# Patient Record
Sex: FEMALE | Race: WHITE | HISPANIC OR LATINO
[De-identification: names, ages, dates, MRNs, and addresses within clinical notes are randomized per-mention and may not be internally consistent; named-entity substitution may affect disease eponyms.]

---

## 2017-02-15 ENCOUNTER — LABORATORY RESULT (OUTPATIENT)
Age: 32
End: 2017-02-15

## 2017-02-15 ENCOUNTER — APPOINTMENT (OUTPATIENT)
Dept: INTERNAL MEDICINE | Facility: CLINIC | Age: 32
End: 2017-02-15

## 2017-02-15 VITALS
BODY MASS INDEX: 36.69 KG/M2 | TEMPERATURE: 98.5 F | SYSTOLIC BLOOD PRESSURE: 104 MMHG | HEART RATE: 101 BPM | HEIGHT: 59 IN | DIASTOLIC BLOOD PRESSURE: 70 MMHG | WEIGHT: 182 LBS

## 2017-02-15 DIAGNOSIS — Z86.39 PERSONAL HISTORY OF OTHER ENDOCRINE, NUTRITIONAL AND METABOLIC DISEASE: ICD-10-CM

## 2017-02-16 LAB
25(OH)D3 SERPL-MCNC: 20.1 NG/ML
ALBUMIN SERPL ELPH-MCNC: 4.1 G/DL
ALP BLD-CCNC: 190 U/L
ALT SERPL-CCNC: 43 U/L
ANION GAP SERPL CALC-SCNC: 16 MMOL/L
APPEARANCE: ABNORMAL
AST SERPL-CCNC: 48 U/L
BASOPHILS # BLD AUTO: 0.02 K/UL
BASOPHILS NFR BLD AUTO: 0.3 %
BILIRUB SERPL-MCNC: 0.3 MG/DL
BILIRUBIN URINE: ABNORMAL
BLOOD URINE: ABNORMAL
BUN SERPL-MCNC: 7 MG/DL
CALCIUM SERPL-MCNC: 9.7 MG/DL
CARDIOLIPIN AB SER IA-ACNC: NEGATIVE
CHLORIDE SERPL-SCNC: 103 MMOL/L
CHOLEST SERPL-MCNC: 135 MG/DL
CHOLEST/HDLC SERPL: 3.6 RATIO
CO2 SERPL-SCNC: 24 MMOL/L
COLOR: ABNORMAL
CREAT SERPL-MCNC: 0.77 MG/DL
DSDNA AB SER-ACNC: <12 IU/ML
EOSINOPHIL # BLD AUTO: 0.03 K/UL
EOSINOPHIL NFR BLD AUTO: 0.5 %
FERRITIN SERPL-MCNC: 16.1 NG/ML
GLUCOSE QUALITATIVE U: NORMAL MG/DL
GLUCOSE SERPL-MCNC: 91 MG/DL
HBA1C MFR BLD HPLC: 5.6 %
HCT VFR BLD CALC: 35.8 %
HDLC SERPL-MCNC: 37 MG/DL
HGB BLD-MCNC: 10.8 G/DL
IMM GRANULOCYTES NFR BLD AUTO: 0.3 %
IRON SATN MFR SERPL: 6 %
IRON SERPL-MCNC: 25 UG/DL
KETONES URINE: NEGATIVE
LDLC SERPL CALC-MCNC: 84 MG/DL
LEUKOCYTE ESTERASE URINE: ABNORMAL
LYMPHOCYTES # BLD AUTO: 1.79 K/UL
LYMPHOCYTES NFR BLD AUTO: 28.7 %
MAN DIFF?: NORMAL
MCHC RBC-ENTMCNC: 23.5 PG
MCHC RBC-ENTMCNC: 30.2 GM/DL
MCV RBC AUTO: 78 FL
MEV IGG FLD QL IA: 95.5 AU/ML
MEV IGG+IGM SER-IMP: POSITIVE
MITOCHONDRIA AB SER IF-ACNC: NORMAL
MONOCYTES # BLD AUTO: 0.4 K/UL
MONOCYTES NFR BLD AUTO: 6.4 %
MUV AB SER-ACNC: POSITIVE
MUV IGG SER QL IA: 90.2 AU/ML
NEUTROPHILS # BLD AUTO: 3.98 K/UL
NEUTROPHILS NFR BLD AUTO: 63.8 %
NITRITE URINE: NEGATIVE
PH URINE: 5
PLATELET # BLD AUTO: 333 K/UL
POTASSIUM SERPL-SCNC: 3.8 MMOL/L
PROT SERPL-MCNC: 7.7 G/DL
PROTEIN URINE: 100 MG/DL
RBC # BLD: 4.59 M/UL
RBC # FLD: 17.8 %
RUBV IGG FLD-ACNC: 3.2 INDEX
RUBV IGG SER-IMP: POSITIVE
SMOOTH MUSCLE AB SER QL IF: ABNORMAL
SODIUM SERPL-SCNC: 143 MMOL/L
SPECIFIC GRAVITY URINE: 1.02
T PALLIDUM AB SER QL IA: NEGATIVE
THYROGLOB AB SERPL-ACNC: 25.9 IU/ML
THYROPEROXIDASE AB SERPL IA-ACNC: 386 IU/ML
TIBC SERPL-MCNC: 390 UG/DL
TRIGL SERPL-MCNC: 72 MG/DL
TSH SERPL-ACNC: 2.05 UIU/ML
UIBC SERPL-MCNC: 365 UG/DL
UROBILINOGEN URINE: NORMAL MG/DL
VZV AB TITR SER: POSITIVE
VZV IGG SER IF-ACNC: 2576 INDEX
WBC # FLD AUTO: 6.24 K/UL

## 2017-02-17 LAB
ANA PAT FLD IF-IMP: ABNORMAL
ANA SER IF-ACNC: ABNORMAL
CENTROMERE IGG SER-ACNC: <0.2 AL
ENA RNP AB SER IA-ACNC: 0.6 AL
ENA SCL70 IGG SER IA-ACNC: <0.2 AL
ENA SM AB SER IA-ACNC: <0.2 AL
ENA SS-A AB SER IA-ACNC: <0.2 AL
ENA SS-B AB SER IA-ACNC: 0.3 AL
FRUCTOSAMINE SERPL-MCNC: 238 UMOL/L

## 2017-02-21 LAB — LKM AB SER QL IF: 0.2 UNITS

## 2017-02-22 ENCOUNTER — OTHER (OUTPATIENT)
Age: 32
End: 2017-02-22

## 2017-03-01 ENCOUNTER — APPOINTMENT (OUTPATIENT)
Dept: CHRONIC DISEASE MANAGEMENT | Facility: CLINIC | Age: 32
End: 2017-03-01

## 2017-03-20 ENCOUNTER — APPOINTMENT (OUTPATIENT)
Dept: RHEUMATOLOGY | Facility: CLINIC | Age: 32
End: 2017-03-20

## 2017-03-20 VITALS
SYSTOLIC BLOOD PRESSURE: 100 MMHG | DIASTOLIC BLOOD PRESSURE: 64 MMHG | HEART RATE: 92 BPM | BODY MASS INDEX: 35.88 KG/M2 | WEIGHT: 178 LBS | HEIGHT: 59 IN | TEMPERATURE: 98.4 F

## 2017-03-20 DIAGNOSIS — Z87.39 PERSONAL HISTORY OF OTHER DISEASES OF THE MUSCULOSKELETAL SYSTEM AND CONNECTIVE TISSUE: ICD-10-CM

## 2017-03-21 LAB
FOLATE SERPL-MCNC: 14.4 NG/ML
VIT B12 SERPL-MCNC: 482 PG/ML

## 2017-03-22 LAB
B2 GLYCOPROT1 IGA SERPL IA-ACNC: <5 SAU
B2 GLYCOPROT1 IGG SER-ACNC: <5 SGU
B2 GLYCOPROT1 IGM SER-ACNC: <5 SMU
CONFIRM: 28.3 SEC
DRVVT IMM 1:2 NP PPP: NORMAL
DRVVT SCREEN TO CONFIRM RATIO: 1.01 RATIO
SCREEN DRVVT: 32.2 SEC
SILICA CLOTTING TIME INTERPRETATION: NORMAL
SILICA CLOTTING TIME S/C: 1.1 RATIO

## 2017-03-30 ENCOUNTER — APPOINTMENT (OUTPATIENT)
Dept: INTERNAL MEDICINE | Facility: CLINIC | Age: 32
End: 2017-03-30

## 2017-03-30 VITALS
DIASTOLIC BLOOD PRESSURE: 74 MMHG | WEIGHT: 180 LBS | BODY MASS INDEX: 36.36 KG/M2 | SYSTOLIC BLOOD PRESSURE: 104 MMHG | TEMPERATURE: 98.7 F | HEART RATE: 102 BPM

## 2017-03-31 LAB
HAV IGG+IGM SER QL: NONREACTIVE
HBV CORE IGG+IGM SER QL: NONREACTIVE
HBV SURFACE AB SER QL: REACTIVE
HBV SURFACE AB SERPL IA-ACNC: >1000 MIU/ML
HCG SERPL-MCNC: <1 MIU/ML

## 2017-04-03 ENCOUNTER — APPOINTMENT (OUTPATIENT)
Dept: RHEUMATOLOGY | Facility: CLINIC | Age: 32
End: 2017-04-03

## 2017-05-15 ENCOUNTER — APPOINTMENT (OUTPATIENT)
Dept: RHEUMATOLOGY | Facility: CLINIC | Age: 32
End: 2017-05-15

## 2017-05-15 VITALS
SYSTOLIC BLOOD PRESSURE: 110 MMHG | OXYGEN SATURATION: 99 % | HEART RATE: 77 BPM | HEIGHT: 59 IN | RESPIRATION RATE: 16 BRPM | BODY MASS INDEX: 36.69 KG/M2 | TEMPERATURE: 98.9 F | DIASTOLIC BLOOD PRESSURE: 60 MMHG | WEIGHT: 182 LBS

## 2017-05-16 ENCOUNTER — APPOINTMENT (OUTPATIENT)
Dept: INTERNAL MEDICINE | Facility: CLINIC | Age: 32
End: 2017-05-16

## 2017-05-31 ENCOUNTER — TRANSCRIPTION ENCOUNTER (OUTPATIENT)
Age: 32
End: 2017-05-31

## 2017-06-16 ENCOUNTER — APPOINTMENT (OUTPATIENT)
Dept: RHEUMATOLOGY | Facility: CLINIC | Age: 32
End: 2017-06-16

## 2017-07-31 ENCOUNTER — APPOINTMENT (OUTPATIENT)
Dept: RHEUMATOLOGY | Facility: CLINIC | Age: 32
End: 2017-07-31
Payer: MEDICAID

## 2017-07-31 VITALS
WEIGHT: 187 LBS | HEART RATE: 104 BPM | TEMPERATURE: 98.6 F | RESPIRATION RATE: 18 BRPM | DIASTOLIC BLOOD PRESSURE: 62 MMHG | OXYGEN SATURATION: 99 % | SYSTOLIC BLOOD PRESSURE: 94 MMHG | BODY MASS INDEX: 37.77 KG/M2

## 2017-07-31 PROCEDURE — 99214 OFFICE O/P EST MOD 30 MIN: CPT

## 2017-08-01 ENCOUNTER — TRANSCRIPTION ENCOUNTER (OUTPATIENT)
Age: 32
End: 2017-08-01

## 2017-08-01 LAB
ALBUMIN SERPL ELPH-MCNC: 4 G/DL
ALP BLD-CCNC: 159 U/L
ALT SERPL-CCNC: 11 U/L
ANION GAP SERPL CALC-SCNC: 15 MMOL/L
AST SERPL-CCNC: 15 U/L
BASOPHILS # BLD AUTO: 0.01 K/UL
BASOPHILS NFR BLD AUTO: 0.1 %
BILIRUB SERPL-MCNC: 0.3 MG/DL
BUN SERPL-MCNC: 10 MG/DL
CALCIUM SERPL-MCNC: 8.8 MG/DL
CHLORIDE SERPL-SCNC: 104 MMOL/L
CO2 SERPL-SCNC: 23 MMOL/L
CREAT SERPL-MCNC: 0.79 MG/DL
CRP SERPL-MCNC: 1.1 MG/DL
EOSINOPHIL # BLD AUTO: 0.06 K/UL
EOSINOPHIL NFR BLD AUTO: 0.8 %
ERYTHROCYTE [SEDIMENTATION RATE] IN BLOOD BY WESTERGREN METHOD: 18 MM/HR
GLUCOSE SERPL-MCNC: 89 MG/DL
HCT VFR BLD CALC: 35.7 %
HGB BLD-MCNC: 11.2 G/DL
IMM GRANULOCYTES NFR BLD AUTO: 0.1 %
LYMPHOCYTES # BLD AUTO: 1.64 K/UL
LYMPHOCYTES NFR BLD AUTO: 23 %
MAN DIFF?: NORMAL
MCHC RBC-ENTMCNC: 24.5 PG
MCHC RBC-ENTMCNC: 31.4 GM/DL
MCV RBC AUTO: 77.9 FL
MONOCYTES # BLD AUTO: 0.42 K/UL
MONOCYTES NFR BLD AUTO: 5.9 %
NEUTROPHILS # BLD AUTO: 4.99 K/UL
NEUTROPHILS NFR BLD AUTO: 70.1 %
PLATELET # BLD AUTO: 257 K/UL
POTASSIUM SERPL-SCNC: 4.6 MMOL/L
PROT SERPL-MCNC: 7.4 G/DL
RBC # BLD: 4.58 M/UL
RBC # FLD: 16.5 %
SODIUM SERPL-SCNC: 142 MMOL/L
WBC # FLD AUTO: 7.13 K/UL

## 2017-09-06 ENCOUNTER — APPOINTMENT (OUTPATIENT)
Dept: INTERNAL MEDICINE | Facility: CLINIC | Age: 32
End: 2017-09-06

## 2017-09-18 ENCOUNTER — APPOINTMENT (OUTPATIENT)
Dept: RHEUMATOLOGY | Facility: CLINIC | Age: 32
End: 2017-09-18
Payer: MEDICAID

## 2017-09-18 VITALS
DIASTOLIC BLOOD PRESSURE: 60 MMHG | HEART RATE: 80 BPM | TEMPERATURE: 98.3 F | OXYGEN SATURATION: 99 % | RESPIRATION RATE: 18 BRPM | BODY MASS INDEX: 38.3 KG/M2 | SYSTOLIC BLOOD PRESSURE: 94 MMHG | HEIGHT: 59 IN | WEIGHT: 190 LBS

## 2017-09-18 PROCEDURE — 99213 OFFICE O/P EST LOW 20 MIN: CPT

## 2017-10-05 LAB
ALP BLD-CCNC: 156 U/L
ALP BONE CFR SERPL: 31 %
ALP INTEST CFR SERPL: 8 %
ALP LIVER CFR SERPL: 61 %
ALP MACRO CFR SERPL: 0 %
ALP PLAC CFR SERPL: 0 %

## 2017-10-23 ENCOUNTER — LABORATORY RESULT (OUTPATIENT)
Age: 32
End: 2017-10-23

## 2017-10-23 ENCOUNTER — OTHER (OUTPATIENT)
Age: 32
End: 2017-10-23

## 2017-10-23 ENCOUNTER — RX RENEWAL (OUTPATIENT)
Age: 32
End: 2017-10-23

## 2017-10-23 ENCOUNTER — APPOINTMENT (OUTPATIENT)
Dept: INTERNAL MEDICINE | Facility: CLINIC | Age: 32
End: 2017-10-23
Payer: MEDICAID

## 2017-10-23 VITALS
BODY MASS INDEX: 37.5 KG/M2 | DIASTOLIC BLOOD PRESSURE: 72 MMHG | SYSTOLIC BLOOD PRESSURE: 114 MMHG | TEMPERATURE: 98.5 F | HEART RATE: 92 BPM | WEIGHT: 186 LBS | HEIGHT: 59 IN | OXYGEN SATURATION: 99 %

## 2017-10-23 PROCEDURE — 99215 OFFICE O/P EST HI 40 MIN: CPT

## 2017-10-23 RX ORDER — FERROUS SULFATE TAB EC 324 MG (65 MG FE EQUIVALENT) 324 (65 FE) MG
324 (65 FE) TABLET DELAYED RESPONSE ORAL
Qty: 60 | Refills: 3 | Status: COMPLETED | COMMUNITY
Start: 2017-02-16 | End: 2017-10-23

## 2017-10-23 RX ORDER — HYDROXYCHLOROQUINE SULFATE 200 MG/1
200 TABLET, FILM COATED ORAL TWICE DAILY
Qty: 60 | Refills: 1 | Status: COMPLETED | COMMUNITY
Start: 2017-03-20 | End: 2017-10-23

## 2017-10-23 RX ORDER — CHOLECALCIFEROL (VITAMIN D3) 1250 MCG
1.25 MG CAPSULE ORAL
Qty: 4 | Refills: 0 | Status: COMPLETED | COMMUNITY
Start: 2017-03-20 | End: 2017-10-23

## 2017-10-23 RX ORDER — CYCLOBENZAPRINE HYDROCHLORIDE 5 MG/1
5 TABLET, FILM COATED ORAL
Qty: 30 | Refills: 1 | Status: COMPLETED | COMMUNITY
Start: 2017-03-20 | End: 2017-10-23

## 2017-10-23 RX ORDER — CHOLECALCIFEROL (VITAMIN D3) 1250 MCG
1.25 MG CAPSULE ORAL
Qty: 8 | Refills: 0 | Status: COMPLETED | COMMUNITY
Start: 2017-02-16 | End: 2017-10-23

## 2017-10-24 LAB
25(OH)D3 SERPL-MCNC: 36.7 NG/ML
ALBUMIN SERPL ELPH-MCNC: 4.2 G/DL
ALP BLD-CCNC: 175 U/L
ALT SERPL-CCNC: 14 U/L
ANION GAP SERPL CALC-SCNC: 14 MMOL/L
AST SERPL-CCNC: 8 U/L
BASOPHILS # BLD AUTO: 0.02 K/UL
BASOPHILS NFR BLD AUTO: 0.2 %
BILIRUB SERPL-MCNC: 0.2 MG/DL
BUN SERPL-MCNC: 8 MG/DL
CALCIUM SERPL-MCNC: 9.3 MG/DL
CHLORIDE SERPL-SCNC: 103 MMOL/L
CO2 SERPL-SCNC: 24 MMOL/L
CREAT SERPL-MCNC: 0.78 MG/DL
EOSINOPHIL # BLD AUTO: 0.04 K/UL
EOSINOPHIL NFR BLD AUTO: 0.5 %
FOLATE SERPL-MCNC: 10.5 NG/ML
GGT SERPL-CCNC: 27 U/L
GLUCOSE SERPL-MCNC: 82 MG/DL
HCT VFR BLD CALC: 33.7 %
HGB BLD-MCNC: 10.7 G/DL
IMM GRANULOCYTES NFR BLD AUTO: 0.1 %
LYMPHOCYTES # BLD AUTO: 1.87 K/UL
LYMPHOCYTES NFR BLD AUTO: 23.2 %
MAN DIFF?: NORMAL
MCHC RBC-ENTMCNC: 24.5 PG
MCHC RBC-ENTMCNC: 31.8 GM/DL
MCV RBC AUTO: 77.1 FL
MONOCYTES # BLD AUTO: 0.55 K/UL
MONOCYTES NFR BLD AUTO: 6.8 %
NEUTROPHILS # BLD AUTO: 5.57 K/UL
NEUTROPHILS NFR BLD AUTO: 69.2 %
PLATELET # BLD AUTO: 270 K/UL
POTASSIUM SERPL-SCNC: 4.1 MMOL/L
PROT SERPL-MCNC: 7.5 G/DL
RBC # BLD: 4.37 M/UL
RBC # FLD: 16.1 %
SODIUM SERPL-SCNC: 141 MMOL/L
VIT B12 SERPL-MCNC: 356 PG/ML
WBC # FLD AUTO: 8.06 K/UL

## 2017-11-20 ENCOUNTER — APPOINTMENT (OUTPATIENT)
Dept: RHEUMATOLOGY | Facility: CLINIC | Age: 32
End: 2017-11-20

## 2017-11-23 ENCOUNTER — TRANSCRIPTION ENCOUNTER (OUTPATIENT)
Age: 32
End: 2017-11-23

## 2017-12-04 ENCOUNTER — APPOINTMENT (OUTPATIENT)
Dept: RHEUMATOLOGY | Facility: CLINIC | Age: 32
End: 2017-12-04
Payer: MEDICAID

## 2017-12-04 VITALS
HEART RATE: 103 BPM | DIASTOLIC BLOOD PRESSURE: 64 MMHG | HEIGHT: 59 IN | TEMPERATURE: 98.4 F | BODY MASS INDEX: 36.69 KG/M2 | WEIGHT: 182 LBS | SYSTOLIC BLOOD PRESSURE: 100 MMHG | RESPIRATION RATE: 18 BRPM | OXYGEN SATURATION: 99 %

## 2017-12-04 PROCEDURE — 99213 OFFICE O/P EST LOW 20 MIN: CPT

## 2017-12-18 ENCOUNTER — APPOINTMENT (OUTPATIENT)
Dept: INTERNAL MEDICINE | Facility: CLINIC | Age: 32
End: 2017-12-18
Payer: MEDICAID

## 2017-12-18 ENCOUNTER — APPOINTMENT (OUTPATIENT)
Dept: RHEUMATOLOGY | Facility: CLINIC | Age: 32
End: 2017-12-18

## 2017-12-18 VITALS
OXYGEN SATURATION: 99 % | SYSTOLIC BLOOD PRESSURE: 100 MMHG | BODY MASS INDEX: 38.3 KG/M2 | HEIGHT: 59 IN | DIASTOLIC BLOOD PRESSURE: 70 MMHG | TEMPERATURE: 98.1 F | WEIGHT: 190 LBS | HEART RATE: 70 BPM

## 2017-12-18 DIAGNOSIS — N64.4 MASTODYNIA: ICD-10-CM

## 2017-12-18 PROCEDURE — G0008: CPT

## 2017-12-18 PROCEDURE — 99214 OFFICE O/P EST MOD 30 MIN: CPT | Mod: 25

## 2017-12-18 PROCEDURE — 90674 CCIIV4 VAC NO PRSV 0.5 ML IM: CPT

## 2017-12-18 RX ORDER — FERROUS SULFATE TAB EC 325 MG (65 MG FE EQUIVALENT) 325 (65 FE) MG
325 (65 FE) TABLET DELAYED RESPONSE ORAL
Qty: 60 | Refills: 6 | Status: DISCONTINUED | COMMUNITY
Start: 2017-10-24 | End: 2017-12-18

## 2018-02-05 ENCOUNTER — APPOINTMENT (OUTPATIENT)
Dept: RHEUMATOLOGY | Facility: CLINIC | Age: 33
End: 2018-02-05
Payer: MEDICAID

## 2018-02-05 VITALS
RESPIRATION RATE: 18 BRPM | SYSTOLIC BLOOD PRESSURE: 106 MMHG | OXYGEN SATURATION: 96 % | TEMPERATURE: 97.9 F | HEART RATE: 102 BPM | HEIGHT: 59 IN | WEIGHT: 189 LBS | DIASTOLIC BLOOD PRESSURE: 70 MMHG | BODY MASS INDEX: 38.1 KG/M2

## 2018-02-05 PROCEDURE — 99213 OFFICE O/P EST LOW 20 MIN: CPT

## 2018-02-12 ENCOUNTER — LABORATORY RESULT (OUTPATIENT)
Age: 33
End: 2018-02-12

## 2018-02-12 ENCOUNTER — APPOINTMENT (OUTPATIENT)
Dept: INTERNAL MEDICINE | Facility: CLINIC | Age: 33
End: 2018-02-12
Payer: MEDICAID

## 2018-02-12 VITALS
HEART RATE: 100 BPM | WEIGHT: 185 LBS | SYSTOLIC BLOOD PRESSURE: 118 MMHG | HEIGHT: 59 IN | TEMPERATURE: 97.8 F | OXYGEN SATURATION: 98 % | DIASTOLIC BLOOD PRESSURE: 74 MMHG | BODY MASS INDEX: 37.29 KG/M2

## 2018-02-12 LAB
ALBUMIN SERPL ELPH-MCNC: 4.4 G/DL
ALP BLD-CCNC: 169 U/L
ALT SERPL-CCNC: 23 U/L
ANION GAP SERPL CALC-SCNC: 16 MMOL/L
APPEARANCE: ABNORMAL
AST SERPL-CCNC: 16 U/L
BASOPHILS # BLD AUTO: 0.02 K/UL
BASOPHILS NFR BLD AUTO: 0.2 %
BILIRUB SERPL-MCNC: 0.2 MG/DL
BILIRUBIN URINE: NEGATIVE
BLOOD URINE: NEGATIVE
BUN SERPL-MCNC: 9 MG/DL
CALCIUM SERPL-MCNC: 9.4 MG/DL
CHLORIDE SERPL-SCNC: 101 MMOL/L
CHOLEST SERPL-MCNC: 154 MG/DL
CHOLEST/HDLC SERPL: 2.8 RATIO
CO2 SERPL-SCNC: 23 MMOL/L
COLOR: YELLOW
CREAT SERPL-MCNC: 0.79 MG/DL
EOSINOPHIL # BLD AUTO: 0.04 K/UL
EOSINOPHIL NFR BLD AUTO: 0.4 %
FERRITIN SERPL-MCNC: 13 NG/ML
FOLATE SERPL-MCNC: 9.2 NG/ML
FRUCTOSAMINE SERPL-MCNC: 218 UMOL/L
GLUCOSE QUALITATIVE U: NEGATIVE MG/DL
GLUCOSE SERPL-MCNC: 103 MG/DL
HBA1C MFR BLD HPLC: 5.5 %
HCT VFR BLD CALC: 36.1 %
HDLC SERPL-MCNC: 55 MG/DL
HGB BLD-MCNC: 11 G/DL
IMM GRANULOCYTES NFR BLD AUTO: 0.2 %
IRON SATN MFR SERPL: 6 %
IRON SERPL-MCNC: 28 UG/DL
KETONES URINE: NEGATIVE
LDLC SERPL CALC-MCNC: 83 MG/DL
LEUKOCYTE ESTERASE URINE: ABNORMAL
LYMPHOCYTES # BLD AUTO: 1.91 K/UL
LYMPHOCYTES NFR BLD AUTO: 20.7 %
MAN DIFF?: NORMAL
MCHC RBC-ENTMCNC: 23.8 PG
MCHC RBC-ENTMCNC: 30.5 GM/DL
MCV RBC AUTO: 78 FL
MONOCYTES # BLD AUTO: 0.42 K/UL
MONOCYTES NFR BLD AUTO: 4.5 %
NEUTROPHILS # BLD AUTO: 6.83 K/UL
NEUTROPHILS NFR BLD AUTO: 74 %
NITRITE URINE: NEGATIVE
PH URINE: 8
PLATELET # BLD AUTO: 321 K/UL
POTASSIUM SERPL-SCNC: 4.2 MMOL/L
PROT SERPL-MCNC: 7.8 G/DL
PROTEIN URINE: NEGATIVE MG/DL
RBC # BLD: 4.63 M/UL
RBC # FLD: 15.5 %
SODIUM SERPL-SCNC: 140 MMOL/L
SPECIFIC GRAVITY URINE: 1.02
TIBC SERPL-MCNC: 456 UG/DL
TRIGL SERPL-MCNC: 79 MG/DL
TSH SERPL-ACNC: 1.38 UIU/ML
UIBC SERPL-MCNC: 428 UG/DL
UROBILINOGEN URINE: 1 MG/DL
VIT B12 SERPL-MCNC: 438 PG/ML
WBC # FLD AUTO: 9.24 K/UL

## 2018-02-12 PROCEDURE — 99395 PREV VISIT EST AGE 18-39: CPT | Mod: 25

## 2018-02-12 PROCEDURE — 99401 PREV MED CNSL INDIV APPRX 15: CPT

## 2018-02-13 LAB — 25(OH)D3 SERPL-MCNC: 26.4 NG/ML

## 2018-05-07 ENCOUNTER — APPOINTMENT (OUTPATIENT)
Dept: RHEUMATOLOGY | Facility: CLINIC | Age: 33
End: 2018-05-07
Payer: MEDICAID

## 2018-05-07 VITALS
TEMPERATURE: 98.7 F | HEIGHT: 59 IN | OXYGEN SATURATION: 99 % | HEART RATE: 92 BPM | RESPIRATION RATE: 18 BRPM | DIASTOLIC BLOOD PRESSURE: 64 MMHG | SYSTOLIC BLOOD PRESSURE: 88 MMHG | BODY MASS INDEX: 37.9 KG/M2 | WEIGHT: 188 LBS

## 2018-05-07 PROCEDURE — 99214 OFFICE O/P EST MOD 30 MIN: CPT

## 2018-05-08 LAB
ALBUMIN SERPL ELPH-MCNC: 3.9 G/DL
ALP BLD-CCNC: 175 U/L
ALT SERPL-CCNC: 11 U/L
ANION GAP SERPL CALC-SCNC: 16 MMOL/L
AST SERPL-CCNC: 15 U/L
BASOPHILS # BLD AUTO: 0.03 K/UL
BASOPHILS NFR BLD AUTO: 0.3 %
BILIRUB SERPL-MCNC: 0.2 MG/DL
BUN SERPL-MCNC: 12 MG/DL
CALCIUM SERPL-MCNC: 9.6 MG/DL
CHLORIDE SERPL-SCNC: 103 MMOL/L
CO2 SERPL-SCNC: 22 MMOL/L
CREAT SERPL-MCNC: 0.73 MG/DL
EOSINOPHIL # BLD AUTO: 0.06 K/UL
EOSINOPHIL NFR BLD AUTO: 0.7 %
GLUCOSE SERPL-MCNC: 88 MG/DL
HCT VFR BLD CALC: 35.7 %
HGB BLD-MCNC: 10.7 G/DL
IMM GRANULOCYTES NFR BLD AUTO: 0.2 %
LYMPHOCYTES # BLD AUTO: 2.24 K/UL
LYMPHOCYTES NFR BLD AUTO: 25.1 %
MAN DIFF?: NORMAL
MCHC RBC-ENTMCNC: 24 PG
MCHC RBC-ENTMCNC: 30 GM/DL
MCV RBC AUTO: 80.2 FL
MONOCYTES # BLD AUTO: 0.45 K/UL
MONOCYTES NFR BLD AUTO: 5.1 %
NEUTROPHILS # BLD AUTO: 6.11 K/UL
NEUTROPHILS NFR BLD AUTO: 68.6 %
PLATELET # BLD AUTO: 303 K/UL
POTASSIUM SERPL-SCNC: 4 MMOL/L
PROT SERPL-MCNC: 7.5 G/DL
RBC # BLD: 4.45 M/UL
RBC # FLD: 16.6 %
SODIUM SERPL-SCNC: 141 MMOL/L
WBC # FLD AUTO: 8.91 K/UL

## 2018-06-11 ENCOUNTER — APPOINTMENT (OUTPATIENT)
Dept: INTERNAL MEDICINE | Facility: CLINIC | Age: 33
End: 2018-06-11
Payer: MEDICAID

## 2018-06-11 VITALS
OXYGEN SATURATION: 98 % | DIASTOLIC BLOOD PRESSURE: 68 MMHG | WEIGHT: 183 LBS | RESPIRATION RATE: 16 BRPM | SYSTOLIC BLOOD PRESSURE: 110 MMHG | HEIGHT: 59 IN | TEMPERATURE: 98.5 F | BODY MASS INDEX: 36.89 KG/M2 | HEART RATE: 80 BPM

## 2018-06-11 PROCEDURE — 99214 OFFICE O/P EST MOD 30 MIN: CPT

## 2018-06-11 NOTE — COUNSELING
[Healthy eating counseling provided] : healthy eating [Weight Self Once Weekly] : Weight self once weekly [Keep Food Diary] : Keep food diary [Low Fat Diet] : Low fat diet [Decrease Portions] : Decrease food portions [Low Salt Diet] : Low salt diet [___ min/wk activity recommended] : [unfilled] min/wk activity recommended [Walking] : Walking [Sleep ___ hours/day] : Sleep [unfilled] hours/day [Engage in a relaxing activity] : Engage in a relaxing activity [Plan in advance] : Plan in advance [None] : None [Good understanding] : Patient has a good understanding of lifestyle changes and the steps needed to achieve self management goals

## 2018-06-11 NOTE — HEALTH RISK ASSESSMENT
[] : No [No falls in past year] : Patient reported no falls in the past year [0] : 2) Feeling down, depressed, or hopeless: Not at all (0) [[id number]] : 0

## 2018-06-11 NOTE — ASSESSMENT
[FreeTextEntry1] : anemia -  pt has iron def and is taking her iron .  Fibromyalgia-  she is feeling much better.  She will continue to exercise and lose weight  and healthy eating avoid processed foods.  she should avoid  canned foods and eat raw and healthy foods.  She can decrease her gluten in her diet  since some studies have shown improvement of her fibromyalgia.  She has lost 5 lbs .

## 2018-07-02 ENCOUNTER — APPOINTMENT (OUTPATIENT)
Dept: RHEUMATOLOGY | Facility: CLINIC | Age: 33
End: 2018-07-02
Payer: MEDICAID

## 2018-07-02 VITALS
OXYGEN SATURATION: 99 % | HEART RATE: 72 BPM | RESPIRATION RATE: 16 BRPM | DIASTOLIC BLOOD PRESSURE: 70 MMHG | TEMPERATURE: 98.6 F | BODY MASS INDEX: 36.69 KG/M2 | SYSTOLIC BLOOD PRESSURE: 120 MMHG | WEIGHT: 182 LBS | HEIGHT: 59 IN

## 2018-07-02 PROCEDURE — 99213 OFFICE O/P EST LOW 20 MIN: CPT

## 2018-07-02 RX ORDER — MILNACIPRAN HYDROCHLORIDE 25 MG/1
25 TABLET, FILM COATED ORAL
Qty: 60 | Refills: 2 | Status: COMPLETED | COMMUNITY
Start: 2017-07-31 | End: 2018-07-02

## 2018-08-17 ENCOUNTER — TRANSCRIPTION ENCOUNTER (OUTPATIENT)
Age: 33
End: 2018-08-17

## 2018-10-01 ENCOUNTER — APPOINTMENT (OUTPATIENT)
Dept: RHEUMATOLOGY | Facility: CLINIC | Age: 33
End: 2018-10-01

## 2018-10-04 ENCOUNTER — EMERGENCY (EMERGENCY)
Facility: HOSPITAL | Age: 33
LOS: 1 days | Discharge: HOME | End: 2018-10-04
Attending: EMERGENCY MEDICINE
Payer: SELF-PAY

## 2018-10-04 VITALS
TEMPERATURE: 98 F | SYSTOLIC BLOOD PRESSURE: 133 MMHG | DIASTOLIC BLOOD PRESSURE: 90 MMHG | OXYGEN SATURATION: 100 % | RESPIRATION RATE: 18 BRPM | HEART RATE: 106 BPM

## 2018-10-04 VITALS — HEIGHT: 59 IN | WEIGHT: 184.97 LBS

## 2018-10-04 PROCEDURE — 99284 EMERGENCY DEPT VISIT MOD MDM: CPT

## 2018-10-04 PROCEDURE — 99283 EMERGENCY DEPT VISIT LOW MDM: CPT | Mod: 25

## 2018-10-04 PROCEDURE — 72040 X-RAY EXAM NECK SPINE 2-3 VW: CPT

## 2018-10-04 PROCEDURE — 72040 X-RAY EXAM NECK SPINE 2-3 VW: CPT | Mod: 26

## 2018-10-04 RX ORDER — IBUPROFEN 200 MG
600 TABLET ORAL ONCE
Qty: 0 | Refills: 0 | Status: COMPLETED | OUTPATIENT
Start: 2018-10-04 | End: 2018-10-04

## 2018-10-04 RX ORDER — IBUPROFEN 200 MG
1 TABLET ORAL
Qty: 28 | Refills: 0
Start: 2018-10-04 | End: 2018-10-10

## 2018-10-04 RX ORDER — METHOCARBAMOL 500 MG/1
1 TABLET, FILM COATED ORAL
Qty: 21 | Refills: 0
Start: 2018-10-04 | End: 2018-10-10
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
## 2018-10-08 ENCOUNTER — APPOINTMENT (OUTPATIENT)
Dept: RHEUMATOLOGY | Facility: CLINIC | Age: 33
End: 2018-10-08
Payer: MEDICAID

## 2018-10-08 ENCOUNTER — LABORATORY RESULT (OUTPATIENT)
Age: 33
End: 2018-10-08

## 2018-10-08 VITALS
BODY MASS INDEX: 36.49 KG/M2 | HEART RATE: 76 BPM | OXYGEN SATURATION: 100 % | HEIGHT: 59 IN | RESPIRATION RATE: 16 BRPM | TEMPERATURE: 99.1 F | WEIGHT: 181 LBS | DIASTOLIC BLOOD PRESSURE: 70 MMHG | SYSTOLIC BLOOD PRESSURE: 115 MMHG

## 2018-10-08 DIAGNOSIS — R53.81 OTHER MALAISE: ICD-10-CM

## 2018-10-08 PROCEDURE — 99214 OFFICE O/P EST MOD 30 MIN: CPT

## 2018-10-10 ENCOUNTER — RX RENEWAL (OUTPATIENT)
Age: 33
End: 2018-10-10

## 2018-10-10 LAB
25(OH)D3 SERPL-MCNC: 31.8 NG/ML
ALBUMIN SERPL ELPH-MCNC: 4.4 G/DL
ALP BLD-CCNC: 176 U/L
ALT SERPL-CCNC: 10 U/L
ANA PAT FLD IF-IMP: ABNORMAL
ANA PATTERN: ABNORMAL
ANA SER IF-ACNC: ABNORMAL
ANA TITER: ABNORMAL
ANION GAP SERPL CALC-SCNC: 14 MMOL/L
APPEARANCE: ABNORMAL
AST SERPL-CCNC: 17 U/L
BASOPHILS # BLD AUTO: 0.01 K/UL
BASOPHILS NFR BLD AUTO: 0.1 %
BILIRUB SERPL-MCNC: 0.4 MG/DL
BILIRUBIN URINE: NEGATIVE
BLOOD URINE: NEGATIVE
BUN SERPL-MCNC: 8 MG/DL
C3 SERPL-MCNC: 164 MG/DL
C4 SERPL-MCNC: 52 MG/DL
CALCIUM SERPL-MCNC: 9.6 MG/DL
CCP AB SER IA-ACNC: <8 UNITS
CHLORIDE SERPL-SCNC: 102 MMOL/L
CO2 SERPL-SCNC: 24 MMOL/L
COLOR: YELLOW
CREAT SERPL-MCNC: 0.76 MG/DL
CREAT SPEC-SCNC: 69 MG/DL
CREAT/PROT UR: 0.1 RATIO
CRP SERPL-MCNC: 1.01 MG/DL
DEPRECATED KAPPA LC FREE/LAMBDA SER: 1.01 RATIO
DSDNA AB SER-ACNC: <12 IU/ML
ENA RNP AB SER IA-ACNC: 0.2 AL
ENA SM AB SER IA-ACNC: <0.2 AL
ENA SS-A AB SER IA-ACNC: <0.2 AL
ENA SS-B AB SER IA-ACNC: 0.2 AL
EOSINOPHIL # BLD AUTO: 0.04 K/UL
EOSINOPHIL NFR BLD AUTO: 0.5 %
ERYTHROCYTE [SEDIMENTATION RATE] IN BLOOD BY WESTERGREN METHOD: 28 MM/HR
FERRITIN SERPL-MCNC: 11 NG/ML
FOLATE SERPL-MCNC: 10.9 NG/ML
GLUCOSE QUALITATIVE U: NEGATIVE MG/DL
GLUCOSE SERPL-MCNC: 68 MG/DL
HBV CORE IGG+IGM SER QL: NONREACTIVE
HBV SURFACE AB SER QL: REACTIVE
HBV SURFACE AG SER QL: NONREACTIVE
HCT VFR BLD CALC: 37.2 %
HCV AB SER QL: NONREACTIVE
HCV S/CO RATIO: 0.22 S/CO
HGB BLD-MCNC: 11 G/DL
IGA SER QL IEP: 292 MG/DL
IGG SER QL IEP: 1408 MG/DL
IGM SER QL IEP: 98 MG/DL
IMM GRANULOCYTES NFR BLD AUTO: 0.2 %
IRON SATN MFR SERPL: 5 %
IRON SERPL-MCNC: 23 UG/DL
KAPPA LC CSF-MCNC: 1.82 MG/DL
KAPPA LC SERPL-MCNC: 1.83 MG/DL
KETONES URINE: NEGATIVE
LEUKOCYTE ESTERASE URINE: ABNORMAL
LYMPHOCYTES # BLD AUTO: 1.95 K/UL
LYMPHOCYTES NFR BLD AUTO: 22.6 %
M TB IFN-G BLD-IMP: NEGATIVE
MAN DIFF?: NORMAL
MCHC RBC-ENTMCNC: 23.1 PG
MCHC RBC-ENTMCNC: 29.6 GM/DL
MCV RBC AUTO: 78.2 FL
MONOCYTES # BLD AUTO: 0.28 K/UL
MONOCYTES NFR BLD AUTO: 3.2 %
NEUTROPHILS # BLD AUTO: 6.34 K/UL
NEUTROPHILS NFR BLD AUTO: 73.4 %
NITRITE URINE: NEGATIVE
PH URINE: 7
PLATELET # BLD AUTO: 347 K/UL
POTASSIUM SERPL-SCNC: 4.2 MMOL/L
PROT SERPL-MCNC: 7.9 G/DL
PROT UR-MCNC: 7 MG/DL
PROTEIN URINE: NEGATIVE MG/DL
QUANTIFERON TB PLUS MITOGEN MINUS NIL: 7.99 IU/ML
QUANTIFERON TB PLUS NIL: 0.04 IU/ML
QUANTIFERON TB PLUS TB1 MINUS NIL: 0 IU/ML
QUANTIFERON TB PLUS TB2 MINUS NIL: 0 IU/ML
RBC # BLD: 4.76 M/UL
RBC # FLD: 17.6 %
RF+CCP IGG SER-IMP: NEGATIVE
RHEUMATOID FACT SER QL: <10 IU/ML
SMOOTH MUSCLE AB SER QL IF: ABNORMAL
SODIUM SERPL-SCNC: 140 MMOL/L
SPECIFIC GRAVITY URINE: 1.01
TIBC SERPL-MCNC: 485 UG/DL
TSH SERPL-ACNC: 2.07 UIU/ML
UIBC SERPL-MCNC: 462 UG/DL
UROBILINOGEN URINE: NEGATIVE MG/DL
VIT B12 SERPL-MCNC: 416 PG/ML
WBC # FLD AUTO: 8.64 K/UL

## 2018-10-11 ENCOUNTER — TRANSCRIPTION ENCOUNTER (OUTPATIENT)
Age: 33
End: 2018-10-11

## 2018-10-12 ENCOUNTER — RX RENEWAL (OUTPATIENT)
Age: 33
End: 2018-10-12

## 2018-10-15 ENCOUNTER — APPOINTMENT (OUTPATIENT)
Dept: INTERNAL MEDICINE | Facility: CLINIC | Age: 33
End: 2018-10-15
Payer: MEDICAID

## 2018-10-15 VITALS
DIASTOLIC BLOOD PRESSURE: 70 MMHG | SYSTOLIC BLOOD PRESSURE: 110 MMHG | WEIGHT: 184 LBS | TEMPERATURE: 98.6 F | OXYGEN SATURATION: 99 % | HEIGHT: 59 IN | HEART RATE: 89 BPM | BODY MASS INDEX: 37.09 KG/M2

## 2018-10-15 PROCEDURE — G0008: CPT

## 2018-10-15 PROCEDURE — 99214 OFFICE O/P EST MOD 30 MIN: CPT | Mod: 25

## 2018-10-15 PROCEDURE — 90686 IIV4 VACC NO PRSV 0.5 ML IM: CPT

## 2018-10-15 RX ORDER — MILNACIPRAN HYDROCHLORIDE 50 MG/1
50 TABLET, FILM COATED ORAL
Qty: 30 | Refills: 2 | Status: DISCONTINUED | COMMUNITY
Start: 2018-05-07 | End: 2018-10-15

## 2018-10-15 NOTE — HEALTH RISK ASSESSMENT
[No falls in past year] : Patient reported no falls in the past year [0] : 2) Feeling down, depressed, or hopeless: Not at all (0) [] : No [[id number]] : 0

## 2018-10-15 NOTE — HISTORY OF PRESENT ILLNESS
[FreeTextEntry1] : iron def  RA [de-identified] : Pt has been seeing the rheumatologist and has been diagnosed with RA and is receiving medication. She has iron def anemia due to menorrhagia and cant tolerate oral iron and is to have iron infusions.

## 2018-10-15 NOTE — ASSESSMENT
[FreeTextEntry1] : Arthritis Pt will be starting Orencia and I gave her flu vaccine to day. I discussed other vaccines such as pneumonia and she will discuss with her  rheumatologist.  She also has menorrhagia and iron dsef and cant tolerate oral iron and will obtain infusion two doses.    She is taking prednisone and tolerating it .

## 2018-10-15 NOTE — COUNSELING
[Weight management counseling provided] : Weight management [Healthy eating counseling provided] : healthy eating [Weight Self Once Weekly] : Weight self once weekly [Keep Food Diary] : Keep food diary [Low Fat Diet] : Low fat diet [Decrease Portions] : Decrease food portions [Low Salt Diet] : Low salt diet [___ min/wk activity recommended] : [unfilled] min/wk activity recommended [Walking] : Walking [Sleep ___ hours/day] : Sleep [unfilled] hours/day [Engage in a relaxing activity] : Engage in a relaxing activity [Plan in advance] : Plan in advance [None] : None [Good understanding] : Patient has a good understanding of lifestyle changes and the steps needed to achieve self management goals

## 2018-10-16 ENCOUNTER — RX RENEWAL (OUTPATIENT)
Age: 33
End: 2018-10-16

## 2018-10-16 RX ORDER — ABATACEPT 125 MG/ML
125 INJECTION, SOLUTION SUBCUTANEOUS
Qty: 1 | Refills: 4 | Status: DISCONTINUED | COMMUNITY
Start: 2018-10-12 | End: 2018-10-16

## 2018-10-25 ENCOUNTER — TRANSCRIPTION ENCOUNTER (OUTPATIENT)
Age: 33
End: 2018-10-25

## 2018-10-26 ENCOUNTER — TRANSCRIPTION ENCOUNTER (OUTPATIENT)
Age: 33
End: 2018-10-26

## 2018-10-28 ENCOUNTER — TRANSCRIPTION ENCOUNTER (OUTPATIENT)
Age: 33
End: 2018-10-28

## 2018-10-29 ENCOUNTER — APPOINTMENT (OUTPATIENT)
Dept: RHEUMATOLOGY | Facility: CLINIC | Age: 33
End: 2018-10-29
Payer: MEDICAID

## 2018-10-29 VITALS
HEART RATE: 109 BPM | SYSTOLIC BLOOD PRESSURE: 120 MMHG | DIASTOLIC BLOOD PRESSURE: 83 MMHG | OXYGEN SATURATION: 97 % | BODY MASS INDEX: 36.69 KG/M2 | TEMPERATURE: 98.3 F | RESPIRATION RATE: 16 BRPM | WEIGHT: 182 LBS | HEIGHT: 59 IN

## 2018-10-29 PROCEDURE — 99214 OFFICE O/P EST MOD 30 MIN: CPT

## 2018-11-02 ENCOUNTER — TRANSCRIPTION ENCOUNTER (OUTPATIENT)
Age: 33
End: 2018-11-02

## 2018-11-05 ENCOUNTER — RX RENEWAL (OUTPATIENT)
Age: 33
End: 2018-11-05

## 2018-11-05 ENCOUNTER — TRANSCRIPTION ENCOUNTER (OUTPATIENT)
Age: 33
End: 2018-11-05

## 2018-11-30 ENCOUNTER — TRANSCRIPTION ENCOUNTER (OUTPATIENT)
Age: 33
End: 2018-11-30

## 2018-12-02 ENCOUNTER — TRANSCRIPTION ENCOUNTER (OUTPATIENT)
Age: 33
End: 2018-12-02

## 2018-12-03 ENCOUNTER — TRANSCRIPTION ENCOUNTER (OUTPATIENT)
Age: 33
End: 2018-12-03

## 2018-12-20 ENCOUNTER — APPOINTMENT (OUTPATIENT)
Dept: RHEUMATOLOGY | Facility: CLINIC | Age: 33
End: 2018-12-20
Payer: MEDICAID

## 2018-12-20 VITALS
HEIGHT: 59 IN | TEMPERATURE: 98.4 F | HEART RATE: 76 BPM | WEIGHT: 184 LBS | OXYGEN SATURATION: 100 % | DIASTOLIC BLOOD PRESSURE: 75 MMHG | SYSTOLIC BLOOD PRESSURE: 113 MMHG | RESPIRATION RATE: 16 BRPM | BODY MASS INDEX: 37.09 KG/M2

## 2018-12-20 PROCEDURE — 99214 OFFICE O/P EST MOD 30 MIN: CPT

## 2018-12-20 RX ORDER — PREDNISONE 20 MG/1
20 TABLET ORAL
Qty: 30 | Refills: 0 | Status: DISCONTINUED | COMMUNITY
Start: 2018-10-08 | End: 2018-12-20

## 2018-12-21 LAB
25(OH)D3 SERPL-MCNC: 19.1 NG/ML
ALBUMIN SERPL ELPH-MCNC: 4.5 G/DL
ALP BLD-CCNC: 102 U/L
ALT SERPL-CCNC: 18 U/L
ANION GAP SERPL CALC-SCNC: 12 MMOL/L
AST SERPL-CCNC: 18 U/L
BASOPHILS # BLD AUTO: 0.03 K/UL
BASOPHILS NFR BLD AUTO: 0.5 %
BILIRUB SERPL-MCNC: 0.2 MG/DL
BUN SERPL-MCNC: 10 MG/DL
CALCIUM SERPL-MCNC: 9.5 MG/DL
CHLORIDE SERPL-SCNC: 104 MMOL/L
CHOLEST SERPL-MCNC: 171 MG/DL
CHOLEST/HDLC SERPL: 3.4 RATIO
CO2 SERPL-SCNC: 26 MMOL/L
CREAT SERPL-MCNC: 0.7 MG/DL
CRP SERPL-MCNC: <0.1 MG/DL
EOSINOPHIL # BLD AUTO: 0.06 K/UL
EOSINOPHIL NFR BLD AUTO: 0.9 %
ERYTHROCYTE [SEDIMENTATION RATE] IN BLOOD BY WESTERGREN METHOD: 2 MM/HR
GLUCOSE SERPL-MCNC: 69 MG/DL
HCT VFR BLD CALC: 39.1 %
HDLC SERPL-MCNC: 50 MG/DL
HGB BLD-MCNC: 12.6 G/DL
IMM GRANULOCYTES NFR BLD AUTO: 0.2 %
IRON SATN MFR SERPL: 20 %
IRON SERPL-MCNC: 66 UG/DL
LDLC SERPL CALC-MCNC: 98 MG/DL
LYMPHOCYTES # BLD AUTO: 2.26 K/UL
LYMPHOCYTES NFR BLD AUTO: 34.3 %
MAN DIFF?: NORMAL
MCHC RBC-ENTMCNC: 27.7 PG
MCHC RBC-ENTMCNC: 32.2 GM/DL
MCV RBC AUTO: 85.9 FL
MONOCYTES # BLD AUTO: 0.35 K/UL
MONOCYTES NFR BLD AUTO: 5.3 %
NEUTROPHILS # BLD AUTO: 3.87 K/UL
NEUTROPHILS NFR BLD AUTO: 58.8 %
PLATELET # BLD AUTO: 216 K/UL
POTASSIUM SERPL-SCNC: 4.2 MMOL/L
PROT SERPL-MCNC: 7 G/DL
RBC # BLD: 4.55 M/UL
RBC # FLD: 18.4 %
SODIUM SERPL-SCNC: 142 MMOL/L
TIBC SERPL-MCNC: 337 UG/DL
TRIGL SERPL-MCNC: 114 MG/DL
UIBC SERPL-MCNC: 271 UG/DL
WBC # FLD AUTO: 6.58 K/UL

## 2019-01-02 ENCOUNTER — EMERGENCY (EMERGENCY)
Facility: HOSPITAL | Age: 34
LOS: 1 days | Discharge: HOME | End: 2019-01-02
Attending: EMERGENCY MEDICINE
Payer: MEDICAID

## 2019-01-02 VITALS
OXYGEN SATURATION: 99 % | TEMPERATURE: 99 F | HEART RATE: 72 BPM | RESPIRATION RATE: 16 BRPM | WEIGHT: 186.07 LBS | DIASTOLIC BLOOD PRESSURE: 85 MMHG | SYSTOLIC BLOOD PRESSURE: 124 MMHG | HEIGHT: 59 IN

## 2019-01-02 LAB
ANION GAP SERPL CALC-SCNC: 8 MMOL/L — SIGNIFICANT CHANGE UP (ref 5–17)
APPEARANCE UR: SIGNIFICANT CHANGE UP
BASOPHILS # BLD AUTO: 0 K/UL — SIGNIFICANT CHANGE UP (ref 0–0.2)
BASOPHILS NFR BLD AUTO: 1.4 % — SIGNIFICANT CHANGE UP (ref 0–2)
BILIRUB UR-MCNC: NEGATIVE — SIGNIFICANT CHANGE UP
BUN SERPL-MCNC: 5 MG/DL — LOW (ref 7–18)
CALCIUM SERPL-MCNC: 8.4 MG/DL — SIGNIFICANT CHANGE UP (ref 8.4–10.5)
CHLORIDE SERPL-SCNC: 106 MMOL/L — SIGNIFICANT CHANGE UP (ref 96–108)
CO2 SERPL-SCNC: 25 MMOL/L — SIGNIFICANT CHANGE UP (ref 22–31)
COLOR SPEC: YELLOW — SIGNIFICANT CHANGE UP
CREAT SERPL-MCNC: 0.78 MG/DL — SIGNIFICANT CHANGE UP (ref 0.5–1.3)
DIFF PNL FLD: ABNORMAL
EOSINOPHIL # BLD AUTO: 0 K/UL — SIGNIFICANT CHANGE UP (ref 0–0.5)
EOSINOPHIL NFR BLD AUTO: 1.2 % — SIGNIFICANT CHANGE UP (ref 0–6)
GLUCOSE SERPL-MCNC: 85 MG/DL — SIGNIFICANT CHANGE UP (ref 70–99)
GLUCOSE UR QL: NEGATIVE — SIGNIFICANT CHANGE UP
HCG SERPL-ACNC: <1 MIU/ML — SIGNIFICANT CHANGE UP
HCG UR QL: NEGATIVE — SIGNIFICANT CHANGE UP
HCT VFR BLD CALC: 45.9 % — HIGH (ref 34.5–45)
HGB BLD-MCNC: 14.7 G/DL — SIGNIFICANT CHANGE UP (ref 11.5–15.5)
KETONES UR-MCNC: NEGATIVE — SIGNIFICANT CHANGE UP
LEUKOCYTE ESTERASE UR-ACNC: ABNORMAL
LYMPHOCYTES # BLD AUTO: 1.5 K/UL — SIGNIFICANT CHANGE UP (ref 1–3.3)
LYMPHOCYTES # BLD AUTO: 42.6 % — SIGNIFICANT CHANGE UP (ref 13–44)
MCHC RBC-ENTMCNC: 28.1 PG — SIGNIFICANT CHANGE UP (ref 27–34)
MCHC RBC-ENTMCNC: 32 GM/DL — SIGNIFICANT CHANGE UP (ref 32–36)
MCV RBC AUTO: 87.8 FL — SIGNIFICANT CHANGE UP (ref 80–100)
MONOCYTES # BLD AUTO: 0.5 K/UL — SIGNIFICANT CHANGE UP (ref 0–0.9)
MONOCYTES NFR BLD AUTO: 14.3 % — HIGH (ref 2–14)
NEUTROPHILS # BLD AUTO: 1.4 K/UL — LOW (ref 1.8–7.4)
NEUTROPHILS NFR BLD AUTO: 40.5 % — LOW (ref 43–77)
NITRITE UR-MCNC: NEGATIVE — SIGNIFICANT CHANGE UP
PH UR: 5 — SIGNIFICANT CHANGE UP (ref 5–8)
PLATELET # BLD AUTO: 149 K/UL — LOW (ref 150–400)
POTASSIUM SERPL-MCNC: 3.9 MMOL/L — SIGNIFICANT CHANGE UP (ref 3.5–5.3)
POTASSIUM SERPL-SCNC: 3.9 MMOL/L — SIGNIFICANT CHANGE UP (ref 3.5–5.3)
PROT UR-MCNC: NEGATIVE — SIGNIFICANT CHANGE UP
RBC # BLD: 5.23 M/UL — HIGH (ref 3.8–5.2)
RBC # FLD: 15.7 % — HIGH (ref 10.3–14.5)
SODIUM SERPL-SCNC: 139 MMOL/L — SIGNIFICANT CHANGE UP (ref 135–145)
SP GR SPEC: 1.01 — SIGNIFICANT CHANGE UP (ref 1.01–1.02)
UROBILINOGEN FLD QL: NEGATIVE — SIGNIFICANT CHANGE UP
WBC # BLD: 3.6 K/UL — LOW (ref 3.8–10.5)
WBC # FLD AUTO: 3.6 K/UL — LOW (ref 3.8–10.5)

## 2019-01-02 PROCEDURE — 85027 COMPLETE CBC AUTOMATED: CPT

## 2019-01-02 PROCEDURE — 80048 BASIC METABOLIC PNL TOTAL CA: CPT

## 2019-01-02 PROCEDURE — 36415 COLL VENOUS BLD VENIPUNCTURE: CPT

## 2019-01-02 PROCEDURE — 81025 URINE PREGNANCY TEST: CPT

## 2019-01-02 PROCEDURE — 74176 CT ABD & PELVIS W/O CONTRAST: CPT

## 2019-01-02 PROCEDURE — 96361 HYDRATE IV INFUSION ADD-ON: CPT

## 2019-01-02 PROCEDURE — 99284 EMERGENCY DEPT VISIT MOD MDM: CPT | Mod: 25

## 2019-01-02 PROCEDURE — 87086 URINE CULTURE/COLONY COUNT: CPT

## 2019-01-02 PROCEDURE — 84702 CHORIONIC GONADOTROPIN TEST: CPT

## 2019-01-02 PROCEDURE — 81001 URINALYSIS AUTO W/SCOPE: CPT

## 2019-01-02 PROCEDURE — 99284 EMERGENCY DEPT VISIT MOD MDM: CPT

## 2019-01-02 RX ORDER — SODIUM CHLORIDE 9 MG/ML
1000 INJECTION INTRAMUSCULAR; INTRAVENOUS; SUBCUTANEOUS ONCE
Qty: 0 | Refills: 0 | Status: COMPLETED | OUTPATIENT
Start: 2019-01-02 | End: 2019-01-02

## 2019-01-02 RX ORDER — ACETAMINOPHEN WITH CODEINE 300MG-30MG
1 TABLET ORAL ONCE
Qty: 0 | Refills: 0 | Status: DISCONTINUED | OUTPATIENT
Start: 2019-01-02 | End: 2019-01-02

## 2019-01-02 RX ORDER — ONDANSETRON 8 MG/1
4 TABLET, FILM COATED ORAL ONCE
Qty: 0 | Refills: 0 | Status: COMPLETED | OUTPATIENT
Start: 2019-01-02 | End: 2019-01-02

## 2019-01-02 RX ADMIN — SODIUM CHLORIDE 2000 MILLILITER(S): 9 INJECTION INTRAMUSCULAR; INTRAVENOUS; SUBCUTANEOUS at 23:06

## 2019-01-02 RX ADMIN — ONDANSETRON 4 MILLIGRAM(S): 8 TABLET, FILM COATED ORAL at 23:04

## 2019-01-02 RX ADMIN — Medication 1 TABLET(S): at 23:03

## 2019-01-02 NOTE — ED PROVIDER NOTE - PROGRESS NOTE DETAILS
UA contaminated, no specific urinary symptoms so will defer abx unless signs of infection on CT, culture sent. pt signed out pending CT abd/pel (Taylor) - sign out to f/u CT and reassess. CT does not show hydronephrosis. Non contrast scan so unable to assess for pyelo. Will send rx to abx for possible pyelo given acute change in flank pain the past few days and with recent fever of unknown etiology. Will f/u with PCP. Discussed indications for patient return to ED. Patient understood.

## 2019-01-02 NOTE — ED ADULT NURSE NOTE - NSIMPLEMENTINTERV_GEN_ALL_ED
Implemented All Universal Safety Interventions:  [place] to call system. Call bell, personal items and telephone within reach. Instruct patient to call for assistance. Room bathroom lighting operational. Non-slip footwear when patient is off stretcher. Physically safe environment: no spills, clutter or unnecessary equipment. Stretcher in lowest position, wheels locked, appropriate side rails in place.

## 2019-01-02 NOTE — ED PROVIDER NOTE - OBJECTIVE STATEMENT
32 y/o F with PMHx of kidney stones, RA on immunosuppressive drug presents to the ED with complaints of waxing and waning R flank pain x 6 weeks. Pain has been associated with fever for last week. Patient states fever was measured at 101 a few days ago. Patient has not seen anyone for symptoms since onset. Patient states last kidney stone was a few years ago which resolved without intervention. Patient denies recent antibiotic use, vomiting, bowel symptoms or any other acute complaints. Allergies: PCN. 34 y/o F with PMHx of kidney stones, RA on immunosuppressive drug presents to the ED with complaints of waxing and waning R flank pain x 6 weeks. Pain has been associated with fever for last week. Patient states fever was measured at 101 a few days ago. Patient has not seen anyone for symptoms since onset. Patient states last kidney stone was a few years ago which resolved without intervention. Patient denies recent antibiotic use, dysuria, hematuria, frequency, urgency, vomiting, bowel symptoms, vaginal bleeding/discharge, or any other acute complaints. Allergies: PCN.

## 2019-01-02 NOTE — ED PROVIDER NOTE - MEDICAL DECISION MAKING DETAILS
34 y/o F presents with flank pain. Mild symptoms however patient immunocompromised. Concern for pyelonephritis versus infected stone. Will obtain non-contrast CT abdomen, urinalysis, urine culture and BMP. If stone is present, will consult urology.

## 2019-01-03 VITALS
DIASTOLIC BLOOD PRESSURE: 74 MMHG | TEMPERATURE: 98 F | OXYGEN SATURATION: 100 % | SYSTOLIC BLOOD PRESSURE: 112 MMHG | RESPIRATION RATE: 16 BRPM | HEART RATE: 68 BPM

## 2019-01-03 PROCEDURE — 74176 CT ABD & PELVIS W/O CONTRAST: CPT | Mod: 26

## 2019-01-03 RX ADMIN — Medication 1 TABLET(S): at 00:04

## 2019-01-03 RX ADMIN — SODIUM CHLORIDE 1000 MILLILITER(S): 9 INJECTION INTRAMUSCULAR; INTRAVENOUS; SUBCUTANEOUS at 00:04

## 2019-01-04 LAB
CULTURE RESULTS: SIGNIFICANT CHANGE UP
SPECIMEN SOURCE: SIGNIFICANT CHANGE UP

## 2019-01-08 ENCOUNTER — RX RENEWAL (OUTPATIENT)
Age: 34
End: 2019-01-08

## 2019-02-01 ENCOUNTER — TRANSCRIPTION ENCOUNTER (OUTPATIENT)
Age: 34
End: 2019-02-01

## 2019-02-01 ENCOUNTER — EMERGENCY (EMERGENCY)
Facility: HOSPITAL | Age: 34
LOS: 1 days | Discharge: HOME | End: 2019-02-01
Attending: EMERGENCY MEDICINE
Payer: MEDICAID

## 2019-02-01 ENCOUNTER — OUTPATIENT (OUTPATIENT)
Dept: OUTPATIENT SERVICES | Facility: HOSPITAL | Age: 34
LOS: 1 days | End: 2019-02-01
Payer: MEDICAID

## 2019-02-01 VITALS
SYSTOLIC BLOOD PRESSURE: 117 MMHG | OXYGEN SATURATION: 100 % | WEIGHT: 184.97 LBS | HEIGHT: 59 IN | DIASTOLIC BLOOD PRESSURE: 82 MMHG | RESPIRATION RATE: 20 BRPM | HEART RATE: 87 BPM | TEMPERATURE: 99 F

## 2019-02-01 LAB — HCG UR QL: NEGATIVE — SIGNIFICANT CHANGE UP

## 2019-02-01 PROCEDURE — 99285 EMERGENCY DEPT VISIT HI MDM: CPT

## 2019-02-01 PROCEDURE — 81025 URINE PREGNANCY TEST: CPT

## 2019-02-01 PROCEDURE — 94640 AIRWAY INHALATION TREATMENT: CPT

## 2019-02-01 PROCEDURE — 99285 EMERGENCY DEPT VISIT HI MDM: CPT | Mod: 25

## 2019-02-01 PROCEDURE — 96374 THER/PROPH/DIAG INJ IV PUSH: CPT

## 2019-02-01 PROCEDURE — 93005 ELECTROCARDIOGRAM TRACING: CPT

## 2019-02-01 PROCEDURE — 93010 ELECTROCARDIOGRAM REPORT: CPT

## 2019-02-01 PROCEDURE — G9001: CPT

## 2019-02-01 RX ORDER — IPRATROPIUM/ALBUTEROL SULFATE 18-103MCG
3 AEROSOL WITH ADAPTER (GRAM) INHALATION
Qty: 0 | Refills: 0 | Status: COMPLETED | OUTPATIENT
Start: 2019-02-01 | End: 2019-02-01

## 2019-02-01 RX ORDER — IBUPROFEN 200 MG
1 TABLET ORAL
Qty: 20 | Refills: 0
Start: 2019-02-01 | End: 2019-03-02

## 2019-02-01 RX ORDER — METHOCARBAMOL 500 MG/1
2 TABLET, FILM COATED ORAL
Qty: 24 | Refills: 0
Start: 2019-02-01 | End: 2019-02-03

## 2019-02-01 RX ORDER — KETOROLAC TROMETHAMINE 30 MG/ML
30 SYRINGE (ML) INJECTION ONCE
Qty: 0 | Refills: 0 | Status: DISCONTINUED | OUTPATIENT
Start: 2019-02-01 | End: 2019-02-01

## 2019-02-01 RX ORDER — ALBUTEROL 90 UG/1
3 AEROSOL, METERED ORAL
Qty: 30 | Refills: 0
Start: 2019-02-01 | End: 2019-03-02

## 2019-02-01 RX ADMIN — Medication 3 MILLILITER(S): at 22:05

## 2019-02-01 RX ADMIN — Medication 30 MILLIGRAM(S): at 23:12

## 2019-02-01 RX ADMIN — Medication 30 MILLIGRAM(S): at 23:53

## 2019-02-01 RX ADMIN — Medication 3 MILLILITER(S): at 21:55

## 2019-02-01 RX ADMIN — Medication 3 MILLILITER(S): at 23:00
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
## 2019-02-01 NOTE — ED ADULT TRIAGE NOTE - HEIGHT IN CM
Chief Complaint   Patient presents with     RECHECK     UMP- MOVEMENT DISORDER F/U     Eldon Pearl, KHADIJAH     [phone]
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
## 2019-02-01 NOTE — ED ADULT NURSE NOTE - NSIMPLEMENTINTERV_GEN_ALL_ED
Implemented All Universal Safety Interventions:  [place] to call system. Call bell, personal items and telephone within reach. Instruct patient to call for assistance. Room bathroom lighting operational. Non-slip footwear when patient is off stretcher. Physically safe environment: no spills, clutter or unnecessary equipment. Stretcher in lowest position, wheels locked, appropriate side rails in place.
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
## 2019-02-02 PROBLEM — M06.9 RHEUMATOID ARTHRITIS, UNSPECIFIED: Chronic | Status: ACTIVE | Noted: 2019-01-03

## 2019-02-05 ENCOUNTER — APPOINTMENT (OUTPATIENT)
Dept: INTERNAL MEDICINE | Facility: CLINIC | Age: 34
End: 2019-02-05
Payer: MEDICAID

## 2019-02-05 ENCOUNTER — APPOINTMENT (OUTPATIENT)
Dept: OBGYN | Facility: CLINIC | Age: 34
End: 2019-02-05

## 2019-02-05 VITALS
TEMPERATURE: 98 F | BODY MASS INDEX: 36.49 KG/M2 | HEART RATE: 76 BPM | WEIGHT: 181 LBS | DIASTOLIC BLOOD PRESSURE: 59 MMHG | OXYGEN SATURATION: 98 % | SYSTOLIC BLOOD PRESSURE: 116 MMHG | HEIGHT: 59 IN

## 2019-02-05 PROCEDURE — 99215 OFFICE O/P EST HI 40 MIN: CPT

## 2019-02-05 RX ORDER — FERUMOXYTOL 510 MG/17ML
510 INJECTION INTRAVENOUS
Qty: 1 | Refills: 0 | Status: COMPLETED | OUTPATIENT
Start: 2018-10-10 | End: 2019-02-05

## 2019-02-05 RX ORDER — PREDNISONE 5 MG/1
5 TABLET ORAL
Qty: 60 | Refills: 1 | Status: COMPLETED | COMMUNITY
Start: 2018-10-29 | End: 2019-02-05

## 2019-02-05 NOTE — PHYSICAL EXAM
[Well Developed] : well developed [Well Nourished] : well nourished [Normal Sclera/Conjunctiva] : normal sclera/conjunctiva [PERRL] : pupils equal round and reactive to light [EOMI] : extraocular movements intact [Normal Outer Ear/Nose] : the outer ears and nose were normal in appearance [Normal Oropharynx] : the oropharynx was normal [Normal TMs] : both tympanic membranes were normal [Normal Nasal Mucosa] : the nasal mucosa was normal [No JVD] : no jugular venous distention [Supple] : supple [No Lymphadenopathy] : no lymphadenopathy [Rate ___] : at [unfilled] breaths per minute [Normal Rhythm/Effort] : normal respiratory rhythm and effort [Clear Bilaterally] : the lungs were clear to auscultation bilaterally [Normal to Percussion] : the lungs were normal to percussion [Normal] : palpation of the chest was normal [5th Left ICS - MCL] : palpated at the 5th LICS in the midclavicular line [Normal Rate] : normal [Heart Rate ___] : [unfilled] bpm [Normal S1] : normal S1 [Normal S2] : normal S2 [No Murmur] : no murmurs heard [No Pitting Edema] : no pitting edema present [2+] : left 2+ [No Abnormalities] : the abdominal aorta was not enlarged and no bruit was heard [No Edema] : there was no peripheral edema [No Extremity Clubbing/Cyanosis] : no extremity clubbing/cyanosis [Soft] : abdomen soft [Non Tender] : non-tender [Non-distended] : non-distended [No Masses] : no abdominal mass palpated [No HSM] : no HSM [Normal Bowel Sounds] : normal bowel sounds [Normal Posterior Cervical Nodes] : no posterior cervical lymphadenopathy [Normal Anterior Cervical Nodes] : no anterior cervical lymphadenopathy [No CVA Tenderness] : no CVA  tenderness [No Spinal Tenderness] : no spinal tenderness [No Joint Swelling] : no joint swelling [Grossly Normal Strength/Tone] : grossly normal strength/tone [No Rash] : no rash [No Skin Lesions] : no skin lesions [Normal Gait] : normal gait [Coordination Grossly Intact] : coordination grossly intact [No Focal Deficits] : no focal deficits [Normal Affect] : the affect was normal [Normal Insight/Judgement] : insight and judgment were intact [Normal Verbal Skills] : a deficiency in verbal communication skills was noted [Normal Nonverbal Skills] : deficient nonverbal communication skills were noted [S3] : no S3 [S4] : no S4 [Right Carotid Bruit] : no bruit heard over the right carotid [Left Carotid Bruit] : no bruit heard over the left carotid [Right Femoral Bruit] : no bruit heard over the right femoral artery [Left Femoral Bruit] : no bruit heard over the left femoral artery

## 2019-02-05 NOTE — COUNSELING
[Healthy eating counseling provided] : healthy eating [Engage in a relaxing activity] : Engage in a relaxing activity [Plan in advance] : Plan in advance [None] : None [Good understanding] : Patient has a good understanding of lifestyle changes and the steps needed to achieve self management goals

## 2019-02-05 NOTE — ASSESSMENT
[FreeTextEntry1] : smoke inhalation Inhalation injuries are acute injuries to your respiratory system and lungs. They can happen if you breathe in toxic substances, such as smoke (from fires), chemicals, particle pollution, and gases. Inhalation injuries can also be caused by extreme heat; these are a type of thermal injuries. Over half of deaths from fires are due to inhalation injuries.\par \par Symptoms of inhalation injuries can depend on what you breathed in. But they often include\par •Coughing and phlegm\par •A scratchy throat\par •Irritated sinuses\par •Shortness of breath\par •Chest pain or tightness\par •Headaches\par •Stinging eyes\par •A runny nose\par \par If you have a chronic heart or lung problem, an inhalation injury can make it worse.\par \par To make a diagnosis, your health care provider may use a scope to look at your airways and check for damage. Other possible tests include imaging tests of the lungs, blood tests, and lung function tests.\par \par If you have an inhalation injury, your health care provider will make sure that your airway is not blocked. Treatment is with oxygen therapy, and in some cases, medicines. Some patients need to use a ventilator to breathe. Most people get better, but some people have permanent lung or breathing problems. Smokers and people who had a severe injury are at a greater risk of having permanent problems.\par \par You can take steps to try to prevent inhalation injuries:\par •At home, practice fire safety, which includes preventing fires and having a plan in case there is a fire\par •If there is smoke from a wildfire nearby or lots of particulate pollution in the air, try to limit your time outdoors. Keep your indoor air as clean as possible, by keeping windows closed and using an air filter. If you have asthma, another lung disease, or heart disease, follow your health care provider's advice about your medicines and respiratory management plan.\par •If you are working with chemicals or gases, handle them safely and use protective equipment\par sleep disturbance \par Sleep patterns are often learned as children. When we repeat these patterns over many years, they become habits.\par \par Insomnia is difficulty falling asleep or staying asleep. In many cases, you can relieve insomnia by making a few simple lifestyle changes. But, it may take some time if you have had the same sleep habits for years.\par \par \par \par \par \par How Much Sleep is Enough?\par \par \par \par \par \par \par People who have insomnia are often worried about getting enough sleep. The more they try to sleep, the more frustrated and upset they get, and the harder it becomes to sleep.\par •While 7 to 8 hours a night is recommended for most people, children and teenagers need more.\par •Older people tend to do fine with less sleep at night. But they may still need about 8 hours of sleep over a 24-hour period. \par \par Remember, the quality of sleep and how rested you feel afterward is as important as how much sleep you get.\par \par \par \par \par \par Change Your Lifestyle\par \par \par \par \par \par \par Before you go to bed:\par •Write down all the things that worry you in a journal. This way, you can transfer your worries from your mind to paper, leaving your thoughts quieter and better suited for falling asleep. \par \par During the day:\par •Be more active. Walk or exercise for at least 30 minutes on most days.\par •DO NOT take naps during the day or in the evening. \par \par Stop or cut back on smoking and drinking alcohol. And reduce your caffeine intake. \par \par If you are taking any medicines, diet pills, herbs, or supplements, ask your health care provider about the effects they may have on your sleep.\par \par Find ways to manage stress.\par •Learn about relaxation techniques, such as guided imagery, listening to music, or practicing yoga or meditation.\par •Listen to your body when it tells you to slow down or take a break. \par \par \par \par \par \par Change Your Bedtime Habits\par \par \par \par \par \par \par Your bed is for sleeping. DO NOT do things like eat or work while in bed.\par \par Develop a sleep routine.\par •If possible, wake up at the same time each day.\par •Go to bed around the same time every day, but not more than 8 hours before you expect to start your day.\par •Avoid beverages with caffeine or alcohol in the evening.\par •Avoid eating heavy meals at least 2 hours before going to sleep. \par \par Find calming, relaxing activities to do before bedtime.\par •Read or take a bath so that you do not dwell on worrisome issues.\par •DO NOT watch TV or use a computer near the time you want to fall asleep.\par •Avoid activity that increases your heart rate for the 2 hours before going to bed.\par •Make sure your sleep area is quiet, dark, and is at a temperature you like. \par \par If you cannot fall asleep within 30 minutes, get up and move to another room. Do a quiet activity until you feel sleepy.\par \par \par \par \par \par When to Call the Doctor\par \par \par \par \par \par \par Talk to your provider if:\par •You are feeling sad or depressed\par •Pain or discomfort is keeping you awake\par •You are taking any medicine that may be keeping you awake\par •You have been taking medicines for sleep without talking to your provider first \par  Pt instructed to go to er if she develops any worsening of her symptoms.   40 mins spent with pt discussing recommendation and evaluating her

## 2019-02-06 LAB
BASOPHILS # BLD AUTO: 0.02 K/UL
BASOPHILS NFR BLD AUTO: 0.4 %
EOSINOPHIL # BLD AUTO: 0.03 K/UL
EOSINOPHIL NFR BLD AUTO: 0.6 %
HCT VFR BLD CALC: 42.4 %
HGB BLD-MCNC: 13.6 G/DL
IMM GRANULOCYTES NFR BLD AUTO: 0.2 %
LYMPHOCYTES # BLD AUTO: 1.62 K/UL
LYMPHOCYTES NFR BLD AUTO: 29.9 %
MAN DIFF?: NORMAL
MCHC RBC-ENTMCNC: 28.4 PG
MCHC RBC-ENTMCNC: 32.1 GM/DL
MCV RBC AUTO: 88.5 FL
MONOCYTES # BLD AUTO: 0.39 K/UL
MONOCYTES NFR BLD AUTO: 7.2 %
NEUTROPHILS # BLD AUTO: 3.35 K/UL
NEUTROPHILS NFR BLD AUTO: 61.7 %
PLATELET # BLD AUTO: 157 K/UL
RBC # BLD: 4.79 M/UL
RBC # FLD: 13.7 %
WBC # FLD AUTO: 5.42 K/UL

## 2019-02-07 LAB
ALBUMIN SERPL ELPH-MCNC: 3.4 G/DL
ALP BLD-CCNC: 75 U/L
ALT SERPL-CCNC: NORMAL
ANION GAP SERPL CALC-SCNC: NORMAL MMOL/L
AST SERPL-CCNC: NORMAL
BILIRUB SERPL-MCNC: 0.3 MG/DL
BUN SERPL-MCNC: 6 MG/DL
CALCIUM SERPL-MCNC: NORMAL
CHLORIDE SERPL-SCNC: 110 MMOL/L
CO2 SERPL-SCNC: NORMAL
CREAT SERPL-MCNC: NORMAL MG/DL
GLUCOSE SERPL-MCNC: NORMAL
POTASSIUM SERPL-SCNC: 5 MMOL/L
PROT SERPL-MCNC: 7.2 G/DL
SODIUM SERPL-SCNC: 139 MMOL/L

## 2019-02-12 ENCOUNTER — APPOINTMENT (OUTPATIENT)
Dept: PULMONOLOGY | Facility: CLINIC | Age: 34
End: 2019-02-12
Payer: MEDICAID

## 2019-02-12 VITALS
HEART RATE: 66 BPM | SYSTOLIC BLOOD PRESSURE: 115 MMHG | BODY MASS INDEX: 36.49 KG/M2 | OXYGEN SATURATION: 100 % | HEIGHT: 59 IN | TEMPERATURE: 99 F | WEIGHT: 181 LBS | DIASTOLIC BLOOD PRESSURE: 74 MMHG

## 2019-02-12 PROCEDURE — ZZZZZ: CPT

## 2019-02-12 PROCEDURE — 94060 EVALUATION OF WHEEZING: CPT

## 2019-02-12 PROCEDURE — 94726 PLETHYSMOGRAPHY LUNG VOLUMES: CPT

## 2019-02-12 PROCEDURE — 94729 DIFFUSING CAPACITY: CPT

## 2019-02-12 PROCEDURE — 99203 OFFICE O/P NEW LOW 30 MIN: CPT | Mod: 25
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
## 2019-02-13 NOTE — PHYSICAL EXAM
[General Appearance - Well Developed] : well developed [Normal Appearance] : normal appearance [Well Groomed] : well groomed [General Appearance - Well Nourished] : well nourished [No Deformities] : no deformities [General Appearance - In No Acute Distress] : no acute distress [Normal Conjunctiva] : the conjunctiva exhibited no abnormalities [Normal Oropharynx] : normal oropharynx [I] : I [Neck Appearance] : the appearance of the neck was normal [Neck Cervical Mass (___cm)] : no neck mass was observed [Jugular Venous Distention Increased] : there was no jugular-venous distention [Heart Rate And Rhythm] : heart rate and rhythm were normal [Heart Sounds] : normal S1 and S2 [Murmurs] : no murmurs present [Arterial Pulses Normal] : the arterial pulses were normal [Respiration, Rhythm And Depth] : normal respiratory rhythm and effort [Exaggerated Use Of Accessory Muscles For Inspiration] : no accessory muscle use [Bowel Sounds] : normal bowel sounds [Abdomen Soft] : soft [Abdomen Tenderness] : non-tender [] : no hepato-splenomegaly [Abnormal Walk] : normal gait [Nail Clubbing] : no clubbing of the fingernails [Cyanosis, Localized] : no localized cyanosis [Skin Color & Pigmentation] : normal skin color and pigmentation [Skin Turgor] : normal skin turgor [Cranial Nerves] : cranial nerves 2-12 were intact [Deep Tendon Reflexes (DTR)] : deep tendon reflexes were 2+ and symmetric [No Focal Deficits] : no focal deficits [Oriented To Time, Place, And Person] : oriented to person, place, and time [Impaired Insight] : insight and judgment were intact [FreeTextEntry1] : Bilateral inspiratory squeaks

## 2019-02-13 NOTE — PROCEDURE
[FreeTextEntry1] : Spirometry shows no obstruction however there is a significant bronchodilator response. Possible pattern of small airway disease (reduced [id number]%). DLCO is normal.

## 2019-02-13 NOTE — REVIEW OF SYSTEMS
[Cough] : cough [Sputum] : sputum  [Dyspnea] : dyspnea [Chest Tightness] : chest tightness [Negative] : Endocrine

## 2019-02-19 ENCOUNTER — APPOINTMENT (OUTPATIENT)
Dept: INTERNAL MEDICINE | Facility: CLINIC | Age: 34
End: 2019-02-19

## 2019-02-21 ENCOUNTER — APPOINTMENT (OUTPATIENT)
Dept: RHEUMATOLOGY | Facility: CLINIC | Age: 34
End: 2019-02-21

## 2019-02-22 ENCOUNTER — APPOINTMENT (OUTPATIENT)
Dept: RHEUMATOLOGY | Facility: CLINIC | Age: 34
End: 2019-02-22
Payer: MEDICAID

## 2019-02-22 VITALS
HEART RATE: 68 BPM | DIASTOLIC BLOOD PRESSURE: 81 MMHG | WEIGHT: 181 LBS | OXYGEN SATURATION: 98 % | SYSTOLIC BLOOD PRESSURE: 117 MMHG | HEIGHT: 59 IN | TEMPERATURE: 98.4 F | BODY MASS INDEX: 36.49 KG/M2

## 2019-02-22 DIAGNOSIS — Z71.89 OTHER SPECIFIED COUNSELING: ICD-10-CM

## 2019-02-22 PROCEDURE — 99214 OFFICE O/P EST MOD 30 MIN: CPT

## 2019-03-22 ENCOUNTER — TRANSCRIPTION ENCOUNTER (OUTPATIENT)
Age: 34
End: 2019-03-22

## 2019-03-26 ENCOUNTER — APPOINTMENT (OUTPATIENT)
Dept: PULMONOLOGY | Facility: CLINIC | Age: 34
End: 2019-03-26

## 2019-04-01 ENCOUNTER — TRANSCRIPTION ENCOUNTER (OUTPATIENT)
Age: 34
End: 2019-04-01

## 2019-04-03 ENCOUNTER — TRANSCRIPTION ENCOUNTER (OUTPATIENT)
Age: 34
End: 2019-04-03

## 2019-04-04 ENCOUNTER — RX RENEWAL (OUTPATIENT)
Age: 34
End: 2019-04-04

## 2019-04-05 ENCOUNTER — APPOINTMENT (OUTPATIENT)
Dept: RHEUMATOLOGY | Facility: CLINIC | Age: 34
End: 2019-04-05
Payer: MEDICAID

## 2019-04-05 PROCEDURE — 99214 OFFICE O/P EST MOD 30 MIN: CPT

## 2019-04-05 NOTE — PHYSICAL EXAM
[General Appearance - Alert] : alert [General Appearance - In No Acute Distress] : in no acute distress [Outer Ear] : the ears and nose were normal in appearance [Oropharynx] : the oropharynx was normal [Neck Appearance] : the appearance of the neck was normal [Neck Cervical Mass (___cm)] : no neck mass was observed [Jugular Venous Distention Increased] : there was no jugular-venous distention [Thyroid Diffuse Enlargement] : the thyroid was not enlarged [Thyroid Nodule] : there were no palpable thyroid nodules [Auscultation Breath Sounds / Voice Sounds] : lungs were clear to auscultation bilaterally [Heart Rate And Rhythm] : heart rate was normal and rhythm regular [Heart Sounds] : normal S1 and S2 [Heart Sounds Gallop] : no gallops [Murmurs] : no murmurs [Heart Sounds Pericardial Friction Rub] : no pericardial rub [Full Pulse] : the pedal pulses are present [Edema] : there was no peripheral edema [Bowel Sounds] : normal bowel sounds [Abdomen Soft] : soft [Abdomen Tenderness] : non-tender [Abdomen Mass (___ Cm)] : no abdominal mass palpated [Abnormal Walk] : normal gait [Nail Clubbing] : no clubbing  or cyanosis of the fingernails [Musculoskeletal - Swelling] : no joint swelling seen [Motor Tone] : muscle strength and tone were normal [Skin Color & Pigmentation] : normal skin color and pigmentation [Skin Turgor] : normal skin turgor [] : no rash [Oriented To Time, Place, And Person] : oriented to person, place, and time [Impaired Insight] : insight and judgment were intact [Affect] : the affect was normal [FreeTextEntry1] : minimal bilateral knee pain

## 2019-04-05 NOTE — REVIEW OF SYSTEMS
[Feeling Tired] : feeling tired [As Noted in HPI] : as noted in HPI [Joint Stiffness] : joint stiffness [Negative] : Heme/Lymph

## 2019-04-05 NOTE — ASSESSMENT
[FreeTextEntry1] : 32 year-old female with pmh of ALMA ROSA, now with return of her symptoms with polyarthritis, highly positive DEBORAH\par \par \par 1. RA -  flaring - will start methotrexate with folic acid - patient has IUD in place\par 2. Iron deficiency - stable levels - continue with oral replacement\par 3. right side lumbar pain - x-rays with DDD - worsening now with bilateral sciatica pain -  taking ibuprofen as needed - consider PT - next visit\par 4. chronic pain - - unable to afford CBD oil - uses hemp\par \par \par I reviewed previous labs results with patients.\par Diagnosis and Prognosis discussed\par Continue with current medications\par education provided on methotrexate\par F/u  4 weeks\par

## 2019-04-05 NOTE — HISTORY OF PRESENT ILLNESS
[___ Month(s) Ago] : [unfilled] month(s) ago [FreeTextEntry1] : flaring - for the last 4 days with mouth sores (this has resolved after folic acid)\par will start methotrexate and folic acid \par start prednisone 40 mg daily for flare resolution\par

## 2019-04-10 ENCOUNTER — APPOINTMENT (OUTPATIENT)
Dept: OBGYN | Facility: CLINIC | Age: 34
End: 2019-04-10
Payer: MEDICAID

## 2019-04-10 VITALS — WEIGHT: 184 LBS | SYSTOLIC BLOOD PRESSURE: 122 MMHG | BODY MASS INDEX: 37.16 KG/M2 | DIASTOLIC BLOOD PRESSURE: 80 MMHG

## 2019-04-10 PROCEDURE — 99395 PREV VISIT EST AGE 18-39: CPT

## 2019-04-10 RX ORDER — IBUPROFEN 600 MG/1
600 TABLET, FILM COATED ORAL
Qty: 28 | Refills: 0 | Status: COMPLETED | COMMUNITY
Start: 2018-12-12

## 2019-04-10 RX ORDER — PREDNISONE 20 MG/1
20 TABLET ORAL DAILY
Qty: 40 | Refills: 0 | Status: COMPLETED | COMMUNITY
Start: 2019-02-12 | End: 2019-04-10

## 2019-04-10 RX ORDER — ACETAMINOPHEN AND CODEINE 300; 30 MG/1; MG/1
300-30 TABLET ORAL
Qty: 15 | Refills: 0 | Status: COMPLETED | COMMUNITY
Start: 2019-01-24

## 2019-04-10 RX ORDER — FLUTICASONE PROPIONATE 44 UG/1
44 AEROSOL, METERED RESPIRATORY (INHALATION)
Qty: 1 | Refills: 3 | Status: COMPLETED | COMMUNITY
Start: 2019-02-05 | End: 2019-04-10

## 2019-04-10 RX ORDER — ALBUTEROL SULFATE 90 UG/1
108 (90 BASE) AEROSOL, METERED RESPIRATORY (INHALATION)
Qty: 1 | Refills: 2 | Status: COMPLETED | COMMUNITY
Start: 2019-02-05 | End: 2019-04-10

## 2019-04-10 RX ORDER — CLINDAMYCIN HYDROCHLORIDE 300 MG/1
300 CAPSULE ORAL
Qty: 21 | Refills: 0 | Status: COMPLETED | COMMUNITY
Start: 2018-12-12

## 2019-04-10 NOTE — COUNSELING
[Nutrition] : nutrition [Breast Self Exam] : breast self exam [Exercise] : exercise [Vitamins/Supplements] : vitamins/supplements [STD (testing, results, tx)] : STD (testing, results, tx) [Contraception] : contraception [Vaccines] : vaccines

## 2019-04-16 ENCOUNTER — APPOINTMENT (OUTPATIENT)
Dept: INTERNAL MEDICINE | Facility: CLINIC | Age: 34
End: 2019-04-16
Payer: MEDICAID

## 2019-04-16 ENCOUNTER — TRANSCRIPTION ENCOUNTER (OUTPATIENT)
Age: 34
End: 2019-04-16

## 2019-04-16 VITALS
SYSTOLIC BLOOD PRESSURE: 110 MMHG | HEART RATE: 72 BPM | BODY MASS INDEX: 37.5 KG/M2 | TEMPERATURE: 97.8 F | HEIGHT: 59 IN | WEIGHT: 186 LBS | DIASTOLIC BLOOD PRESSURE: 73 MMHG

## 2019-04-16 PROCEDURE — 93000 ELECTROCARDIOGRAM COMPLETE: CPT

## 2019-04-16 PROCEDURE — 90670 PCV13 VACCINE IM: CPT

## 2019-04-16 PROCEDURE — 99214 OFFICE O/P EST MOD 30 MIN: CPT | Mod: 25

## 2019-04-16 PROCEDURE — G0009: CPT

## 2019-04-16 PROCEDURE — 99395 PREV VISIT EST AGE 18-39: CPT | Mod: 25

## 2019-04-16 NOTE — HISTORY OF PRESENT ILLNESS
[FreeTextEntry1] : cpe [de-identified] : Pt is a 33 yr old woman who came for her cpe.  She has hx of asthma, fibromyalgia, migraines and rheumatoid arthritis.  Pt saw pulmonary and had pft  which didn’t show obstruction but possible small airway disease.

## 2019-04-16 NOTE — PAST MEDICAL HISTORY
[Menstruating] : menstruating [Menarche Age ____] : age at menarche was [unfilled] [Definite ___ (Date)] : the last menstrual period was [unfilled] [Normal Duration] : the duration was normal [Regular Cycle Intervals] : have been regular [Total Preg ___] : G[unfilled] [Living ___] : Living: [unfilled] [Live Births ___] : P[unfilled]

## 2019-04-16 NOTE — HEALTH RISK ASSESSMENT
[Fair] : ~his/her~ current health as fair  [Good] : ~his/her~  mood as  good [No falls in past year] : Patient reported no falls in the past year [0] : 2) Feeling down, depressed, or hopeless: Not at all (0) [HIV Test offered] : HIV Test offered [Hepatitis C test offered] : Hepatitis C test offered [None] : None [With Family] : lives with family [High School] : high school [# of Members in Household ___] :  household currently consist of [unfilled] member(s) [# Of Children ___] : has [unfilled] children [] :  [Sexually Active] : sexually active [Fully functional (bathing, dressing, toileting, transferring, walking, feeding)] : Fully functional (bathing, dressing, toileting, transferring, walking, feeding) [Feels Safe at Home] : Feels safe at home [Fully functional (using the telephone, shopping, preparing meals, housekeeping, doing laundry, using] : Fully functional and needs no help or supervision to perform IADLs (using the telephone, shopping, preparing meals, housekeeping, doing laundry, using transportation, managing medications and managing finances) [Smoke Detector] : smoke detector [Carbon Monoxide Detector] : carbon monoxide detector [Safety elements used in home] : safety elements used in home [Seat Belt] :  uses seat belt [Sunscreen] : uses sunscreen [FreeTextEntry1] : none [] : No [de-identified] : twice a week goes to gym [de-identified] : reduced portions.   [RSF6Hoiew] : 0 [Change in mental status noted] : No change in mental status noted [Language] : denies difficulty with language [Learning/Retaining New Information] : denies difficulty learning/retaining new information [Behavior] : denies difficulty with behavior [Handling Complex Tasks] : denies difficulty handling complex tasks [Reasoning] : denies difficulty with reasoning [Spatial Ability and Orientation] : denies difficulty with spatial ability and orientation [Reports changes in hearing] : Reports no changes in hearing [Guns at Home] : no guns at home [Reports changes in dental health] : Reports no changes in dental health [Travel to Developing Areas] : does not  travel to developing areas [TB Exposure] : is not being exposed to tuberculosis [PapSmearDate] : 4/19 [Caregiver Concerns] : does not have caregiver concerns [de-identified] : no hearing loss [HepatitisCDate] : 10/18 [de-identified] : over a year [de-identified] : last eye exam less than year [AdvancecareDate] : 04/16/19

## 2019-04-16 NOTE — COUNSELING
[Weight management counseling provided] : Weight management [Activity counseling provided] : activity [Healthy eating counseling provided] : healthy eating [Fall prevention counseling provided] : fall prevention  [Weight Self Once Weekly] : Weight self once weekly [Low Salt Diet] : Low salt diet [Low Fat Diet] : Low fat diet [Decrease Portions] : Decrease food portions [Keep Food Diary] : Keep food diary [Sleep ___ hours/day] : Sleep [unfilled] hours/day [Adequate lighting] : Adequate lighting [Engage in a relaxing activity] : Engage in a relaxing activity [Plan in advance] : Plan in advance [Use proper foot wear] : Use proper foot wear [No throw rugs] : No throw rugs [Good understanding] : Patient has a good understanding of lifestyle changes and the steps needed to achieve self management goals [Use recommended devices] : Use recommended devices [None] : None [Target Wt Loss Goal ___] : Target weight loss goal [unfilled] lbs [Walking] : Walking [___ min/wk activity recommended] : [unfilled] min/wk activity recommended [de-identified] : ideal weight [phone]

## 2019-04-16 NOTE — REVIEW OF SYSTEMS
[Joint Pain] : joint pain [Joint Stiffness] : joint stiffness [Muscle Weakness] : muscle weakness [Muscle Pain] : muscle pain [Back Pain] : back pain [Negative] : Heme/Lymph

## 2019-04-16 NOTE — ASSESSMENT
[FreeTextEntry1] : health maintenance  bmi 37  risks of obesity and need for diet and eating healthy Obesity is a complex disorder involving an excessive amount of body fat. Obesity isn't just a cosmetic concern. It increases your risk of diseases and health problems, such as heart disease, diabetes and high blood pressure.\par \par Being extremely obese means you are especially likely to have health problems related to your weight.\par \par \par The good news is that even modest weight loss can improve or prevent the health problems associated with obesity. Dietary changes, increased physical activity and behavior changes can help you lose weight. Prescription medications and weight-loss surgery are additional options for treating obesity.\par \par \par \par \par Symptoms\par \par Obesity is diagnosed when your body mass index (BMI) is 30 or higher. Your body mass index is calculated by dividing your weight in kilograms (kg) by your height in meters (m) squared. \par \par \par BMI\par \par Weight status\par \par \par Below 18.5 Underweight \par 18.5-24.9 Normal \par 25.0-29.9 Overweight \par 30.0-34.9 Obese (Class I) \par 35.0-39.9 Obese (Class II) \par 40.0 and higher Extreme obesity (Class III) \par \par For most people, BMI provides a reasonable estimate of body fat. However, BMI doesn't directly measure body fat, so some people, such as muscular athletes, may have a BMI in the obese category even though they don't have excess body fat. Ask your doctor if your BMI is a problem. \par \par When to see a doctor\par \par If you think you may be obese, and especially if you're concerned about weight-related health problems, see your doctor or health care provider. You and your provider can evaluate your health risks and discuss your weight-loss options. \par \par Request an Appointment at [facility]\par \par Causes\par \par Although there are genetic, behavioral and hormonal influences on body weight, obesity occurs when you take in more calories than you burn through exercise and normal daily activities. Your body stores these excess calories as fat.\par \par Obesity can sometimes be traced to a medical cause, such as Prader-Willi syndrome, Cushing's syndrome, and other diseases and conditions. However, these disorders are rare and, in general, the principal causes of obesity are:\par •Inactivity. If you're not very active, you don't burn as many calories. With a sedentary lifestyle, you can easily take in more calories every day than you use through exercise and normal daily activities.\par •Unhealthy diet and eating habits. Weight gain is inevitable if you regularly eat more calories than you burn. And most Americans' diets are too high in calories and are full of fast food and high-calorie beverages.\par \par Risk factors\par \par Obesity usually results from a combination of causes and contributing factors, including:\par •Genetics. Your genes may affect the amount of body fat you store, and where that fat is distributed. Genetics may also play a role in how efficiently your body converts food into energy and how your body burns calories during exercise.\par •Family lifestyle. Obesity tends to run in families. If one or both of your parents are obese, your risk of being obese is increased. That's not just because of genetics. Family members tend to share similar eating and activity habits.\par •Inactivity. If you're not very active, you don't burn as many calories. With a sedentary lifestyle, you can easily take in more calories every day than you burn through exercise and routine daily activities. Having medical problems, such as arthritis, can lead to decreased activity, which contributes to weight gain.\par •Unhealthy diet. A diet that's high in calories, lacking in fruits and vegetables, full of fast food, and laden with high-calorie beverages and oversized portions contributes to weight gain.\par •Medical problems. In some people, obesity can be traced to a medical cause, such as Prader-Willi syndrome, Cushing's syndrome and other conditions. Medical problems, such as arthritis, also can lead to decreased activity, which may result in weight gain.\par •Certain medications. Some medications can lead to weight gain if you don't compensate through diet or activity. These medications include some antidepressants, anti-seizure medications, diabetes medications, antipsychotic medications, steroids and beta blockers.\par •Social and economic issues. Research has linked social and economic factors to obesity. Avoiding obesity is difficult if you don't have safe areas to exercise. Similarly, you may not have been taught healthy ways of cooking, or you may not have money to buy healthier foods. In addition, the people you spend time with may influence your weight — you're more likely to become obese if you have obese friends or relatives.\par •Age. Obesity can occur at any age, even in young children. But as you age, hormonal changes and a less active lifestyle increase your risk of obesity. In addition, the amount of muscle in your body tends to decrease with age. This lower muscle mass leads to a decrease in metabolism. These changes also reduce calorie needs, and can make it harder to keep off excess weight. If you don't consciously control what you eat and become more physically active as you age, you'll likely gain weight.\par •Pregnancy. During pregnancy, a woman's weight necessarily increases. Some women find this weight difficult to lose after the baby is born. This weight gain may contribute to the development of obesity in women.\par •Quitting smoking. Quitting smoking is often associated with weight gain. And for some, it can lead to enough weight gain that the person becomes obese. In the long run, however, quitting smoking is still a greater benefit to your health than continuing to smoke.\par •Lack of sleep. Not getting enough sleep or getting too much sleep can cause changes in hormones that increase your appetite. You may also crave foods high in calories and carbohydrates, which can contribute to weight gain.\par \par Even if you have one or more of these risk factors, it doesn't mean that you're destined to become obese. You can counteract most risk factors through diet, physical activity and exercise, and behavior changes.\par \par Complications\par \par If you're obese, you're more likely to develop a number of potentially serious health problems, including:\par •High triglycerides and low high-density lipoprotein (HDL) cholesterol\par •Type 2 diabetes\par •High blood pressure\par •Metabolic syndrome — a combination of high blood sugar, high blood pressure, high triglycerides and low HDL cholesterol\par •Heart disease\par •Stroke\par •Cancer, including cancer of the uterus, cervix, endometrium, ovaries, breast, colon, rectum, esophagus, liver, gallbladder, pancreas, kidney and prostate\par •Breathing disorders, including sleep apnea, a potentially serious sleep disorder in which breathing repeatedly stops and starts\par •Gallbladder disease\par •Gynecological problems, such as infertility and irregular periods\par •Erectile dysfunction and sexual health issues\par •Nonalcoholic fatty liver disease, a condition in which fat builds up in the liver and can cause inflammation or scarring\par •Osteoarthritis\par \par Quality of life\par \par When you're obese, your overall quality of life may be diminished. You may not be able to do things you used to do, such as participating in enjoyable activities. You may avoid public places. Obese people may even encounter discrimination.\par \par Other weight-related issues that may affect your quality of life include:\par •Depression\par •Disability\par •Sexual problems\par •Shame and guilt\par •Social isolation\par •Lower work achievement\par \par Prevention\par \par Whether you're at risk of becoming obese, currently overweight or at a healthy weight, you can take steps to prevent unhealthy weight gain and related health problems. Not surprisingly, the steps to prevent weight gain are the same as the steps to lose weight: daily exercise, a healthy diet, and a long-term commitment to watch what you eat and drink.\par •Exercise regularly. You need to get 150 to 300 minutes of moderate-intensity activity a week to prevent weight gain. Moderately intense physical activities include fast walking and swimming.\par •Follow a healthy eating plan. Focus on low-calorie, nutrient-dense foods, such as fruits, veg\par She understands her joint pain might improve with weight loss and increased activity.  she is up to date with gyn and is having tubal ligation May 28 .  She will return for blood tests.  she needs prevnar and Pneumovax due to her ra and on immunosuppressors.  She will have prevnar now and one year later Pneumovax.    Rheumatoid arthritis -  She has been started on methotrexate and I would like her to have abd sonogram to look for underlying liver disease such as fatty liver.  She also should have shingles vaccines as well.  Asthma stable and should fu with Dr Greenfield.   She is also on kevzara injections every two weeks.    ekg sinus arrhythmia normal ecg rate 68 /min qrs 86 ms qt 394/418 ms pr 128ms p 108ms I fito also refer to cardiologist

## 2019-04-16 NOTE — PHYSICAL EXAM
[Well Nourished] : well nourished [Normal Voice Quality] : was normal [Well Developed] : well developed [Normal Verbal Skills] : the patient had normal verbal communication skills [Normal Nonverbal Skills] : normal nonverbal communication skills were demonstrated [Conjunctiva] : the conjunctiva were normal in both eyes [PERRL] : pupils were equal in size, round, and reactive to light [EOM Intact] : extraocular movements were intact [Normal Oropharynx] : the oropharynx was normal [Normal Outer Ear/Nose] : the outer ears and nose were normal in appearance [Normal Appearance] : was normal in appearance [Normal Nasal Mucosa] : the nasal mucosa was normal [Normal TMs] : both tympanic membranes were normal [Neck Supple] : was supple [Rate ___] : at [unfilled] breaths per minute [Clear Bilaterally] : the lungs were clear to auscultation bilaterally [Normal Rhythm/Effort] : normal respiratory rhythm and effort [Normal to Percussion] : the lungs were normal to percussion [Heart Rate ___] : [unfilled] bpm [Normal Rate] : normal [Normal S1] : normal S1 [Normal S2] : normal S2 [No Murmur] : no murmurs heard [No Pitting Edema] : no pitting edema present [2+] : right 2+ [No Abnormalities] : the abdominal aorta was not enlarged and no bruit was heard [Examination Of The Breasts] : a normal appearance [No Discharge] : no discharge [Soft, Nontender] : the abdomen was soft and nontender [No HSM] : no hepatosplenomegaly noted [No Mass] : no masses were palpated [None] : no hernias were palpable [Normal Kyphosis] : normal kyphosis [No Scoliosis] : no scoliosis [Normal Lordosis] : normal lordosis [No Visual Abnormalities] : no visible abnormalities [Full ROM] : full ROM [No Masses] : no masses [No Tenderness to Palpation] : no spine tenderness on palpation [No Pain with ROM] : no pain with motion in any direction [Intact] : all reflexes within normal limits bilaterally [Normal Station and Gait] : the gait and station were normal [Normal Motor Tone] : the muscle tone was normal [Involuntary Movements] : no involuntary movements were seen [Normal Scalp] : inspection of the scalp showed no abnormalities [Examination Of The Hair] : texture and distribution of hair was normal [Complexion Medium] : medium complexion [Normal] : the deep tendon reflexes were normal [Appropriate] : appropriate [Normal Mental Status] : the patient's orientation, memory, attention, language and fund of knowledge were normal [JVP Elevated ___cm] : the JVP was not elevated [Enlarged Diffusely] : was not enlarged [Regularly Irregular] : regularly irregular [S4] : no S4 [S3] : no S3 [Lt] : no varicose veins of the left leg [Rt] : no varicose veins of the right leg [Right Carotid Bruit] : no bruit heard over the right carotid [Left Carotid Bruit] : no bruit heard over the left carotid [Right Femoral Bruit] : no bruit heard over the right femoral artery [Bruit] : no bruit heard [Left Femoral Bruit] : no bruit heard over the left femoral artery [Postauricular Lymph Nodes Enlarged Bilaterally] : nodes not enlarged [Preauricular Lymph Nodes Enlarged Bilaterally] : nodes not enlarged [Suboccipital Lymph Nodes Enlarged Bilaterally] : nodes not enlarged [Submandibular Lymph Nodes Enlarged Bilaterally] : nodes not enlarged [Cervical Lymph Nodes Enlarged Anterior Bilaterally] : nodes not enlarged [Submental Lymph Nodes Enlarged] : nodes not enlarged [Cervical Lymph Nodes Enlarged Posterior Bilaterally] : nodes not enlarged [Epitrochlear Lymph Nodes Enlarged Bilaterally] : nodes not enlarged [Supraclavicular Lymph Nodes Enlarged Bilaterally] : nodes not enlarged [Axillary Lymph Nodes Enlarged Bilaterally] : nodes not enlarged [Femoral Lymph Nodes Enlarged Bilaterally] : nodes not enlarged [Abnormal Color] : normal color and pigmentation [Inguinal Lymph Nodes Enlarged Bilaterally] : nodes not enlarged [Impaired judgment] : intact judgment [Skin Lesions 1] : no skin lesions were observed [Skin Turgor Decreased] : normal skin turgor [Impaired Insight] : intact insight [de-identified] : tongue normal teeth in good repair.

## 2019-04-19 ENCOUNTER — APPOINTMENT (OUTPATIENT)
Dept: ORTHOPEDIC SURGERY | Facility: CLINIC | Age: 34
End: 2019-04-19

## 2019-05-09 ENCOUNTER — TRANSCRIPTION ENCOUNTER (OUTPATIENT)
Age: 34
End: 2019-05-09

## 2019-05-15 ENCOUNTER — APPOINTMENT (OUTPATIENT)
Dept: ORTHOPEDIC SURGERY | Facility: CLINIC | Age: 34
End: 2019-05-15

## 2019-05-16 ENCOUNTER — APPOINTMENT (OUTPATIENT)
Dept: RHEUMATOLOGY | Facility: CLINIC | Age: 34
End: 2019-05-16
Payer: MEDICAID

## 2019-05-16 VITALS
WEIGHT: 188 LBS | DIASTOLIC BLOOD PRESSURE: 75 MMHG | HEIGHT: 59 IN | SYSTOLIC BLOOD PRESSURE: 107 MMHG | TEMPERATURE: 98.2 F | BODY MASS INDEX: 37.9 KG/M2 | OXYGEN SATURATION: 100 % | RESPIRATION RATE: 16 BRPM | HEART RATE: 78 BPM

## 2019-05-16 PROCEDURE — 99214 OFFICE O/P EST MOD 30 MIN: CPT

## 2019-05-16 NOTE — HISTORY OF PRESENT ILLNESS
[___ Month(s) Ago] : [unfilled] month(s) ago [FreeTextEntry1] : No flares - but with baseline achiness\par 1. RA - on kevzara and methotrexate - has a headache the day after taking methotrexate resolves quickly - thinning hair\par 2. Iron deficiency - stable levels - continue with oral replacement\par 3. right side lumbar pain - x-rays with DDD - worsening now with bilateral sciatica pain -  taking ibuprofen as needed - still ongoing \par 4. chronic pain - - unable to afford CBD oil - uses hemp

## 2019-05-16 NOTE — ASSESSMENT
[FreeTextEntry1] : 32 year-old female with pmh of ALMA ROSA, now with return of her symptoms with polyarthritis, highly positive DEBORAH\par \par \par 1. RA -  stable now on methotrexate with minimal stiffness - increase methotrexate to 5 tablets with folic acid\par no prednisone - no flares - continue with kevzara\par 2. Iron deficiency - stable levels - continue with oral replacement\par 3. right side lumbar pain - x-rays with DDD - worsening now with bilateral sciatica pain -  taking ibuprofen as needed - referral to PT\par 4. chronic pain -using CBD oil\par 5. muscle cramps over the right leg - start magnesium at bed time\par \par scheduled for tubal ligation on 05/28 - will stop kevzara now - can continue with methotrexate\par can re-start kevzara 1 week after surgery - if properly healed\par has 3 children\par \par \par I reviewed previous labs results with patients.\par Laboratory tests ordered today\par Diagnosis and Prognosis discussed\par Continue with current medications\par education provided on pre-operative directives\par F/u 2 months\par

## 2019-05-16 NOTE — PHYSICAL EXAM
[General Appearance - Alert] : alert [General Appearance - In No Acute Distress] : in no acute distress [Outer Ear] : the ears and nose were normal in appearance [Oropharynx] : the oropharynx was normal [Neck Cervical Mass (___cm)] : no neck mass was observed [Neck Appearance] : the appearance of the neck was normal [Jugular Venous Distention Increased] : there was no jugular-venous distention [Thyroid Diffuse Enlargement] : the thyroid was not enlarged [Thyroid Nodule] : there were no palpable thyroid nodules [Auscultation Breath Sounds / Voice Sounds] : lungs were clear to auscultation bilaterally [Heart Rate And Rhythm] : heart rate was normal and rhythm regular [Heart Sounds] : normal S1 and S2 [Murmurs] : no murmurs [Heart Sounds Gallop] : no gallops [Heart Sounds Pericardial Friction Rub] : no pericardial rub [Edema] : there was no peripheral edema [Full Pulse] : the pedal pulses are present [Bowel Sounds] : normal bowel sounds [Abdomen Soft] : soft [Abdomen Tenderness] : non-tender [Abdomen Mass (___ Cm)] : no abdominal mass palpated [Abnormal Walk] : normal gait [Nail Clubbing] : no clubbing  or cyanosis of the fingernails [Motor Tone] : muscle strength and tone were normal [Musculoskeletal - Swelling] : no joint swelling seen [Skin Color & Pigmentation] : normal skin color and pigmentation [Skin Turgor] : normal skin turgor [] : no rash [Impaired Insight] : insight and judgment were intact [Oriented To Time, Place, And Person] : oriented to person, place, and time [Affect] : the affect was normal [FreeTextEntry1] : no tender joints

## 2019-05-17 LAB
25(OH)D3 SERPL-MCNC: 19.1 NG/ML
ALBUMIN SERPL ELPH-MCNC: 4.8 G/DL
ALP BLD-CCNC: 98 U/L
ALT SERPL-CCNC: 19 U/L
ANION GAP SERPL CALC-SCNC: 12 MMOL/L
AST SERPL-CCNC: 15 U/L
BASOPHILS # BLD AUTO: 0.04 K/UL
BASOPHILS NFR BLD AUTO: 0.6 %
BILIRUB SERPL-MCNC: 0.6 MG/DL
BUN SERPL-MCNC: 13 MG/DL
CALCIUM SERPL-MCNC: 9.8 MG/DL
CHLORIDE SERPL-SCNC: 104 MMOL/L
CO2 SERPL-SCNC: 26 MMOL/L
CREAT SERPL-MCNC: 0.78 MG/DL
CRP SERPL-MCNC: <0.1 MG/DL
EOSINOPHIL # BLD AUTO: 0.1 K/UL
EOSINOPHIL NFR BLD AUTO: 1.6 %
ERYTHROCYTE [SEDIMENTATION RATE] IN BLOOD BY WESTERGREN METHOD: 2 MM/HR
GLUCOSE SERPL-MCNC: 84 MG/DL
HCT VFR BLD CALC: 40.4 %
HGB BLD-MCNC: 13.3 G/DL
IMM GRANULOCYTES NFR BLD AUTO: 0.2 %
LYMPHOCYTES # BLD AUTO: 2.13 K/UL
LYMPHOCYTES NFR BLD AUTO: 33.7 %
MAN DIFF?: NORMAL
MCHC RBC-ENTMCNC: 30.5 PG
MCHC RBC-ENTMCNC: 32.9 GM/DL
MCV RBC AUTO: 92.7 FL
MONOCYTES # BLD AUTO: 0.59 K/UL
MONOCYTES NFR BLD AUTO: 9.3 %
NEUTROPHILS # BLD AUTO: 3.45 K/UL
NEUTROPHILS NFR BLD AUTO: 54.6 %
PLATELET # BLD AUTO: 217 K/UL
POTASSIUM SERPL-SCNC: 4.3 MMOL/L
PROT SERPL-MCNC: 7.3 G/DL
RBC # BLD: 4.36 M/UL
RBC # FLD: 13.5 %
SODIUM SERPL-SCNC: 142 MMOL/L
WBC # FLD AUTO: 6.32 K/UL

## 2019-05-20 ENCOUNTER — APPOINTMENT (OUTPATIENT)
Dept: INTERNAL MEDICINE | Facility: CLINIC | Age: 34
End: 2019-05-20
Payer: MEDICAID

## 2019-05-20 VITALS
DIASTOLIC BLOOD PRESSURE: 58 MMHG | HEART RATE: 95 BPM | WEIGHT: 190.6 LBS | TEMPERATURE: 98 F | OXYGEN SATURATION: 98 % | SYSTOLIC BLOOD PRESSURE: 118 MMHG | BODY MASS INDEX: 38.42 KG/M2 | HEIGHT: 59 IN

## 2019-05-20 PROCEDURE — 99215 OFFICE O/P EST HI 40 MIN: CPT

## 2019-05-20 RX ORDER — TIZANIDINE 2 MG/1
2 TABLET ORAL
Qty: 20 | Refills: 0 | Status: COMPLETED | COMMUNITY
Start: 2019-02-22 | End: 2019-05-20

## 2019-05-20 RX ORDER — IBUPROFEN 800 MG/1
800 TABLET, FILM COATED ORAL 3 TIMES DAILY
Qty: 60 | Refills: 2 | Status: COMPLETED | COMMUNITY
Start: 2019-02-22 | End: 2019-05-20

## 2019-05-20 NOTE — COUNSELING
[Healthy eating counseling provided] : healthy eating [Low Fat Diet] : Low fat diet [Walking] : Walking [Sleep ___ hours/day] : Sleep [unfilled] hours/day [Engage in a relaxing activity] : Engage in a relaxing activity [Plan in advance] : Plan in advance [None] : None [Good understanding] : Patient has a good understanding of lifestyle changes and the steps needed to achieve self management goals

## 2019-05-24 ENCOUNTER — LABORATORY RESULT (OUTPATIENT)
Age: 34
End: 2019-05-24

## 2019-05-24 ENCOUNTER — OUTPATIENT (OUTPATIENT)
Dept: OUTPATIENT SERVICES | Facility: HOSPITAL | Age: 34
LOS: 1 days | End: 2019-05-24
Payer: MEDICAID

## 2019-05-24 VITALS
RESPIRATION RATE: 18 BRPM | HEIGHT: 59 IN | SYSTOLIC BLOOD PRESSURE: 109 MMHG | WEIGHT: 184.97 LBS | HEART RATE: 73 BPM | DIASTOLIC BLOOD PRESSURE: 78 MMHG | TEMPERATURE: 98 F

## 2019-05-24 DIAGNOSIS — Z01.818 ENCOUNTER FOR OTHER PREPROCEDURAL EXAMINATION: ICD-10-CM

## 2019-05-24 DIAGNOSIS — Z30.2 ENCOUNTER FOR STERILIZATION: ICD-10-CM

## 2019-05-24 LAB
BLD GP AB SCN SERPL QL: SIGNIFICANT CHANGE UP
HCG SERPL-ACNC: <1 MIU/ML — SIGNIFICANT CHANGE UP

## 2019-05-24 PROCEDURE — 86900 BLOOD TYPING SEROLOGIC ABO: CPT

## 2019-05-24 PROCEDURE — 86901 BLOOD TYPING SEROLOGIC RH(D): CPT

## 2019-05-24 PROCEDURE — G0463: CPT

## 2019-05-24 PROCEDURE — 86850 RBC ANTIBODY SCREEN: CPT

## 2019-05-24 PROCEDURE — 84702 CHORIONIC GONADOTROPIN TEST: CPT

## 2019-05-24 PROCEDURE — 36415 COLL VENOUS BLD VENIPUNCTURE: CPT

## 2019-05-24 NOTE — H&P PST ADULT - ASSESSMENT
33 years old female with PMhx rheumatoid arthritis, fibromyalgia, asthma (last attack in January and PSHx ,  left plantar fasciotomy presented for presurgical evaluation for laparoscopic bilateral tubal ligation.

## 2019-05-24 NOTE — H&P PST ADULT - GASTROINTESTINAL DETAILS
nontender/no rebound tenderness/no distention/normal/soft/bowel sounds normal/no bruit/no masses palpable

## 2019-05-24 NOTE — H&P PST ADULT - HISTORY OF PRESENT ILLNESS
33 years old female with PMhx rheumatoid arthritis, fibromyalgia, asthma (last attack in January and PSHx   left plantar fasciotomy presented for presurgical evaluation for laparoscopic bilateral tubal ligation. 33 years old female with PMhx rheumatoid arthritis, fibromyalgia, asthma (last attack in January and PSHx ,  left plantar fasciotomy presented for presurgical evaluation for laparoscopic bilateral tubal ligation.

## 2019-05-24 NOTE — H&P PST ADULT - RS GEN PE MLT RESP DETAILS PC
normal/clear to auscultation bilaterally/airway patent/respirations non-labored/no chest wall tenderness/breath sounds equal/good air movement

## 2019-05-24 NOTE — H&P PST ADULT - NEGATIVE GASTROINTESTINAL SYMPTOMS
no change in bowel habits/no flatulence/no diarrhea/no constipation/no vomiting/no nausea/no abdominal pain/no melena

## 2019-05-24 NOTE — H&P PST ADULT - NEGATIVE MUSCULOSKELETAL SYMPTOMS
no muscle weakness/no arthralgia/no myalgia/no muscle cramps/no stiffness/no arthritis/no joint swelling/no neck pain

## 2019-05-24 NOTE — H&P PST ADULT - NEGATIVE NEUROLOGICAL SYMPTOMS
no paresthesias/no generalized seizures/no focal seizures/no loss of sensation/no difficulty walking/no weakness/no tremors/no transient paralysis/no syncope/no vertigo

## 2019-05-24 NOTE — H&P PST ADULT - NEUROLOGICAL DETAILS
responds to pain/responds to verbal commands/alert and oriented x 3/sensation intact/deep reflexes intact/normal strength

## 2019-05-24 NOTE — H&P PST ADULT - NEGATIVE ENMT SYMPTOMS
no ear pain/no nasal discharge/no tinnitus/no hearing difficulty/no vertigo/no nasal congestion/no sinus symptoms/no nasal obstruction

## 2019-05-24 NOTE — H&P PST ADULT - NSANTHOSAYNRD_GEN_A_CORE
No. MELITA screening performed.  STOP BANG Legend: 0-2 = LOW Risk; 3-4 = INTERMEDIATE Risk; 5-8 = HIGH Risk

## 2019-05-24 NOTE — H&P PST ADULT - NSICDXPROBLEM_GEN_ALL_CORE_FT
PROBLEM DIAGNOSES  Problem: Encounter for sterilization  Assessment and Plan: -patient is scheduled for laparoscopic bilateral tubal ligation on 5/28/2019

## 2019-05-24 NOTE — H&P PST ADULT - MUSCULOSKELETAL
details… detailed exam normal/ROM intact/no joint swelling/no joint erythema/no calf tenderness/no joint warmth/normal strength

## 2019-05-24 NOTE — H&P PST ADULT - NEGATIVE PSYCHIATRIC SYMPTOMS
no insomnia/no mood swings/no anxiety/no paranoia/no visual hallucinations/no suicidal ideation/no depression/no memory loss/no hyperactivity/no agitation/no auditory hallucinations

## 2019-05-24 NOTE — H&P PST ADULT - NEGATIVE GENERAL GENITOURINARY SYMPTOMS
no urinary hesitancy/no nocturia/no hematuria/no flank pain L/no dysuria/no flank pain R/no renal colic/normal urinary frequency

## 2019-05-26 LAB
ALBUMIN SERPL ELPH-MCNC: 4.4 G/DL
ALP BLD-CCNC: 97 U/L
ALT SERPL-CCNC: 27 U/L
ANION GAP SERPL CALC-SCNC: 17 MMOL/L
APPEARANCE: ABNORMAL
AST SERPL-CCNC: 17 U/L
BASOPHILS # BLD AUTO: 0.02 K/UL
BASOPHILS NFR BLD AUTO: 0.3 %
BILIRUB SERPL-MCNC: 0.4 MG/DL
BILIRUBIN URINE: NEGATIVE
BLOOD URINE: ABNORMAL
BUN SERPL-MCNC: 10 MG/DL
CALCIUM SERPL-MCNC: 9.2 MG/DL
CHLORIDE SERPL-SCNC: 104 MMOL/L
CO2 SERPL-SCNC: 20 MMOL/L
COLOR: YELLOW
CREAT SERPL-MCNC: 0.73 MG/DL
EOSINOPHIL # BLD AUTO: 0.08 K/UL
EOSINOPHIL NFR BLD AUTO: 1.3 %
GLUCOSE QUALITATIVE U: NEGATIVE
GLUCOSE SERPL-MCNC: 76 MG/DL
HCG SERPL-MCNC: <1 MIU/ML
HCT VFR BLD CALC: 39.8 %
HGB BLD-MCNC: 12.8 G/DL
IMM GRANULOCYTES NFR BLD AUTO: 0.5 %
INR PPP: 1.05 RATIO
KETONES URINE: NEGATIVE
LEUKOCYTE ESTERASE URINE: ABNORMAL
LYMPHOCYTES # BLD AUTO: 1.99 K/UL
LYMPHOCYTES NFR BLD AUTO: 31.4 %
MAN DIFF?: NORMAL
MCHC RBC-ENTMCNC: 30.6 PG
MCHC RBC-ENTMCNC: 32.2 GM/DL
MCV RBC AUTO: 95.2 FL
MONOCYTES # BLD AUTO: 0.45 K/UL
MONOCYTES NFR BLD AUTO: 7.1 %
NEUTROPHILS # BLD AUTO: 3.76 K/UL
NEUTROPHILS NFR BLD AUTO: 59.4 %
NITRITE URINE: NEGATIVE
PH URINE: 5
PLATELET # BLD AUTO: 220 K/UL
POTASSIUM SERPL-SCNC: 4 MMOL/L
PROT SERPL-MCNC: 6.9 G/DL
PROTEIN URINE: NEGATIVE
PT BLD: 12.1 SEC
RBC # BLD: 4.18 M/UL
RBC # FLD: 13.8 %
SODIUM SERPL-SCNC: 141 MMOL/L
SPECIFIC GRAVITY URINE: 1.01
UROBILINOGEN URINE: NORMAL
WBC # FLD AUTO: 6.33 K/UL

## 2019-05-27 ENCOUNTER — TRANSCRIPTION ENCOUNTER (OUTPATIENT)
Age: 34
End: 2019-05-27

## 2019-05-28 ENCOUNTER — RESULT REVIEW (OUTPATIENT)
Age: 34
End: 2019-05-28

## 2019-05-28 ENCOUNTER — OUTPATIENT (OUTPATIENT)
Dept: OUTPATIENT SERVICES | Facility: HOSPITAL | Age: 34
LOS: 1 days | End: 2019-05-28
Payer: MEDICAID

## 2019-05-28 ENCOUNTER — APPOINTMENT (OUTPATIENT)
Dept: OBGYN | Facility: HOSPITAL | Age: 34
End: 2019-05-28

## 2019-05-28 VITALS
SYSTOLIC BLOOD PRESSURE: 126 MMHG | HEIGHT: 59 IN | WEIGHT: 184.97 LBS | TEMPERATURE: 98 F | DIASTOLIC BLOOD PRESSURE: 77 MMHG | HEART RATE: 73 BPM | OXYGEN SATURATION: 100 % | RESPIRATION RATE: 18 BRPM

## 2019-05-28 VITALS
OXYGEN SATURATION: 99 % | RESPIRATION RATE: 18 BRPM | DIASTOLIC BLOOD PRESSURE: 56 MMHG | HEART RATE: 81 BPM | SYSTOLIC BLOOD PRESSURE: 96 MMHG | TEMPERATURE: 98 F

## 2019-05-28 DIAGNOSIS — Z01.818 ENCOUNTER FOR OTHER PREPROCEDURAL EXAMINATION: ICD-10-CM

## 2019-05-28 DIAGNOSIS — M72.2 PLANTAR FASCIAL FIBROMATOSIS: Chronic | ICD-10-CM

## 2019-05-28 DIAGNOSIS — Z98.891 HISTORY OF UTERINE SCAR FROM PREVIOUS SURGERY: Chronic | ICD-10-CM

## 2019-05-28 DIAGNOSIS — Z30.2 ENCOUNTER FOR STERILIZATION: ICD-10-CM

## 2019-05-28 LAB — BLD GP AB SCN SERPL QL: SIGNIFICANT CHANGE UP

## 2019-05-28 PROCEDURE — 58670 LAPAROSCOPY TUBAL CAUTERY: CPT

## 2019-05-28 PROCEDURE — 88300 SURGICAL PATH GROSS: CPT | Mod: 26

## 2019-05-28 RX ORDER — IBUPROFEN 200 MG
600 TABLET ORAL EVERY 6 HOURS
Refills: 0 | Status: DISCONTINUED | OUTPATIENT
Start: 2019-05-28 | End: 2019-06-05

## 2019-05-28 RX ORDER — FENTANYL CITRATE 50 UG/ML
25 INJECTION INTRAVENOUS
Refills: 0 | Status: DISCONTINUED | OUTPATIENT
Start: 2019-05-28 | End: 2019-05-28

## 2019-05-28 RX ORDER — ACETAMINOPHEN 500 MG
2 TABLET ORAL
Qty: 30 | Refills: 0
Start: 2019-05-28 | End: 2019-06-01

## 2019-05-28 RX ORDER — IBUPROFEN 200 MG
1 TABLET ORAL
Qty: 20 | Refills: 0
Start: 2019-05-28 | End: 2019-06-01

## 2019-05-28 RX ORDER — SODIUM CHLORIDE 9 MG/ML
3 INJECTION INTRAMUSCULAR; INTRAVENOUS; SUBCUTANEOUS EVERY 8 HOURS
Refills: 0 | Status: DISCONTINUED | OUTPATIENT
Start: 2019-05-28 | End: 2019-06-05

## 2019-05-28 RX ORDER — METOCLOPRAMIDE HCL 10 MG
10 TABLET ORAL ONCE
Refills: 0 | Status: COMPLETED | OUTPATIENT
Start: 2019-05-28 | End: 2019-05-28

## 2019-05-28 RX ORDER — ACETAMINOPHEN 500 MG
1000 TABLET ORAL ONCE
Refills: 0 | Status: DISCONTINUED | OUTPATIENT
Start: 2019-05-28 | End: 2019-06-05

## 2019-05-28 RX ORDER — SODIUM CHLORIDE 9 MG/ML
1000 INJECTION, SOLUTION INTRAVENOUS
Refills: 0 | Status: DISCONTINUED | OUTPATIENT
Start: 2019-05-28 | End: 2019-05-28

## 2019-05-28 RX ADMIN — SODIUM CHLORIDE 100 MILLILITER(S): 9 INJECTION, SOLUTION INTRAVENOUS at 17:34

## 2019-05-28 RX ADMIN — Medication 10 MILLIGRAM(S): at 17:34

## 2019-05-28 NOTE — BRIEF OPERATIVE NOTE - NSICDXBRIEFPROCEDURE_GEN_ALL_CORE_FT
PROCEDURES:  Removal IUD 28-May-2019 15:09:48  Sandee Grissom  Laparoscopic tubal ligation 28-May-2019 15:07:35  Sandee Grissom

## 2019-05-28 NOTE — HISTORY OF PRESENT ILLNESS
[No Pertinent Cardiac History] : no history of aortic stenosis, atrial fibrillation, coronary artery disease, recent myocardial infarction, or implantable device/pacemaker [Asthma] : asthma [No Adverse Anesthesia Reaction] : no adverse anesthesia reaction in self or family member [Chronic Anticoagulation] : no chronic anticoagulation [Diabetes] : no diabetes [FreeTextEntry1] : tubal ligation [Chronic Kidney Disease] : no chronic kidney disease [FreeTextEntry2] : 5/28/19 [FreeTextEntry3] : Dr Oleary [FreeTextEntry4] : Pt is a 33 yr old woman with rheumatoid arthritis and has hx of asthma  she has been on methotrexate and received injections of Kevzara .   [FreeTextEntry7] : pt saw pulmonary in 2/19 and had pft small airway disease .

## 2019-05-28 NOTE — ASSESSMENT
[High Risk Surgery - Intraperitoneal, Intrathoracic or Supringuinal Vascular Procedures] : High Risk Surgery - Intraperitoneal, Intrathoracic or Supringuinal Vascular Procedures - No (0) [Ischemic Heart Disease] : Ischemic Heart Disease - No (0) [Congestive Heart Failure] : Congestive Heart Failure - No (0) [Prior Cerebrovascular Accident or TIA] : Prior Cerebrovascular Accident or TIA - No (0) [Creatinine >= 2mg/dL (1 Point)] : Creatinine >= 2mg/dL - No (0) [Insulin-dependent Diabetic (1 Point)] : Insulin-dependent Diabetic - No (0) [0] : 0 , RCRI Class: I, Risk of Post-Op Cardiac Complications: 0.4%, Procedure Risk: Low-Risk [Modify medications prior to procedure] : Modify medications prior to procedure [As per surgery] : as per surgery [FreeTextEntry7] : bring proair with her to surgery take 2 puffs prior to procedure  [FreeTextEntry4] : pt has been explained the risks , complications alternatives to the surgery anesthesia  and all her questions were answered by the surgeon.   she will have blood testing today and once reviewed will be cleared . She needs pregnancy test on Wednesday.  she will bring her inhaler and 30 mins before surgery she will take two puffs  Her rcrI is .4% score 0 for  cardiac  event intra operatively  for a low risk procedure.

## 2019-05-28 NOTE — ASU DISCHARGE PLAN (ADULT/PEDIATRIC) - ACTIVITY LEVEL
Nothing per rectum/No heavy lifting No intercourse/No douching/No heavy lifting/No weight bearing/No sports/gym/Nothing per rectum/Nothing per vagina/No tampons/No tub baths

## 2019-05-28 NOTE — ASU DISCHARGE PLAN (ADULT/PEDIATRIC) - CARE PROVIDER_API CALL
Avery Oleary)  Obstetrics and Gynecology  [address] [facility], [place] [address]  Phone: [phone]  Fax: [phone]  Follow Up Time:

## 2019-05-28 NOTE — PHYSICAL EXAM
[Well Developed] : well developed [Well Nourished] : well nourished [Normal Voice Quality] : was normal [Normal Verbal Skills] : the patient had normal verbal communication skills [Normal Nonverbal Skills] : normal nonverbal communication skills were demonstrated [Conjunctiva] : the conjunctiva were normal in both eyes [PERRL] : pupils were equal in size, round, and reactive to light [EOM Intact] : extraocular movements were intact [Normal Outer Ear/Nose] : the outer ears and nose were normal in appearance [Normal Oropharynx] : the oropharynx was normal [Normal TMs] : both tympanic membranes were normal [No JVD] : no jugular venous distention [Supple] : supple [Rate ___] : at [unfilled] breaths per minute [Normal Rhythm/Effort] : normal respiratory rhythm and effort [Clear Bilaterally] : the lungs were clear to auscultation bilaterally [Normal to Percussion] : the lungs were normal to percussion [Heart Rate ___] : [unfilled] bpm [Normal Rate] : normal [Normal S1] : normal S1 [Normal S2] : normal S2 [No Murmur] : no murmurs heard [No Pitting Edema] : no pitting edema present [2+] : left 2+ [No Abnormalities] : the abdominal aorta was not enlarged and no bruit was heard [Soft, Nontender] : the abdomen was soft and nontender [No Mass] : no masses were palpated [No HSM] : no hepatosplenomegaly noted [No CVA Tenderness] : no CVA  tenderness [No Spinal Tenderness] : no spinal tenderness [Normal Station and Gait] : the gait and station were normal [Normal Motor Tone] : the muscle tone was normal [Involuntary Movements] : no involuntary movements were seen [Normal Scalp] : inspection of the scalp showed no abnormalities [Examination Of The Hair] : texture and distribution of hair was normal [Complexion Medium] : medium complexion [Normal] : the deep tendon reflexes were normal [Normal Mental Status] : the patient's orientation, memory, attention, language and fund of knowledge were normal [Appropriate] : appropriate [S3] : no S3 [Lt] : no varicose veins of the left leg [S4] : no S4 [Rt] : no varicose veins of the right leg [Right Carotid Bruit] : no bruit heard over the right carotid [Left Carotid Bruit] : no bruit heard over the left carotid [Right Femoral Bruit] : no bruit heard over the right femoral artery [Cervical Lymph Nodes Enlarged Posterior Bilaterally] : nodes not enlarged [Left Femoral Bruit] : no bruit heard over the left femoral artery [Bruit] : no bruit heard [Inguinal Lymph Nodes Enlarged Bilaterally] : nodes not enlarged [Axillary Lymph Nodes Enlarged Bilaterally] : nodes not enlarged [Abnormal Color] : normal color and pigmentation [Supraclavicular Lymph Nodes Enlarged Bilaterally] : nodes not enlarged [Skin Turgor Decreased] : normal skin turgor [Impaired judgment] : intact judgment [Skin Lesions 1] : no skin lesions were observed [Impaired Insight] : intact insight

## 2019-05-28 NOTE — RESULTS/DATA
[ECG Reviewed] : reviewed [Ventricular Rate___] : ventricular rate is [unfilled] beats per minute [P Waves Normal] : the P wave is normal [ECG Intervals TN.] : TN interval is normal [MT Interval___] : [unfilled] seconds [Normal QRS] : the QRS is normal [QRS Interval___] : QRS interval: [unfilled] seconds [ECG Axis] : the QRS axis is normal [QTc Interval___] : QTc interval: [unfilled] [Normal ST Segments] : the ST segments are normal [ECG T. Waves] : normal [FreeTextEntry1] : sinus arrhythmia [de-identified] : lmp 5/15/`19

## 2019-05-28 NOTE — BRIEF OPERATIVE NOTE - OPERATION/FINDINGS
grossly normal appearing bilateral fallopian tubes and ovaries. Uterus with adhesion between lower uterine segment and upper abdominal wall and fundus and upper abdominal wall. grossly normal appearing liver edge  IUD removal

## 2019-05-29 PROBLEM — N20.0 CALCULUS OF KIDNEY: Chronic | Status: INACTIVE | Noted: 2019-01-03 | Resolved: 2019-05-28

## 2019-05-31 ENCOUNTER — APPOINTMENT (OUTPATIENT)
Dept: ORTHOPEDIC SURGERY | Facility: CLINIC | Age: 34
End: 2019-05-31

## 2019-06-03 ENCOUNTER — INPATIENT (INPATIENT)
Facility: HOSPITAL | Age: 34
LOS: 1 days | Discharge: HOME | DRG: 176 | End: 2019-06-05
Attending: INTERNAL MEDICINE | Admitting: INTERNAL MEDICINE
Payer: MEDICAID

## 2019-06-03 VITALS
TEMPERATURE: 98 F | DIASTOLIC BLOOD PRESSURE: 78 MMHG | WEIGHT: 199.96 LBS | RESPIRATION RATE: 16 BRPM | HEART RATE: 11 BPM | HEIGHT: 62 IN | OXYGEN SATURATION: 98 % | SYSTOLIC BLOOD PRESSURE: 115 MMHG

## 2019-06-03 DIAGNOSIS — Z98.51 TUBAL LIGATION STATUS: Chronic | ICD-10-CM

## 2019-06-03 DIAGNOSIS — M72.2 PLANTAR FASCIAL FIBROMATOSIS: Chronic | ICD-10-CM

## 2019-06-03 DIAGNOSIS — Z98.891 HISTORY OF UTERINE SCAR FROM PREVIOUS SURGERY: Chronic | ICD-10-CM

## 2019-06-03 PROBLEM — J45.909 UNSPECIFIED ASTHMA, UNCOMPLICATED: Chronic | Status: ACTIVE | Noted: 2019-05-28

## 2019-06-03 PROBLEM — M79.7 FIBROMYALGIA: Chronic | Status: ACTIVE | Noted: 2019-05-28

## 2019-06-03 LAB
ANION GAP SERPL CALC-SCNC: 6 MMOL/L — SIGNIFICANT CHANGE UP (ref 5–17)
BASOPHILS # BLD AUTO: 0.01 K/UL — SIGNIFICANT CHANGE UP (ref 0–0.2)
BASOPHILS NFR BLD AUTO: 0.1 % — SIGNIFICANT CHANGE UP (ref 0–2)
BUN SERPL-MCNC: 5 MG/DL — LOW (ref 7–18)
CALCIUM SERPL-MCNC: 8 MG/DL — LOW (ref 8.4–10.5)
CHLORIDE SERPL-SCNC: 111 MMOL/L — HIGH (ref 96–108)
CK MB BLD-MCNC: <1 % — SIGNIFICANT CHANGE UP (ref 0–3.5)
CK MB CFR SERPL CALC: <1 NG/ML — SIGNIFICANT CHANGE UP (ref 0–3.6)
CK SERPL-CCNC: 101 U/L — SIGNIFICANT CHANGE UP (ref 21–215)
CO2 SERPL-SCNC: 25 MMOL/L — SIGNIFICANT CHANGE UP (ref 22–31)
CREAT SERPL-MCNC: 0.64 MG/DL — SIGNIFICANT CHANGE UP (ref 0.5–1.3)
EOSINOPHIL # BLD AUTO: 0.01 K/UL — SIGNIFICANT CHANGE UP (ref 0–0.5)
EOSINOPHIL NFR BLD AUTO: 0.1 % — SIGNIFICANT CHANGE UP (ref 0–6)
GLUCOSE SERPL-MCNC: 109 MG/DL — HIGH (ref 70–99)
HCG SERPL-ACNC: <1 MIU/ML — SIGNIFICANT CHANGE UP
HCT VFR BLD CALC: 37.4 % — SIGNIFICANT CHANGE UP (ref 34.5–45)
HGB BLD-MCNC: 12.6 G/DL — SIGNIFICANT CHANGE UP (ref 11.5–15.5)
IMM GRANULOCYTES NFR BLD AUTO: 0.4 % — SIGNIFICANT CHANGE UP (ref 0–1.5)
LYMPHOCYTES # BLD AUTO: 0.86 K/UL — LOW (ref 1–3.3)
LYMPHOCYTES # BLD AUTO: 7.6 % — LOW (ref 13–44)
MCHC RBC-ENTMCNC: 31.1 PG — SIGNIFICANT CHANGE UP (ref 27–34)
MCHC RBC-ENTMCNC: 33.7 GM/DL — SIGNIFICANT CHANGE UP (ref 32–36)
MCV RBC AUTO: 92.3 FL — SIGNIFICANT CHANGE UP (ref 80–100)
MONOCYTES # BLD AUTO: 0.58 K/UL — SIGNIFICANT CHANGE UP (ref 0–0.9)
MONOCYTES NFR BLD AUTO: 5.2 % — SIGNIFICANT CHANGE UP (ref 2–14)
NEUTROPHILS # BLD AUTO: 9.76 K/UL — HIGH (ref 1.8–7.4)
NEUTROPHILS NFR BLD AUTO: 86.6 % — HIGH (ref 43–77)
NRBC # BLD: 0 /100 WBCS — SIGNIFICANT CHANGE UP (ref 0–0)
NT-PROBNP SERPL-SCNC: 64 PG/ML — SIGNIFICANT CHANGE UP (ref 0–125)
PLATELET # BLD AUTO: 186 K/UL — SIGNIFICANT CHANGE UP (ref 150–400)
POTASSIUM SERPL-MCNC: 3.7 MMOL/L — SIGNIFICANT CHANGE UP (ref 3.5–5.3)
POTASSIUM SERPL-SCNC: 3.7 MMOL/L — SIGNIFICANT CHANGE UP (ref 3.5–5.3)
RBC # BLD: 4.05 M/UL — SIGNIFICANT CHANGE UP (ref 3.8–5.2)
RBC # FLD: 13.8 % — SIGNIFICANT CHANGE UP (ref 10.3–14.5)
SODIUM SERPL-SCNC: 142 MMOL/L — SIGNIFICANT CHANGE UP (ref 135–145)
TROPONIN I SERPL-MCNC: <0.015 NG/ML — SIGNIFICANT CHANGE UP (ref 0–0.04)
WBC # BLD: 11.26 K/UL — HIGH (ref 3.8–10.5)
WBC # FLD AUTO: 11.26 K/UL — HIGH (ref 3.8–10.5)

## 2019-06-03 PROCEDURE — 93005 ELECTROCARDIOGRAM TRACING: CPT

## 2019-06-03 PROCEDURE — 36415 COLL VENOUS BLD VENIPUNCTURE: CPT

## 2019-06-03 PROCEDURE — 86901 BLOOD TYPING SEROLOGIC RH(D): CPT

## 2019-06-03 PROCEDURE — 88300 SURGICAL PATH GROSS: CPT

## 2019-06-03 PROCEDURE — 86850 RBC ANTIBODY SCREEN: CPT

## 2019-06-03 PROCEDURE — 58301 REMOVE INTRAUTERINE DEVICE: CPT

## 2019-06-03 PROCEDURE — 86900 BLOOD TYPING SEROLOGIC ABO: CPT

## 2019-06-03 PROCEDURE — 71275 CT ANGIOGRAPHY CHEST: CPT | Mod: 26

## 2019-06-03 PROCEDURE — 58670 LAPAROSCOPY TUBAL CAUTERY: CPT

## 2019-06-03 RX ORDER — DIPHENHYDRAMINE HCL 50 MG
50 CAPSULE ORAL ONCE
Refills: 0 | Status: COMPLETED | OUTPATIENT
Start: 2019-06-03 | End: 2019-06-03

## 2019-06-03 RX ORDER — MORPHINE SULFATE 50 MG/1
4 CAPSULE, EXTENDED RELEASE ORAL ONCE
Refills: 0 | Status: DISCONTINUED | OUTPATIENT
Start: 2019-06-03 | End: 2019-06-03

## 2019-06-03 RX ADMIN — MORPHINE SULFATE 4 MILLIGRAM(S): 50 CAPSULE, EXTENDED RELEASE ORAL at 17:00

## 2019-06-03 RX ADMIN — Medication 50 MILLIGRAM(S): at 21:41

## 2019-06-03 RX ADMIN — Medication 125 MILLIGRAM(S): at 18:31

## 2019-06-03 RX ADMIN — MORPHINE SULFATE 4 MILLIGRAM(S): 50 CAPSULE, EXTENDED RELEASE ORAL at 21:18

## 2019-06-03 RX ADMIN — MORPHINE SULFATE 4 MILLIGRAM(S): 50 CAPSULE, EXTENDED RELEASE ORAL at 16:34

## 2019-06-03 NOTE — ED ADULT NURSE REASSESSMENT NOTE - NS ED NURSE REASSESS COMMENT FT1
Pt. received medication for CT scan and pain, no complaints voiced at this time. No signs of acute distress noted. Pt. awaiting CT scan. Will continue to monitor.

## 2019-06-03 NOTE — ED PROVIDER NOTE - OBJECTIVE STATEMENT
34 y/o F with a significant PMHx of asthma, fibromyalgia, rheumatoid arthritis and a significant PSHx of , plantar fasciitis and s/p tubal ligation done here on 19, presents to the ED with complaints of chest pain and shortness of breath x 2 days. Patient states pain is right sided. Denies nausea, vomiting, diaphoresis, fever or any other acute complaints.

## 2019-06-03 NOTE — ED PROVIDER NOTE - PROGRESS NOTE DETAILS
Harish DO: Pt with persistnet right cCT discussed after intital reporte with radiologist, pt with right peripheral PE. Harish DO: Pt with persistent right thoracic area tenderness. CT reviewed after initial report with radiologist, pt with right peripheral PE (will make report addendum). Lovenox ordered will admit to telemetry. Moreira DO: Pt with persistent right thoracic area tenderness. CT reviewed with radiologist, pt with right peripheral PE (will make report addendum). Lovenox ordered will admit to telemetry.

## 2019-06-03 NOTE — ED ADULT TRIAGE NOTE - NSWEIGHTCALCTOOLDRUG_GEN_A_CORE
eMERGENCY dEPARTMENT eNCOUnter      CHIEF COMPLAINT    Chief Complaint   Patient presents with   • Cough       HPI  Mickleton Hill is a 1 year old male who presents to the emergency department with a chief complaint of \"cough and congestion.\" Historian was the father. Father reports onset of congestion on Friday. States over the last few days he started coughing. Today, he noticed that there was an increased work of breathing and patient was using his stomach to breathe harder with wheezing as well as a fever. Father denies any past medical history of reactive airway disease. Father reports 4 episodes of pudding-like diarrhea today. He reports patient is eating and drinking as normal. He has been wetting his diapers as normal. Father has been giving Dimetapp for symptoms without improvement. Father reports patient is not up-to-date on his immunizations. Father denies any vomiting, ear tugging, or lethargy.     ALLERGIES    ALLERGIES:  No Known Allergies    CURRENT MEDICATIONS    No current facility-administered medications for this encounter.      Current Outpatient Prescriptions   Medication Sig Dispense Refill   • prednisoLONE sod-phos (ORAPRED) 15 MG/5ML solution Take 8.2 mLs by mouth daily for 4 days. 100 mL 0   • albuterol 108 (90 Base) MCG/ACT inhaler Inhale 2 puffs into the lungs every 4 hours as needed for Wheezing. 8.5 g 0   • ibuprofen (MOTRIN,ADVIL) 100 MG/5ML suspension Take 5.3 mLs by mouth every 8 hours as needed for Fever. 237 mL 0   • amoxicillin (AMOXIL) 125 MG/5ML suspension Take 5 mLs by mouth 3 times daily. 150 mL 0   • ibuprofen (CHILDRENS MOTRIN) 100 MG/5ML suspension Take 5.9 mLs by mouth every 6 hours as needed for Fever. 120 mL 0   • amoxicillin (AMOXIL) 125 MG/5ML suspension Take 5 mLs by mouth 3 times daily. 150 mL 0   • ibuprofen (CHILDRENS MOTRIN) 100 MG/5ML suspension Take 5.5 mLs by mouth every 6 hours as needed for Fever. 120 mL 0       PAST MEDICAL HISTORY    History reviewed. No  pertinent past medical history.    SURGICAL HISTORY    History reviewed. No pertinent surgical history.    SOCIAL HISTORY    Child is meeting all age-appropriate mile stones.    FAMILY HISTORY    No family history on file.    REVIEW OF SYSTEMS:    Constitutional:  Fevers.   HENT: Reports nasal congestion. No earache or sore throat.   Respiratory: Reports cough and wheezing with breathing difficulty.   GI:  No abdominal pain, vomiting. Reports diarrhea.   Skin:  No rash.     PHYSICAL EXAM  Vitals:    02/18/18 2036 02/19/18 0013 02/19/18 0454   Pulse: 168  133   Resp: (!) 44  (!) 32   Temp: 100.4 °F (38 °C)     TempSrc: Rectal     SpO2: 96%  97%   Weight:  12.3 kg        Pulse Ox Interpretation:  96% on RA  Constitutional:  Well-developed, well-nourished, non-toxic appearing child who is playful, consolable, smiling and interactive.   Eyes:  PERRL, conjunctivae normal.  HENT:  Head is atraumatic/normocephalic. External ears without lesions. Tympanic membranes normal. Nose midline. Oropharynx appears dry. No tonsillar or oropharyngeal erythema. Rhinorrhea present.  Neck:  Normal range of motion, no tenderness, supple.  Respiratory:  Increased work of breathing with accessory muscle use.  Tachyepnic.  No acute respiratory distress. Wheezing present in bilateral lung fields. No rales or coarse breath sounds.   Cardiovascular:  Tachycardic.  No murmurs.   GI:  Soft, nondistended, normal bowel sounds, nontender, no organomegaly, no mass, no guarding.   Musculoskeletal:  No edema, no tenderness, no deformities.  Integument:  Capillary refill < 2sec. No rashes.    Lymphatic:  No cervical LAD.   Neurologic:  Alert, strength and tone normal.    RADIOLOGY    XR Chest AP or PA   Final Result   FINDINGS/IMPRESSION:        The cardio thymic silhouette is within normal limits. There is minimal   prominence of the perihilar bronchovascular markings, which can be seen   with viral infection or reactive airways disease. There is no  evidence of   dense focal consolidation or significant effusion.             LABS    Results for orders placed or performed during the hospital encounter of 02/18/18   Influenza Rapid A/B   Result Value    SOURCE NASOPHARYNGEAL SWAB    INFLUENZA A NEGATIVE    INFLUENZA B ANTIGEN NEGATIVE         ED COURSE & MEDICAL DECISION MAKING    Addy Klein is a 1 year old male who presents to the emergency department with a chief complaint of \"cough and congestion.\" Examination as noted above. Patient has increased work of breathing with bilateral wheezing but is in no acute respiratory distress or respiratory failure. Patient is febrile and tachycardic. Will obtain chest x-ray, test for influenza, give DuoNeb and tylenol. Will give oral fluids given history of diarrhea today.    12:44 AM: Reassessed patient. Patient is active, smiling, jumping up and down on bed. Patient is tolerating water. Patient's breath sounds with improved aeration after breathing treatment. Work of breathing has decreased after although patient is still slightly tachypneic. Awaiting influenza and chest x-ray read at this time.    1:07 AM: CXR negative for pneumonia with minimal prominence of perihilar bronchovascular markings likely due to viral infection or reactive airway disease.     1:51 AM: Influenza negative. Reassessed patient. Patient continues to have increased work of breathing and is mildly tachypneic. Lungs are markedly improved after first breathing treatment. Patient appears more comfortable and is in no acute respiratory distress. Will give orapred.     3:40 AM: Patient sleeping bed with father. Patient continues to have some wheezing in his right lung. Patient appears to have decreased work of breathing from initial assessment with improvement in respiratory and heart rate. Patient is interactive, active, and smiling. Will order second DuoNeb. Will prescribe inhaler with metered-dose inhaler as needed for wheezing. Will give orapred  to for 4 days. Patient instructed to follow up with his pediatrician in one day for reevaluation of symptoms. Patient instructed to give Tylenol and ibuprofen as needed for fever.    3:47 AM Patient care further endorsed to Dr. Anderson at this time for further management with anticipation of discharge after second breathing treatment. All questions have been answered.    Impression:  The primary encounter diagnosis was Reactive airway disease with wheezing without complication, unspecified asthma severity, unspecified whether persistent. Diagnoses of Fever, unspecified fever cause, Diarrhea, unspecified type, and Viral syndrome were also pertinent to this visit.    Follow Up:  NOE Abreu  [address] [facility] [address]  [phone]    Schedule an appointment as soon as possible for a visit in 1 day  For re-evaluation       Discharge Medication List as of 2/19/2018  4:02 AM      START taking these medications    Details   prednisoLONE sod-phos (ORAPRED) 15 MG/5ML solution Take 8.2 mLs by mouth daily for 4 days.Eprescribe, Disp-100 mL, R-0      albuterol 108 (90 Base) MCG/ACT inhaler Inhale 2 puffs into the lungs every 4 hours as needed for Wheezing.Eprescribe, Disp-8.5 g, R-0             Patient seen under the direct supervision of Dr. Anderson.  Eliane Levy PA-C   02/19/18 0402       Eliane Levy PA-C  02/19/18 8732      used

## 2019-06-04 ENCOUNTER — TRANSCRIPTION ENCOUNTER (OUTPATIENT)
Age: 34
End: 2019-06-04

## 2019-06-04 DIAGNOSIS — Z29.9 ENCOUNTER FOR PROPHYLACTIC MEASURES, UNSPECIFIED: ICD-10-CM

## 2019-06-04 DIAGNOSIS — I26.99 OTHER PULMONARY EMBOLISM WITHOUT ACUTE COR PULMONALE: ICD-10-CM

## 2019-06-04 DIAGNOSIS — J45.909 UNSPECIFIED ASTHMA, UNCOMPLICATED: ICD-10-CM

## 2019-06-04 DIAGNOSIS — M79.669 PAIN IN UNSPECIFIED LOWER LEG: ICD-10-CM

## 2019-06-04 DIAGNOSIS — M06.9 RHEUMATOID ARTHRITIS, UNSPECIFIED: ICD-10-CM

## 2019-06-04 LAB
ANION GAP SERPL CALC-SCNC: 8 MMOL/L — SIGNIFICANT CHANGE UP (ref 5–17)
APPEARANCE UR: CLEAR — SIGNIFICANT CHANGE UP
BASOPHILS # BLD AUTO: 0.01 K/UL — SIGNIFICANT CHANGE UP (ref 0–0.2)
BASOPHILS NFR BLD AUTO: 0.1 % — SIGNIFICANT CHANGE UP (ref 0–2)
BILIRUB UR-MCNC: NEGATIVE — SIGNIFICANT CHANGE UP
BUN SERPL-MCNC: 8 MG/DL — SIGNIFICANT CHANGE UP (ref 7–18)
CALCIUM SERPL-MCNC: 8.8 MG/DL — SIGNIFICANT CHANGE UP (ref 8.4–10.5)
CHLORIDE SERPL-SCNC: 108 MMOL/L — SIGNIFICANT CHANGE UP (ref 96–108)
CHOLEST SERPL-MCNC: 181 MG/DL — SIGNIFICANT CHANGE UP (ref 10–199)
CK MB BLD-MCNC: <0.9 % — SIGNIFICANT CHANGE UP (ref 0–3.5)
CK MB CFR SERPL CALC: <1 NG/ML — SIGNIFICANT CHANGE UP (ref 0–3.6)
CK SERPL-CCNC: 107 U/L — SIGNIFICANT CHANGE UP (ref 21–215)
CO2 SERPL-SCNC: 24 MMOL/L — SIGNIFICANT CHANGE UP (ref 22–31)
COLOR SPEC: YELLOW — SIGNIFICANT CHANGE UP
CREAT SERPL-MCNC: 0.71 MG/DL — SIGNIFICANT CHANGE UP (ref 0.5–1.3)
DIFF PNL FLD: ABNORMAL
EOSINOPHIL # BLD AUTO: 0 K/UL — SIGNIFICANT CHANGE UP (ref 0–0.5)
EOSINOPHIL NFR BLD AUTO: 0 % — SIGNIFICANT CHANGE UP (ref 0–6)
FOLATE SERPL-MCNC: 14.3 NG/ML — SIGNIFICANT CHANGE UP
GLUCOSE SERPL-MCNC: 132 MG/DL — HIGH (ref 70–99)
GLUCOSE UR QL: NEGATIVE — SIGNIFICANT CHANGE UP
HBA1C BLD-MCNC: 5 % — SIGNIFICANT CHANGE UP (ref 4–5.6)
HCT VFR BLD CALC: 37.2 % — SIGNIFICANT CHANGE UP (ref 34.5–45)
HDLC SERPL-MCNC: 79 MG/DL — SIGNIFICANT CHANGE UP
HGB BLD-MCNC: 12.3 G/DL — SIGNIFICANT CHANGE UP (ref 11.5–15.5)
IMM GRANULOCYTES NFR BLD AUTO: 0.4 % — SIGNIFICANT CHANGE UP (ref 0–1.5)
KETONES UR-MCNC: NEGATIVE — SIGNIFICANT CHANGE UP
LEUKOCYTE ESTERASE UR-ACNC: NEGATIVE — SIGNIFICANT CHANGE UP
LIPID PNL WITH DIRECT LDL SERPL: 90 MG/DL — SIGNIFICANT CHANGE UP
LYMPHOCYTES # BLD AUTO: 0.78 K/UL — LOW (ref 1–3.3)
LYMPHOCYTES # BLD AUTO: 6.8 % — LOW (ref 13–44)
MCHC RBC-ENTMCNC: 30.5 PG — SIGNIFICANT CHANGE UP (ref 27–34)
MCHC RBC-ENTMCNC: 33.1 GM/DL — SIGNIFICANT CHANGE UP (ref 32–36)
MCV RBC AUTO: 92.3 FL — SIGNIFICANT CHANGE UP (ref 80–100)
MONOCYTES # BLD AUTO: 0.11 K/UL — SIGNIFICANT CHANGE UP (ref 0–0.9)
MONOCYTES NFR BLD AUTO: 1 % — LOW (ref 2–14)
NEUTROPHILS # BLD AUTO: 10.56 K/UL — HIGH (ref 1.8–7.4)
NEUTROPHILS NFR BLD AUTO: 91.7 % — HIGH (ref 43–77)
NITRITE UR-MCNC: NEGATIVE — SIGNIFICANT CHANGE UP
NRBC # BLD: 0 /100 WBCS — SIGNIFICANT CHANGE UP (ref 0–0)
PH UR: 7 — SIGNIFICANT CHANGE UP (ref 5–8)
PLATELET # BLD AUTO: 195 K/UL — SIGNIFICANT CHANGE UP (ref 150–400)
POTASSIUM SERPL-MCNC: 3.8 MMOL/L — SIGNIFICANT CHANGE UP (ref 3.5–5.3)
POTASSIUM SERPL-SCNC: 3.8 MMOL/L — SIGNIFICANT CHANGE UP (ref 3.5–5.3)
PROT UR-MCNC: NEGATIVE — SIGNIFICANT CHANGE UP
RBC # BLD: 4.03 M/UL — SIGNIFICANT CHANGE UP (ref 3.8–5.2)
RBC # FLD: 13.9 % — SIGNIFICANT CHANGE UP (ref 10.3–14.5)
SODIUM SERPL-SCNC: 140 MMOL/L — SIGNIFICANT CHANGE UP (ref 135–145)
SP GR SPEC: 1 — LOW (ref 1.01–1.02)
TOTAL CHOLESTEROL/HDL RATIO MEASUREMENT: 2.3 RATIO — LOW (ref 3.3–7.1)
TRIGL SERPL-MCNC: 59 MG/DL — SIGNIFICANT CHANGE UP (ref 10–149)
TROPONIN I SERPL-MCNC: <0.015 NG/ML — SIGNIFICANT CHANGE UP (ref 0–0.04)
TSH SERPL-MCNC: 0.37 UU/ML — SIGNIFICANT CHANGE UP (ref 0.34–4.82)
UROBILINOGEN FLD QL: NEGATIVE — SIGNIFICANT CHANGE UP
VIT B12 SERPL-MCNC: 437 PG/ML — SIGNIFICANT CHANGE UP (ref 232–1245)
WBC # BLD: 11.51 K/UL — HIGH (ref 3.8–10.5)
WBC # FLD AUTO: 11.51 K/UL — HIGH (ref 3.8–10.5)

## 2019-06-04 PROCEDURE — 99284 EMERGENCY DEPT VISIT MOD MDM: CPT | Mod: 25

## 2019-06-04 PROCEDURE — 99223 1ST HOSP IP/OBS HIGH 75: CPT

## 2019-06-04 PROCEDURE — 93970 EXTREMITY STUDY: CPT | Mod: 26

## 2019-06-04 RX ORDER — OXYCODONE AND ACETAMINOPHEN 5; 325 MG/1; MG/1
1 TABLET ORAL EVERY 6 HOURS
Refills: 0 | Status: DISCONTINUED | OUTPATIENT
Start: 2019-06-04 | End: 2019-06-05

## 2019-06-04 RX ORDER — ENOXAPARIN SODIUM 100 MG/ML
90 INJECTION SUBCUTANEOUS ONCE
Refills: 0 | Status: COMPLETED | OUTPATIENT
Start: 2019-06-04 | End: 2019-06-04

## 2019-06-04 RX ORDER — ACETAMINOPHEN 500 MG
650 TABLET ORAL EVERY 6 HOURS
Refills: 0 | Status: DISCONTINUED | OUTPATIENT
Start: 2019-06-04 | End: 2019-06-05

## 2019-06-04 RX ORDER — FOLIC ACID 0.8 MG
1 TABLET ORAL DAILY
Refills: 0 | Status: DISCONTINUED | OUTPATIENT
Start: 2019-06-04 | End: 2019-06-05

## 2019-06-04 RX ORDER — ENOXAPARIN SODIUM 100 MG/ML
90 INJECTION SUBCUTANEOUS EVERY 12 HOURS
Refills: 0 | Status: DISCONTINUED | OUTPATIENT
Start: 2019-06-04 | End: 2019-06-05

## 2019-06-04 RX ORDER — MORPHINE SULFATE 50 MG/1
6 CAPSULE, EXTENDED RELEASE ORAL ONCE
Refills: 0 | Status: DISCONTINUED | OUTPATIENT
Start: 2019-06-04 | End: 2019-06-04

## 2019-06-04 RX ORDER — IPRATROPIUM/ALBUTEROL SULFATE 18-103MCG
3 AEROSOL WITH ADAPTER (GRAM) INHALATION EVERY 6 HOURS
Refills: 0 | Status: DISCONTINUED | OUTPATIENT
Start: 2019-06-04 | End: 2019-06-05

## 2019-06-04 RX ORDER — OXYCODONE AND ACETAMINOPHEN 5; 325 MG/1; MG/1
2 TABLET ORAL EVERY 6 HOURS
Refills: 0 | Status: DISCONTINUED | OUTPATIENT
Start: 2019-06-04 | End: 2019-06-05

## 2019-06-04 RX ADMIN — ENOXAPARIN SODIUM 90 MILLIGRAM(S): 100 INJECTION SUBCUTANEOUS at 01:18

## 2019-06-04 RX ADMIN — MORPHINE SULFATE 4 MILLIGRAM(S): 50 CAPSULE, EXTENDED RELEASE ORAL at 02:36

## 2019-06-04 RX ADMIN — Medication 3 MILLILITER(S): at 08:49

## 2019-06-04 RX ADMIN — Medication 650 MILLIGRAM(S): at 08:12

## 2019-06-04 RX ADMIN — Medication 650 MILLIGRAM(S): at 07:27

## 2019-06-04 RX ADMIN — ENOXAPARIN SODIUM 90 MILLIGRAM(S): 100 INJECTION SUBCUTANEOUS at 13:15

## 2019-06-04 RX ADMIN — OXYCODONE AND ACETAMINOPHEN 1 TABLET(S): 5; 325 TABLET ORAL at 12:01

## 2019-06-04 RX ADMIN — OXYCODONE AND ACETAMINOPHEN 1 TABLET(S): 5; 325 TABLET ORAL at 20:32

## 2019-06-04 RX ADMIN — Medication 1 MILLIGRAM(S): at 11:16

## 2019-06-04 RX ADMIN — MORPHINE SULFATE 6 MILLIGRAM(S): 50 CAPSULE, EXTENDED RELEASE ORAL at 04:16

## 2019-06-04 RX ADMIN — OXYCODONE AND ACETAMINOPHEN 1 TABLET(S): 5; 325 TABLET ORAL at 11:16

## 2019-06-04 RX ADMIN — Medication 3 MILLILITER(S): at 05:31

## 2019-06-04 RX ADMIN — Medication 3 MILLILITER(S): at 14:10

## 2019-06-04 RX ADMIN — MORPHINE SULFATE 6 MILLIGRAM(S): 50 CAPSULE, EXTENDED RELEASE ORAL at 01:16

## 2019-06-04 NOTE — H&P ADULT - PROBLEM SELECTOR PLAN 5
IMPROVE VTE Individual Risk Assessment    RISK                                                          Points  [] Previous VTE                                           3  [] Thrombophilia                                        2  [] Lower limb paralysis                              2   [] Current Cancer                                       2   [x] Immobilization > 24 hrs                        1  [] ICU/CCU stay > 24 hours                       1  [] Age > 60                                                   1    IMPROVE VTE Score:1  on lovenox for PE , no need for gi ppx

## 2019-06-04 NOTE — H&P ADULT - ASSESSMENT
32 yo F with PMHx of asthma, fibromyalgia, rheumatoid arthritis and a significant PSHx of , plantar fasciitis and s/p tubal ligation done here on 19, presents to the ED with complaints of chest pain and shortness of breath for 2 days. Patient states pain is right sided, dull pain, non radiating, also c/p rt calf pain, mild swelling of RLE. CT angio noted small segmental/subsegmental pulmonary embolus in the posterior basal segment of the right lower lobe. s/p lovenox 90mg x1 in ED.

## 2019-06-04 NOTE — CONSULT NOTE ADULT - SUBJECTIVE AND OBJECTIVE BOX
Source:    Reason for Consultation: Right sided segmental PE    Chief Complaint: SOB x 2 days, right sided chest pain    HPI:          MEDICATIONS  (STANDING):  ALBUTerol/ipratropium for Nebulization 3 milliLiter(s) Nebulizer every 6 hours  enoxaparin Injectable 90 milliGRAM(s) SubCutaneous every 12 hours  folic acid 1 milliGRAM(s) Oral daily    MEDICATIONS  (PRN):  acetaminophen   Tablet .. 650 milliGRAM(s) Oral every 6 hours PRN Temp greater or equal to 38C (100.4F), Mild Pain (1 - 3)  oxyCODONE    5 mG/acetaminophen 325 mG 1 Tablet(s) Oral every 6 hours PRN Moderate Pain (4 - 6)  oxyCODONE    5 mG/acetaminophen 325 mG 2 Tablet(s) Oral every 6 hours PRN Severe Pain (7 - 10)    Allergies    penicillin (Anaphylaxis)    Intolerances    PAST MEDICAL & SURGICAL HISTORY:  Asthma  Fibromyalgia  Rheumatoid arthritis  S/P tubal ligation  Plantar fasciitis, left: -s/p fasciotomy  H/O  section    FAMILY HISTORY:  FH: hypertension    SOCIAL HISTORY  Smoking History:   Alcohol:  Drugs:  Occupation:    T(C): 36.6 (19 @ 15:00), Max: 36.9 (19 @ 01:18)  HR: 100 (19 @ 15:00) (92 - 100)  BP: 122/63 (19 @ 15:00) (93/66 - 122/63)  RR: 18 (19 @ 15:00) (18 - 27)  SpO2: 100% (19 @ 15:00) (98% - 100%)    LABS:                        12.3   11.51 )-----------( 195      ( 2019 05:48 )             37.2     -    140  |  108  |  8   ----------------------------<  132<H>  3.8   |  24  |  0.71    Ca    8.8      2019 05:48    Urinalysis Basic - ( 2019 00:26 )    Color: Yellow / Appearance: Clear / S.005 / pH: x  Gluc: x / Ketone: Negative  / Bili: Negative / Urobili: Negative   Blood: x / Protein: Negative / Nitrite: Negative   Leuk Esterase: Negative / RBC: 2-5 /HPF / WBC 0-2 /HPF   Sq Epi: x / Non Sq Epi: Few /HPF / Bacteria: Many /HPF    CARDIAC MARKERS ( 2019 05:48 )  <0.015 ng/mL / x     / 107 U/L / x     / <1.0 ng/mL  CARDIAC MARKERS ( 2019 16:21 )  <0.015 ng/mL / x     / 101 U/L / x     / <1.0 ng/mL    Serum Pro-Brain Natriuretic Peptide: 64 pg/mL (19 @ 16:21)    Microbiology    RADIOLOGY & ADDITIONAL STUDIES: (My Reading)    CT angio- Right sided LL segmental PE with possible infarct  Dopplar lower ext- negative Source: Patient, chart, known to me from my clinic    Reason for Consultation: Right sided segmental PE    Chief Complaint: SOB x 2 days, right sided chest pain    HPI:  34 yo F with PMHx of asthma, fibromyalgia, rheumatoid arthritis and a significant PSHx of , plantar fasciitis and s/p tubal ligation done here on 19, presents to the ED with complaints of chest pain and shortness of breath for 2 days. Patient states pain is right sided, dull pain, non radiating, also c/p rt calf pain, mild swelling of RLE. Denies fever, chills, abd pain,  nausea, vomiting, diaphoresis, dysuria or any other acute complaints. Pt is non smoking, denies use OCP.     The patient had right calf pain for the last 3 months. She has no hx of blood clots. She had an IUD before her surgery. In the hospital she was found to have a RLL segmental PE.    MEDICATIONS  (STANDING):  ALBUTerol/ipratropium for Nebulization 3 milliLiter(s) Nebulizer every 6 hours  enoxaparin Injectable 90 milliGRAM(s) SubCutaneous every 12 hours  folic acid 1 milliGRAM(s) Oral daily    MEDICATIONS  (PRN):  acetaminophen   Tablet .. 650 milliGRAM(s) Oral every 6 hours PRN Temp greater or equal to 38C (100.4F), Mild Pain (1 - 3)  oxyCODONE    5 mG/acetaminophen 325 mG 1 Tablet(s) Oral every 6 hours PRN Moderate Pain (4 - 6)  oxyCODONE    5 mG/acetaminophen 325 mG 2 Tablet(s) Oral every 6 hours PRN Severe Pain (7 - 10)    Allergies    penicillin (Anaphylaxis)    Intolerances    PAST MEDICAL & SURGICAL HISTORY:  Asthma  Fibromyalgia  Rheumatoid arthritis  S/P tubal ligation  Plantar fasciitis, left: -s/p fasciotomy  H/O  section    FAMILY HISTORY:  FH: hypertension    SOCIAL HISTORY  Smoking History: Nonsmoker  Alcohol: No ETOH  Drugs: No Drugs    T(C): 36.6 (19 @ 15:00), Max: 36.9 (19 @ 01:18)  HR: 100 (19 @ 15:00) (92 - 100)  BP: 122/63 (19 @ 15:00) (93/66 - 122/63)  RR: 18 (19 @ 15:00) (18 - 27)  SpO2: 100% (19 @ 15:00) (98% - 100%)    LABS:                        12.3   11.51 )-----------( 195      ( 2019 05:48 )             37.2         140  |  108  |  8   ----------------------------<  132<H>  3.8   |  24  |  0.71    Ca    8.8      2019 05:48    Urinalysis Basic - ( 2019 00:26 )    Color: Yellow / Appearance: Clear / S.005 / pH: x  Gluc: x / Ketone: Negative  / Bili: Negative / Urobili: Negative   Blood: x / Protein: Negative / Nitrite: Negative   Leuk Esterase: Negative / RBC: 2-5 /HPF / WBC 0-2 /HPF   Sq Epi: x / Non Sq Epi: Few /HPF / Bacteria: Many /HPF    CARDIAC MARKERS ( 2019 05:48 )  <0.015 ng/mL / x     / 107 U/L / x     / <1.0 ng/mL  CARDIAC MARKERS ( 2019 16:21 )  <0.015 ng/mL / x     / 101 U/L / x     / <1.0 ng/mL    Serum Pro-Brain Natriuretic Peptide: 64 pg/mL (19 @ 16:21)    Microbiology    RADIOLOGY & ADDITIONAL STUDIES: (My Reading)    CT angio- Right sided LL segmental PE with possible infarct  Dopplar lower ext- negative

## 2019-06-04 NOTE — H&P ADULT - NSICDXPASTSURGICALHX_GEN_ALL_CORE_FT
PAST SURGICAL HISTORY:  H/O  section     Plantar fasciitis, left -s/p fasciotomy    S/P tubal ligation

## 2019-06-04 NOTE — H&P ADULT - HISTORY OF PRESENT ILLNESS
34 yo F with PMHx of asthma, fibromyalgia, rheumatoid arthritis and a significant PSHx of , plantar fasciitis and s/p tubal ligation done here on 19, presents to the ED with complaints of chest pain and shortness of breath x 2 days. Patient states pain is right sided. Denies nausea, vomiting, diaphoresis, fever or any other acute complaints.     ED course, tachycardia 105, afebrile, WBC 11k, CT angio noted small segmental/subsegmental pulmonary embolus in the posterior basal segment of the right lower lobe. s/p lovenox 90mg x1 in ED. 34 yo F with PMHx of asthma, fibromyalgia, rheumatoid arthritis and a significant PSHx of , plantar fasciitis and s/p tubal ligation done here on 19, presents to the ED with complaints of chest pain and shortness of breath for 2 days. Patient states pain is right sided, dull pain, non radiating, also c/p rt calf pain, mild swelling of RLE. Denies fever, chills, abd pain,  nausea, vomiting, diaphoresis, dysuria or any other acute complaints. Pt is non smoking, denies use OCP.     ED course, tachycardia 105, afebrile, WBC 11k, CT angio noted small segmental/subsegmental pulmonary embolus in the posterior basal segment of the right lower lobe. s/p lovenox 90mg x1 in ED.     goc: full code.

## 2019-06-04 NOTE — H&P ADULT - NSHPPHYSICALEXAM_GEN_ALL_CORE
Vital Signs Last 24 Hrs  T(C): 36.9 (04 Jun 2019 01:18), Max: 37.3 (03 Jun 2019 15:27)  T(F): 98.4 (04 Jun 2019 01:18), Max: 99.1 (03 Jun 2019 15:27)  HR: 97 (04 Jun 2019 01:18) (11 - 105)  BP: 105/65 (04 Jun 2019 01:18) (105/65 - 124/73)  BP(mean): --  RR: 21 (04 Jun 2019 01:18) (16 - 21)  SpO2: 100% (04 Jun 2019 01:18) (98% - 100%)

## 2019-06-04 NOTE — H&P ADULT - NSHPLABSRESULTS_GEN_ALL_CORE
Complete Blood Count + Automated Diff (06.03.19 @ 16:21)    WBC Count: 11.26 K/uL    RBC Count: 4.05 M/uL    Hemoglobin: 12.6 g/dL    Hematocrit: 37.4 %    Mean Cell Volume: 92.3 fl    Mean Cell Hemoglobin: 31.1 pg    Mean Cell Hemoglobin Conc: 33.7 gm/dL    Red Cell Distrib Width: 13.8 %    Platelet Count - Automated: 186 K/uL    Auto Neutrophil #: 9.76 K/uL    Auto Lymphocyte #: 0.86 K/uL    Auto Monocyte #: 0.58 K/uL    Auto Eosinophil #: 0.01 K/uL    Auto Basophil #: 0.01 K/uL    Auto Neutrophil %: 86.6: Differential percentages must be correlated with absolute numbers for  clinical significance. %    Auto Lymphocyte %: 7.6 %    Auto Monocyte %: 5.2 %    Auto Eosinophil %: 0.1 %    Auto Basophil %: 0.1 %    Auto Immature Granulocyte %: 0.4 %    Nucleated RBC: 0 /100 WBCs    Basic Metabolic Panel (06.03.19 @ 16:21)    Sodium, Serum: 142 mmol/L    Potassium, Serum: 3.7 mmol/L    Chloride, Serum: 111 mmol/L    Carbon Dioxide, Serum: 25 mmol/L    Anion Gap, Serum: 6 mmol/L    Blood Urea Nitrogen, Serum: 5 mg/dL    Creatinine, Serum: 0.64 mg/dL    Glucose, Serum: 109 mg/dL    Calcium, Total Serum: 8.0 mg/dL    eGFR if Non : 116:    < from: CT Angio Chest w/ IV Cont (06.03.19 @ 23:08) >    FINDINGS/  IMPRESSION:  There is small segmental/subsegmental pulmonary embolus in the posterior   basal segment of the right lower lobe (5:207-243).    A triangular opacity in the posterior basal segment of the right lower   lobe appears poorly enhancing, relative to adjacent atelectasis, and is   suspicious for a pulmonary infarct (3:66-70).    < end of copied text >

## 2019-06-04 NOTE — H&P ADULT - PROBLEM SELECTOR PLAN 1
p/w rt cp, sob, hx for recent surgery  CT angio noted small segmental/subsegmental pulmonary embolus in the posterior basal segment of the right lower lobe  s/p lovenox 90mg x1 in ED  c/w full dose lovenox p/w rt cp, sob, hx for recent surgery  CT angio noted small segmental/subsegmental pulmonary embolus in the posterior basal segment of the right lower lobe  on tele for 24 hrs   Troponin negative, EKG sinus tachycardia  s/p lovenox 90mg x1 in ED  c/w full dose lovenox  on duoneb and O2

## 2019-06-04 NOTE — H&P ADULT - ATTENDING COMMENTS
33 year old woman with PMH of fibromyalgia/ RA and recent hospital admission for an elective day surgery for B/L tubal ligation. She presents with 2 days of sudden onset right posterior chest pain. Pain was described as sharp, pleuritic with some radiation down the right arm. It became associated with SOB as well. On further questioning describes some "yumiko horse" pain in the R calf last month and some swelling in the RLE in the past week.   No additional finding on ROS.  In the ED, she had a PE work up that was +ve. Questioning around possible provoking etiology did not reveal additional symptoms.    Vital Signs Last 24 Hrs  T(C): 36.9 (2019 01:18), Max: 37.3 (2019 15:27)  T(F): 98.4 (2019 01:18), Max: 99.1 (2019 15:27)  HR: 97 (2019 01:18) (11 - 105)  BP: 105/65 (2019 01:18) (105/65 - 124/73)  RR: 21 (2019 01:18) (16 - 21)  SpO2: 100% (2019 01:18) (98% - 100%)    Young woman lying in bed, NAD presently, O2 via nasal cannula  No oropharyngeal erythema  CTA B/L; RRR S1S2 only  +TTP in the right upper posterior chest wall  Soft NTND BS +  Mild /trace pedal edema on RLE; no calf tenderness B/L  No focal deficits                          12.6   11.26 )-----------( 186      ( 2019 16:21 )             37.4     06-03    142  |  111<H>  |  5<L>  ----------------------------<  109<H>  3.7   |  25  |  0.64    Ca    8.0<L>      2019 16:21    Urinalysis Basic - ( 2019 00:26 )    Color: Yellow / Appearance: Clear / S.005 / pH: x  Gluc: x / Ketone: Negative  / Bili: Negative / Urobili: Negative   Blood: x / Protein: Negative / Nitrite: Negative   Leuk Esterase: Negative / RBC: 2-5 /HPF / WBC 0-2 /HPF   Sq Epi: x / Non Sq Epi: Few /HPF / Bacteria: Many /HPF    EKG - Sinus tachy    CTA chest w/contrast  FINDINGS/  IMPRESSION:  There is small segmental/subsegmental pulmonary embolus in the posterior   basal segment of the right lower lobe (5:207-243).    A triangular opacity in the posterior basal segment of the right lower   lobe appears poorly enhancing, relative to adjacent atelectasis, and is   suspicious for a pulmonary infarct (3:66-70).      Assessment  33 year old with hx as above presenting with clinical and radiological features c/w a segmental posterior basal RLL PE.  Unsure if this provoked by recent surgery or progressed to a PE from a DVT as a result. Will admit and start treatment.    Plan  Admit to tele  Full dose anticoagulation with LMWH  IVF / supplemental O2/  Pain control  2D ECHO  B/L LLE DVT study  Will need NOAC vs coumadin after 24 hours.

## 2019-06-04 NOTE — CHART NOTE - NSCHARTNOTEFT_GEN_A_CORE
Patient seen and examined. Spouse at bedside; Doing okay. patient admitted with   As per patient has calf tenderness for couple of weeks and then had severe pain and progressive SOB for 2 days.  FH of VTE on Father's side; No FH of lupus.  No personal Hx of miscarriages; Was relatively immobile for day or so only.   Will het Lupus work up; C3, C4 and lupus profile DRVVT screen.   Discussed with Patient and spouse findings and plan of care; discussed need for Protein C, Protein S and Antithrombin III as outpatient once treatment for PE is completed and off anticoagulation for 2 weeks.  Also discussed anticoagulation modalities Coumadin Vs DOACs depending on her coverage and pros and cons of either agent  Discussed with NP Ed Hold and RN

## 2019-06-05 ENCOUNTER — TRANSCRIPTION ENCOUNTER (OUTPATIENT)
Age: 34
End: 2019-06-05

## 2019-06-05 VITALS
HEART RATE: 93 BPM | DIASTOLIC BLOOD PRESSURE: 70 MMHG | TEMPERATURE: 98 F | OXYGEN SATURATION: 98 % | RESPIRATION RATE: 18 BRPM | SYSTOLIC BLOOD PRESSURE: 113 MMHG

## 2019-06-05 LAB
ANION GAP SERPL CALC-SCNC: 6 MMOL/L — SIGNIFICANT CHANGE UP (ref 5–17)
BUN SERPL-MCNC: 16 MG/DL — SIGNIFICANT CHANGE UP (ref 7–18)
C3 SERPL-MCNC: 148 MG/DL — SIGNIFICANT CHANGE UP (ref 81–157)
C4 SERPL-MCNC: 52 MG/DL — HIGH (ref 13–39)
CALCIUM SERPL-MCNC: 8 MG/DL — LOW (ref 8.4–10.5)
CHLORIDE SERPL-SCNC: 109 MMOL/L — HIGH (ref 96–108)
CO2 SERPL-SCNC: 27 MMOL/L — SIGNIFICANT CHANGE UP (ref 22–31)
CREAT SERPL-MCNC: 0.62 MG/DL — SIGNIFICANT CHANGE UP (ref 0.5–1.3)
DRVVT SCREEN TO CONFIRM RATIO: SIGNIFICANT CHANGE UP
GLUCOSE SERPL-MCNC: 82 MG/DL — SIGNIFICANT CHANGE UP (ref 70–99)
HCT VFR BLD CALC: 34.4 % — LOW (ref 34.5–45)
HGB BLD-MCNC: 11.5 G/DL — SIGNIFICANT CHANGE UP (ref 11.5–15.5)
INR BLD: 1.28 RATIO — HIGH (ref 0.88–1.16)
LA NT DPL PPP QL: 37.9 SEC — SIGNIFICANT CHANGE UP
MCHC RBC-ENTMCNC: 31 PG — SIGNIFICANT CHANGE UP (ref 27–34)
MCHC RBC-ENTMCNC: 33.4 GM/DL — SIGNIFICANT CHANGE UP (ref 32–36)
MCV RBC AUTO: 92.7 FL — SIGNIFICANT CHANGE UP (ref 80–100)
NORMALIZED SCT PPP-RTO: 1 RATIO — SIGNIFICANT CHANGE UP (ref 0–1.16)
NORMALIZED SCT PPP-RTO: SIGNIFICANT CHANGE UP
NRBC # BLD: 0 /100 WBCS — SIGNIFICANT CHANGE UP (ref 0–0)
PLATELET # BLD AUTO: 217 K/UL — SIGNIFICANT CHANGE UP (ref 150–400)
POTASSIUM SERPL-MCNC: 3.6 MMOL/L — SIGNIFICANT CHANGE UP (ref 3.5–5.3)
POTASSIUM SERPL-SCNC: 3.6 MMOL/L — SIGNIFICANT CHANGE UP (ref 3.5–5.3)
PROTHROM AB SERPL-ACNC: 14.3 SEC — HIGH (ref 10–12.9)
RBC # BLD: 3.71 M/UL — LOW (ref 3.8–5.2)
RBC # FLD: 14.1 % — SIGNIFICANT CHANGE UP (ref 10.3–14.5)
SODIUM SERPL-SCNC: 142 MMOL/L — SIGNIFICANT CHANGE UP (ref 135–145)
WBC # BLD: 11.57 K/UL — HIGH (ref 3.8–10.5)
WBC # FLD AUTO: 11.57 K/UL — HIGH (ref 3.8–10.5)

## 2019-06-05 PROCEDURE — 99233 SBSQ HOSP IP/OBS HIGH 50: CPT

## 2019-06-05 PROCEDURE — 99239 HOSP IP/OBS DSCHRG MGMT >30: CPT | Mod: GC

## 2019-06-05 RX ORDER — IBUPROFEN 200 MG
400 TABLET ORAL ONCE
Refills: 0 | Status: COMPLETED | OUTPATIENT
Start: 2019-06-05 | End: 2019-06-05

## 2019-06-05 RX ORDER — APIXABAN 2.5 MG/1
2 TABLET, FILM COATED ORAL
Qty: 28 | Refills: 0
Start: 2019-06-05 | End: 2019-06-11

## 2019-06-05 RX ORDER — APIXABAN 2.5 MG/1
10 TABLET, FILM COATED ORAL ONCE
Refills: 0 | Status: COMPLETED | OUTPATIENT
Start: 2019-06-05 | End: 2019-06-05

## 2019-06-05 RX ORDER — APIXABAN 2.5 MG/1
1 TABLET, FILM COATED ORAL
Qty: 28 | Refills: 0
Start: 2019-06-05 | End: 2019-06-18

## 2019-06-05 RX ADMIN — ENOXAPARIN SODIUM 90 MILLIGRAM(S): 100 INJECTION SUBCUTANEOUS at 12:51

## 2019-06-05 RX ADMIN — Medication 650 MILLIGRAM(S): at 12:52

## 2019-06-05 RX ADMIN — Medication 650 MILLIGRAM(S): at 13:22

## 2019-06-05 RX ADMIN — APIXABAN 10 MILLIGRAM(S): 2.5 TABLET, FILM COATED ORAL at 19:01

## 2019-06-05 RX ADMIN — Medication 400 MILLIGRAM(S): at 02:10

## 2019-06-05 RX ADMIN — Medication 1 MILLIGRAM(S): at 12:51

## 2019-06-05 RX ADMIN — Medication 3 MILLILITER(S): at 09:08

## 2019-06-05 RX ADMIN — ENOXAPARIN SODIUM 90 MILLIGRAM(S): 100 INJECTION SUBCUTANEOUS at 01:19

## 2019-06-05 RX ADMIN — Medication 400 MILLIGRAM(S): at 01:19

## 2019-06-05 NOTE — PROGRESS NOTE ADULT - ASSESSMENT
1) Acute low risk PE, RLL segmental PE  2) Mild intermittent asthma    Hemodynamically stable  Low risk PE  Extended anticoagulation given unclear precipitation for PE  Continue albuterol prn  No desaturation with ambulation  Stable for dc from a pulmonary standpoint, once able to ambulate steadily.

## 2019-06-05 NOTE — DISCHARGE NOTE PROVIDER - NSDCFUADDINST_GEN_ALL_CORE_FT
Please monitor for any significant bleeding like in stool.   Avoid falls and trauma while you are on blood thinner.

## 2019-06-05 NOTE — DISCHARGE NOTE PROVIDER - NSDCCPCAREPLAN_GEN_ALL_CORE_FT
PRINCIPAL DISCHARGE DIAGNOSIS  Diagnosis: Pulmonary embolism  Assessment and Plan of Treatment: You presented with right sided, dull chest pain with shortness of breath and right calf pain. CT angio showed small segmental/subsegmental pulmonary embolus in the posterior basal segment of the right lower lobe. you were treated with full dose lovenox. Your US doppler of lower extemity is negative for blood clot. Pulmonologist Dr. Cano and Hemato-oncologist Dr. Garza was consulted. Please take eliquis 10mg twice a day for 7 days, upto 6/13/2019 and then continue taking 5mg twice a day.  Follow up with Hemato-oncologist Dr. Garza, your pulmonolgist and primary care physician one week after discharge.         SECONDARY DISCHARGE DIAGNOSES  Diagnosis: Rheumatoid arthritis  Assessment and Plan of Treatment: There is no interaction between methotrexate and eliquis so please continue taking methotrexate and folic acid. Follo wp with your rheumatologist.    Diagnosis: Asthma  Assessment and Plan of Treatment: Continue with your nebulizers as intrusted by your primary care physician. Avoid triggers for asthma. If you're allergic to dust mites, pollens or molds, they can make your asthma symptoms get worse. Cold air, exercise, fumes from chemicals or perfume, tobacco or wood smoke, and weather changes can also make asthma symptoms worse. So can common colds and sinus infections. Vaccinate with influenza vaccine yearly. Follow up with primary care physician one week after discharge. PRINCIPAL DISCHARGE DIAGNOSIS  Diagnosis: Pulmonary embolism  Assessment and Plan of Treatment: You presented with right sided, dull chest pain with shortness of breath and right calf pain. CT angio showed small segmental/subsegmental pulmonary embolus in the posterior basal segment of the right lower lobe. you were treated with full dose lovenox. Your US doppler of lower extemity is negative for blood clot. Work up for lupus was negative. You need  Protein C, Protein S and Antithrombin III test as outpatient once treatment is completed and off blood thinner for 2 weeks.  Pulmonologist Dr. Cano and Hemato-oncologist Dr. Garza was consulted. Please take eliquis 10mg twice a day for 7 days, upto 6/13/2019 and then continue taking 5mg twice a day.  Follow up with Hemato-oncologist Dr. Garza, your pulmonolgist and primary care physician one week after discharge.         SECONDARY DISCHARGE DIAGNOSES  Diagnosis: Rheumatoid arthritis  Assessment and Plan of Treatment: There is no interaction between methotrexate and eliquis so please continue taking methotrexate and folic acid. Follo wp with your rheumatologist.    Diagnosis: Asthma  Assessment and Plan of Treatment: Continue with your nebulizers as intrusted by your primary care physician. Avoid triggers for asthma. If you're allergic to dust mites, pollens or molds, they can make your asthma symptoms get worse. Cold air, exercise, fumes from chemicals or perfume, tobacco or wood smoke, and weather changes can also make asthma symptoms worse. So can common colds and sinus infections. Vaccinate with influenza vaccine yearly. Follow up with primary care physician one week after discharge. PRINCIPAL DISCHARGE DIAGNOSIS  Diagnosis: Pulmonary embolism  Assessment and Plan of Treatment: You presented with right sided, dull chest pain with shortness of breath and right calf pain. CT angio showed small segmental/subsegmental pulmonary embolus in the posterior basal segment of the right lower lobe. you was treated with full dose lovenox. Your US doppler of lower extemity is negative for blood clot. Work up for lupus was negative. You need  Protein C, Protein S and Antithrombin III test as outpatient once treatment is completed and off blood thinner for 2 weeks.  Pulmonologist Dr. Greenfield and Hemato-oncologist Dr. Garza was consulted. Both recommended DOACs (newer agents which does not need regular blood work as well as does not interfere with diet).  Please take Eliquis (Apixaban)  10 mg twice a day for 7 days, upto 6/13/2019 and then continue taking 5 mg twice a day for at least 3 to 6 months.    Follow up with Hemato-oncologist Dr. Garza for hypercoagulable work up once clinicall yappropriate. You also need work up to see if any hereditary factors contributing as your paternal side has history of blood clots and strokes.   Please follow up with  your pulmonolgist Dr. Greenfield and Primary Care Physician for routine care.         SECONDARY DISCHARGE DIAGNOSES  Diagnosis: Asthma  Assessment and Plan of Treatment: Continue with your nebulizers as intrusted by your primary care physician. You have Intermittent asthma and will recommend use Albuterol only if you have shortness of breath. Avoid triggers for asthma. If you're allergic to dust mites, pollens or molds, they can make your asthma symptoms get worse. Cold air, exercise, fumes from chemicals or perfume, tobacco or wood smoke, and weather changes can also make asthma symptoms worse. So can common colds and sinus infections. Vaccinate with influenza vaccine yearly. Follow up with primary care physician one week after discharge.    Diagnosis: Rheumatoid arthritis  Assessment and Plan of Treatment: There is no interaction between methotrexate and eliquis so please continue taking methotrexate and folic acid. Jacob hensley with your rheumatologist Dr. Annie Ga as outpatient.

## 2019-06-05 NOTE — CHART NOTE - NSCHARTNOTEFT_GEN_A_CORE
Patient seen and examined earlier this morning; OOB to Chair; Chest pain improved. No acute SOB.  Patient seen by Pulmonary and Hematology both had seen patient in the past for Asthma and Iron deficiency respectively.    Vital Signs Last 24 Hrs  T(C): 36.6 (05 Jun 2019 13:15), Max: 37.1 (04 Jun 2019 19:00)  T(F): 97.9 (05 Jun 2019 13:15), Max: 98.8 (04 Jun 2019 19:00)  HR: 93 (05 Jun 2019 13:15) (71 - 97)  BP: 113/70 (05 Jun 2019 13:15) (99/59 - 116/70)  RR: 18 (05 Jun 2019 13:15) (18 - 18)  SpO2: 98% (05 Jun 2019 13:15) (97% - 100%)    P/E:  Neuro: AAO x3; No focal deficits  CVS: S1S2 present  Resp: BLAE+, No wheeze or Rhonchi  GI: Soft, Obese, BS+, NT, ND  Extr: No edema or calf tenderness B/L LE    Labs:                        11.5   11.57 )-----------( 217      ( 05 Jun 2019 07:01 )             34.4   06-05    142  |  109<H>  |  16  ----------------------------<  82  3.6   |  27  |  0.62    Ca    8.0<L>      05 Jun 2019 07:01 Patient seen and examined earlier this morning; OOB to Chair; Chest pain improved. No acute SOB.  Patient seen by Pulmonary and Hematology both had seen patient in the past for Asthma and Iron deficiency respectively.    Vital Signs Last 24 Hrs  T(C): 36.6 (05 Jun 2019 13:15), Max: 37.1 (04 Jun 2019 19:00)  T(F): 97.9 (05 Jun 2019 13:15), Max: 98.8 (04 Jun 2019 19:00)  HR: 93 (05 Jun 2019 13:15) (71 - 97)  BP: 113/70 (05 Jun 2019 13:15) (99/59 - 116/70)  RR: 18 (05 Jun 2019 13:15) (18 - 18)  SpO2: 98% (05 Jun 2019 13:15) (97% - 100%)    P/E:  Neuro: AAO x3; No focal deficits  CVS: S1S2 present  Resp: BLAE+, No wheeze or Rhonchi  GI: Soft, Obese, BS+, NT, ND  Extr: No edema or calf tenderness B/L LE    Labs:                        11.5   11.57 )-----------( 217      ( 05 Jun 2019 07:01 )             34.4   06-05    142  |  109<H>  |  16  ----------------------------<  82  3.6   |  27  |  0.62    Ca    8.0<L>      05 Jun 2019 07:01    D/D:  RLL PE    Plan:  Patient improved clinically; Not hypoxic, not in distress  PGY1 d/w Pharmacy benefits, covered for Apixaban  D/C Lovenox; start Apixaban 10 mg BID for 7 days, then 5 mg twice daily. Rx for 3-6 months at least  If hypercoagulable work up positive, indefinite Rx  Pulmonary and Hematology consult appreciated  Patient to follow up with hematology outpatient for continued work up.  Advised Albuterol as needed for Asthma as it is intermittent.   discussed with patient at length findings and plan of care, risk and benefits of anticoagulation    See discharge note for details    (Spent 42 mins with >50% spend counseling and coordination of care and d/w patient and Inpatient and Outpatient providers)

## 2019-06-05 NOTE — DISCHARGE NOTE PROVIDER - HOSPITAL COURSE
34 yo F with PMHx of asthma, fibromyalgia, rheumatoid arthritis and a significant PSHx of , plantar fasciitis and s/p tubal ligation done here on 19, presents to the ED with complaints of chest pain and shortness of breath for 2 days. Patient states pain is right sided, dull pain, non radiating, also c/p rt calf pain, mild swelling of RLE. CT angio noted small segmental/subsegmental pulmonary embolus in the posterior basal segment of the right lower lobe. s/p lovenox 90mg x1 in ED, Troponin negative, EKG sinus tachycardia, she was on full dose lovenox, duoneb and O2.     She c/w rt calf pain and RLE swelling, US doppler b/l LE is negative for DVT.     For Rheumatoid arthritis, she is on methotrexate 12.5mg every Friday and folic acid.     For Asthma, she is on duoneb q6.     Patient is stable for discharge per attending and is advised to follow up with PCP as outpatient    Please refer to patient's complete medical chart with documents for a full hospital course, for this is only a brief summary. 32 yo F with PMHx of asthma, fibromyalgia, rheumatoid arthritis and a significant PSHx of , plantar fasciitis and s/p tubal ligation done here on 19, presents to the ED with complaints of chest pain and shortness of breath for 2 days. Patient states pain is right sided, dull pain, non radiating, also c/p rt calf pain, mild swelling of RLE. CT angio noted small segmental/subsegmental pulmonary embolus in the posterior basal segment of the right lower lobe. s/p lovenox 90mg x1 in ED, Troponin negative, EKG sinus tachycardia, she was on full dose lovenox, duoneb and O2.     She c/w rt calf pain and RLE swelling, US doppler b/l LE is negative for DVT. Work up for lupus was negative. She need  Protein C, Protein S and Antithrombin III test as outpatient once treatment is completed and off blood thinner for 2 weeks.    Pulmonologist Dr. Cano and Hemato-oncologist Dr. Garza was consulted. She is being discharged on eliquis 10mg twice a day for 7 days, upto 2019 and then continue taking 5mg twice a day.     For Rheumatoid arthritis, she is on methotrexate 12.5mg every Friday and folic acid.     For Asthma, she is on duoneb q6.     Patient is stable for discharge per attending and is advised to follow up with PCP as outpatient    Please refer to patient's complete medical chart with documents for a full hospital course, for this is only a brief summary.

## 2019-06-05 NOTE — DISCHARGE NOTE NURSING/CASE MANAGEMENT/SOCIAL WORK - NSDCDPATPORTLINK_GEN_ALL_CORE
You can access the [facility] Patient Portal, offered by [facility], by registering with the following website: [email or web address]

## 2019-06-05 NOTE — DISCHARGE NOTE PROVIDER - CARE PROVIDER_API CALL
Arik Colon)  Internal Medicine  [address] [place] [address]  Phone: [phone]  Fax: [phone]  Follow Up Time: Arik Colon)  Internal Medicine  [address] [address]  Phone: [phone]  Fax: [phone]  Follow Up Time:     Natasha Garza)  HematologyOncology; Internal Medicine  [address] [facility], [address]  [place] [address]  Phone: [phone]  Fax: [phone]  Follow Up Time: Arik Colon)  Internal Medicine  [address] [place] [address]  Phone: [phone]  Fax: [phone]  Follow Up Time:     Natasha Garza)  HematologyOncology; Internal Medicine  [address] [facility], [address]  [place] [id number]  Phone: [phone]  Fax: [phone]  Follow Up Time:     Patricio Greenfield)  Critical Care Medicine; Internal Medicine; Pulmonary Disease  [address] [place] [address]  Phone: [phone]  Fax: [phone]  Follow Up Time:

## 2019-06-05 NOTE — DISCHARGE NOTE PROVIDER - CARE PROVIDERS DIRECT ADDRESSES
,[email or web address],DirectAddress_Unknown ,[email or web address],DirectAddress_Unknown,[email or web address]

## 2019-06-05 NOTE — DISCHARGE NOTE PROVIDER - PROVIDER TOKENS
PROVIDER:[TOKEN:[[id number]]] PROVIDER:[TOKEN:[[id number]]],PROVIDER:[TOKEN:[[id number]]] PROVIDER:[TOKEN:[[id number]]],PROVIDER:[TOKEN:[[id number]]],PROVIDER:[TOKEN:[[id number]]]

## 2019-06-05 NOTE — CONSULT NOTE ADULT - SUBJECTIVE AND OBJECTIVE BOX
HPI:  32 yo F with PMHx of asthma, fibromyalgia, rheumatoid arthritis and a significant PSHx of , plantar fasciitis and s/p tubal ligation done here on 19, presents to the ED with complaints of chest pain and shortness of breath for 2 days. Patient states pain is right sided, dull pain, non radiating, also c/p rt calf pain, mild swelling of RLE. Denies fever, chills, abd pain,  nausea, vomiting, diaphoresis, dysuria or any other acute complaints. Pt is non smoking, denies use OCP.   The patient had right calf pain for the last 3 months. She has no hx of blood clots. She had an IUD before her surgery. In the hospital she was found to have a RLL segmental PE.  2019: Patient seen this morning. Mentions she still has right lower chest pain on deep breaths and some dyspnea on exertion. Ok at rest. H/O DVT in paternal uncle.     ROS: negative except as per hpi    MEDICATIONS: reviewed in emr  Allergies: penicillin (Anaphylaxis)    PAST MEDICAL & SURGICAL HISTORY:  Asthma  Fibromyalgia  Rheumatoid arthritis  S/P b/l tubal ligation 2019  Plantar fasciitis, left: -s/p fasciotomy  H/O  section    FAMILY HISTORY:  H/O DVTs in paternal uncle.     SOCIAL HISTORY  Smoking History: Nonsmoker  Alcohol: No ETOH  Drugs: No Drugs    ICU Vital Signs Last 24 Hrs  T(C): 36.6 (2019 10:57), Max: 37.1 (2019 19:00)  T(F): 97.9 (2019 10:57), Max: 98.8 (2019 19:00)  HR: 97 (2019 10:57) (71 - 100)  BP: 104/55 (2019 10:57) (99/59 - 122/63)  BP(mean): 75 (2019 15:00) (75 - 75)  ABP: --  ABP(mean): --  RR: 18 (2019 10:57) (18 - 18)  SpO2: 100% (2019 10:57) (97% - 100%)    Gen: A&O x 3  CVS: s1s2  Resp: ctabl  GI: no HSM, soft.   CNS: No deficits  Ext: no cyanosis / edema.     CBC Full  -  ( 2019 07:01 )  WBC Count : 11.57 K/uL  RBC Count : 3.71 M/uL  Hemoglobin : 11.5 g/dL  Hematocrit : 34.4 %  Platelet Count - Automated : 217 K/uL  Mean Cell Volume : 92.7 fl  Mean Cell Hemoglobin : 31.0 pg  Mean Cell Hemoglobin Concentration : 33.4 gm/dL  Auto Neutrophil # : x  Auto Lymphocyte # : x  Auto Monocyte # : x  Auto Eosinophil # : x  Auto Basophil # : x  Auto Neutrophil % : x  Auto Lymphocyte % : x  Auto Monocyte % : x  Auto Eosinophil % : x  Auto Basophil % : x    Serum Pro-Brain Natriuretic Peptide: 64 pg/mL (19 @ 16:21)    RADIOLOGY & ADDITIONAL STUDIES:  CT angio- Right sided LL segmental PE with possible infarct    < from: CT Angio Chest w/ IV Cont (19 @ 23:08) >  FINDINGS/  IMPRESSION:  There is small segmental/subsegmental pulmonary embolus in the posterior   basal segment of the right lower lobe (5:207-243).    A triangular opacity in the posterior basal segment of the right lower   lobe appears poorly enhancing, relative to adjacent atelectasis, and is   suspicious for a pulmonary infarct (3:66-70).    These findings were discussed with  in the emergency department on   2019 at 12:40 AM.  Read back verification was obtained.    B/L Doppler lower ext- negativ

## 2019-06-05 NOTE — CONSULT NOTE ADULT - ASSESSMENT
A/P:    # Acute RLL segmental PE  # Pulmonary Infarct  Family h/o DVT in Paternal uncle.   s/p b/l tubal ligation.   B/L USG: Negative for DVT  Hemodynamically stable  Low risk PE  ? Unclear etiology    Recs:  -Eliquis 10 mg PO BID x 7 days and then 5 mg PO BID  -Hyper-Coag w/u as out-patient. Will schedule f/u in 2 weeks.   -Duration of AC: Atleast 3-6 months. Further discussion on risks / benefits of Extended anticoagulation(patient preference) as out-patient.   -Pulm on board    Thank you for the consult.   Case d/w Dr. Paris  Will schedule f/u out-patient in 2 weeks.  Patient in agreement.
1) Acute low risk PE, RLL segmental PE  2) Mild intermittent asthma    Hemodynamically stable  Low risk PE  Unclear etiology, possibly not related to surgery  Extended anticoagulation per patient preferance  Given low risk , can dc on novel oral anticoagulant per insurance coverage  Stable for dc from a pulmonary standpoint if not hypoxic and pain is controlled.

## 2019-06-05 NOTE — PROGRESS NOTE ADULT - SUBJECTIVE AND OBJECTIVE BOX
Interval Events: Pt feeling better. A little shaky with walking    OBJECTIVE:    T(C): 36.6 (2019 13:15), Max: 37.1 (2019 19:00)  T(F): 97.9 (2019 13:15), Max: 98.8 (2019 19:00)  HR: 93 (2019 13:15) (71 - 100)  BP: 113/70 (2019 13:15) (99/59 - 122/63)  BP(mean): 75 (2019 15:00) (75 - 75)  RR: 18 (2019 13:15) (18 - 18)  SpO2: 98% (:15) (97% - 100%)    CAPILLARY BLOOD GLUCOSE    PHYSICAL EXAM:  General: Awake, alert, oriented X 3.   HEENT: Atraumatic, PERRL, Anicteric.   Lymph Nodes: No palpable lymphadenopathy  Respiratory: RLL crackles  Cardiovascular: S1 S2 normal. No murmurs, rubs or gallops.   Abdomen: Soft, non-tender, non-distended. No organomegaly.  Extremities: Warm to touch. Peripheral pulse palpable. No pedal edema.   Skin: No rashes or skin lesions  Neurological: No focal deficits.  Psychiatry: Appropriate mood and affect.    HOSPITAL MEDICATIONS:  MEDICATIONS  (STANDING):  ALBUTerol/ipratropium for Nebulization 3 milliLiter(s) Nebulizer every 6 hours  enoxaparin Injectable 90 milliGRAM(s) SubCutaneous every 12 hours  folic acid 1 milliGRAM(s) Oral daily    MEDICATIONS  (PRN):  acetaminophen   Tablet .. 650 milliGRAM(s) Oral every 6 hours PRN Temp greater or equal to 38C (100.4F), Mild Pain (1 - 3)  oxyCODONE    5 mG/acetaminophen 325 mG 1 Tablet(s) Oral every 6 hours PRN Moderate Pain (4 - 6)  oxyCODONE    5 mG/acetaminophen 325 mG 2 Tablet(s) Oral every 6 hours PRN Severe Pain (7 - 10)    LABS:                        11.5   11.57 )-----------( 217      ( 2019 07:01 )             34.4     06-05    142  |  109<H>  |  16  ----------------------------<  82  3.6   |  27  |  0.62    Ca    8.0<L>      2019 07:01    PT/INR - ( 2019 10:30 )   PT: 14.3 sec;   INR: 1.28 ratio      Urinalysis Basic - ( 2019 00:26 )    Color: Yellow / Appearance: Clear / S.005 / pH: x  Gluc: x / Ketone: Negative  / Bili: Negative / Urobili: Negative   Blood: x / Protein: Negative / Nitrite: Negative   Leuk Esterase: Negative / RBC: 2-5 /HPF / WBC 0-2 /HPF   Sq Epi: x / Non Sq Epi: Few /HPF / Bacteria: Many /HPF    MICROBIOLOGY:     RADIOLOGY:  [X] Reviewed and interpreted by me

## 2019-06-07 ENCOUNTER — OTHER (OUTPATIENT)
Age: 34
End: 2019-06-07

## 2019-06-12 ENCOUNTER — APPOINTMENT (OUTPATIENT)
Dept: OBGYN | Facility: CLINIC | Age: 34
End: 2019-06-12
Payer: MEDICAID

## 2019-06-12 VITALS — DIASTOLIC BLOOD PRESSURE: 70 MMHG | SYSTOLIC BLOOD PRESSURE: 119 MMHG | WEIGHT: 187 LBS | BODY MASS INDEX: 37.77 KG/M2

## 2019-06-12 PROCEDURE — 99024 POSTOP FOLLOW-UP VISIT: CPT

## 2019-06-12 NOTE — HISTORY OF PRESENT ILLNESS
[Clean/Dry/Intact] : clean, dry and intact [Healed] : healed [None] : no vaginal bleeding [Normal] : the vagina was normal [Doing Well] : is doing well [Excellent Pain Control] : has excellent pain control [No Sign of Infection] : is showing no signs of infection [Sutures Removed] : sutures were removed [de-identified] : Patient s/p laparoscopic BTL and lysis of adhesions.  Doing well with no complaints.  Patient readmitted to the hospital for PE post op.   [de-identified] : S/p BTL and KISHORE [de-identified] : Precautions reviewed.  RTO in 4 weeks for final post op check.  Continue PE meds as directed.

## 2019-06-13 ENCOUNTER — APPOINTMENT (OUTPATIENT)
Dept: INTERNAL MEDICINE | Facility: CLINIC | Age: 34
End: 2019-06-13
Payer: MEDICAID

## 2019-06-13 VITALS
WEIGHT: 186 LBS | OXYGEN SATURATION: 99 % | DIASTOLIC BLOOD PRESSURE: 79 MMHG | BODY MASS INDEX: 37.5 KG/M2 | SYSTOLIC BLOOD PRESSURE: 113 MMHG | HEART RATE: 90 BPM | TEMPERATURE: 98.5 F | HEIGHT: 59 IN

## 2019-06-13 PROCEDURE — 99215 OFFICE O/P EST HI 40 MIN: CPT | Mod: 25

## 2019-06-13 PROCEDURE — 99354: CPT

## 2019-06-13 RX ORDER — OMEGA-3/DHA/EPA/FISH OIL 300-1000MG
400 CAPSULE ORAL
Qty: 90 | Refills: 2 | Status: COMPLETED | COMMUNITY
Start: 2019-05-16 | End: 2019-06-13

## 2019-06-13 RX ORDER — NITROFURANTOIN (MONOHYDRATE/MACROCRYSTALS) 25; 75 MG/1; MG/1
100 CAPSULE ORAL
Qty: 14 | Refills: 0 | Status: COMPLETED | COMMUNITY
Start: 2019-05-26 | End: 2019-06-13

## 2019-06-13 RX ORDER — APIXABAN 2.5 MG/1
1 TABLET, FILM COATED ORAL
Qty: 60 | Refills: 0
Start: 2019-06-13 | End: 2019-07-12

## 2019-06-13 RX ORDER — CHOLECALCIFEROL (VITAMIN D3) 1250 MCG
1.25 MG CAPSULE ORAL
Qty: 4 | Refills: 2 | Status: COMPLETED | COMMUNITY
Start: 2018-02-13 | End: 2019-06-13

## 2019-06-13 RX ORDER — THIAMINE HCL 100 MG
100 TABLET ORAL
Qty: 120 | Refills: 3 | Status: COMPLETED | COMMUNITY
Start: 2017-10-23 | End: 2019-06-13

## 2019-06-13 NOTE — ASSESSMENT
[FreeTextEntry1] : Post hospital discharge follow up after having PE: Pt is feeling fine, her symptoms has been improved, she completed eliquis 10mg bid yesterday and she started taking 5mg bid from today. Pt was advised to avoid NSAIDs as they have mild anti platelet activity and can cause Gi bleeding .Pt will follow up with Dr. Greenfield next week on June 18th, Pt will follow up with Dr. Garza on June 20th. Pt will continue eliquis and will do hypercoagulable work up when she has completed her anti coagulation treatment  .   she has hx of rheumatoid arthritis. pt has rheumatologist appointment on june 27th. she has lupus evaluation in hospital which was negative I will add some additional testing.  the etiology of her pe is not know and her surgery was only 12 mins long and should not have caused any pe and her doppler was negative for dvt.  She was asked to not take long car trips or bus rides or airplane flights for now.  She should not stay sedentary for long periods of time.  Several studies recently show that patients with rheumatoid arthritis have a 1.5 to 6 times increased risk of venous thromboembolism, including pulmonary embolism and deep vein thrombosis.(1–5) It is thought that active systemic inflammation may lead to development of venous thromboembolism because inflammatory cytokines such as interleukin (IL)-6, IL-8, and tumor necrosis factor (TNF)-a activate coagulation pathways and thus alter thrombotic tendency.(6, 7) To date, limited evidence is available on whether treatment of rheumatoid arthritis with any disease-modifying antirheumatic drugs (DMARD) or with a specific type of DMARD such as biologic DMARDs (bDMARDs) including TNF-a inhibitors increases or decreases the risk of venous thromboembolism. While several studies report cases of venous thromboembolism or peripheral thrombosis following treatment with TNF-a inhibitors for rheumatoid arthritis or other inflammatory diseases,(8–12) a few small studies find that TNF-a blockade with infliximab in patients with rheumatoid arthritis decreases inflammatory and coagulation markers and reduces the inhibition of fibrinolysis.(13, 14) Furthermore, a cohort study based on the [employer] [place] ([employer]) shows no significant association between TNF-a inhibitors and venous thromboembolism in rheumatoid arthritis patients

## 2019-06-13 NOTE — PHYSICAL EXAM
[No Acute Distress] : no acute distress [Well Nourished] : well nourished [Well Developed] : well developed [Well-Appearing] : well-appearing [Normal Voice/Communication] : normal voice/communication [Normal Sclera/Conjunctiva] : normal sclera/conjunctiva [PERRL] : pupils equal round and reactive to light [EOMI] : extraocular movements intact [Normal Oropharynx] : the oropharynx was normal [Normal Outer Ear/Nose] : the outer ears and nose were normal in appearance [No JVD] : no jugular venous distention [Supple] : supple [Rate ___] : at [unfilled] breaths per minute [Normal Rhythm/Effort] : normal respiratory rhythm and effort [Clear Bilaterally] : the lungs were clear to auscultation bilaterally [Normal to Percussion] : the lungs were normal to percussion [5th Left ICS - MCL] : palpated at the 5th LICS in the midclavicular line [Normal Rate] : normal [Heart Rate ___] : [unfilled] bpm [Normal S1] : normal S1 [Normal S2] : normal S2 [No Murmur] : no murmurs heard [No Pitting Edema] : no pitting edema present [2+] : left 2+ [No Abnormalities] : the abdominal aorta was not enlarged and no bruit was heard [Normal] : normal [Soft, Nontender] : the abdomen was soft and nontender [No Mass] : no masses were palpated [No HSM] : no hepatosplenomegaly noted [Normal Posterior Cervical Nodes] : no posterior cervical lymphadenopathy [Normal Anterior Cervical Nodes] : no anterior cervical lymphadenopathy [No CVA Tenderness] : no CVA  tenderness [No Joint Swelling] : no joint swelling [No Spinal Tenderness] : no spinal tenderness [Grossly Normal Strength/Tone] : grossly normal strength/tone [No Rash] : no rash [No Skin Lesions] : no skin lesions [Normal Gait] : normal gait [Coordination Grossly Intact] : coordination grossly intact [No Focal Deficits] : no focal deficits [Memory Grossly Normal] : memory grossly normal [Normal Affect] : the affect was normal [Alert and Oriented x3] : oriented to person, place, and time [Normal Mood] : the mood was normal [Normal Insight/Judgement] : insight and judgment were intact [Bruit] : no bruit heard [S3] : no S3 [S4] : no S4 [Rt] : no varicose veins of the right leg [Lt] : no varicose veins of the left leg [Left Carotid Bruit] : no bruit heard over the left carotid [Right Femoral Bruit] : no bruit heard over the right femoral artery [Right Carotid Bruit] : no bruit heard over the right carotid [Left Femoral Bruit] : no bruit heard over the left femoral artery [Acne] : no acne [de-identified] : tenderness present over back on the left side [de-identified] : Homans sign negative [de-identified] : breath sound decreased on right lower lung

## 2019-06-13 NOTE — COUNSELING
[Healthy eating counseling provided] : healthy eating [Activity counseling provided] : activity [Sleep ___ hours/day] : Sleep [unfilled] hours/day [None] : None [Weight management counseling provided] : Weight management [Target Wt Loss Goal ___] : Target weight loss goal [unfilled] lbs [Good understanding] : Patient has a good understanding of disease, goals and obesity follow-up plan [Weight Self Once Weekly] : Weight self once weekly [Decrease Portions] : Decrease food portions [Low Fat Diet] : Low fat diet [Walking] : Walking [___ min/wk activity recommended] : [unfilled] min/wk activity recommended

## 2019-06-13 NOTE — END OF VISIT
[>50% of Time Spent on Counseling and Coordination of Care for  ___] : Greater than 50% of the encounter time was spent on counseling and coordination of care for [unfilled] [Time Spent: ___ minutes] : I have spent [unfilled] minutes of face to face time with the patient [] : Resident

## 2019-06-13 NOTE — HEALTH RISK ASSESSMENT
[Intercurrent hospitalizations] : was admitted to the hospital  [No falls in past year] : Patient reported no falls in the past year [0] : 2) Feeling down, depressed, or hopeless: Not at all (0) [Patient reported PAP Smear was normal] : Patient reported PAP Smear was normal [HIV test declined] : HIV test declined [Hepatitis C test offered] : Hepatitis C test offered [None] : None [With Family] : lives with family [# of Members in Household ___] :  household currently consist of [unfilled] member(s) [Unemployed] : unemployed [College] : College [] :  [# Of Children ___] : has [unfilled] children [Feels Safe at Home] : Feels safe at home [Fully functional (bathing, dressing, toileting, transferring, walking, feeding)] : Fully functional (bathing, dressing, toileting, transferring, walking, feeding) [Fully functional (using the telephone, shopping, preparing meals, housekeeping, doing laundry, using] : Fully functional and needs no help or supervision to perform IADLs (using the telephone, shopping, preparing meals, housekeeping, doing laundry, using transportation, managing medications and managing finances) [Smoke Detector] : smoke detector [Carbon Monoxide Detector] : carbon monoxide detector [Safety elements used in home] : safety elements used in home [Seat Belt] :  uses seat belt [Sunscreen] : uses sunscreen [Aggressive treatment] : aggressive treatment [] : No [de-identified] : Post hospital examination  [de-identified] : not active [de-identified] : healthy diet  [[id number]] : 0 [Change in mental status noted] : No change in mental status noted [Language] : denies difficulty with language [Behavior] : denies difficulty with behavior [Learning/Retaining New Information] : denies difficulty learning/retaining new information [Handling Complex Tasks] : denies difficulty handling complex tasks [Reasoning] : denies difficulty with reasoning [Spatial Ability and Orientation] : denies difficulty with spatial ability and orientation [Sexually Active] : not sexually active [High Risk Behavior] : no high risk behavior [Reports changes in hearing] : Reports no changes in hearing [Reports changes in vision] : Reports no changes in vision [Reports changes in dental health] : Reports no changes in dental health [Guns at Home] : no guns at home [Travel to Developing Areas] : does not  travel to developing areas [TB Exposure] : is not being exposed to tuberculosis [Caregiver Concerns] : does not have caregiver concerns [HepatitisCDate] : 2018 [PapSmearDate] : 4/2019 [de-identified] : last eye exam 8/2018 [HepatitisCComments] : negative [de-identified] : w [AdvancecareDate] : 6/13/2019

## 2019-06-13 NOTE — HISTORY OF PRESENT ILLNESS
[Post-hospitalization from ___ Hospital] : Post-hospitalization from [unfilled] Hospital [Admitted on: ___] : The patient was admitted on [unfilled] [Discharged on ___] : discharged on [unfilled] [Discharge Summary] : discharge summary [Discharge Med List] : discharge medication list [Med Reconciliation] : medication reconciliation has been completed [Patient Contacted By: ____] : and contacted by [unfilled] [FreeTextEntry2] : Pt is 33 year old female has medical history of RA on methotrexate and folic acid who had tubal ligation May 28th, who developed SOB on June 3rd in the afternoon, initially pt took inhaler as she thought her asthma is acting up, pt started feeling chest pain was feeling shoulder pain, and she went to [facility] where she was found to have Pulmonary infarct but  her lower extremity doppler was negative for dvt, she was treated with full dose lovenox, she was seen by Pulmonologist Dr. Greenfield and hematologist Dr. Garza, she was discharged on Eliquis. She has improved sob but still gets winded when over exerting herself. She feels fatigued  .  \par ct angiogram noted small segmental subsegmental pulmonary embolus in posterior basal segment of the rll .  She had work up for was negative for lupus work up but protein c  protein s and anti thrombin will be done once she completes her treatment.   \par

## 2019-06-17 LAB
B2 GLYCOPROT1 IGA SERPL IA-ACNC: <5 SAU
B2 GLYCOPROT1 IGG SER-ACNC: <5 SGU
B2 GLYCOPROT1 IGM SER-ACNC: <5 SMU
CARDIOLIPIN AB SER IA-ACNC: NEGATIVE
CONFIRM: 48.3 SEC
DEPRECATED CARDIOLIPIN IGA SER: <5 APL
DRVVT 1H NP PPP: 43.3 SEC
DRVVT IMM 1:2 NP PPP: NORMAL
DRVVT SCREEN TO CONFIRM RATIO: 0.86 RATIO
FACT VIII ACT/NOR PPP: 121 %
HCYS SERPL-MCNC: 6.6 UMOL/L
SCREEN DRVVT: 51.8 SEC

## 2019-06-18 ENCOUNTER — APPOINTMENT (OUTPATIENT)
Dept: PULMONOLOGY | Facility: CLINIC | Age: 34
End: 2019-06-18
Payer: MEDICAID

## 2019-06-18 VITALS
BODY MASS INDEX: 37.9 KG/M2 | HEIGHT: 59 IN | HEART RATE: 83 BPM | OXYGEN SATURATION: 100 % | DIASTOLIC BLOOD PRESSURE: 77 MMHG | TEMPERATURE: 98.6 F | SYSTOLIC BLOOD PRESSURE: 109 MMHG | WEIGHT: 188 LBS

## 2019-06-18 LAB
DNA PLOIDY SPEC FC-IMP: NORMAL
PS IGA SER QL: <20 U/ML
PS IGG SER QL: <10 U/ML
PS IGM SER QL: <25 U/ML
PTR INTERP: NORMAL

## 2019-06-18 PROCEDURE — 99213 OFFICE O/P EST LOW 20 MIN: CPT

## 2019-06-18 NOTE — PHYSICAL EXAM
[General Appearance - Well Developed] : well developed [Normal Appearance] : normal appearance [Well Groomed] : well groomed [No Deformities] : no deformities [General Appearance - Well Nourished] : well nourished [General Appearance - In No Acute Distress] : no acute distress [Normal Conjunctiva] : the conjunctiva exhibited no abnormalities [I] : I [Normal Oropharynx] : normal oropharynx [Neck Cervical Mass (___cm)] : no neck mass was observed [Neck Appearance] : the appearance of the neck was normal [Heart Rate And Rhythm] : heart rate and rhythm were normal [Jugular Venous Distention Increased] : there was no jugular-venous distention [Murmurs] : no murmurs present [Heart Sounds] : normal S1 and S2 [Arterial Pulses Normal] : the arterial pulses were normal [Exaggerated Use Of Accessory Muscles For Inspiration] : no accessory muscle use [Respiration, Rhythm And Depth] : normal respiratory rhythm and effort [FreeTextEntry1] : Bilateral inspiratory squeaks [Bowel Sounds] : normal bowel sounds [Abdomen Soft] : soft [Abnormal Walk] : normal gait [Abdomen Tenderness] : non-tender [] : no hepato-splenomegaly [Cranial Nerves] : cranial nerves 2-12 were intact [Nail Clubbing] : no clubbing of the fingernails [Cyanosis, Localized] : no localized cyanosis [No Focal Deficits] : no focal deficits [Oriented To Time, Place, And Person] : oriented to person, place, and time [Deep Tendon Reflexes (DTR)] : deep tendon reflexes were 2+ and symmetric [Impaired Insight] : insight and judgment were intact

## 2019-06-27 ENCOUNTER — APPOINTMENT (OUTPATIENT)
Dept: RHEUMATOLOGY | Facility: CLINIC | Age: 34
End: 2019-06-27
Payer: MEDICAID

## 2019-06-27 VITALS
SYSTOLIC BLOOD PRESSURE: 123 MMHG | HEART RATE: 97 BPM | BODY MASS INDEX: 43.14 KG/M2 | TEMPERATURE: 98.7 F | RESPIRATION RATE: 16 BRPM | OXYGEN SATURATION: 99 % | HEIGHT: 59 IN | WEIGHT: 214 LBS | DIASTOLIC BLOOD PRESSURE: 91 MMHG

## 2019-06-27 PROCEDURE — 99214 OFFICE O/P EST MOD 30 MIN: CPT

## 2019-06-27 NOTE — ASSESSMENT
[FreeTextEntry1] : 32 year-old female with pmh of ALMA ROSA, now with return of her symptoms with polyarthritis, highly positive DEBORAH\par \par \par 1. RA -  with ongoing disease activity - will increase methotrexate to 6 tablets weekly and after labs may increase to 8 tablets - if no improvement will switch out of kevzara, patient missed 2 doses of medication secondary to surgery\par 2. Iron deficiency - stable levels - continue with oral replacement\par 3. right side lumbar pain - x-rays with DDD - worsening now with bilateral sciatica pain -  taking ibuprofen as needed - referral to PT\par 4. chronic pain -using CBD oil\par 5. muscle cramps over the right leg - start magnesium at bed time\par 6. PE - likely related to tubal ligation (5 days prior) - on eliquis for the next 6 months - heme work-up has been negative for APS or other coagulation disorders\par \par \par I reviewed previous labs results with patients.\par Laboratory tests ordered today\par Diagnosis and Prognosis discussed\par Continue with current medications\par education provided on pre-operative directives\par F/u 1 month

## 2019-06-27 NOTE — PHYSICAL EXAM
[General Appearance - Alert] : alert [General Appearance - In No Acute Distress] : in no acute distress [Outer Ear] : the ears and nose were normal in appearance [Oropharynx] : the oropharynx was normal [Neck Appearance] : the appearance of the neck was normal [Jugular Venous Distention Increased] : there was no jugular-venous distention [Neck Cervical Mass (___cm)] : no neck mass was observed [Thyroid Diffuse Enlargement] : the thyroid was not enlarged [Thyroid Nodule] : there were no palpable thyroid nodules [Auscultation Breath Sounds / Voice Sounds] : lungs were clear to auscultation bilaterally [Heart Rate And Rhythm] : heart rate was normal and rhythm regular [Heart Sounds] : normal S1 and S2 [Heart Sounds Gallop] : no gallops [Murmurs] : no murmurs [Heart Sounds Pericardial Friction Rub] : no pericardial rub [Full Pulse] : the pedal pulses are present [Edema] : there was no peripheral edema [Bowel Sounds] : normal bowel sounds [Abdomen Soft] : soft [Abdomen Tenderness] : non-tender [Abdomen Mass (___ Cm)] : no abdominal mass palpated [Abnormal Walk] : normal gait [Nail Clubbing] : no clubbing  or cyanosis of the fingernails [Musculoskeletal - Swelling] : no joint swelling seen [Motor Tone] : muscle strength and tone were normal [Skin Color & Pigmentation] : normal skin color and pigmentation [Skin Turgor] : normal skin turgor [] : no rash [Oriented To Time, Place, And Person] : oriented to person, place, and time [Impaired Insight] : insight and judgment were intact [Affect] : the affect was normal [FreeTextEntry1] : tender: shoulders, left elbow, mcp's, knees - no swollen joints

## 2019-06-27 NOTE — HISTORY OF PRESENT ILLNESS
[___ Month(s) Ago] : [unfilled] month(s) ago [FreeTextEntry1] : Prior visit\par She is doing well and reports that pain is under much better control.  She denies joint swelling or prolonged morning stiffness.   Patient exercises weekly and tried to move more on days that she doesn't exercise and is changing her diet for the better. \par \par Interval history\par ____________\par Savella increased to 50 mg bid last visit and she has noted improvement in pain. \par pain is maximum at a 6/10 on strenuous days; most day, under good control and she has no pain. \par \par

## 2019-06-28 LAB
BASOPHILS # BLD AUTO: 0.03 K/UL
BASOPHILS NFR BLD AUTO: 0.4 %
CRP SERPL-MCNC: 1.74 MG/DL
EOSINOPHIL # BLD AUTO: 0.07 K/UL
EOSINOPHIL NFR BLD AUTO: 0.8 %
ERYTHROCYTE [SEDIMENTATION RATE] IN BLOOD BY WESTERGREN METHOD: 21 MM/HR
HCT VFR BLD CALC: 37.4 %
HGB BLD-MCNC: 11.9 G/DL
IMM GRANULOCYTES NFR BLD AUTO: 0.4 %
LYMPHOCYTES # BLD AUTO: 1.84 K/UL
LYMPHOCYTES NFR BLD AUTO: 22.1 %
MAN DIFF?: NORMAL
MCHC RBC-ENTMCNC: 30.5 PG
MCHC RBC-ENTMCNC: 31.8 GM/DL
MCV RBC AUTO: 95.9 FL
MONOCYTES # BLD AUTO: 0.73 K/UL
MONOCYTES NFR BLD AUTO: 8.8 %
NEUTROPHILS # BLD AUTO: 5.64 K/UL
NEUTROPHILS NFR BLD AUTO: 67.5 %
PLATELET # BLD AUTO: 275 K/UL
RBC # BLD: 3.9 M/UL
RBC # FLD: 13.2 %
WBC # FLD AUTO: 8.34 K/UL

## 2019-07-02 ENCOUNTER — APPOINTMENT (OUTPATIENT)
Dept: INTERNAL MEDICINE | Facility: CLINIC | Age: 34
End: 2019-07-02
Payer: MEDICAID

## 2019-07-02 VITALS
WEIGHT: 190 LBS | HEART RATE: 75 BPM | DIASTOLIC BLOOD PRESSURE: 73 MMHG | OXYGEN SATURATION: 98 % | BODY MASS INDEX: 37.3 KG/M2 | SYSTOLIC BLOOD PRESSURE: 104 MMHG | HEIGHT: 60 IN | TEMPERATURE: 98 F

## 2019-07-02 PROCEDURE — 99214 OFFICE O/P EST MOD 30 MIN: CPT

## 2019-07-02 RX ORDER — ALBUTEROL SULFATE 2.5 MG/3ML
(2.5 MG/3ML) SOLUTION RESPIRATORY (INHALATION)
Qty: 75 | Refills: 0 | Status: COMPLETED | COMMUNITY
Start: 2019-02-03

## 2019-07-02 RX ORDER — ALUMINUM CHLORIDE 20 %
20 SOLUTION, NON-ORAL TOPICAL
Qty: 60 | Refills: 0 | Status: COMPLETED | COMMUNITY
Start: 2019-04-22

## 2019-07-02 RX ORDER — ACETAMINOPHEN 325 MG/1
325 TABLET ORAL
Qty: 45 | Refills: 0 | Status: COMPLETED | COMMUNITY
Start: 2019-05-28

## 2019-07-02 RX ORDER — NAPROXEN 500 MG/1
500 TABLET ORAL
Qty: 14 | Refills: 0 | Status: COMPLETED | COMMUNITY
Start: 2019-04-15

## 2019-07-02 RX ORDER — DICLOFENAC SODIUM 10 MG/G
1 GEL TOPICAL
Qty: 100 | Refills: 0 | Status: COMPLETED | COMMUNITY
Start: 2019-04-08

## 2019-07-02 RX ORDER — CEPHALEXIN 500 MG/1
500 CAPSULE ORAL
Qty: 14 | Refills: 0 | Status: COMPLETED | COMMUNITY
Start: 2019-04-22

## 2019-07-02 RX ORDER — SULFAMETHOXAZOLE AND TRIMETHOPRIM 800; 160 MG/1; MG/1
800-160 TABLET ORAL
Qty: 14 | Refills: 0 | Status: COMPLETED | COMMUNITY
Start: 2019-01-03

## 2019-07-02 RX ORDER — METHOCARBAMOL 500 MG/1
500 TABLET, FILM COATED ORAL
Qty: 24 | Refills: 0 | Status: COMPLETED | COMMUNITY
Start: 2019-02-03

## 2019-07-02 RX ORDER — MAGNESIUM OXIDE 400 MG
400 (241.3 MG) TABLET ORAL
Qty: 30 | Refills: 0 | Status: COMPLETED | COMMUNITY
Start: 2019-05-16

## 2019-07-02 NOTE — PHYSICAL EXAM
[No Acute Distress] : no acute distress [Well-Appearing] : well-appearing [Well Developed] : well developed [Well Nourished] : well nourished [EOMI] : extraocular movements intact [PERRL] : pupils equal round and reactive to light [Normal Sclera/Conjunctiva] : normal sclera/conjunctiva [Normal Outer Ear/Nose] : the outer ears and nose were normal in appearance [Normal Oropharynx] : the oropharynx was normal [No JVD] : no jugular venous distention [No Lymphadenopathy] : no lymphadenopathy [Supple] : supple [Normal Rate] : normal rate  [No Respiratory Distress] : no respiratory distress  [No Accessory Muscle Use] : no accessory muscle use [Clear to Auscultation] : lungs were clear to auscultation bilaterally [Regular Rhythm] : with a regular rhythm [Normal S1, S2] : normal S1 and S2 [No Varicosities] : no varicosities [Pedal Pulses Present] : the pedal pulses are present [No Extremity Clubbing/Cyanosis] : no extremity clubbing/cyanosis [No Edema] : there was no peripheral edema [Soft] : abdomen soft [Non Tender] : non-tender [Non-distended] : non-distended [No Masses] : no abdominal mass palpated [Normal Bowel Sounds] : normal bowel sounds [No HSM] : no HSM [Normal Posterior Cervical Nodes] : no posterior cervical lymphadenopathy [No CVA Tenderness] : no CVA  tenderness [Normal Anterior Cervical Nodes] : no anterior cervical lymphadenopathy [Grossly Normal Strength/Tone] : grossly normal strength/tone [No Spinal Tenderness] : no spinal tenderness [No Joint Swelling] : no joint swelling [No Focal Deficits] : no focal deficits [No Rash] : no rash [Coordination Grossly Intact] : coordination grossly intact [Normal Affect] : the affect was normal [Deep Tendon Reflexes (DTR)] : deep tendon reflexes were 2+ and symmetric [Normal Gait] : normal gait [Normal Insight/Judgement] : insight and judgment were intact

## 2019-07-02 NOTE — HISTORY OF PRESENT ILLNESS
[de-identified] :  Pt has seen pulmonary and rheumatologist and hematologist.  there have been no findings to explain her pulmonary infarct she has mTHFR heterozygous.which it is unknown its clinical significance.  She  will remain on eliquis for 6 months and will be taken off protein C and S tested and placed on aspirin.  She states her sob has improved .  She doesn’t have bruising or bleeding.  she saw Dr Garza  hematologist  [phone] [FreeTextEntry1] : pulmonary infarct

## 2019-07-02 NOTE — ASSESSMENT
[FreeTextEntry1] : pt had pulmonary infarct and the etiology has not been found.  There were no precipitating factors  except the heterozygous mthfr which might increase her coagulability. She could be on aspirin  after eliquis has been completed in 6 months.  She doesn’t need long term anti coagulation.  she will need protein c and S to be done once off medications  to be sure.  She should try to lose weight and start to walk .  She is slowly improving and less sob.   I will obtain the hematologist  consultation.

## 2019-07-02 NOTE — HEALTH RISK ASSESSMENT
[] : No [No falls in past year] : Patient reported no falls in the past year [de-identified] : healthy

## 2019-07-02 NOTE — COUNSELING
[Fall prevention counseling provided] : fall prevention  [Healthy eating counseling provided] : healthy eating [Weight Self Once Weekly] : Weight self once weekly [Low Fat Diet] : Low fat diet [Low Salt Diet] : Low salt diet [Walking] : Walking [Engage in a relaxing activity] : Engage in a relaxing activity [Plan in advance] : Plan in advance [No throw rugs] : No throw rugs [Adequate lighting] : Adequate lighting [Use proper foot wear] : Use proper foot wear [Use recommended devices] : Use recommended devices [None] : None

## 2019-07-10 PROCEDURE — 83880 ASSAY OF NATRIURETIC PEPTIDE: CPT

## 2019-07-10 PROCEDURE — 82553 CREATINE MB FRACTION: CPT

## 2019-07-10 PROCEDURE — 80048 BASIC METABOLIC PNL TOTAL CA: CPT

## 2019-07-10 PROCEDURE — 71275 CT ANGIOGRAPHY CHEST: CPT

## 2019-07-10 PROCEDURE — 36415 COLL VENOUS BLD VENIPUNCTURE: CPT

## 2019-07-10 PROCEDURE — 96375 TX/PRO/DX INJ NEW DRUG ADDON: CPT | Mod: 76

## 2019-07-10 PROCEDURE — 82607 VITAMIN B-12: CPT

## 2019-07-10 PROCEDURE — 85027 COMPLETE CBC AUTOMATED: CPT

## 2019-07-10 PROCEDURE — 93005 ELECTROCARDIOGRAM TRACING: CPT

## 2019-07-10 PROCEDURE — 93970 EXTREMITY STUDY: CPT

## 2019-07-10 PROCEDURE — 82550 ASSAY OF CK (CPK): CPT

## 2019-07-10 PROCEDURE — 96376 TX/PRO/DX INJ SAME DRUG ADON: CPT

## 2019-07-10 PROCEDURE — 84443 ASSAY THYROID STIM HORMONE: CPT

## 2019-07-10 PROCEDURE — 84484 ASSAY OF TROPONIN QUANT: CPT

## 2019-07-10 PROCEDURE — 94640 AIRWAY INHALATION TREATMENT: CPT

## 2019-07-10 PROCEDURE — 86160 COMPLEMENT ANTIGEN: CPT

## 2019-07-10 PROCEDURE — 82746 ASSAY OF FOLIC ACID SERUM: CPT

## 2019-07-10 PROCEDURE — 96374 THER/PROPH/DIAG INJ IV PUSH: CPT | Mod: XU

## 2019-07-10 PROCEDURE — 84702 CHORIONIC GONADOTROPIN TEST: CPT

## 2019-07-10 PROCEDURE — 93306 TTE W/DOPPLER COMPLETE: CPT

## 2019-07-10 PROCEDURE — 99285 EMERGENCY DEPT VISIT HI MDM: CPT | Mod: 25

## 2019-07-10 PROCEDURE — 80061 LIPID PANEL: CPT

## 2019-07-10 PROCEDURE — 83036 HEMOGLOBIN GLYCOSYLATED A1C: CPT

## 2019-07-10 PROCEDURE — 81001 URINALYSIS AUTO W/SCOPE: CPT

## 2019-07-10 PROCEDURE — 85610 PROTHROMBIN TIME: CPT

## 2019-07-16 ENCOUNTER — APPOINTMENT (OUTPATIENT)
Dept: OBGYN | Facility: CLINIC | Age: 34
End: 2019-07-16
Payer: MEDICAID

## 2019-07-16 VITALS — SYSTOLIC BLOOD PRESSURE: 110 MMHG | BODY MASS INDEX: 36.91 KG/M2 | DIASTOLIC BLOOD PRESSURE: 70 MMHG | WEIGHT: 189 LBS

## 2019-07-16 PROCEDURE — 99024 POSTOP FOLLOW-UP VISIT: CPT

## 2019-07-16 NOTE — HISTORY OF PRESENT ILLNESS
[Clean/Dry/Intact] : clean, dry and intact [Healed] : healed [None] : no vaginal bleeding [Normal] : the vagina was normal [Doing Well] : is doing well [Excellent Pain Control] : has excellent pain control [No Sign of Infection] : is showing no signs of infection [de-identified] : S/p Laparoscopic BTL and lysis of adhesions here for final post op check.  Had complication of post op PE,  and followed by medicine, pulm, rheum and hematology.   [de-identified] : S/p BTL and lysis of adhesions.  Doing well  [de-identified] : Continue meds as directed.  RTO prn and annually for exams.

## 2019-07-18 ENCOUNTER — LABORATORY RESULT (OUTPATIENT)
Age: 34
End: 2019-07-18

## 2019-07-18 ENCOUNTER — APPOINTMENT (OUTPATIENT)
Dept: RHEUMATOLOGY | Facility: CLINIC | Age: 34
End: 2019-07-18
Payer: MEDICAID

## 2019-07-18 VITALS
DIASTOLIC BLOOD PRESSURE: 70 MMHG | HEIGHT: 60 IN | OXYGEN SATURATION: 98 % | BODY MASS INDEX: 37.69 KG/M2 | SYSTOLIC BLOOD PRESSURE: 99 MMHG | HEART RATE: 81 BPM | TEMPERATURE: 98.5 F | WEIGHT: 192 LBS

## 2019-07-18 PROCEDURE — 99214 OFFICE O/P EST MOD 30 MIN: CPT

## 2019-07-19 ENCOUNTER — APPOINTMENT (OUTPATIENT)
Dept: RHEUMATOLOGY | Facility: CLINIC | Age: 34
End: 2019-07-19

## 2019-07-19 LAB
APPEARANCE: ABNORMAL
BILIRUBIN URINE: NEGATIVE
BLOOD URINE: ABNORMAL
COLOR: YELLOW
CREAT SPEC-SCNC: 210 MG/DL
CREAT/PROT UR: 0.1 RATIO
CRP SERPL-MCNC: 1.11 MG/DL
ERYTHROCYTE [SEDIMENTATION RATE] IN BLOOD BY WESTERGREN METHOD: 15 MM/HR
GLUCOSE QUALITATIVE U: NEGATIVE
KETONES URINE: NEGATIVE
LEUKOCYTE ESTERASE URINE: NEGATIVE
MPO AB + PR3 PNL SER: NORMAL
NITRITE URINE: NEGATIVE
PH URINE: 5.5
PROT UR-MCNC: 11 MG/DL
PROTEIN URINE: ABNORMAL
SPECIFIC GRAVITY URINE: 1.03
UROBILINOGEN URINE: NORMAL

## 2019-07-23 ENCOUNTER — RX RENEWAL (OUTPATIENT)
Age: 34
End: 2019-07-23

## 2019-07-24 ENCOUNTER — TRANSCRIPTION ENCOUNTER (OUTPATIENT)
Age: 34
End: 2019-07-24

## 2019-07-25 ENCOUNTER — TRANSCRIPTION ENCOUNTER (OUTPATIENT)
Age: 34
End: 2019-07-25

## 2019-07-25 ENCOUNTER — RX RENEWAL (OUTPATIENT)
Age: 34
End: 2019-07-25

## 2019-07-31 ENCOUNTER — APPOINTMENT (OUTPATIENT)
Dept: ORTHOPEDIC SURGERY | Facility: CLINIC | Age: 34
End: 2019-07-31
Payer: MEDICAID

## 2019-07-31 VITALS
WEIGHT: 192 LBS | SYSTOLIC BLOOD PRESSURE: 108 MMHG | DIASTOLIC BLOOD PRESSURE: 77 MMHG | HEIGHT: 59 IN | HEART RATE: 72 BPM | BODY MASS INDEX: 38.71 KG/M2

## 2019-07-31 PROCEDURE — 73564 X-RAY EXAM KNEE 4 OR MORE: CPT | Mod: LT

## 2019-07-31 PROCEDURE — 99204 OFFICE O/P NEW MOD 45 MIN: CPT

## 2019-07-31 NOTE — PHYSICAL EXAM
[de-identified] : Physical Examination\par General: well nourished, in no acute distress, alert and oriented to person, place and time\par Psychiatric: normal mood and affect, no abnormal movements or speech patterns\par Eyes: vision intact - glasses\par Throat: no thyromegaly\par Lymph: no enlarged nodes, no lymphedema in extremity\par Respiratory: no wheezing, no shortness of breath with ambulation\par Cardiac: no cardiac leg swelling, 2+ peripheral pulses\par Neurology: normal gross sensation in extremities to light touch\par Abdomen: soft, non-tender, tympanic, no masses\par \par Musculoskeletal Examination\par Ambulation	+ antalgic gait, - assistive devices\par \par Knee			Right			Left\par General\par      Swelling/Deformity	normal			normal	\par      Skin			normal			normal\par      Erythema		-			-\par      Standing Alignment	neutral			neutral\par      Effusion		none			none\par Range of Motion\par      Hip			full painless ROM		full painless ROM\par      Knee Flexion		130			120+\par      Knee Extension	0			10+\par Patella\par      J Sign		-			-\par      Quad Medial/Lateral	1/1 1/1\par      Apprehension		-			-\par      Elmer's		-			+\par      Grind Sign		-			-\par      Crepitus		-			-\par Palpation\par      Medial Joint Line	-			+++\par      Medial Fem Condyle	-			-\par      Lateral Joint Line	-			-\par      Quad Tendon		-			-\par      Patella Tendon	-			-\par      Medial Patella		-			-\par      Lateral Patella 	-			-\par      Posterior Knee	-			-\par Ligamentous\par      Varus @ 0° / 30°	-/-			-/-\par      Valgus @ 0° / 30°	-/-			-/-\par      Lachman		-			-\par      Pivot Shift		-			-\par      Anterior Drawer	-			-\par      Posterior Drawer	-			-\par Meniscus\par      Hugo		-			+ pain\par      Flexion Pinch		-			+ medial pain\par Strength Examination/Atrophy\par      Hip Flexors 		5+			5+\par      Quadriceps		5+			5+\par      Hamstring		5+			5+\par      Tibialis Anterior	5+			5+\par      Achilles/Soleus	5+			5+\par Sensation\par      Deep Peroneal	normal			normal\par      Superficial Peroneal 	normal			normal\par      Sural  		normal			normal\par      Posterior Tibial 	normal			normal\par      Saphneous 		normal			normal\par Pulses\par      DP			2+			2+\par  [de-identified] : 3 views of the affected Left knee (AP, 30degree flexed lateral, sunrise view)\par Dated 7-20-19 were evaluated by myself today and\par demonstrate:\par There is no narrowing\par no osteophytic lipping\par Trace suprapatellar effusion\par no patellofemoral joint space loss without evidence of tilt [or] subluxation on sunrise view\par Normal soft tissue density\par Otherwise normal osseous bone structure without fracture or dislocation

## 2019-07-31 NOTE — HISTORY OF PRESENT ILLNESS
[de-identified] : CC Left knee\par \par HPI 32 yo female right HD presents with acute onset of months of constant pain in the medial Left knee [without injury]. The pain is worse, and rated a 7 out of 10, described as ring and sharp, [without radiation]. Nothing makes the pain better and weightbearing flexion of knee makes the pain worse. The patient reports associated symptoms of locking swelling instability causing fall. The patient - pain at night affecting sleep, and - similar pain previously.\par \par The patient has tried the following treatments:\par Activity modification	+\par Ice/Compression  	+\par Braces    		-\par Nsaids    		+ 8 weeks\par Physical Therapy 	+ 8 weeks of home exercises\par Cortisone Injection	-\par Visco Injection		-\par Arthroscopy		-\par \par Review of Systems is positive for the above musculoskeletal symptoms and is otherwise non-contributory for general, constitutional, psychiatric, neurologic, HEENT, cardiac, respiratory, gastrointestinal, reproductive, lymphatic, and dermatologic complaints.\par \par Consult by Sunday \par

## 2019-07-31 NOTE — DISCUSSION/SUMMARY
[de-identified] : Left knee internal derangement\par Left knee chondromalacia\par \par I discussed my findings on history, exam and radiology.\par \par I reviewed the anatomy and function of the periarticular muscles and tendons, patella, ligaments, menisci and cartilage of the knee using models, images and diagrams. Given the current findings for the patient, I recommend proceeding with advanced imaging to better characterize and diagnose the problem to aid patient care, management and treatment guidance as I suspect an internal injury to the knee not diagnosed on non-diagnostic plain radiographs causing the patients symptoms and physical examination to help provide surgical management planning.\par \par Therefore given the patients continued symptoms despite the recent 8 weeks of nonsteroidal anti-inflammatories activity modification and home exercises with continued pain affecting ADLs and inability to do activities limiting quality of life as well as locking and instability significant for patient falls potentially placing the patient at increased risk for exacerbating the injury or causing further injury to the knee, an examination and history consistent with injury to an internal knee derangement and a non-diagnostic radiograph I recommend obtaining an MRI to assess the knee's internal structures for preoperative planning. I discussed with the patient that I am ordering an MRI to assess the soft tissue structures in the joint such as ligaments and tendons, as well as to assess the cartilage and menisci as well as for any bony edema. The test will need insurance approval and will take place at a [facility] or outside facility. If the patient elects to obtain the MRI from an outside facility, the patient understands they have been instructed to bring in both the final radiologist read and a CD/DVD with the MRI images to allow review of the imaging test by myself during the follow up visit. I do not recommend obtaining an open standing MRI as the quality of the images is subpar and makes it difficult to diagnose intra-articular derangements and guide care discussion and decision making.\par The patient was prescribed Diclofenac PO non-steroidal anti-inflammatory medication. 50mg tablets twice daily to be taken for at least 1-2 weeks in a row and then PRN afterwards. Risks and benefits were discussed and include but not limited to renal damage and GI ulceration and bleeding.  They were advised to take with food to limit stomach upset as well as warned to stop the medication if worsening gastric pain or dizziness or other side effects. Also to immediately stop the medication and seek appropriate medical attention if any severe stomach ache, gastritis, black/red vomit, black/red stools or any other medical concern.\par \par The patient verifies their understanding the the visit, diagnosis and plan. They agree with the treatment plan and will contact the office with any questions or problems.\par \par FU after MRI is obtained

## 2019-08-02 ENCOUNTER — RX RENEWAL (OUTPATIENT)
Age: 34
End: 2019-08-02

## 2019-08-02 RX ORDER — SARILUMAB 200 MG/1.14ML
200 INJECTION, SOLUTION SUBCUTANEOUS
Qty: 2 | Refills: 3 | Status: DISCONTINUED | COMMUNITY
Start: 2018-10-16 | End: 2019-08-02

## 2019-08-02 RX ORDER — SARILUMAB 200 MG/1.14ML
200 INJECTION, SOLUTION SUBCUTANEOUS
Qty: 2 | Refills: 0 | Status: DISCONTINUED | COMMUNITY
Start: 2019-02-05 | End: 2019-08-02

## 2019-08-23 ENCOUNTER — APPOINTMENT (OUTPATIENT)
Dept: RHEUMATOLOGY | Facility: CLINIC | Age: 34
End: 2019-08-23
Payer: MEDICAID

## 2019-08-23 VITALS
BODY MASS INDEX: 38.3 KG/M2 | HEIGHT: 59 IN | RESPIRATION RATE: 16 BRPM | OXYGEN SATURATION: 97 % | SYSTOLIC BLOOD PRESSURE: 103 MMHG | TEMPERATURE: 98.5 F | DIASTOLIC BLOOD PRESSURE: 63 MMHG | HEART RATE: 79 BPM | WEIGHT: 190 LBS

## 2019-08-23 PROCEDURE — 99214 OFFICE O/P EST MOD 30 MIN: CPT

## 2019-08-23 NOTE — ASSESSMENT
[FreeTextEntry1] : 32 year-old female with pmh of ALMA ROSA, now with return of her symptoms with polyarthritis, highly positive DEBORAH\par \par \par 1. RA -  with ongoing disease activity - will increase methotrexate to 6 tablets weekly and after labs may increase to 8 tablets - flare resolved and her RA was stable - until 2 days ago when she developed a bilateral leg red tender rash over bilateral legs - this is the second episode - had a previous one before her surgery -  s/p rituxan infusion - no rash today - off prednisone for 3 weeks - will do labs in 2 weeks - mild flare today - can take prednisone as needed\par 2. Iron deficiency - stable levels - continue with oral replacement\par 3. right side lumbar pain - x-rays with DDD - worsening now with bilateral sciatica pain -  taking ibuprofen as needed - referral to PT\par 4. chronic pain -using CBD oil\par 5. muscle cramps over the right leg - start magnesium at bed time\par 6. PE - likely related to tubal ligation (5 days prior) - on Eliquis for the next 6 months - heme work-up has been negative for APS or other coagulation disorders\par \par \par I reviewed previous labs results with patients.\par Laboratory tests ordered today\par Diagnosis and Prognosis discussed\par Continue with current medications\par education provided on pre-operative directives\par F/u  2 months

## 2019-08-23 NOTE — PHYSICAL EXAM
[General Appearance - Alert] : alert [Outer Ear] : the ears and nose were normal in appearance [General Appearance - In No Acute Distress] : in no acute distress [Neck Appearance] : the appearance of the neck was normal [Oropharynx] : the oropharynx was normal [Jugular Venous Distention Increased] : there was no jugular-venous distention [Neck Cervical Mass (___cm)] : no neck mass was observed [Thyroid Diffuse Enlargement] : the thyroid was not enlarged [Thyroid Nodule] : there were no palpable thyroid nodules [Heart Rate And Rhythm] : heart rate was normal and rhythm regular [Auscultation Breath Sounds / Voice Sounds] : lungs were clear to auscultation bilaterally [Heart Sounds] : normal S1 and S2 [Murmurs] : no murmurs [Heart Sounds Gallop] : no gallops [Heart Sounds Pericardial Friction Rub] : no pericardial rub [Full Pulse] : the pedal pulses are present [Edema] : there was no peripheral edema [Bowel Sounds] : normal bowel sounds [Abdomen Soft] : soft [Abdomen Tenderness] : non-tender [Abdomen Mass (___ Cm)] : no abdominal mass palpated [Abnormal Walk] : normal gait [Musculoskeletal - Swelling] : no joint swelling seen [Nail Clubbing] : no clubbing  or cyanosis of the fingernails [Motor Tone] : muscle strength and tone were normal [FreeTextEntry1] : tender/swollen right wrist [Skin Color & Pigmentation] : normal skin color and pigmentation [Skin Turgor] : normal skin turgor [] : no rash [Oriented To Time, Place, And Person] : oriented to person, place, and time [Impaired Insight] : insight and judgment were intact [Affect] : the affect was normal

## 2019-08-23 NOTE — HISTORY OF PRESENT ILLNESS
[___ Month(s) Ago] : [unfilled] month(s) ago [FreeTextEntry1] : rash has resolved - finished Rituxan this week -  no reaction to rituxan - no prednisone for the last 3 weeks\par Today with right hand pain and swelling.  - no other joint pain\par Still on methotrexate\par Still on Eliquis

## 2019-09-03 ENCOUNTER — APPOINTMENT (OUTPATIENT)
Dept: CARDIOLOGY | Facility: CLINIC | Age: 34
End: 2019-09-03

## 2019-09-03 ENCOUNTER — APPOINTMENT (OUTPATIENT)
Dept: ORTHOPEDIC SURGERY | Facility: CLINIC | Age: 34
End: 2019-09-03

## 2019-09-09 ENCOUNTER — APPOINTMENT (OUTPATIENT)
Dept: ORTHOPEDIC SURGERY | Facility: CLINIC | Age: 34
End: 2019-09-09
Payer: MEDICAID

## 2019-09-09 ENCOUNTER — LABORATORY RESULT (OUTPATIENT)
Age: 34
End: 2019-09-09

## 2019-09-09 PROCEDURE — 73564 X-RAY EXAM KNEE 4 OR MORE: CPT | Mod: RT

## 2019-09-09 PROCEDURE — 99214 OFFICE O/P EST MOD 30 MIN: CPT | Mod: 25

## 2019-09-09 PROCEDURE — 20611 DRAIN/INJ JOINT/BURSA W/US: CPT | Mod: RT

## 2019-09-09 NOTE — DISCUSSION/SUMMARY
[de-identified] : Bilateral knee chondromalacia patellofemoral, patellofemoral syndrome\par \par Patellofemoral syndrome or chondritis or chondromalacia is a spectrum of disease of the cartilage in the joint between the patella, or knee cap, and the femoral trochlea, or thigh bone ranging from overload of the joint, to irritation of the cartilage and finally wear of the cartilage. The patella has a convex v shaped joint surface and the trochlea a matching concave v shape. The patella runs in the trochlea valley as the knee bends, increasing the leverage and allowing knee range of motion. However the patellofemoral joint experiences 20-50x the body weight in force when going up and down stairs during mid-flexion of the knee. The joint is most symptomatic with prolonged knee flexion or going up/down stairs. The treatment for this problem is predominantly non-operative consisting of patient education, activity modification, physical therapy with focus on VMO strengthening, oral and topical non-steroidal anti-inflammatories, knee braces including lateral J/shield's brace or infrapatellar band, and corticosteroid/viscosupplementation injection. The symptoms are chronic and are difficult to treat. In cases of failure to respond to non-operative management surgical options can be considered.\par \par \par Physical therapy prescribed with knee ROM exercises, quad/hamstring/VMO/Hip abductor strengthening, knee taping, patella mobilization, modalities PRN, and home exercise program.\par \par Procedure Note:\par \par Verbal consent was obtained for an arthrocentesis and intra-articular corticosteroid injection of the RIGHT knee, after the risks and benefits were discussed with the patient. Potential adverse effects were discussed including but not limited to bleeding, skin/joint infection, local skin reactions including bleaching, bruising, stiffness, soreness, vasovagal episodes, transient hyperglycemia, avascular necrosis, pseudo-septic type reactions, post injection joint pain, allergic reaction to product or anesthetic and other rare but potential adverse effects along with benefits including decreased pain and improved stability prior to obtaining verbal informed consent. It was also discussed that for some patients the treatment is ineffective and there are no guarantees that the patient will experience improvement as the result of the injection. In rare occasions the injection can cause worsening of pain.\par \par The use of a Sonosite 15-6 MHz linear transducer with live ultrasound guidance of the knee was necessary given the patient's BMI and local body habitus overlying and obscuring the accurate identification of normal body bony anatomy used to identify the injection site and the depth of soft tissue envelope necessitating a longer than normal needle to reach the joint space, and to confirm the location of the needle tip and intra-articular delivery of the medication. Without the use of live ultrasound guidance the injection would have been more difficult and place the patient's neurovascular structures at risk from the longer needle needed to traverse the soft tissue envelope.\par \par A 6 inch bolster was placed underneath the knee to place the knee in a slightly flexed position. The superolateral injection site was identified using the ultrasound probe to identify the suprapatellar space and superior boarder of the patella first longitudinally and then transversely. The injection site was marked and prepped with a ChloraPrep swab and anesthetized with ethylchloride skin anesthesia. Using sterile technique a 20g 3 1/2 in spinal needle with 3 cc total of 1cc 1% lidocaine without epinephrine, 1cc 0.25% Marcaine without epinephrine and 1cc of 40mg/mL Kenalog was passed through the injection site towards the suprapatellar space under live ultrasound guidance and noted to penetrate the joint capsule. The medication was injected without resistance under live ultrasound visualization and noted to flow into the suprapatellar joint space. The injection site was sterilely dressed, there was minimal blood loss. The patient tolerated this procedure without any complications done by myself. Images were recorded and saved.\par \par The patient has been advised that if they notice any worsening of symptoms or any problems to contact me and seek care from a qualified medical professional. The patient was instructed to ice the knee and take NSAID medication on an as needed basis if the patient feels discomfort.\par \par The patient verifies their understanding the the visit, diagnosis and plan. They agree with the treatment plan and will contact the office with any questions or problems.\par \par Follow up\par PRN\par

## 2019-09-09 NOTE — PHYSICAL EXAM
[de-identified] : 3 views of the affected Left knee (AP, 30degree flexed lateral, sunrise view)\par Dated 7-20-19\par demonstrate:\par There is no narrowing\par no osteophytic lipping\par Trace suprapatellar effusion\par no patellofemoral joint space loss without evidence of tilt [or] subluxation on sunrise view\par Normal soft tissue density\par Otherwise normal osseous bone structure without fracture or dislocation\par \par 5 views of the affected Right knee (standing AP, flexing standing AP, 30degree flexed lateral, 0degree lateral, sunrise view)\par were ordered, obtained and evaluated by myself today and\par demonstrate:\par There is no narrowing\par no osteophytic lipping\par Trace suprapatellar effusion\par no patellofemoral joint space loss without evidence of tilt [or] subluxation on sunrise view\par Normal soft tissue density\par Otherwise normal osseous bone structure without fracture or dislocation\par \par \par \par MRI Left knee from [facility] on 8-23-19\par My impression of the images:\par Quality of the MRI is ok\par Medial Meniscus ok\par Lateral Meniscus ok\par There is mild chondral loss in the patellofemoral compartments\par There is [Not] bone marrow edema[/subchondral cysts] in the patellofemoral compartments\par LCL is intact\par MCL is intact\par ACL is intact\par PCL is intact \par Quadriceps Tendon is intact\par Patella Tendon is intact\par \par The Final Radiologist Impression:\par Small joint effusion. Prepatellar and infrapatellar soft tissue edema.\par No evidence of marrow replacement process, fracture or osteochondral injury. Evidence of grade I chondromalacia lateral patellar facet, without patellar subluxation.\par Distal quadriceps tendon, patellar tendon, ACL, PCL, popliteus, medial collateral ligament, lateral collateral ligament, iliotibial band and biceps femoris intact. Semimembranosus insertional tendinopathy distally.\par No evidence of meniscal tear. No soft tissue mass.\par IMPRESSION:\par 1. No patellar subluxation on this exam. Grade I chondromalacia lateral patella facet.\par 2. Semimembranosus insertional tendinopathy distally.\par 3. No evidence of meniscal or ligament tear.\par 4. Small joint effusion.\par  [de-identified] : Physical Examination\par General: well nourished, in no acute distress, alert and oriented to person, place and time\par Psychiatric: normal mood and affect, no abnormal movements or speech patterns\par Eyes: vision intact - glasses\par Throat: no thyromegaly\par Lymph: no enlarged nodes, no lymphedema in extremity\par Respiratory: no wheezing, no shortness of breath with ambulation\par Cardiac: no cardiac leg swelling, 2+ peripheral pulses\par Neurology: normal gross sensation in extremities to light touch\par Abdomen: soft, non-tender, tympanic, no masses\par \par Musculoskeletal Examination\par Ambulation	+ antalgic gait, - assistive devices\par \par Knee			Right			Left\par General\par      Swelling/Deformity	normal			normal	\par      Skin			normal			normal\par      Erythema		-			-\par      Standing Alignment	neutral			neutral\par      Effusion		none			none\par Range of Motion\par      Hip			full painless ROM		full painless ROM\par      Knee Flexion		130			120+\par      Knee Extension	0			10+\par Patella\par      J Sign		-			-\par      Quad Medial/Lateral	1/1 1/1\par      Apprehension		-			-\par      Elmer's		+			+\par      Grind Sign		+			-\par      Crepitus		+			-\par Palpation\par      Medial Joint Line	-			-\par      Medial Fem Condyle	-			-\par      Lateral Joint Line	-			-\par      Quad Tendon		-			-\par      Patella Tendon	-			-\par      Medial Patella		-			-\par      Lateral Patella 	-			-\par      Posterior Knee	-			-\par Ligamentous\par      Varus @ 0° / 30°	-/-			-/-\par      Valgus @ 0° / 30°	-/-			-/-\par      Lachman		-			-\par      Pivot Shift		-			-\par      Anterior Drawer	-			-\par      Posterior Drawer	-			-\par Meniscus\par      Hugo		-			-\par      Flexion Pinch		-			-\par Strength Examination/Atrophy\par      Hip Flexors 		5+			5+\par      Quadriceps		5+			5+\par      Hamstring		5+			5+\par      Tibialis Anterior	5+			5+\par      Achilles/Soleus	5+			5+\par Sensation\par      Deep Peroneal	normal			normal\par      Superficial Peroneal 	normal			normal\par      Sural  		normal			normal\par      Posterior Tibial 	normal			normal\par      Saphneous 		normal			normal\par Pulses\par      DP			2+			2+\par

## 2019-09-09 NOTE — HISTORY OF PRESENT ILLNESS
[de-identified] : CC Left knee\par \par HPI 32 yo female right HD presents for mri FU acute onset of months of constant pain in the medial Left knee [without injury]. The pain is worse, and rated a 7 out of 10, described as ring and sharp, [without radiation]. Nothing makes the pain better and weightbearing flexion of knee makes the pain worse. The patient reports associated symptoms of locking swelling instability causing fall. The patient - pain at night affecting sleep, and - similar pain previously.\par \par The patient has tried the following treatments:\par Activity modification	+\par Ice/Compression  	+\par Braces    		-\par Nsaids    		+ 8 weeks\par Physical Therapy 	+ 8 weeks of home exercises\par Cortisone Injection	-\par Visco Injection		-\par Arthroscopy		-\par \par worsening anterior R knee pain similar in nature to L but worse\par \par Review of Systems is positive for the above musculoskeletal symptoms and is otherwise non-contributory for general, constitutional, psychiatric, neurologic, HEENT, cardiac, respiratory, gastrointestinal, reproductive, lymphatic, and dermatologic complaints.\par \par Consult by Sunday \par

## 2019-09-10 ENCOUNTER — APPOINTMENT (OUTPATIENT)
Dept: INTERNAL MEDICINE | Facility: CLINIC | Age: 34
End: 2019-09-10
Payer: MEDICAID

## 2019-09-10 VITALS
HEART RATE: 64 BPM | DIASTOLIC BLOOD PRESSURE: 72 MMHG | BODY MASS INDEX: 38.1 KG/M2 | SYSTOLIC BLOOD PRESSURE: 104 MMHG | OXYGEN SATURATION: 98 % | TEMPERATURE: 97.2 F | HEIGHT: 59 IN | WEIGHT: 189 LBS

## 2019-09-10 DIAGNOSIS — J70.5 RESPIRATORY CONDITIONS DUE TO SMOKE INHALATION: ICD-10-CM

## 2019-09-10 DIAGNOSIS — R25.2 CRAMP AND SPASM: ICD-10-CM

## 2019-09-10 DIAGNOSIS — G43.B0 OPHTHALMOPLEGIC MIGRAINE, NOT INTRACTABLE: ICD-10-CM

## 2019-09-10 DIAGNOSIS — Z87.42 PERSONAL HISTORY OF OTHER DISEASES OF THE FEMALE GENITAL TRACT: ICD-10-CM

## 2019-09-10 DIAGNOSIS — R51 HEADACHE: ICD-10-CM

## 2019-09-10 DIAGNOSIS — M13.0 POLYARTHRITIS, UNSPECIFIED: ICD-10-CM

## 2019-09-10 LAB
25(OH)D3 SERPL-MCNC: 31.8 NG/ML
ALBUMIN SERPL ELPH-MCNC: 4.1 G/DL
ALP BLD-CCNC: 148 U/L
ALT SERPL-CCNC: 17 U/L
ANION GAP SERPL CALC-SCNC: 15 MMOL/L
APPEARANCE: ABNORMAL
AST SERPL-CCNC: 21 U/L
BASOPHILS # BLD AUTO: 0.02 K/UL
BASOPHILS NFR BLD AUTO: 0.2 %
BILIRUB SERPL-MCNC: 0.2 MG/DL
BILIRUBIN URINE: NEGATIVE
BLOOD URINE: NEGATIVE
BUN SERPL-MCNC: 11 MG/DL
CALCIUM SERPL-MCNC: 9.2 MG/DL
CHLORIDE SERPL-SCNC: 102 MMOL/L
CO2 SERPL-SCNC: 24 MMOL/L
COLOR: NORMAL
CREAT SERPL-MCNC: 0.67 MG/DL
CRP SERPL-MCNC: 2.21 MG/DL
EOSINOPHIL # BLD AUTO: 0.09 K/UL
EOSINOPHIL NFR BLD AUTO: 1 %
ERYTHROCYTE [SEDIMENTATION RATE] IN BLOOD BY WESTERGREN METHOD: 31 MM/HR
GLUCOSE QUALITATIVE U: NEGATIVE
GLUCOSE SERPL-MCNC: 81 MG/DL
HCT VFR BLD CALC: 38.5 %
HGB BLD-MCNC: 12.1 G/DL
IMM GRANULOCYTES NFR BLD AUTO: 0.3 %
KETONES URINE: NEGATIVE
LEUKOCYTE ESTERASE URINE: NEGATIVE
LYMPHOCYTES # BLD AUTO: 1.5 K/UL
LYMPHOCYTES NFR BLD AUTO: 16.5 %
MAN DIFF?: NORMAL
MCHC RBC-ENTMCNC: 28.8 PG
MCHC RBC-ENTMCNC: 31.4 GM/DL
MCV RBC AUTO: 91.7 FL
MONOCYTES # BLD AUTO: 0.72 K/UL
MONOCYTES NFR BLD AUTO: 7.9 %
NEUTROPHILS # BLD AUTO: 6.73 K/UL
NEUTROPHILS NFR BLD AUTO: 74.1 %
NITRITE URINE: NEGATIVE
PH URINE: 7
PLATELET # BLD AUTO: 278 K/UL
POTASSIUM SERPL-SCNC: 4.1 MMOL/L
PROT SERPL-MCNC: 6.9 G/DL
PROTEIN URINE: NEGATIVE
RBC # BLD: 4.2 M/UL
RBC # FLD: 13.6 %
SODIUM SERPL-SCNC: 141 MMOL/L
SPECIFIC GRAVITY URINE: 1.02
UROBILINOGEN URINE: NORMAL
WBC # FLD AUTO: 9.09 K/UL

## 2019-09-10 PROCEDURE — 99215 OFFICE O/P EST HI 40 MIN: CPT

## 2019-09-10 NOTE — ASSESSMENT
[FreeTextEntry1] : on methotrexate pt is obese and would like her to have us of sonogram to see if she has underlying fatty liver and fibroscan if needed since methotrexate can cause liver fibrosis .  I explained this to the pt.    Immunotherapy- She will need flu vaccine She is not feeling well today and will wait till Oct .  She has bmi 38 and we discussed obesity and risks of  various disease and worsening of her arthric issues.   risks of obesity and need for diet and eating healthy Obesity is a complex disorder involving an excessive amount of body fat. Obesity isn't just a cosmetic concern. It increases your risk of diseases and health problems, such as heart disease, diabetes and high blood pressure.\par \par Being extremely obese means you are especially likely to have health problems related to your weight.\par \par \par The good news is that even modest weight loss can improve or prevent the health problems associated with obesity. Dietary changes, increased physical activity and behavior changes can help you lose weight. Prescription medications and weight-loss surgery are additional options for treating obesity.\par \par \par \par \par Symptoms\par \par Obesity is diagnosed when your body mass index (BMI) is 30 or higher. Your body mass index is calculated by dividing your weight in kilograms (kg) by your height in meters (m) squared. \par \par \par BMI\par \par Weight status\par \par \par Below 18.5 Underweight \par 18.5-24.9 Normal \par 25.0-29.9 Overweight \par 30.0-34.9 Obese (Class I) \par 35.0-39.9 Obese (Class II) \par 40.0 and higher Extreme obesity (Class III) \par \par For most people, BMI provides a reasonable estimate of body fat. However, BMI doesn't directly measure body fat, so some people, such as muscular athletes, may have a BMI in the obese category even though they don't have excess body fat. Ask your doctor if your BMI is a problem. \par \par When to see a doctor\par \par If you think you may be obese, and especially if you're concerned about weight-related health problems, see your doctor or health care provider. You and your provider can evaluate your health risks and discuss your weight-loss options. \par \par Request an Appointment at [facility]\par \par Causes\par \par Although there are genetic, behavioral and hormonal influences on body weight, obesity occurs when you take in more calories than you burn through exercise and normal daily activities. Your body stores these excess calories as fat.\par \par Obesity can sometimes be traced to a medical cause, such as Prader-Willi syndrome, Cushing's syndrome, and other diseases and conditions. However, these disorders are rare and, in general, the principal causes of obesity are:\par •Inactivity. If you're not very active, you don't burn as many calories. With a sedentary lifestyle, you can easily take in more calories every day than you use through exercise and normal daily activities.\par •Unhealthy diet and eating habits. Weight gain is inevitable if you regularly eat more calories than you burn. And most Americans' diets are too high in calories and are full of fast food and high-calorie beverages.\par \par Risk factors\par \par Obesity usually results from a combination of causes and contributing factors, including:\par •Genetics. Your genes may affect the amount of body fat you store, and where that fat is distributed. Genetics may also play a role in how efficiently your body converts food into energy and how your body burns calories during exercise.\par •Family lifestyle. Obesity tends to run in families. If one or both of your parents are obese, your risk of being obese is increased. That's not just because of genetics. Family members tend to share similar eating and activity habits.\par •Inactivity. If you're not very active, you don't burn as many calories. With a sedentary lifestyle, you can easily take in more calories every day than you burn through exercise and routine daily activities. Having medical problems, such as arthritis, can lead to decreased activity, which contributes to weight gain.\par •Unhealthy diet. A diet that's high in calories, lacking in fruits and vegetables, full of fast food, and laden with high-calorie beverages and oversized portions contributes to weight gain.\par •Medical problems. In some people, obesity can be traced to a medical cause, such as Prader-Willi syndrome, Cushing's syndrome and other conditions. Medical problems, such as arthritis, also can lead to decreased activity, which may result in weight gain.\par •Certain medications. Some medications can lead to weight gain if you don't compensate through diet or activity. These medications include some antidepressants, anti-seizure medications, diabetes medications, antipsychotic medications, steroids and beta blockers.\par •Social and economic issues. Research has linked social and economic factors to obesity. Avoiding obesity is difficult if you don't have safe areas to exercise. Similarly, you may not have been taught healthy ways of cooking, or you may not have money to buy healthier foods. In addition, the people you spend time with may influence your weight — you're more likely to become obese if you have obese friends or relatives.\par •Age. Obesity can occur at any age, even in young children. But as you age, hormonal changes and a less active lifestyle increase your risk of obesity. In addition, the amount of muscle in your body tends to decrease with age. This lower muscle mass leads to a decrease in metabolism. These changes also reduce calorie needs, and can make it harder to keep off excess weight. If you don't consciously control what you eat and become more physically active as you age, you'll likely gain weight.\par •Pregnancy. During pregnancy, a woman's weight necessarily increases. Some women find this weight difficult to lose after the baby is born. This weight gain may contribute to the development of obesity in women.\par •Quitting smoking. Quitting smoking is often associated with weight gain. And for some, it can lead to enough weight gain that the person becomes obese. In the long run, however, quitting smoking is still a greater benefit to your health than continuing to smoke.\par •Lack of sleep. Not getting enough sleep or getting too much sleep can cause changes in hormones that increase your appetite. You may also crave foods high in calories and carbohydrates, which can contribute to weight gain.\par \par Even if you have one or more of these risk factors, it doesn't mean that you're destined to become obese. You can counteract most risk factors through diet, physical activity and exercise, and behavior changes.\par \par Complications\par \par If you're obese, you're more likely to develop a number of potentially serious health problems, including:\par •High triglycerides and low high-density lipoprotein (HDL) cholesterol\par •Type 2 diabetes\par •High blood pressure\par •Metabolic syndrome — a combination of high blood sugar, high blood pressure, high triglycerides and low HDL cholesterol\par •Heart disease\par •Stroke\par •Cancer, including cancer of the uterus, cervix, endometrium, ovaries, breast, colon, rectum, esophagus, liver, gallbladder, pancreas, kidney and prostate\par •Breathing disorders, including sleep apnea, a potentially serious sleep disorder in which breathing repeatedly stops and starts\par •Gallbladder disease\par •Gynecological problems, such as infertility and irregular periods\par •Erectile dysfunction and sexual health issues\par •Nonalcoholic fatty liver disease, a condition in which fat builds up in the liver and can cause inflammation or scarring\par •Osteoarthritis\par \par Quality of life\par \par When you're obese, your overall quality of life may be diminished. You may not be able to do things you used to do, such as participating in enjoyable activities. You may avoid public places. Obese people may even encounter discrimination.\par \par Other weight-related issues that may affect your quality of life include:\par •Depression\par •Disability\par •Sexual problems\par •Shame and guilt\par •Social isolation\par •Lower work achievement\par \par Prevention\par \par Whether you're at risk of becoming obese, currently overweight or at a healthy weight, you can take steps to prevent unhealthy weight gain and related health problems. Not surprisingly, the steps to prevent weight gain are the same as the steps to lose weight: daily exercise, a healthy diet, and a long-term commitment to watch what you eat and drink.\par •Exercise regularly. You need to get 150 to 300 minutes of moderate-intensity activity a week to prevent weight gain. Moderately intense physical activities include fast walking and swimming.\par •Follow a healthy eating plan. Focus on low-calorie, nutrient-dense foods, such as fruits, veg\par i asked her to see nutritionist and states her insurance wont pay for it .  PE-  She will fu with pulmonary.  Cold viral but if her symptoms worsen she will call me.   Pe- pt has appt with hematologist next week.  Poly arthritis improved with Rituxan.

## 2019-09-10 NOTE — HEALTH RISK ASSESSMENT
[] : No [No] : No [No falls in past year] : Patient reported no falls in the past year [0] : 2) Feeling down, depressed, or hopeless: Not at all (0) [de-identified] : rheumatologist  ortho  [de-identified] : walking  [[id number]] : 0 [de-identified] : low fat low calorie more greens

## 2019-09-10 NOTE — PHYSICAL EXAM
[Normal Sclera/Conjunctiva] : normal sclera/conjunctiva [PERRL] : pupils equal round and reactive to light [Normal Outer Ear/Nose] : the outer ears and nose were normal in appearance [Normal TMs] : both tympanic membranes were normal [Normal Oropharynx] : the oropharynx was normal [No JVD] : no jugular venous distention [Normal Nasal Mucosa] : the nasal mucosa was normal [Supple] : supple [No Lymphadenopathy] : no lymphadenopathy [Rate ___] : at [unfilled] breaths per minute [Normal Rhythm/Effort] : normal respiratory rhythm and effort [Clear Bilaterally] : the lungs were clear to auscultation bilaterally [Normal to Percussion] : the lungs were normal to percussion [Regular Rhythm] : with a regular rhythm [Normal Rate] : normal rate  [Normal S1, S2] : normal S1 and S2 [Normal Posterior Cervical Nodes] : no posterior cervical lymphadenopathy [No CVA Tenderness] : no CVA  tenderness [Normal Anterior Cervical Nodes] : no anterior cervical lymphadenopathy [No Spinal Tenderness] : no spinal tenderness [Normal] : affect was normal and insight and judgment were intact [de-identified] : brace on left knee

## 2019-09-17 ENCOUNTER — TRANSCRIPTION ENCOUNTER (OUTPATIENT)
Age: 34
End: 2019-09-17

## 2019-09-22 ENCOUNTER — EMERGENCY (EMERGENCY)
Facility: HOSPITAL | Age: 34
LOS: 1 days | Discharge: HOME | End: 2019-09-22
Attending: EMERGENCY MEDICINE
Payer: MEDICAID

## 2019-09-22 VITALS
RESPIRATION RATE: 22 BRPM | DIASTOLIC BLOOD PRESSURE: 74 MMHG | TEMPERATURE: 98 F | OXYGEN SATURATION: 98 % | HEART RATE: 100 BPM | WEIGHT: 179.9 LBS | SYSTOLIC BLOOD PRESSURE: 111 MMHG

## 2019-09-22 VITALS — HEART RATE: 99 BPM | DIASTOLIC BLOOD PRESSURE: 75 MMHG | SYSTOLIC BLOOD PRESSURE: 117 MMHG

## 2019-09-22 DIAGNOSIS — Z98.891 HISTORY OF UTERINE SCAR FROM PREVIOUS SURGERY: Chronic | ICD-10-CM

## 2019-09-22 DIAGNOSIS — Z98.51 TUBAL LIGATION STATUS: Chronic | ICD-10-CM

## 2019-09-22 DIAGNOSIS — M72.2 PLANTAR FASCIAL FIBROMATOSIS: Chronic | ICD-10-CM

## 2019-09-22 LAB
ALBUMIN SERPL ELPH-MCNC: 3.4 G/DL — LOW (ref 3.5–5)
ALP SERPL-CCNC: 149 U/L — HIGH (ref 40–120)
ALT FLD-CCNC: 26 U/L DA — SIGNIFICANT CHANGE UP (ref 10–60)
ANION GAP SERPL CALC-SCNC: 9 MMOL/L — SIGNIFICANT CHANGE UP (ref 5–17)
APPEARANCE UR: CLEAR — SIGNIFICANT CHANGE UP
APTT BLD: 40.8 SEC — HIGH (ref 27.5–36.3)
AST SERPL-CCNC: 14 U/L — SIGNIFICANT CHANGE UP (ref 10–40)
BILIRUB SERPL-MCNC: 0.4 MG/DL — SIGNIFICANT CHANGE UP (ref 0.2–1.2)
BILIRUB UR-MCNC: NEGATIVE — SIGNIFICANT CHANGE UP
BUN SERPL-MCNC: 8 MG/DL — SIGNIFICANT CHANGE UP (ref 7–18)
CALCIUM SERPL-MCNC: 8.9 MG/DL — SIGNIFICANT CHANGE UP (ref 8.4–10.5)
CHLORIDE SERPL-SCNC: 107 MMOL/L — SIGNIFICANT CHANGE UP (ref 96–108)
CO2 SERPL-SCNC: 25 MMOL/L — SIGNIFICANT CHANGE UP (ref 22–31)
COLOR SPEC: YELLOW — SIGNIFICANT CHANGE UP
CREAT SERPL-MCNC: 0.8 MG/DL — SIGNIFICANT CHANGE UP (ref 0.5–1.3)
DIFF PNL FLD: NEGATIVE — SIGNIFICANT CHANGE UP
GLUCOSE SERPL-MCNC: 128 MG/DL — HIGH (ref 70–99)
GLUCOSE UR QL: NEGATIVE — SIGNIFICANT CHANGE UP
HCG SERPL-ACNC: <1 MIU/ML — SIGNIFICANT CHANGE UP
HCG UR QL: NEGATIVE — SIGNIFICANT CHANGE UP
HCT VFR BLD CALC: 38 % — SIGNIFICANT CHANGE UP (ref 34.5–45)
HGB BLD-MCNC: 12.2 G/DL — SIGNIFICANT CHANGE UP (ref 11.5–15.5)
INR BLD: 1.54 RATIO — HIGH (ref 0.88–1.16)
KETONES UR-MCNC: NEGATIVE — SIGNIFICANT CHANGE UP
LEUKOCYTE ESTERASE UR-ACNC: NEGATIVE — SIGNIFICANT CHANGE UP
LIDOCAIN IGE QN: 64 U/L — LOW (ref 73–393)
MAGNESIUM SERPL-MCNC: 1.9 MG/DL — SIGNIFICANT CHANGE UP (ref 1.6–2.6)
MCHC RBC-ENTMCNC: 28.6 PG — SIGNIFICANT CHANGE UP (ref 27–34)
MCHC RBC-ENTMCNC: 32.1 GM/DL — SIGNIFICANT CHANGE UP (ref 32–36)
MCV RBC AUTO: 89.2 FL — SIGNIFICANT CHANGE UP (ref 80–100)
NITRITE UR-MCNC: NEGATIVE — SIGNIFICANT CHANGE UP
NRBC # BLD: 0 /100 WBCS — SIGNIFICANT CHANGE UP (ref 0–0)
NT-PROBNP SERPL-SCNC: 39 PG/ML — SIGNIFICANT CHANGE UP (ref 0–125)
PH UR: 5 — SIGNIFICANT CHANGE UP (ref 5–8)
PLATELET # BLD AUTO: 286 K/UL — SIGNIFICANT CHANGE UP (ref 150–400)
POTASSIUM SERPL-MCNC: 3.6 MMOL/L — SIGNIFICANT CHANGE UP (ref 3.5–5.3)
POTASSIUM SERPL-SCNC: 3.6 MMOL/L — SIGNIFICANT CHANGE UP (ref 3.5–5.3)
PROT SERPL-MCNC: 7.2 G/DL — SIGNIFICANT CHANGE UP (ref 6–8.3)
PROT UR-MCNC: NEGATIVE — SIGNIFICANT CHANGE UP
PROTHROM AB SERPL-ACNC: 17.3 SEC — HIGH (ref 10–12.9)
RBC # BLD: 4.26 M/UL — SIGNIFICANT CHANGE UP (ref 3.8–5.2)
RBC # FLD: 13.9 % — SIGNIFICANT CHANGE UP (ref 10.3–14.5)
SODIUM SERPL-SCNC: 141 MMOL/L — SIGNIFICANT CHANGE UP (ref 135–145)
SP GR SPEC: 1.01 — SIGNIFICANT CHANGE UP (ref 1.01–1.02)
TROPONIN I SERPL-MCNC: <0.015 NG/ML — SIGNIFICANT CHANGE UP (ref 0–0.04)
UROBILINOGEN FLD QL: NEGATIVE — SIGNIFICANT CHANGE UP
WBC # BLD: 11.09 K/UL — HIGH (ref 3.8–10.5)
WBC # FLD AUTO: 11.09 K/UL — HIGH (ref 3.8–10.5)

## 2019-09-22 PROCEDURE — 36415 COLL VENOUS BLD VENIPUNCTURE: CPT

## 2019-09-22 PROCEDURE — 84443 ASSAY THYROID STIM HORMONE: CPT

## 2019-09-22 PROCEDURE — 71275 CT ANGIOGRAPHY CHEST: CPT

## 2019-09-22 PROCEDURE — 96374 THER/PROPH/DIAG INJ IV PUSH: CPT | Mod: XU

## 2019-09-22 PROCEDURE — 83880 ASSAY OF NATRIURETIC PEPTIDE: CPT

## 2019-09-22 PROCEDURE — 93005 ELECTROCARDIOGRAM TRACING: CPT

## 2019-09-22 PROCEDURE — 84484 ASSAY OF TROPONIN QUANT: CPT

## 2019-09-22 PROCEDURE — 81025 URINE PREGNANCY TEST: CPT

## 2019-09-22 PROCEDURE — 80053 COMPREHEN METABOLIC PANEL: CPT

## 2019-09-22 PROCEDURE — 83735 ASSAY OF MAGNESIUM: CPT

## 2019-09-22 PROCEDURE — 85610 PROTHROMBIN TIME: CPT

## 2019-09-22 PROCEDURE — 99285 EMERGENCY DEPT VISIT HI MDM: CPT

## 2019-09-22 PROCEDURE — 85027 COMPLETE CBC AUTOMATED: CPT

## 2019-09-22 PROCEDURE — 99285 EMERGENCY DEPT VISIT HI MDM: CPT | Mod: 25

## 2019-09-22 PROCEDURE — 81003 URINALYSIS AUTO W/O SCOPE: CPT

## 2019-09-22 PROCEDURE — 85730 THROMBOPLASTIN TIME PARTIAL: CPT

## 2019-09-22 PROCEDURE — 94640 AIRWAY INHALATION TREATMENT: CPT

## 2019-09-22 PROCEDURE — 71275 CT ANGIOGRAPHY CHEST: CPT | Mod: 26

## 2019-09-22 PROCEDURE — 84702 CHORIONIC GONADOTROPIN TEST: CPT

## 2019-09-22 PROCEDURE — 83690 ASSAY OF LIPASE: CPT

## 2019-09-22 RX ORDER — AZITHROMYCIN 500 MG/1
1 TABLET, FILM COATED ORAL
Qty: 6 | Refills: 0
Start: 2019-09-22

## 2019-09-22 RX ORDER — ALBUTEROL 90 UG/1
2 AEROSOL, METERED ORAL
Qty: 1 | Refills: 0
Start: 2019-09-22 | End: 2019-10-21

## 2019-09-22 RX ORDER — IPRATROPIUM/ALBUTEROL SULFATE 18-103MCG
3 AEROSOL WITH ADAPTER (GRAM) INHALATION
Refills: 0 | Status: COMPLETED | OUTPATIENT
Start: 2019-09-22 | End: 2019-09-22

## 2019-09-22 RX ADMIN — Medication 3 MILLILITER(S): at 17:30

## 2019-09-22 RX ADMIN — Medication 125 MILLIGRAM(S): at 18:25

## 2019-09-22 RX ADMIN — Medication 3 MILLILITER(S): at 16:32

## 2019-09-22 RX ADMIN — Medication 3 MILLILITER(S): at 17:00

## 2019-09-22 NOTE — ED PROVIDER NOTE - PATIENT PORTAL LINK FT
You can access the FollowMyHealth Patient Portal offered by [facility] by registering at the following website: [email or web address]. By joining [facility]’s FollowMyHealth portal, you will also be able to view your health information using other applications (apps) compatible with our system.

## 2019-09-22 NOTE — ED PROVIDER NOTE - PROGRESS NOTE DETAILS
Educated pt on results, pt aware of pulm infarct. Educated on possible bronchitis vs SE from Rituxan started recently. Educated on s/s to return sooner to ED. Answered q's.

## 2019-09-22 NOTE — ED PROVIDER NOTE - OBJECTIVE STATEMENT
33 year old female with PMHx of PE (on Eliquis), asthma, and RA (on Methotrexate) presents to the ED with c/o non-productive cough x3 weeks w/ shortness of breath and R sided pleuritic chest pain. Reports that symptoms have not been improving with nebulizers or small dose of steroids and reports being compliant with medication. Patient denies fever, leg pain or swelling, or any other acute complaints. Allergic to Penicillin (anaphylaxis).

## 2019-09-22 NOTE — ED PROVIDER NOTE - CLINICAL SUMMARY MEDICAL DECISION MAKING FREE TEXT BOX
Patient with history of PE (on Eliquis) and asthma here for cough and shortness of breath concerned for worsening PE and asthma exacerbation. Possibly PNA among other causes. Do labs, CTA chest, and give nebulizers and reassess.

## 2019-09-22 NOTE — ED ADULT NURSE NOTE - NSIMPLEMENTINTERV_GEN_ALL_ED
Implemented All Universal Safety Interventions:  [place] to call system. Call bell, personal items and telephone within reach. Instruct patient to call for assistance. Room bathroom lighting operational. Non-slip footwear when patient is off stretcher. Physically safe environment: no spills, clutter or unnecessary equipment. Stretcher in lowest position, wheels locked, appropriate side rails in place.

## 2019-09-24 ENCOUNTER — APPOINTMENT (OUTPATIENT)
Dept: PULMONOLOGY | Facility: CLINIC | Age: 34
End: 2019-09-24
Payer: MEDICAID

## 2019-09-24 VITALS
WEIGHT: 190 LBS | DIASTOLIC BLOOD PRESSURE: 67 MMHG | HEIGHT: 59 IN | OXYGEN SATURATION: 98 % | HEART RATE: 85 BPM | BODY MASS INDEX: 38.3 KG/M2 | TEMPERATURE: 97.5 F | SYSTOLIC BLOOD PRESSURE: 105 MMHG

## 2019-09-24 PROCEDURE — 99213 OFFICE O/P EST LOW 20 MIN: CPT | Mod: 25

## 2019-09-24 PROCEDURE — 94010 BREATHING CAPACITY TEST: CPT

## 2019-09-24 NOTE — ASSESSMENT
[FreeTextEntry1] : 1) Subacute cough- possibly post viral vs allergic rhinitis with upper airway cough syndrome

## 2019-09-24 NOTE — PHYSICAL EXAM
[Normal Appearance] : normal appearance [General Appearance - Well Developed] : well developed [General Appearance - Well Nourished] : well nourished [Well Groomed] : well groomed [General Appearance - In No Acute Distress] : no acute distress [No Deformities] : no deformities [Normal Conjunctiva] : the conjunctiva exhibited no abnormalities [Normal Oropharynx] : normal oropharynx [I] : I [Neck Appearance] : the appearance of the neck was normal [Jugular Venous Distention Increased] : there was no jugular-venous distention [Neck Cervical Mass (___cm)] : no neck mass was observed [Heart Sounds] : normal S1 and S2 [Heart Rate And Rhythm] : heart rate and rhythm were normal [Murmurs] : no murmurs present [Arterial Pulses Normal] : the arterial pulses were normal [Respiration, Rhythm And Depth] : normal respiratory rhythm and effort [FreeTextEntry1] : Bilateral inspiratory squeaks [Exaggerated Use Of Accessory Muscles For Inspiration] : no accessory muscle use [Bowel Sounds] : normal bowel sounds [Abdomen Soft] : soft [] : no hepato-splenomegaly [Abdomen Tenderness] : non-tender [Cyanosis, Localized] : no localized cyanosis [Abnormal Walk] : normal gait [Nail Clubbing] : no clubbing of the fingernails [Deep Tendon Reflexes (DTR)] : deep tendon reflexes were 2+ and symmetric [Cranial Nerves] : cranial nerves 2-12 were intact [Oriented To Time, Place, And Person] : oriented to person, place, and time [No Focal Deficits] : no focal deficits [Impaired Insight] : insight and judgment were intact

## 2019-09-26 ENCOUNTER — RX RENEWAL (OUTPATIENT)
Age: 34
End: 2019-09-26

## 2019-10-01 ENCOUNTER — LABORATORY RESULT (OUTPATIENT)
Age: 34
End: 2019-10-01

## 2019-10-01 ENCOUNTER — APPOINTMENT (OUTPATIENT)
Dept: INTERNAL MEDICINE | Facility: CLINIC | Age: 34
End: 2019-10-01
Payer: MEDICAID

## 2019-10-01 ENCOUNTER — FORM ENCOUNTER (OUTPATIENT)
Age: 34
End: 2019-10-01

## 2019-10-01 VITALS
HEIGHT: 59 IN | WEIGHT: 187 LBS | DIASTOLIC BLOOD PRESSURE: 71 MMHG | OXYGEN SATURATION: 98 % | BODY MASS INDEX: 37.7 KG/M2 | TEMPERATURE: 98.1 F | HEART RATE: 110 BPM | SYSTOLIC BLOOD PRESSURE: 108 MMHG

## 2019-10-01 PROBLEM — I26.99 OTHER PULMONARY EMBOLISM WITHOUT ACUTE COR PULMONALE: Chronic | Status: ACTIVE | Noted: 2019-09-22

## 2019-10-01 LAB
BASOPHILS # BLD AUTO: 0.04 K/UL
BASOPHILS NFR BLD AUTO: 0.3 %
EOSINOPHIL # BLD AUTO: 0.13 K/UL
EOSINOPHIL NFR BLD AUTO: 1.1 %
HCT VFR BLD CALC: 40.4 %
HGB BLD-MCNC: 12.7 G/DL
IMM GRANULOCYTES NFR BLD AUTO: 0.5 %
LYMPHOCYTES # BLD AUTO: 1.95 K/UL
LYMPHOCYTES NFR BLD AUTO: 16.1 %
MAN DIFF?: NORMAL
MCHC RBC-ENTMCNC: 28.6 PG
MCHC RBC-ENTMCNC: 31.4 GM/DL
MCV RBC AUTO: 91 FL
MONOCYTES # BLD AUTO: 0.79 K/UL
MONOCYTES NFR BLD AUTO: 6.5 %
NEUTROPHILS # BLD AUTO: 9.11 K/UL
NEUTROPHILS NFR BLD AUTO: 75.5 %
PLATELET # BLD AUTO: 291 K/UL
RBC # BLD: 4.44 M/UL
RBC # FLD: 14.6 %
WBC # FLD AUTO: 12.08 K/UL

## 2019-10-01 PROCEDURE — 99214 OFFICE O/P EST MOD 30 MIN: CPT

## 2019-10-01 NOTE — PHYSICAL EXAM
[Well Developed] : well developed [Hoarse] : was hoarse [Well Nourished] : well nourished [Normal Verbal Skills] : the patient had normal verbal communication skills [Normal Nonverbal Skills] : normal nonverbal communication skills were demonstrated [Normal Sclera/Conjunctiva] : normal sclera/conjunctiva [PERRL] : pupils equal round and reactive to light [Normal Oropharynx] : the oropharynx was normal [EOMI] : extraocular movements intact [No Lymphadenopathy] : no lymphadenopathy [Rate ___] : at [unfilled] breaths per minute [Normal Rhythm/Effort] : normal respiratory rhythm and effort [Normal to Percussion] : the lungs were normal to percussion [Clear Bilaterally] : the lungs were clear to auscultation bilaterally [Normal Rate] : normal rate  [Normal S1, S2] : normal S1 and S2 [Regular Rhythm] : with a regular rhythm [No Extremity Clubbing/Cyanosis] : no extremity clubbing/cyanosis [No Edema] : there was no peripheral edema [Soft, Nontender] : the abdomen was soft and nontender [No Mass] : no masses were palpated [No HSM] : no hepatosplenomegaly noted [Normal Anterior Cervical Nodes] : no anterior cervical lymphadenopathy [Normal Posterior Cervical Nodes] : no posterior cervical lymphadenopathy [No Spinal Tenderness] : no spinal tenderness [No CVA Tenderness] : no CVA  tenderness [No Joint Swelling] : no joint swelling [Grossly Normal Strength/Tone] : grossly normal strength/tone [Normal] : affect was normal and insight and judgment were intact

## 2019-10-01 NOTE — ASSESSMENT
[FreeTextEntry1] : cough Pt had ct of lungs and has seen Dr Greenfield  and continues to have cough . She states she is taking all her meds.  She likely has viral but with her immunosuppression therapy she is not clearing it .  she has not worsened and presently her lungs are clear.  She had normal spirometry.  She has not been around anyone ill and had normal spirometry . she had ct a negative of chest .   I will test her for Tb .  and allergy testing.  She will call Dr Greenfield if her coughs continues or worsens

## 2019-10-01 NOTE — HISTORY OF PRESENT ILLNESS
[de-identified] : Tp states she recently went to er for cough and worsening hoarseness of her voice.  She was given a nebulizer and Solu-Medrol IV.  She had ct of lung .  she saw Dr Greenfield  post er and had pft and was started on medication.  She doesn’t have fever or chills or body aches. She as cough dry  she has coughing fits and gets sob.  She sometimes hears wheezing.  "She states it is the same and not worsening.  she is getting infusions and has not received flu vaccine and will need to wait till she has resolved the cough.  She is still on elquis.  No sore throat.   [FreeTextEntry1] : cough

## 2019-10-02 ENCOUNTER — OUTPATIENT (OUTPATIENT)
Dept: OUTPATIENT SERVICES | Facility: HOSPITAL | Age: 34
LOS: 1 days | End: 2019-10-02
Payer: MEDICAID

## 2019-10-02 DIAGNOSIS — Z98.891 HISTORY OF UTERINE SCAR FROM PREVIOUS SURGERY: Chronic | ICD-10-CM

## 2019-10-02 DIAGNOSIS — R05 COUGH: ICD-10-CM

## 2019-10-02 DIAGNOSIS — Z98.51 TUBAL LIGATION STATUS: Chronic | ICD-10-CM

## 2019-10-02 DIAGNOSIS — M72.2 PLANTAR FASCIAL FIBROMATOSIS: Chronic | ICD-10-CM

## 2019-10-02 LAB
ALBUMIN SERPL ELPH-MCNC: 4.5 G/DL
ALP BLD-CCNC: 135 U/L
ALT SERPL-CCNC: 16 U/L
ANION GAP SERPL CALC-SCNC: 17 MMOL/L
AST SERPL-CCNC: 15 U/L
BILIRUB SERPL-MCNC: 0.4 MG/DL
BUN SERPL-MCNC: 8 MG/DL
CALCIUM SERPL-MCNC: 9.1 MG/DL
CHLORIDE SERPL-SCNC: 102 MMOL/L
CO2 SERPL-SCNC: 22 MMOL/L
CREAT SERPL-MCNC: 0.62 MG/DL
GLUCOSE SERPL-MCNC: 70 MG/DL
POTASSIUM SERPL-SCNC: 3.9 MMOL/L
PROT SERPL-MCNC: 7.1 G/DL
SODIUM SERPL-SCNC: 140 MMOL/L

## 2019-10-02 PROCEDURE — 71046 X-RAY EXAM CHEST 2 VIEWS: CPT

## 2019-10-02 PROCEDURE — 71046 X-RAY EXAM CHEST 2 VIEWS: CPT | Mod: 26

## 2019-10-04 LAB
M TB IFN-G BLD-IMP: NEGATIVE
QUANTIFERON TB PLUS MITOGEN MINUS NIL: 9.12 IU/ML
QUANTIFERON TB PLUS NIL: 0.03 IU/ML
QUANTIFERON TB PLUS TB1 MINUS NIL: -0.01 IU/ML
QUANTIFERON TB PLUS TB2 MINUS NIL: 0 IU/ML

## 2019-10-06 LAB
A ALTERNATA IGE QN: <0.1 KUA/L
A FUMIGATUS IGE QN: <0.1 KUA/L
BERMUDA GRASS IGE QN: <0.1 KUA/L
BOXELDER IGE QN: <0.1 KUA/L
C HERBARUM IGE QN: <0.1 KUA/L
CALIF WALNUT IGE QN: <0.1 KUA/L
CAT DANDER IGE QN: <0.1 KUA/L
CMN PIGWEED IGE QN: <0.1 KUA/L
COMMON RAGWEED IGE QN: <0.1 KUA/L
COTTONWOOD IGE QN: <0.1 KUA/L
D FARINAE IGE QN: 5.35 KUA/L
D PTERONYSS IGE QN: 4.2 KUA/L
DEPRECATED A ALTERNATA IGE RAST QL: 0
DEPRECATED A FUMIGATUS IGE RAST QL: 0
DEPRECATED BERMUDA GRASS IGE RAST QL: 0
DEPRECATED BOXELDER IGE RAST QL: 0
DEPRECATED C HERBARUM IGE RAST QL: 0
DEPRECATED CAT DANDER IGE RAST QL: 0
DEPRECATED COMMON PIGWEED IGE RAST QL: 0
DEPRECATED COMMON RAGWEED IGE RAST QL: 0
DEPRECATED COTTONWOOD IGE RAST QL: 0
DEPRECATED D FARINAE IGE RAST QL: 3
DEPRECATED D PTERONYSS IGE RAST QL: 3
DEPRECATED DOG DANDER IGE RAST QL: 0
DEPRECATED GOOSEFOOT IGE RAST QL: 0
DEPRECATED LONDON PLANE IGE RAST QL: 0
DEPRECATED MUGWORT IGE RAST QL: 0
DEPRECATED P NOTATUM IGE RAST QL: 0
DEPRECATED RED CEDAR IGE RAST QL: 0
DEPRECATED ROACH IGE RAST QL: NORMAL
DEPRECATED SHEEP SORREL IGE RAST QL: 0
DEPRECATED SILVER BIRCH IGE RAST QL: 0
DEPRECATED TIMOTHY IGE RAST QL: 0
DEPRECATED WHITE ASH IGE RAST QL: 0
DEPRECATED WHITE OAK IGE RAST QL: 0
DOG DANDER IGE QN: <0.1 KUA/L
GOOSEFOOT IGE QN: <0.1 KUA/L
LONDON PLANE IGE QN: <0.1 KUA/L
MUGWORT IGE QN: <0.1 KUA/L
MULBERRY (T70) CLASS: 0
MULBERRY (T70) CONC: <0.1 KUA/L
P NOTATUM IGE QN: <0.1 KUA/L
RED CEDAR IGE QN: <0.1 KUA/L
ROACH IGE QN: 0.1 KUA/L
SHEEP SORREL IGE QN: <0.1 KUA/L
SILVER BIRCH IGE QN: <0.1 KUA/L
TIMOTHY IGE QN: <0.1 KUA/L
TREE ALLERG MIX1 IGE QL: 0
WHITE ASH IGE QN: <0.1 KUA/L
WHITE ELM IGE QN: 0
WHITE ELM IGE QN: <0.1 KUA/L
WHITE OAK IGE QN: <0.1 KUA/L

## 2019-10-11 ENCOUNTER — APPOINTMENT (OUTPATIENT)
Dept: RHEUMATOLOGY | Facility: CLINIC | Age: 34
End: 2019-10-11
Payer: MEDICAID

## 2019-10-11 VITALS
BODY MASS INDEX: 37.9 KG/M2 | OXYGEN SATURATION: 99 % | WEIGHT: 188 LBS | HEIGHT: 59 IN | TEMPERATURE: 97.8 F | HEART RATE: 72 BPM | DIASTOLIC BLOOD PRESSURE: 75 MMHG | SYSTOLIC BLOOD PRESSURE: 112 MMHG | RESPIRATION RATE: 16 BRPM

## 2019-10-11 PROCEDURE — 99214 OFFICE O/P EST MOD 30 MIN: CPT

## 2019-10-11 NOTE — ASSESSMENT
[FreeTextEntry1] : 32 year-old female with pmh of ALMA ROSA, now with return of her symptoms with polyarthritis, highly positive DEBORAH\par \par \par 1. RA/UCTD with positive DEBORAH -   with ongoing disease activity - will increase methotrexate to 6 tablets weekly and after labs may increase to 8 tablets - flare resolved and her RA was stable - until 2 days ago when she developed a bilateral leg red tender rash over bilateral legs - this is the second episode - had a previous one before her surgery -  s/p rituxan infusion - no rash today - no joint pain - feels well - decrease methotrexate to 6 tablets\par 2. Iron deficiency - stable levels - continue with oral replacement\par 3. right side lumbar pain - x-rays with DDD - worsening now with bilateral sciatica pain -  taking ibuprofen as needed - referral to PT\par 4. chronic pain -using CBD oil\par 5. muscle cramps over the right leg - start magnesium at bed time\par 6. PE - likely related to tubal ligation (5 days prior) - on Eliquis for the next 6 months - heme work-up has been negative for APS or other coagulation disorders\par 7. Knee patella chondromalacia - on PT\par 8. Thrush - pending ENT visit today - Nystatin swish ad swallow send to pharmacy\par \par \par I reviewed previous labs results with patients.\par Diagnosis and Prognosis discussed\par Continue with current medications\par education provided on thrush\par F/u  2 months

## 2019-10-11 NOTE — HISTORY OF PRESENT ILLNESS
[___ Month(s) Ago] : [unfilled] month(s) ago [FreeTextEntry1] : white tongue - over 2 month with hoarse voice \par seen by PCP with elevated WBC at 12 - she is pending ENT today\par no joint pain feels well with no complains

## 2019-10-11 NOTE — PHYSICAL EXAM
[General Appearance - Alert] : alert [General Appearance - In No Acute Distress] : in no acute distress [Oropharynx] : the oropharynx was normal [Outer Ear] : the ears and nose were normal in appearance [Jugular Venous Distention Increased] : there was no jugular-venous distention [Neck Cervical Mass (___cm)] : no neck mass was observed [Neck Appearance] : the appearance of the neck was normal [Thyroid Diffuse Enlargement] : the thyroid was not enlarged [Thyroid Nodule] : there were no palpable thyroid nodules [Auscultation Breath Sounds / Voice Sounds] : lungs were clear to auscultation bilaterally [Heart Rate And Rhythm] : heart rate was normal and rhythm regular [Heart Sounds] : normal S1 and S2 [Heart Sounds Gallop] : no gallops [Heart Sounds Pericardial Friction Rub] : no pericardial rub [Murmurs] : no murmurs [Full Pulse] : the pedal pulses are present [Edema] : there was no peripheral edema [Abdomen Soft] : soft [Abdomen Tenderness] : non-tender [Bowel Sounds] : normal bowel sounds [Abdomen Mass (___ Cm)] : no abdominal mass palpated [Musculoskeletal - Swelling] : no joint swelling seen [Abnormal Walk] : normal gait [Nail Clubbing] : no clubbing  or cyanosis of the fingernails [Motor Tone] : muscle strength and tone were normal [FreeTextEntry1] : tender/swollen right wrist [Skin Color & Pigmentation] : normal skin color and pigmentation [] : no rash [Skin Turgor] : normal skin turgor [Impaired Insight] : insight and judgment were intact [Oriented To Time, Place, And Person] : oriented to person, place, and time [Affect] : the affect was normal

## 2019-11-05 NOTE — ED PROVIDER NOTE - NS_EDPROVIDERDISPOUSERTYPE_ED_A_ED
Scribe Attestation (For Scribes USE Only)... Scribe Attestation (For Scribes USE Only).../Attending Attestation (For Attendings USE Only)... [id number] Exp Problem Focused - Mod. Complex

## 2019-11-22 ENCOUNTER — TRANSCRIPTION ENCOUNTER (OUTPATIENT)
Age: 34
End: 2019-11-22

## 2019-12-13 ENCOUNTER — APPOINTMENT (OUTPATIENT)
Dept: RHEUMATOLOGY | Facility: CLINIC | Age: 34
End: 2019-12-13
Payer: MEDICAID

## 2019-12-13 ENCOUNTER — LABORATORY RESULT (OUTPATIENT)
Age: 34
End: 2019-12-13

## 2019-12-13 VITALS
OXYGEN SATURATION: 100 % | BODY MASS INDEX: 37.5 KG/M2 | DIASTOLIC BLOOD PRESSURE: 66 MMHG | HEIGHT: 59 IN | SYSTOLIC BLOOD PRESSURE: 113 MMHG | TEMPERATURE: 99.1 F | HEART RATE: 117 BPM | WEIGHT: 186 LBS | RESPIRATION RATE: 16 BRPM

## 2019-12-13 PROCEDURE — 99214 OFFICE O/P EST MOD 30 MIN: CPT

## 2019-12-13 NOTE — ASSESSMENT
[FreeTextEntry1] : 32 year-old female with pmh of ALMA ROSA, now with return of her symptoms with polyarthritis, highly positive DEBORAH\par \par \par 1. RA  with vasculitis - methotrexate 5 tablets -  s/p rituxan infusion - no flares since the last visit - doing well - exercising daily - next infusion in 02/2020\par 2. Iron deficiency - stable levels - continue with oral replacement\par 3. right side lumbar pain -not acitve at this time. \par 4. chronic pain -using CBD oil\par 5. muscle cramps over the right leg - start magnesium at bed time\par 6. PE - likely related to tubal ligation (5 days prior) - still on eliquis 2.5 - until march\par \par \par I reviewed previous labs results with patients.\par Laboratory tests ordered today\par Diagnosis and Prognosis discussed\par Continue with current medications\par education provided on pre-operative directives\par F/u   after next Rituxan infusion in 02/20202

## 2019-12-13 NOTE — PHYSICAL EXAM
[General Appearance - Alert] : alert [General Appearance - In No Acute Distress] : in no acute distress [Oropharynx] : the oropharynx was normal [Outer Ear] : the ears and nose were normal in appearance [Neck Appearance] : the appearance of the neck was normal [Neck Cervical Mass (___cm)] : no neck mass was observed [Jugular Venous Distention Increased] : there was no jugular-venous distention [Thyroid Diffuse Enlargement] : the thyroid was not enlarged [Thyroid Nodule] : there were no palpable thyroid nodules [Auscultation Breath Sounds / Voice Sounds] : lungs were clear to auscultation bilaterally [Heart Rate And Rhythm] : heart rate was normal and rhythm regular [Heart Sounds] : normal S1 and S2 [Heart Sounds Gallop] : no gallops [Heart Sounds Pericardial Friction Rub] : no pericardial rub [Murmurs] : no murmurs [Full Pulse] : the pedal pulses are present [Bowel Sounds] : normal bowel sounds [Edema] : there was no peripheral edema [Abdomen Soft] : soft [Abdomen Tenderness] : non-tender [Abdomen Mass (___ Cm)] : no abdominal mass palpated [Abnormal Walk] : normal gait [Nail Clubbing] : no clubbing  or cyanosis of the fingernails [Musculoskeletal - Swelling] : no joint swelling seen [Motor Tone] : muscle strength and tone were normal [Skin Color & Pigmentation] : normal skin color and pigmentation [] : no rash [Skin Turgor] : normal skin turgor [Oriented To Time, Place, And Person] : oriented to person, place, and time [Impaired Insight] : insight and judgment were intact [Affect] : the affect was normal [FreeTextEntry1] : CVA tenderness - over the right lower lumbar area - sciatica bilaterally [Involuntary Movements] : no involuntary movements were seen

## 2019-12-13 NOTE — HISTORY OF PRESENT ILLNESS
[___ Month(s) Ago] : [unfilled] month(s) ago [FreeTextEntry1] : no flares since the last visit\par exercising daily at home for at least 2 hours\par next infusion in 02/2020\par No rashes\par feels well\par \par Flu shot done 2019

## 2019-12-15 LAB
BASOPHILS # BLD AUTO: 0.07 K/UL
BASOPHILS NFR BLD AUTO: 0.9 %
CRP SERPL-MCNC: 0.77 MG/DL
DEPRECATED KAPPA LC FREE/LAMBDA SER: 1.16 RATIO
EOSINOPHIL # BLD AUTO: 0.07 K/UL
EOSINOPHIL NFR BLD AUTO: 0.9 %
ERYTHROCYTE [SEDIMENTATION RATE] IN BLOOD BY WESTERGREN METHOD: 56 MM/HR
HBV CORE IGG+IGM SER QL: NONREACTIVE
HBV SURFACE AB SER QL: REACTIVE
HBV SURFACE AG SER QL: NONREACTIVE
HCT VFR BLD CALC: 39.1 %
HCV AB SER QL: NONREACTIVE
HCV S/CO RATIO: 0.14 S/CO
HGB BLD-MCNC: 11.9 G/DL
IGA SER QL IEP: 213 MG/DL
IGG SER QL IEP: 1029 MG/DL
IGM SER QL IEP: 58 MG/DL
KAPPA LC CSF-MCNC: 1.65 MG/DL
KAPPA LC SERPL-MCNC: 1.92 MG/DL
LYMPHOCYTES # BLD AUTO: 1.32 K/UL
LYMPHOCYTES NFR BLD AUTO: 17.2 %
MAN DIFF?: NORMAL
MCHC RBC-ENTMCNC: 27.4 PG
MCHC RBC-ENTMCNC: 30.4 GM/DL
MCV RBC AUTO: 90.1 FL
MONOCYTES # BLD AUTO: 0.6 K/UL
MONOCYTES NFR BLD AUTO: 7.8 %
NEUTROPHILS # BLD AUTO: 5.36 K/UL
NEUTROPHILS NFR BLD AUTO: 69.8 %
PLATELET # BLD AUTO: 242 K/UL
RBC # BLD: 4.34 M/UL
RBC # FLD: 15.1 %
WBC # FLD AUTO: 7.68 K/UL

## 2020-01-02 ENCOUNTER — APPOINTMENT (OUTPATIENT)
Dept: INTERNAL MEDICINE | Facility: CLINIC | Age: 35
End: 2020-01-02
Payer: MEDICAID

## 2020-01-02 VITALS
WEIGHT: 179 LBS | TEMPERATURE: 98 F | DIASTOLIC BLOOD PRESSURE: 78 MMHG | HEIGHT: 59 IN | BODY MASS INDEX: 36.08 KG/M2 | SYSTOLIC BLOOD PRESSURE: 114 MMHG | HEART RATE: 93 BPM | OXYGEN SATURATION: 98 %

## 2020-01-02 DIAGNOSIS — J00 ACUTE NASOPHARYNGITIS [COMMON COLD]: ICD-10-CM

## 2020-01-02 DIAGNOSIS — M72.2 PLANTAR FASCIAL FIBROMATOSIS: ICD-10-CM

## 2020-01-02 DIAGNOSIS — B36.9 SUPERFICIAL MYCOSIS, UNSPECIFIED: ICD-10-CM

## 2020-01-02 DIAGNOSIS — J45.901 UNSPECIFIED ASTHMA WITH (ACUTE) EXACERBATION: ICD-10-CM

## 2020-01-02 DIAGNOSIS — Z87.898 PERSONAL HISTORY OF OTHER SPECIFIED CONDITIONS: ICD-10-CM

## 2020-01-02 DIAGNOSIS — Z71.89 OTHER SPECIFIED COUNSELING: ICD-10-CM

## 2020-01-02 DIAGNOSIS — Z86.2 PERSONAL HISTORY OF DISEASES OF THE BLOOD AND BLOOD-FORMING ORGANS AND CERTAIN DISORDERS INVOLVING THE IMMUNE MECHANISM: ICD-10-CM

## 2020-01-02 DIAGNOSIS — M22.2X1 PATELLOFEMORAL DISORDERS, RIGHT KNEE: ICD-10-CM

## 2020-01-02 DIAGNOSIS — M22.2X2 PATELLOFEMORAL DISORDERS, LEFT KNEE: ICD-10-CM

## 2020-01-02 DIAGNOSIS — M54.31 SCIATICA, RIGHT SIDE: ICD-10-CM

## 2020-01-02 DIAGNOSIS — M54.5 LOW BACK PAIN: ICD-10-CM

## 2020-01-02 DIAGNOSIS — Z86.39 PERSONAL HISTORY OF OTHER ENDOCRINE, NUTRITIONAL AND METABOLIC DISEASE: ICD-10-CM

## 2020-01-02 DIAGNOSIS — B37.0 CANDIDAL STOMATITIS: ICD-10-CM

## 2020-01-02 PROCEDURE — 99214 OFFICE O/P EST MOD 30 MIN: CPT

## 2020-01-02 NOTE — ASSESSMENT
[FreeTextEntry1] : bmi 35 Pt has started an exercise program and has lost 7 lbs . She has decreased her calorie intake , drinks more water, doesn’t eat beyond 7 pm.  She is eating healthier and more salads less meat.   RA she has improved joint pain on medication and is seeing rheumatologist regularly.  She no longer has anemia.  asthma has improved .  her knee pain has improved  She still has foot discomfort but has improved as well.   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

## 2020-01-02 NOTE — HEALTH RISK ASSESSMENT
[] : No [No] : No [No falls in past year] : Patient reported no falls in the past year [0] : 2) Feeling down, depressed, or hopeless: Not at all (0) [de-identified] : rheumatologist , hematologist  [de-identified] : regularly  [de-identified] : healthy  [[id number]] : 0

## 2020-01-02 NOTE — COUNSELING
[Sleep ___ hours/day] : Sleep [unfilled] hours/day [Engage in a relaxing activity] : Engage in a relaxing activity [Plan in advance] : Plan in advance [Potential consequences of obesity discussed] : Potential consequences of obesity discussed [Benefits of weight loss discussed] : Benefits of weight loss discussed [Encouraged to maintain food diary] : Encouraged to maintain food diary [Encouraged to increase physical activity] : Encouraged to increase physical activity [Encouraged to use exercise tracking device] : Encouraged to use exercise tracking device [Decrease Portions] : decrease portions [Weigh Self Weekly] : weigh self weekly [____ min/wk Activity] : [unfilled] min/wk activity [Keep Food Diary] : keep food diary [Good understanding] : Patient has a good understanding of lifestyle changes and steps needed to achieve self management goal

## 2020-01-02 NOTE — HISTORY OF PRESENT ILLNESS
[FreeTextEntry1] : RA, anemia   [de-identified] : Pt is feeling well and has no complaints She has seen foot doctor for her plantar fascitis and is seeing podiatrist and has improved.  \par She has seen rheumatologist regularly and has received her injection and is on methotrexate titrated to 5 tabs .  She has seen hematologist and will continue on elquis till March and has been decreased .  She doesn’t have knee pain or back pain .  She doesn’t have sob or wheezing or calf pain.  She has been working out and has lost 7 lbs since Dec

## 2020-01-07 ENCOUNTER — TRANSCRIPTION ENCOUNTER (OUTPATIENT)
Age: 35
End: 2020-01-07

## 2020-01-07 ENCOUNTER — RX RENEWAL (OUTPATIENT)
Age: 35
End: 2020-01-07

## 2020-01-14 ENCOUNTER — TRANSCRIPTION ENCOUNTER (OUTPATIENT)
Age: 35
End: 2020-01-14

## 2020-02-01 ENCOUNTER — TRANSCRIPTION ENCOUNTER (OUTPATIENT)
Age: 35
End: 2020-02-01

## 2020-02-10 ENCOUNTER — APPOINTMENT (OUTPATIENT)
Dept: CARDIOLOGY | Facility: CLINIC | Age: 35
End: 2020-02-10
Payer: MEDICAID

## 2020-02-10 VITALS
OXYGEN SATURATION: 98 % | WEIGHT: 180 LBS | HEART RATE: 94 BPM | TEMPERATURE: 98 F | HEIGHT: 59 IN | BODY MASS INDEX: 36.29 KG/M2 | SYSTOLIC BLOOD PRESSURE: 113 MMHG | DIASTOLIC BLOOD PRESSURE: 71 MMHG

## 2020-02-10 PROCEDURE — 93000 ELECTROCARDIOGRAM COMPLETE: CPT

## 2020-02-10 PROCEDURE — 99205 OFFICE O/P NEW HI 60 MIN: CPT

## 2020-02-26 ENCOUNTER — OUTPATIENT (OUTPATIENT)
Dept: OUTPATIENT SERVICES | Facility: HOSPITAL | Age: 35
LOS: 1 days | End: 2020-02-26
Payer: MEDICAID

## 2020-02-26 DIAGNOSIS — Z98.891 HISTORY OF UTERINE SCAR FROM PREVIOUS SURGERY: Chronic | ICD-10-CM

## 2020-02-26 DIAGNOSIS — R00.2 PALPITATIONS: ICD-10-CM

## 2020-02-26 DIAGNOSIS — Z98.51 TUBAL LIGATION STATUS: Chronic | ICD-10-CM

## 2020-02-26 DIAGNOSIS — M72.2 PLANTAR FASCIAL FIBROMATOSIS: Chronic | ICD-10-CM

## 2020-02-26 PROCEDURE — 93306 TTE W/DOPPLER COMPLETE: CPT

## 2020-02-26 PROCEDURE — 93306 TTE W/DOPPLER COMPLETE: CPT | Mod: 26

## 2020-03-06 ENCOUNTER — TRANSCRIPTION ENCOUNTER (OUTPATIENT)
Age: 35
End: 2020-03-06

## 2020-03-13 ENCOUNTER — APPOINTMENT (OUTPATIENT)
Dept: RHEUMATOLOGY | Facility: CLINIC | Age: 35
End: 2020-03-13
Payer: MEDICAID

## 2020-03-13 VITALS
WEIGHT: 181 LBS | BODY MASS INDEX: 36.49 KG/M2 | HEART RATE: 66 BPM | DIASTOLIC BLOOD PRESSURE: 76 MMHG | OXYGEN SATURATION: 99 % | RESPIRATION RATE: 16 BRPM | TEMPERATURE: 98.5 F | SYSTOLIC BLOOD PRESSURE: 113 MMHG | HEIGHT: 59 IN

## 2020-03-13 PROCEDURE — 99213 OFFICE O/P EST LOW 20 MIN: CPT

## 2020-03-13 NOTE — HISTORY OF PRESENT ILLNESS
[___ Month(s) Ago] : [unfilled] month(s) ago [FreeTextEntry1] : s/p 2nd cycle of rituxan - with polyarthralgia after the first dose, but no reaction after the second\par feels well with no complains\par no stiffness - \par still on methotrexate 12.5 mg weekly - no side effects

## 2020-03-13 NOTE — PHYSICAL EXAM
[General Appearance - Alert] : alert [General Appearance - In No Acute Distress] : in no acute distress [Outer Ear] : the ears and nose were normal in appearance [Oropharynx] : the oropharynx was normal [Neck Appearance] : the appearance of the neck was normal [Neck Cervical Mass (___cm)] : no neck mass was observed [Jugular Venous Distention Increased] : there was no jugular-venous distention [Thyroid Diffuse Enlargement] : the thyroid was not enlarged [Thyroid Nodule] : there were no palpable thyroid nodules [Auscultation Breath Sounds / Voice Sounds] : lungs were clear to auscultation bilaterally [Heart Rate And Rhythm] : heart rate was normal and rhythm regular [Heart Sounds] : normal S1 and S2 [Heart Sounds Gallop] : no gallops [Murmurs] : no murmurs [Heart Sounds Pericardial Friction Rub] : no pericardial rub [Full Pulse] : the pedal pulses are present [Edema] : there was no peripheral edema [Bowel Sounds] : normal bowel sounds [Abdomen Soft] : soft [Abdomen Tenderness] : non-tender [Abdomen Mass (___ Cm)] : no abdominal mass palpated [Cervical Lymph Nodes Enlarged Posterior Bilaterally] : posterior cervical [Cervical Lymph Nodes Enlarged Anterior Bilaterally] : anterior cervical [Supraclavicular Lymph Nodes Enlarged Bilaterally] : supraclavicular [No CVA Tenderness] : no ~M costovertebral angle tenderness [No Spinal Tenderness] : no spinal tenderness [Abnormal Walk] : normal gait [Nail Clubbing] : no clubbing  or cyanosis of the fingernails [Involuntary Movements] : no involuntary movements were seen [Musculoskeletal - Swelling] : no joint swelling seen [Motor Tone] : muscle strength and tone were normal [Skin Color & Pigmentation] : normal skin color and pigmentation [Skin Turgor] : normal skin turgor [] : no rash [Oriented To Time, Place, And Person] : oriented to person, place, and time [Impaired Insight] : insight and judgment were intact [Affect] : the affect was normal

## 2020-03-13 NOTE — ASSESSMENT
[FreeTextEntry1] : 32 year-old female with pmh of ALMA ROSA, now with return of her symptoms with polyarthritis, highly positive DEBORAH\par \par \par 1. RA  with vasculitis - methotrexate 5 tablets -  s/p  2 cycles of Rituxan - doing well with no flares -  - next cycle in September if needed\par 2. Iron deficiency - stable levels - continue with oral replacement\par 3. right side lumbar pain -not acitve at this time. \par 4. chronic pain -using CBD oil\par 5. muscle cramps over the right leg - not active\par 6. PE - likely related to tubal ligation (5 days prior) -  still on eliquis\par \par \par I reviewed previous labs results with patients.\par Laboratory tests ordered today\par Diagnosis and Prognosis discussed\par Continue with current medications\par education provided on pre-operative directives\par F/u   after next Rituxan infusion in 02/20202

## 2020-03-16 ENCOUNTER — APPOINTMENT (OUTPATIENT)
Dept: CARDIOLOGY | Facility: CLINIC | Age: 35
End: 2020-03-16
Payer: MEDICAID

## 2020-03-16 VITALS
WEIGHT: 181 LBS | BODY MASS INDEX: 36.49 KG/M2 | HEIGHT: 59 IN | SYSTOLIC BLOOD PRESSURE: 117 MMHG | HEART RATE: 74 BPM | DIASTOLIC BLOOD PRESSURE: 76 MMHG

## 2020-03-16 LAB
ALBUMIN SERPL ELPH-MCNC: 4.5 G/DL
ALP BLD-CCNC: 147 U/L
ALT SERPL-CCNC: 12 U/L
ANION GAP SERPL CALC-SCNC: 11 MMOL/L
APPEARANCE: CLEAR
AST SERPL-CCNC: 12 U/L
BASOPHILS # BLD AUTO: 0.03 K/UL
BASOPHILS NFR BLD AUTO: 0.4 %
BILIRUB SERPL-MCNC: 0.5 MG/DL
BILIRUBIN URINE: NEGATIVE
BLOOD URINE: NEGATIVE
BUN SERPL-MCNC: 7 MG/DL
CALCIUM SERPL-MCNC: 9.5 MG/DL
CHLORIDE SERPL-SCNC: 103 MMOL/L
CO2 SERPL-SCNC: 25 MMOL/L
COLOR: NORMAL
CREAT SERPL-MCNC: 0.7 MG/DL
CRP SERPL-MCNC: 1.28 MG/DL
DEPRECATED KAPPA LC FREE/LAMBDA SER: 1.13 RATIO
EOSINOPHIL # BLD AUTO: 0.02 K/UL
EOSINOPHIL NFR BLD AUTO: 0.3 %
ERYTHROCYTE [SEDIMENTATION RATE] IN BLOOD BY WESTERGREN METHOD: 36 MM/HR
FOLATE SERPL-MCNC: 18.3 NG/ML
GLUCOSE QUALITATIVE U: NEGATIVE
GLUCOSE SERPL-MCNC: 85 MG/DL
HCT VFR BLD CALC: 41.2 %
HGB BLD-MCNC: 12.6 G/DL
IGA SER QL IEP: 225 MG/DL
IGG SER QL IEP: 1165 MG/DL
IGM SER QL IEP: 68 MG/DL
IMM GRANULOCYTES NFR BLD AUTO: 0.1 %
IRON SATN MFR SERPL: 16 %
IRON SERPL-MCNC: 62 UG/DL
KAPPA LC CSF-MCNC: 1.43 MG/DL
KAPPA LC SERPL-MCNC: 1.61 MG/DL
KETONES URINE: NEGATIVE
LEUKOCYTE ESTERASE URINE: NEGATIVE
LYMPHOCYTES # BLD AUTO: 1.23 K/UL
LYMPHOCYTES NFR BLD AUTO: 17.6 %
MAN DIFF?: NORMAL
MCHC RBC-ENTMCNC: 27.4 PG
MCHC RBC-ENTMCNC: 30.6 GM/DL
MCV RBC AUTO: 89.6 FL
MONOCYTES # BLD AUTO: 0.43 K/UL
MONOCYTES NFR BLD AUTO: 6.2 %
NEUTROPHILS # BLD AUTO: 5.26 K/UL
NEUTROPHILS NFR BLD AUTO: 75.4 %
NITRITE URINE: NEGATIVE
PH URINE: 6
PLATELET # BLD AUTO: 237 K/UL
POTASSIUM SERPL-SCNC: 4.4 MMOL/L
PROT SERPL-MCNC: 7.1 G/DL
PROTEIN URINE: NEGATIVE
RBC # BLD: 4.6 M/UL
RBC # FLD: 15.6 %
SODIUM SERPL-SCNC: 140 MMOL/L
SPECIFIC GRAVITY URINE: 1.02
TIBC SERPL-MCNC: 398 UG/DL
TSH SERPL-ACNC: 1.45 UIU/ML
UIBC SERPL-MCNC: 336 UG/DL
UROBILINOGEN URINE: NORMAL
VIT B12 SERPL-MCNC: 408 PG/ML
WBC # FLD AUTO: 6.98 K/UL

## 2020-03-16 PROCEDURE — 93224 XTRNL ECG REC UP TO 48 HRS: CPT

## 2020-03-20 ENCOUNTER — TRANSCRIPTION ENCOUNTER (OUTPATIENT)
Age: 35
End: 2020-03-20

## 2020-03-20 DIAGNOSIS — F41.9 ANXIETY DISORDER, UNSPECIFIED: ICD-10-CM

## 2020-04-03 ENCOUNTER — APPOINTMENT (OUTPATIENT)
Dept: ENDOCRINOLOGY | Facility: CLINIC | Age: 35
End: 2020-04-03

## 2020-05-08 ENCOUNTER — TRANSCRIPTION ENCOUNTER (OUTPATIENT)
Age: 35
End: 2020-05-08

## 2020-05-18 ENCOUNTER — RX RENEWAL (OUTPATIENT)
Age: 35
End: 2020-05-18

## 2020-05-24 ENCOUNTER — TRANSCRIPTION ENCOUNTER (OUTPATIENT)
Age: 35
End: 2020-05-24

## 2020-05-26 ENCOUNTER — TRANSCRIPTION ENCOUNTER (OUTPATIENT)
Age: 35
End: 2020-05-26

## 2020-06-10 ENCOUNTER — APPOINTMENT (OUTPATIENT)
Dept: ENDOCRINOLOGY | Facility: CLINIC | Age: 35
End: 2020-06-10

## 2020-06-16 ENCOUNTER — APPOINTMENT (OUTPATIENT)
Dept: OBGYN | Facility: CLINIC | Age: 35
End: 2020-06-16

## 2020-06-26 ENCOUNTER — APPOINTMENT (OUTPATIENT)
Dept: RHEUMATOLOGY | Facility: CLINIC | Age: 35
End: 2020-06-26
Payer: MEDICAID

## 2020-06-26 VITALS
DIASTOLIC BLOOD PRESSURE: 79 MMHG | RESPIRATION RATE: 16 BRPM | OXYGEN SATURATION: 97 % | HEIGHT: 59 IN | HEART RATE: 82 BPM | SYSTOLIC BLOOD PRESSURE: 134 MMHG | WEIGHT: 196 LBS | TEMPERATURE: 98.2 F | BODY MASS INDEX: 39.51 KG/M2

## 2020-06-26 LAB
BASOPHILS # BLD AUTO: 0.04 K/UL
BASOPHILS NFR BLD AUTO: 0.5 %
EOSINOPHIL # BLD AUTO: 0.06 K/UL
EOSINOPHIL NFR BLD AUTO: 0.7 %
HCT VFR BLD CALC: 37.9 %
HGB BLD-MCNC: 12 G/DL
IMM GRANULOCYTES NFR BLD AUTO: 0.2 %
LYMPHOCYTES # BLD AUTO: 1.42 K/UL
LYMPHOCYTES NFR BLD AUTO: 17 %
MAN DIFF?: NORMAL
MCHC RBC-ENTMCNC: 28.2 PG
MCHC RBC-ENTMCNC: 31.7 GM/DL
MCV RBC AUTO: 89.2 FL
MONOCYTES # BLD AUTO: 0.51 K/UL
MONOCYTES NFR BLD AUTO: 6.1 %
NEUTROPHILS # BLD AUTO: 6.29 K/UL
NEUTROPHILS NFR BLD AUTO: 75.5 %
PLATELET # BLD AUTO: 288 K/UL
RBC # BLD: 4.25 M/UL
RBC # FLD: 14.1 %
TSH SERPL-ACNC: 1.7 UIU/ML
WBC # FLD AUTO: 8.34 K/UL

## 2020-06-26 PROCEDURE — 99213 OFFICE O/P EST LOW 20 MIN: CPT

## 2020-06-26 RX ORDER — NYSTATIN 100000 [USP'U]/ML
100000 SUSPENSION ORAL 3 TIMES DAILY
Qty: 1 | Refills: 3 | Status: DISCONTINUED | COMMUNITY
Start: 2019-10-11 | End: 2020-06-26

## 2020-06-26 NOTE — HISTORY OF PRESENT ILLNESS
[___ Month(s) Ago] : [unfilled] month(s) ago [FreeTextEntry1] : doing well -\par right knee pain X 2 days\par next Rituxan infusion in 09/2020\par still on eliquis - hx of stroke father X 2  and brother - with heart attack - she is followed by hematology - plan is to stay on Eliquis

## 2020-06-26 NOTE — PHYSICAL EXAM
[General Appearance - Alert] : alert [General Appearance - In No Acute Distress] : in no acute distress [Outer Ear] : the ears and nose were normal in appearance [Oropharynx] : the oropharynx was normal [Neck Appearance] : the appearance of the neck was normal [Neck Cervical Mass (___cm)] : no neck mass was observed [Jugular Venous Distention Increased] : there was no jugular-venous distention [Thyroid Diffuse Enlargement] : the thyroid was not enlarged [Thyroid Nodule] : there were no palpable thyroid nodules [Auscultation Breath Sounds / Voice Sounds] : lungs were clear to auscultation bilaterally [Heart Rate And Rhythm] : heart rate was normal and rhythm regular [Heart Sounds] : normal S1 and S2 [Heart Sounds Gallop] : no gallops [Murmurs] : no murmurs [Heart Sounds Pericardial Friction Rub] : no pericardial rub [Full Pulse] : the pedal pulses are present [Edema] : there was no peripheral edema [Bowel Sounds] : normal bowel sounds [Abdomen Soft] : soft [Abdomen Tenderness] : non-tender [Abdomen Mass (___ Cm)] : no abdominal mass palpated [Cervical Lymph Nodes Enlarged Posterior Bilaterally] : posterior cervical [Cervical Lymph Nodes Enlarged Anterior Bilaterally] : anterior cervical [Supraclavicular Lymph Nodes Enlarged Bilaterally] : supraclavicular [No CVA Tenderness] : no ~M costovertebral angle tenderness [No Spinal Tenderness] : no spinal tenderness [Abnormal Walk] : normal gait [Nail Clubbing] : no clubbing  or cyanosis of the fingernails [Involuntary Movements] : no involuntary movements were seen [Musculoskeletal - Swelling] : no joint swelling seen [Motor Tone] : muscle strength and tone were normal [Skin Turgor] : normal skin turgor [Skin Color & Pigmentation] : normal skin color and pigmentation [] : no rash [Oriented To Time, Place, And Person] : oriented to person, place, and time [Impaired Insight] : insight and judgment were intact [Affect] : the affect was normal [FreeTextEntry1] : right knee pain - mild effusion tenderness to palpation

## 2020-06-26 NOTE — ASSESSMENT
[FreeTextEntry1] : 32 year-old female with pmh of ALMA ROSA, now with return of her symptoms with polyarthritis, highly positive DEBORAH\par \par \par 1. RA  with vasculitis - methotrexate 5 tablets -  s/p  2 cycles of Rituxan - doing well with no flares -  - next cycle in September - continue with current regimen - check inflammatory markers for activity\par 2. Iron deficiency - stable levels - continue with oral replacement\par 3. right side lumbar pain -not active at this time. \par 4. chronic pain -using CBD oil\par 5. muscle cramps over the right leg - not active\par 6. PE - likely related to tubal ligation (5 days prior) -  still on eliquis - plan is to continue indefinitely given hx of clotting in her family\par \par \par I reviewed previous labs results with patients.\par Laboratory tests ordered today\par Diagnosis and Prognosis discussed\par Continue with current medications\par education provided on clotting risk\par F/u  before next Rituxan in august

## 2020-06-28 LAB
ALBUMIN SERPL ELPH-MCNC: 4.3 G/DL
ALP BLD-CCNC: 147 U/L
ALT SERPL-CCNC: 14 U/L
ANION GAP SERPL CALC-SCNC: 14 MMOL/L
AST SERPL-CCNC: 11 U/L
BILIRUB SERPL-MCNC: 0.3 MG/DL
BUN SERPL-MCNC: 13 MG/DL
CALCIUM SERPL-MCNC: 9.4 MG/DL
CHLORIDE SERPL-SCNC: 104 MMOL/L
CO2 SERPL-SCNC: 23 MMOL/L
CREAT SERPL-MCNC: 0.64 MG/DL
CRP SERPL-MCNC: 1.05 MG/DL
ERYTHROCYTE [SEDIMENTATION RATE] IN BLOOD BY WESTERGREN METHOD: 43 MM/HR
GLUCOSE SERPL-MCNC: 94 MG/DL
IRON SATN MFR SERPL: 9 %
IRON SERPL-MCNC: 33 UG/DL
POTASSIUM SERPL-SCNC: 4.2 MMOL/L
PROT SERPL-MCNC: 6.6 G/DL
SODIUM SERPL-SCNC: 141 MMOL/L
TIBC SERPL-MCNC: 363 UG/DL
UIBC SERPL-MCNC: 330 UG/DL

## 2020-07-29 ENCOUNTER — APPOINTMENT (OUTPATIENT)
Dept: INTERNAL MEDICINE | Facility: CLINIC | Age: 35
End: 2020-07-29
Payer: MEDICAID

## 2020-07-29 VITALS
HEIGHT: 59 IN | OXYGEN SATURATION: 98 % | HEART RATE: 100 BPM | WEIGHT: 190 LBS | TEMPERATURE: 97.8 F | SYSTOLIC BLOOD PRESSURE: 134 MMHG | BODY MASS INDEX: 38.3 KG/M2 | DIASTOLIC BLOOD PRESSURE: 86 MMHG

## 2020-07-29 DIAGNOSIS — M79.671 PAIN IN RIGHT FOOT: ICD-10-CM

## 2020-07-29 DIAGNOSIS — Z82.49 FAMILY HISTORY OF ISCHEMIC HEART DISEASE AND OTHER DISEASES OF THE CIRCULATORY SYSTEM: ICD-10-CM

## 2020-07-29 DIAGNOSIS — Z82.3 FAMILY HISTORY OF STROKE: ICD-10-CM

## 2020-07-29 PROCEDURE — 99395 PREV VISIT EST AGE 18-39: CPT | Mod: 25

## 2020-07-29 RX ORDER — MONTELUKAST 10 MG/1
10 TABLET, FILM COATED ORAL
Qty: 30 | Refills: 3 | Status: COMPLETED | COMMUNITY
Start: 2019-09-24 | End: 2020-07-29

## 2020-07-29 RX ORDER — LEVOCETIRIZINE DIHYDROCHLORIDE 5 MG/1
5 TABLET ORAL DAILY
Qty: 90 | Refills: 3 | Status: COMPLETED | COMMUNITY
Start: 2019-09-24 | End: 2020-07-29

## 2020-07-29 RX ORDER — NYSTATIN 100000 1/G
100000 POWDER TOPICAL 3 TIMES DAILY
Qty: 1 | Refills: 3 | Status: COMPLETED | COMMUNITY
Start: 2019-07-16 | End: 2020-07-29

## 2020-07-29 NOTE — PAST MEDICAL HISTORY
[Menarche Age ____] : age at menarche was [unfilled] [Menstruating] : menstruating [Total Preg ___] : G[unfilled] [Definite ___ (Date)] : the last menstrual period was [unfilled] [Live Births ___] : P[unfilled]  [Living ___] : Living: [unfilled] [Full Term ___] : Full Term: [unfilled]

## 2020-07-29 NOTE — HISTORY OF PRESENT ILLNESS
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

## 2020-07-29 NOTE — ASSESSMENT
[FreeTextEntry1] : health  She will fu with gyn  and is up to date with dental and needs eye exam and should have bone density .  She is up to date with vaccines.  \par 2 bmi 37  risks of obesity and need for diet and eating healthy Obesity is a complex disorder involving an excessive amount of body fat. Obesity isn't just a cosmetic concern. It increases your risk of diseases and health problems, such as heart disease, diabetes and high blood pressure.\par \par Being extremely obese means you are especially likely to have health problems related to your weight.\par \par \par The good news is that even modest weight loss can improve or prevent the health problems associated with obesity. Dietary changes, increased physical activity and behavior changes can help you lose weight. Prescription medications and weight-loss surgery are additional options for treating obesity.\par \par \par \par \par Symptoms\par \par Obesity is diagnosed when your body mass index (BMI) is 30 or higher. Your body mass index is calculated by dividing your weight in kilograms (kg) by your height in meters (m) squared. \par \par \par BMI\par \par Weight status\par \par \par Below 18.5 Underweight \par 18.5-24.9 Normal \par 25.0-29.9 Overweight \par 30.0-34.9 Obese (Class I) \par 35.0-39.9 Obese (Class II) \par 40.0 and higher Extreme obesity (Class III) \par \par For most people, BMI provides a reasonable estimate of body fat. However, BMI doesn't directly measure body fat, so some people, such as muscular athletes, may have a BMI in the obese category even though they don't have excess body fat. Ask your doctor if your BMI is a problem. \par \par When to see a doctor\par \par If you think you may be obese, and especially if you're concerned about weight-related health problems, see your doctor or health care provider. You and your provider can evaluate your health risks and discuss your weight-loss options. \par \par Request an Appointment at [facility] [place]\par \par Causes\par \par Although there are genetic, behavioral and hormonal influences on body weight, obesity occurs when you take in more calories than you burn through exercise and normal daily activities. Your body stores these excess calories as fat.\par \par Obesity can sometimes be traced to a medical cause, such as Prader-Willi syndrome, Cushing's syndrome, and other diseases and conditions. However, these disorders are rare and, in general, the principal causes of obesity are:\par •Inactivity. If you're not very active, you don't burn as many calories. With a sedentary lifestyle, you can easily take in more calories every day than you use through exercise and normal daily activities.\par •Unhealthy diet and eating habits. Weight gain is inevitable if you regularly eat more calories than you burn. And most Americans' diets are too high in calories and are full of fast food and high-calorie beverages.\par \par Risk factors\par \par Obesity usually results from a combination of causes and contributing factors, including:\par •Genetics. Your genes may affect the amount of body fat you store, and where that fat is distributed. Genetics may also play a role in how efficiently your body converts food into energy and how your body burns calories during exercise.\par •Family lifestyle. Obesity tends to run in families. If one or both of your parents are obese, your risk of being obese is increased. That's not just because of genetics. Family members tend to share similar eating and activity habits.\par •Inactivity. If you're not very active, you don't burn as many calories. With a sedentary lifestyle, you can easily take in more calories every day than you burn through exercise and routine daily activities. Having medical problems, such as arthritis, can lead to decreased activity, which contributes to weight gain.\par •Unhealthy diet. A diet that's high in calories, lacking in fruits and vegetables, full of fast food, and laden with high-calorie beverages and oversized portions contributes to weight gain.\par •Medical problems. In some people, obesity can be traced to a medical cause, such as Prader-Willi syndrome, Cushing's syndrome and other conditions. Medical problems, such as arthritis, also can lead to decreased activity, which may result in weight gain.\par •Certain medications. Some medications can lead to weight gain if you don't compensate through diet or activity. These medications include some antidepressants, anti-seizure medications, diabetes medications, antipsychotic medications, steroids and beta blockers.\par •Social and economic issues. Research has linked social and economic factors to obesity. Avoiding obesity is difficult if you don't have safe areas to exercise. Similarly, you may not have been taught healthy ways of cooking, or you may not have money to buy healthier foods. In addition, the people you spend time with may influence your weight — you're more likely to become obese if you have obese friends or relatives.\par •Age. Obesity can occur at any age, even in young children. But as you age, hormonal changes and a less active lifestyle increase your risk of obesity. In addition, the amount of muscle in your body tends to decrease with age. This lower muscle mass leads to a decrease in metabolism. These changes also reduce calorie needs, and can make it harder to keep off excess weight. If you don't consciously control what you eat and become more physically active as you age, you'll likely gain weight.\par •Pregnancy. During pregnancy, a woman's weight necessarily increases. Some women find this weight difficult to lose after the baby is born. This weight gain may contribute to the development of obesity in women.\par •Quitting smoking. Quitting smoking is often associated with weight gain. And for some, it can lead to enough weight gain that the person becomes obese. In the long run, however, quitting smoking is still a greater benefit to your health than continuing to smoke.\par •Lack of sleep. Not getting enough sleep or getting too much sleep can cause changes in hormones that increase your appetite. You may also crave foods high in calories and carbohydrates, which can contribute to weight gain.\par \par Even if you have one or more of these risk factors, it doesn't mean that you're destined to become obese. You can counteract most risk factors through diet, physical activity and exercise, and behavior changes.\par \par Complications\par \par If you're obese, you're more likely to develop a number of potentially serious health problems, including:\par •High triglycerides and low high-density lipoprotein (HDL) cholesterol\par •Type 2 diabetes\par •High blood pressure\par •Metabolic syndrome — a combination of high blood sugar, high blood pressure, high triglycerides and low HDL cholesterol\par •Heart disease\par •Stroke\par •Cancer, including cancer of the uterus, cervix, endometrium, ovaries, breast, colon, rectum, esophagus, liver, gallbladder, pancreas, kidney and prostate\par •Breathing disorders, including sleep apnea, a potentially serious sleep disorder in which breathing repeatedly stops and starts\par •Gallbladder disease\par •Gynecological problems, such as infertility and irregular periods\par •Erectile dysfunction and sexual health issues\par •Nonalcoholic fatty liver disease, a condition in which fat builds up in the liver and can cause inflammation or scarring\par •Osteoarthritis\par \par Quality of life\par \par When you're obese, your overall quality of life may be diminished. You may not be able to do things you used to do, such as participating in enjoyable activities. You may avoid public places. Obese people may even encounter discrimination.\par \par Other weight-related issues that may affect your quality of life include:\par •Depression\par •Disability\par •Sexual problems\par •Shame and guilt\par •Social isolation\par •Lower work achievement\par \par Prevention\par \par Whether you're at risk of becoming obese, currently overweight or at a healthy weight, you can take steps to prevent unhealthy weight gain and related health problems. Not surprisingly, the steps to prevent weight gain are the same as the steps to lose weight: daily exercise, a healthy diet, and a long-term commitment to watch what you eat and drink.\par •Exercise regularly. You need to get 150 to 300 minutes of moderate-intensity activity a week to prevent weight gain. Moderately intense physical activities include fast walking and swimming.\par •Follow a healthy eating plan. Focus on low-calorie, nutrient-dense foods, such as fruits, veg\par pt should eat 60-70  gms of protein a day or 20-30 gms per meal This is fish chicken eggs lean meat such as chicken turkey pork and beef .  - Pt should not eat more than a 200 calorie snack  and make sure they are protein and fiber rich. he should eat 7 serving of protein, 12 servings of carbohydrates and 3 servings of non starchy vegetables and 4 servings of fat Dont eat unless you are physically hungry and never eat in front of a TV go to the kitchen table.  he should not eat more than a 1500 calories per day.\par 3. asthma- Asthma is believed to be caused by inherited (genetic) and environmental factor, but its exact cause is unknown. Asthma may be triggered by allergens, lung infections, or irritants in the air. Asthma triggers are different for each person \par -Inhaler technique reviewed as well as oral hygiene techniques reviewed with patient. Avoidance of cold air, extremes of temperature, rescue inhaler should be used before exercise. Order of medication reviewed with patient. Recommended use of a cool mist humidifier in the bedroom. \par \par 4. arthritis-  Pts that consume certain foods can worsen their symptoms of arthritis.  The five worse foods are bagels, gluten, French fries and processed fast foods, trans fats  such as soybean, sunflower oil, omega 6 , vegetable oil and corn oil .  Blackened and barbecue foods  Advanced glycogen enzymes , night shade vegetables such as tomatoes, peppers, potatoes , and sugars.  Foods that help   tart cherries, broccoli, papaya, olive oil probiotics nuts such as almonds, pineapple  green tea.  Papain breaks down protein and bromelin reduces inflammation tumeric destroys free radicals and devils claw reduces arthritis pain.  as well as ginger extract , rutin , [place] yucca root, citrus bioflavonoids  and Boswelia extract.\par 5. asthma - Asthma is believed to be caused by inherited (genetic) and environmental factor, but its exact cause is unknown. Asthma may be triggered by allergens, lung infections, or irritants in the air. Asthma triggers are different for each person \par -Inhaler technique reviewed as well as oral hygiene techniques reviewed with patient. Avoidance of cold air, extremes of temperature, rescue inhaler should be used before exercise. Order of medication reviewed with patient. Recommended use of a cool mist humidifier in the bedroom. \par \par

## 2020-07-29 NOTE — HEALTH RISK ASSESSMENT
[Good] : ~his/her~  mood as  good [No] : No [No falls in past year] : Patient reported no falls in the past year [0] : 2) Feeling down, depressed, or hopeless: Not at all (0) [HIV test declined] : HIV test declined [Hepatitis C test offered] : Hepatitis C test offered [None] : None [# of Members in Household ___] :  household currently consist of [unfilled] member(s) [With Family] : lives with family [High School] : high school [Unemployed] : unemployed [# Of Children ___] : has [unfilled] children [] :  [Sexually Active] : sexually active [Fully functional (bathing, dressing, toileting, transferring, walking, feeding)] : Fully functional (bathing, dressing, toileting, transferring, walking, feeding) [Fully functional (using the telephone, shopping, preparing meals, housekeeping, doing laundry, using] : Fully functional and needs no help or supervision to perform IADLs (using the telephone, shopping, preparing meals, housekeeping, doing laundry, using transportation, managing medications and managing finances) [Carbon Monoxide Detector] : carbon monoxide detector [Smoke Detector] : smoke detector [Sunscreen] : uses sunscreen [Safety elements used in home] : safety elements used in home [Seat Belt] :  uses seat belt [FreeTextEntry1] : none  [] : No [de-identified] : rheum  [de-identified] : none  [de-identified] : portion control  [[id number]] : 0 [Change in mental status noted] : No change in mental status noted [Language] : denies difficulty with language [Behavior] : denies difficulty with behavior [Learning/Retaining New Information] : denies difficulty learning/retaining new information [Handling Complex Tasks] : denies difficulty handling complex tasks [Reasoning] : denies difficulty with reasoning [Spatial Ability and Orientation] : denies difficulty with spatial ability and orientation [Reports changes in hearing] : Reports no changes in hearing [Reports changes in vision] : Reports no changes in vision [Reports changes in dental health] : Reports no changes in dental health [Guns at Home] : no guns at home [Travel to Developing Areas] : does not  travel to developing areas [TB Exposure] : is not being exposed to tuberculosis [Caregiver Concerns] : does not have caregiver concerns [PapSmearDate] : 2019 [de-identified] : last eye exam 1yr  [de-identified] : last dental has appt Aug 4 [AdvancecareDate] : 07/20

## 2020-07-29 NOTE — COUNSELING
[Fall prevention counseling provided] : Fall prevention counseling provided [Adequate lighting] : Adequate lighting [Use proper foot wear] : Use proper foot wear [Use recommended devices] : Use recommended devices [Sleep ___ hours/day] : Sleep [unfilled] hours/day [Engage in a relaxing activity] : Engage in a relaxing activity [Plan in advance] : Plan in advance [Potential consequences of obesity discussed] : Potential consequences of obesity discussed [Structured Weight Management Program suggested:] : Structured weight management program suggested [Encouraged to maintain food diary] : Encouraged to maintain food diary [Benefits of weight loss discussed] : Benefits of weight loss discussed [Encouraged to increase physical activity] : Encouraged to increase physical activity [Encouraged to use exercise tracking device] : Encouraged to use exercise tracking device [Weigh Self Weekly] : weigh self weekly [Decrease Portions] : decrease portions [____ min/wk Activity] : [unfilled] min/wk activity [Keep Food Diary] : keep food diary [Target Wt Loss Goal ___] : Weight Loss Goals: Target weight loss goal [unfilled] lbs [FreeTextEntry1] : low rosie  [FreeTextEntry2] : ideal 107-123 she is  190 bmi 38

## 2020-07-29 NOTE — PHYSICAL EXAM
[Well Nourished] : well nourished [Well Developed] : well developed [Normal Verbal Skills] : the patient had normal verbal communication skills [Normal Nonverbal Skills] : normal nonverbal communication skills were demonstrated [Conjunctiva] : the conjunctiva were normal in both eyes [PERRL] : pupils were equal in size, round, and reactive to light [EOM Intact] : extraocular movements were intact [Neck Supple] : was supple [Normal Appearance] : was normal in appearance [Rate ___] : at [unfilled] breaths per minute [Clear Bilaterally] : the lungs were clear to auscultation bilaterally [Normal to Percussion] : the lungs were normal to percussion [Normal Rhythm/Effort] : normal respiratory rhythm and effort [5th Left ICS - MCL] : palpated at the 5th LICS in the midclavicular line [Heart Rate ___] : [unfilled] bpm [Rhythm Regular] : regular [Normal Rate] : normal [Normal S1] : normal S1 [Normal S2] : normal S2 [No Pitting Edema] : no pitting edema present [No Murmur] : no murmurs heard [2+] : left 2+ [No Abnormalities] : the abdominal aorta was not enlarged and no bruit was heard [Examination Of The Breasts] : a normal appearance [No Discharge] : no discharge [Soft, Nontender] : the abdomen was soft and nontender [No Mass] : no masses were palpated [No HSM] : no hepatosplenomegaly noted [None] : no CVA tenderness [No Lymphangitis] : no lymphangitis observed [Normal Kyphosis] : normal kyphosis [No Visual Abnormalities] : no visible abnormalities [Normal Lordosis] : normal lordosis [No Scoliosis] : no scoliosis [No Tenderness to Palpation] : no spine tenderness on palpation [Full ROM] : full ROM [No Masses] : no masses [No Pain with ROM] : no pain with motion in any direction [Intact] : all reflexes within normal limits bilaterally [Normal Station and Gait] : the gait and station were normal [Normal Motor Tone] : the muscle tone was normal [Involuntary Movements] : no involuntary movements were seen [Normal Scalp] : inspection of the scalp showed no abnormalities [Examination Of The Hair] : texture and distribution of hair was normal [Complexion Medium] : medium complexion [Normal Mental Status] : the patient's orientation, memory, attention, language and fund of knowledge were normal [Normal] : the deep tendon reflexes were normal [Appropriate] : appropriate [JVP Elevated ___cm] : the JVP was not elevated [Enlarged Diffusely] : was not enlarged [S4] : no S4 [S3] : no S3 [Rt] : no varicose veins of the right leg [Lt] : no varicose veins of the left leg [Left Carotid Bruit] : no bruit heard over the left carotid [Right Carotid Bruit] : no bruit heard over the right carotid [Right Femoral Bruit] : no bruit heard over the right femoral artery [Bruit] : no bruit heard [Left Femoral Bruit] : no bruit heard over the left femoral artery [Preauricular Lymph Nodes Enlarged Bilaterally] : nodes not enlarged [Postauricular Lymph Nodes Enlarged Bilaterally] : nodes not enlarged [Submandibular Lymph Nodes Enlarged Bilaterally] : nodes not enlarged [Suboccipital Lymph Nodes Enlarged Bilaterally] : nodes not enlarged [Cervical Lymph Nodes Enlarged Posterior Bilaterally] : nodes not enlarged [Submental Lymph Nodes Enlarged] : nodes not enlarged [Cervical Lymph Nodes Enlarged Anterior Bilaterally] : nodes not enlarged [Supraclavicular Lymph Nodes Enlarged Bilaterally] : nodes not enlarged [Axillary Lymph Nodes Enlarged Bilaterally] : nodes not enlarged [Epitrochlear Lymph Nodes Enlarged Bilaterally] : nodes not enlarged [Inguinal Lymph Nodes Enlarged Bilaterally] : nodes not enlarged [Femoral Lymph Nodes Enlarged Bilaterally] : nodes not enlarged [Abnormal Color] : normal color and pigmentation [Skin Lesions 1] : no skin lesions were observed [Skin Turgor Decreased] : normal skin turgor [Impaired judgment] : intact judgment [Impaired Insight] : intact insight

## 2020-07-30 LAB
25(OH)D3 SERPL-MCNC: 42.1 NG/ML
ALBUMIN SERPL ELPH-MCNC: 4.8 G/DL
ALP BLD-CCNC: 161 U/L
ALT SERPL-CCNC: 16 U/L
APPEARANCE: CLEAR
AST SERPL-CCNC: 13 U/L
BILIRUB DIRECT SERPL-MCNC: 0.1 MG/DL
BILIRUB INDIRECT SERPL-MCNC: 0.4 MG/DL
BILIRUB SERPL-MCNC: 0.5 MG/DL
BILIRUBIN URINE: NEGATIVE
BLOOD URINE: NEGATIVE
CHOLEST SERPL-MCNC: 197 MG/DL
CHOLEST/HDLC SERPL: 3.5 RATIO
COLOR: YELLOW
ESTIMATED AVERAGE GLUCOSE: 105 MG/DL
FRUCTOSAMINE SERPL-MCNC: 225 UMOL/L
GLUCOSE QUALITATIVE U: NEGATIVE
HBA1C MFR BLD HPLC: 5.3 %
HDLC SERPL-MCNC: 56 MG/DL
KETONES URINE: NEGATIVE
LDLC SERPL CALC-MCNC: 124 MG/DL
LEUKOCYTE ESTERASE URINE: NEGATIVE
NITRITE URINE: NEGATIVE
PH URINE: 6
PROT SERPL-MCNC: 7.4 G/DL
PROTEIN URINE: NEGATIVE
SARS-COV-2 IGG SERPL IA-ACNC: 0.08 INDEX
SARS-COV-2 IGG SERPL QL IA: NEGATIVE
SPECIFIC GRAVITY URINE: 1.02
T PALLIDUM AB SER QL IA: NEGATIVE
TRIGL SERPL-MCNC: 84 MG/DL
UROBILINOGEN URINE: NORMAL

## 2020-08-03 ENCOUNTER — RX RENEWAL (OUTPATIENT)
Age: 35
End: 2020-08-03

## 2020-08-03 LAB
M TB IFN-G BLD-IMP: NEGATIVE
QUANTIFERON TB PLUS MITOGEN MINUS NIL: 8.6 IU/ML
QUANTIFERON TB PLUS NIL: 0.02 IU/ML
QUANTIFERON TB PLUS TB1 MINUS NIL: 0 IU/ML
QUANTIFERON TB PLUS TB2 MINUS NIL: 0 IU/ML

## 2020-08-21 ENCOUNTER — APPOINTMENT (OUTPATIENT)
Dept: OTOLARYNGOLOGY | Facility: CLINIC | Age: 35
End: 2020-08-21
Payer: MEDICAID

## 2020-08-21 VITALS
DIASTOLIC BLOOD PRESSURE: 80 MMHG | WEIGHT: 195 LBS | SYSTOLIC BLOOD PRESSURE: 121 MMHG | OXYGEN SATURATION: 98 % | HEART RATE: 72 BPM | HEIGHT: 59 IN | TEMPERATURE: 98.1 F | BODY MASS INDEX: 39.31 KG/M2

## 2020-08-21 PROCEDURE — 31575 DIAGNOSTIC LARYNGOSCOPY: CPT

## 2020-08-21 PROCEDURE — 99204 OFFICE O/P NEW MOD 45 MIN: CPT | Mod: 25

## 2020-08-21 NOTE — CONSULT LETTER
[Dear  ___] : Dear  [unfilled], [Consult Letter:] : I had the pleasure of evaluating your patient, [unfilled]. [Consult Closing:] : Thank you very much for allowing me to participate in the care of this patient.  If you have any questions, please do not hesitate to contact me. [Please see my note below.] : Please see my note below. [Sincerely,] : Sincerely, [FreeTextEntry3] : Ashok Davison MD, PhD\par Chief, Division of Laryngology\par Department of Otolaryngology\par [facility]\par Pediatric Otolaryngology, [facility]\par  of Otolaryngology\par [employer] [employer] [facility]\par \par \par

## 2020-08-21 NOTE — HISTORY OF PRESENT ILLNESS
[de-identified] : referred from [facility], >1 year solids>liquids dysphagia\par no aspiration, no pna\par eg bread gets stuck\par voice has changed for 1 year, gradually getting worse\par +RA, on mtx, rituxan q6 months\par no complete voice loss, no hemoptysis\par breathing ok but had PE last june, h/o asthma, has improved over the last year but not baseline\par +difficulty with any climbing stairs, trouble exercising\par no heartburn, +globus sensation, no meds for reflux\par no covid symptoms\par no otalgia, no constitutional symptoms\par

## 2020-08-21 NOTE — PHYSICAL EXAM
[Midline] : trachea located in midline position [Laryngoscopy Performed] : laryngoscopy was performed, see procedure section for findings [Normal] : no rashes [de-identified] : moderately raspy and breathy

## 2020-08-28 ENCOUNTER — APPOINTMENT (OUTPATIENT)
Dept: RHEUMATOLOGY | Facility: CLINIC | Age: 35
End: 2020-08-28
Payer: MEDICAID

## 2020-08-28 VITALS
DIASTOLIC BLOOD PRESSURE: 72 MMHG | SYSTOLIC BLOOD PRESSURE: 108 MMHG | TEMPERATURE: 98 F | BODY MASS INDEX: 39.11 KG/M2 | HEIGHT: 59 IN | WEIGHT: 194 LBS | OXYGEN SATURATION: 97 % | HEART RATE: 81 BPM | RESPIRATION RATE: 16 BRPM

## 2020-08-28 PROCEDURE — 99213 OFFICE O/P EST LOW 20 MIN: CPT

## 2020-08-28 RX ORDER — HYDROCHLOROTHIAZIDE 12.5 MG/1
12.5 CAPSULE ORAL DAILY
Qty: 10 | Refills: 0 | Status: DISCONTINUED | COMMUNITY
Start: 2019-09-26 | End: 2020-08-28

## 2020-08-28 NOTE — HISTORY OF PRESENT ILLNESS
[___ Month(s) Ago] : [unfilled] month(s) ago [FreeTextEntry1] : due for Rituxan in September - now with mild joint pain over -  her hands\par stiffness over 45 minutes in the morning\par ongoing plantar fasciitis over the right foot

## 2020-08-28 NOTE — ASSESSMENT
[FreeTextEntry1] : 32 year-old female with pmh of ALMA ROSA, now with return of her symptoms with polyarthritis, highly positive DEBORAH\par \par \par 1. RA  with vasculitis - methotrexate 5 tablets -  s/p  2 cycles of Rituxan - doing well with no flares -  - next cycle in September - continue with current regimen - now with mild joint pain and stiffness - continue with current regimen\par 2. Iron deficiency - stable levels - continue with oral replacement\par 3. right side lumbar pain -not active at this time. \par 4. chronic pain -using CBD oil\par 5. muscle cramps over the right leg - not active\par 6. PE - likely related to tubal ligation (5 days prior) -  still on eliquis - plan is to continue indefinitely given hx of clotting in her family\par \par \par I reviewed previous labs results with patients.\par Laboratory tests ordered today\par Diagnosis and Prognosis discussed\par Continue with current medications\par education provided on \par f/u after rituxan infusion\par

## 2020-08-28 NOTE — PHYSICAL EXAM
[General Appearance - In No Acute Distress] : in no acute distress [General Appearance - Alert] : alert [Outer Ear] : the ears and nose were normal in appearance [Oropharynx] : the oropharynx was normal [Neck Cervical Mass (___cm)] : no neck mass was observed [Jugular Venous Distention Increased] : there was no jugular-venous distention [Neck Appearance] : the appearance of the neck was normal [Thyroid Diffuse Enlargement] : the thyroid was not enlarged [Thyroid Nodule] : there were no palpable thyroid nodules [Auscultation Breath Sounds / Voice Sounds] : lungs were clear to auscultation bilaterally [Heart Rate And Rhythm] : heart rate was normal and rhythm regular [Heart Sounds] : normal S1 and S2 [Heart Sounds Gallop] : no gallops [Heart Sounds Pericardial Friction Rub] : no pericardial rub [Murmurs] : no murmurs [Full Pulse] : the pedal pulses are present [Edema] : there was no peripheral edema [Abdomen Soft] : soft [Bowel Sounds] : normal bowel sounds [Abdomen Tenderness] : non-tender [Abdomen Mass (___ Cm)] : no abdominal mass palpated [Cervical Lymph Nodes Enlarged Anterior Bilaterally] : anterior cervical [Cervical Lymph Nodes Enlarged Posterior Bilaterally] : posterior cervical [Supraclavicular Lymph Nodes Enlarged Bilaterally] : supraclavicular [No CVA Tenderness] : no ~M costovertebral angle tenderness [No Spinal Tenderness] : no spinal tenderness [Abnormal Walk] : normal gait [Nail Clubbing] : no clubbing  or cyanosis of the fingernails [Involuntary Movements] : no involuntary movements were seen [Motor Tone] : muscle strength and tone were normal [Musculoskeletal - Swelling] : no joint swelling seen [Skin Color & Pigmentation] : normal skin color and pigmentation [Skin Turgor] : normal skin turgor [] : no rash [Oriented To Time, Place, And Person] : oriented to person, place, and time [Impaired Insight] : insight and judgment were intact [Affect] : the affect was normal [FreeTextEntry1] : tender joints: elbows and wrists, shoulders

## 2020-08-29 ENCOUNTER — TRANSCRIPTION ENCOUNTER (OUTPATIENT)
Age: 35
End: 2020-08-29

## 2020-09-15 ENCOUNTER — TRANSCRIPTION ENCOUNTER (OUTPATIENT)
Age: 35
End: 2020-09-15

## 2020-09-16 ENCOUNTER — APPOINTMENT (OUTPATIENT)
Dept: ORTHOPEDIC SURGERY | Facility: CLINIC | Age: 35
End: 2020-09-16
Payer: MEDICAID

## 2020-09-16 VITALS
BODY MASS INDEX: 40.52 KG/M2 | HEIGHT: 59 IN | WEIGHT: 201 LBS | HEART RATE: 94 BPM | DIASTOLIC BLOOD PRESSURE: 77 MMHG | SYSTOLIC BLOOD PRESSURE: 117 MMHG

## 2020-09-16 PROCEDURE — 99214 OFFICE O/P EST MOD 30 MIN: CPT

## 2020-09-16 PROCEDURE — 73564 X-RAY EXAM KNEE 4 OR MORE: CPT | Mod: RT

## 2020-09-16 NOTE — DISCUSSION/SUMMARY
[de-identified] : Right knee internal derangement with locking of the knee\par \par \par I discussed my findings on history, exam and radiology.\par \par I reviewed the anatomy and function of the periarticular muscles and tendons, patella, ligaments, menisci and cartilage of the knee using models, images and diagrams. Given the current findings for the patient, I recommend proceeding with advanced imaging to better characterize and diagnose the problem to aid patient care, management and treatment guidance as I suspect an internal injury to the knee not diagnosed on non-diagnostic plain radiographs causing the patients symptoms and physical examination to help provide surgical management planning.\par \par Therefore given the patients continued symptoms despite [ non-operative management ] with continued pain affecting ADLs and inability to do activities limiting quality of life as well as locking and instability significant for patient falls potentially placing the patient at increased risk for exacerbating the injury or causing further injury to the knee, an examination and history consistent with injury to an internal knee derangement and a non-diagnostic radiograph I recommend obtaining an MRI to assess the knee's internal structures for preoperative planning. I discussed with the patient that I am ordering an MRI to assess the soft tissue structures in the joint such as ligaments and tendons, as well as to assess the cartilage and menisci as well as for any bony edema. The test will need insurance approval and will take place at a [facility] facility or outside facility. If the patient elects to obtain the MRI from an outside facility, the patient understands they have been instructed to bring in both the final radiologist read and a CD/DVD with the MRI images to allow review of the imaging test by myself during the follow up visit. I do not recommend obtaining an open standing MRI as the quality of the images is subpar and makes it difficult to diagnose intra-articular derangements and guide care discussion and decision making.\par The patient was prescribed Diclofenac PO non-steroidal anti-inflammatory medication. 50mg tablets twice daily to be taken for at least 1-2 weeks in a row and then PRN afterwards. Risks and benefits were discussed and include but not limited to renal damage and GI ulceration and bleeding.  They were advised to take with food to limit stomach upset as well as warned to stop the medication if worsening gastric pain or dizziness or other side effects. Also to immediately stop the medication and seek appropriate medical attention if any severe stomach ache, gastritis, black/red vomit, black/red stools or any other medical concern.\par \par hinged knee brace\par \par The patient verifies their understanding the the visit, diagnosis and plan. They agree with the treatment plan and will contact the office with any questions or problems.\par \par FU after MRI is obtained

## 2020-09-16 NOTE — PHYSICAL EXAM
[de-identified] : Physical Examination\par General: well nourished, in no acute distress, alert and oriented to person, place and time\par Psychiatric: normal mood and affect, no abnormal movements or speech patterns\par Eyes: vision intact - glasses\par Throat: no thyromegaly\par Lymph: no enlarged nodes, no lymphedema in extremity\par Respiratory: no wheezing, no shortness of breath with ambulation\par Cardiac: no cardiac leg swelling, 2+ peripheral pulses\par Neurology: normal gross sensation in extremities to light touch\par Abdomen: soft, non-tender, tympanic, no masses\par \par Musculoskeletal Examination\par Ambulation	+ antalgic gait, - assistive devices\par \par Knee			Right			Left\par General\par      Swelling/Deformity	normal			normal	\par      Skin			normal			normal\par      Erythema		-			-\par      Standing Alignment	neutral			neutral\par      Effusion		small			none\par Range of Motion\par      Hip			full painless ROM		full painless ROM\par      Knee Flexion		60			120+\par      Knee Extension	30			10+\par Patella\par      J Sign		-			-\par      Quad Medial/Lateral	1/1 1/1\par      Apprehension		-			-\par      Elmer's		+			+\par      Grind Sign		+			-\par      Crepitus		+			-\par Palpation\par      Medial Joint Line	-			-\par      Medial Fem Condyle	-			-\par      Lateral Joint Line	+			-\par      Quad Tendon		-			-\par      Patella Tendon	-			-\par      Medial Patella		-			-\par      Lateral Patella 	-			-\par      Posterior Knee	-			-\par Ligamentous\par      Varus @ 0° / 30°	-/-			-/-\par      Valgus @ 0° / 30°	-/-			-/-\par      Lachman		-			-\par      Pivot Shift		-&			-\par      Anterior Drawer	-&			-\par      Posterior Drawer	-&			-\par Meniscus\par      Hugo		- unable			-\par      Flexion Pinch		- unable			-\par Strength Examination/Atrophy\par      Hip Flexors 		5+			5+\par      Quadriceps		5+			5+\par      Hamstring		5+			5+\par      Tibialis Anterior	5+			5+\par      Achilles/Soleus	5+			5+\par Sensation\par      Deep Peroneal	normal			normal\par      Superficial Peroneal 	normal			normal\par      Sural  		normal			normal\par      Posterior Tibial 	normal			normal\par      Saphneous 		normal			normal\par Pulses\par      DP			2+			2+ [de-identified] : 5 views of the affected Right knee (standing AP, flexing standing AP, 30degree flexed lateral, 0degree lateral, sunrise view)\par were ordered, obtained and evaluated by myself today and\par demonstrate:\par There is mild medially pain asymmetric narrowing\par no osteophytic lipping\par Trace suprapatellar effusion\par no patellofemoral joint space loss without evidence of tilt [or] subluxation on sunrise view\par Normal soft tissue density\par Otherwise normal osseous bone structure without fracture or dislocation

## 2020-09-16 NOTE — HISTORY OF PRESENT ILLNESS
[de-identified] : CC Right knee\par \par HPI 35 yo female presents with acute onset of one month of activity related pain in the lateral Right knee [without injury]. The pain is worse, and rated a 10 out of 10, described as sharp stabbing, [without radiation]. Nothing makes the pain better and any knee motion makes the pain worse. The patient reports associated symptoms of locking and inability to move the knee. The patient + pain at night affecting sleep, and - similar pain previously.\par \par The patient has tried the following treatments:\par Activity modification	+\par Ice/Compression  	+\par Braces    		-\par Nsaids    		+\par Physical Therapy 	-\par Cortisone Injection	-\par Visco Injection		-\par Zilretta			-\par Arthroscopy		-\par \par Review of Systems is positive for the above musculoskeletal symptoms and is otherwise non-contributory for general, constitutional, psychiatric, neurologic, HEENT, cardiac, respiratory, gastrointestinal, reproductive, lymphatic, and dermatologic complaints.\par \par Consult by

## 2020-10-02 ENCOUNTER — LABORATORY RESULT (OUTPATIENT)
Age: 35
End: 2020-10-02

## 2020-10-02 ENCOUNTER — APPOINTMENT (OUTPATIENT)
Dept: RHEUMATOLOGY | Facility: CLINIC | Age: 35
End: 2020-10-02
Payer: MEDICAID

## 2020-10-02 DIAGNOSIS — Z23 ENCOUNTER FOR IMMUNIZATION: ICD-10-CM

## 2020-10-02 PROCEDURE — 96375 TX/PRO/DX INJ NEW DRUG ADDON: CPT

## 2020-10-02 PROCEDURE — 36415 COLL VENOUS BLD VENIPUNCTURE: CPT

## 2020-10-02 PROCEDURE — 96413 CHEMO IV INFUSION 1 HR: CPT

## 2020-10-02 PROCEDURE — G0008: CPT

## 2020-10-02 PROCEDURE — 90686 IIV4 VACC NO PRSV 0.5 ML IM: CPT

## 2020-10-02 PROCEDURE — 96415 CHEMO IV INFUSION ADDL HR: CPT

## 2020-10-04 ENCOUNTER — RX RENEWAL (OUTPATIENT)
Age: 35
End: 2020-10-04

## 2020-10-07 ENCOUNTER — APPOINTMENT (OUTPATIENT)
Dept: ORTHOPEDIC SURGERY | Facility: CLINIC | Age: 35
End: 2020-10-07
Payer: MEDICAID

## 2020-10-07 DIAGNOSIS — M94.261 CHONDROMALACIA, RIGHT KNEE: ICD-10-CM

## 2020-10-07 DIAGNOSIS — M22.2X1 PATELLOFEMORAL DISORDERS, RIGHT KNEE: ICD-10-CM

## 2020-10-07 PROCEDURE — 99214 OFFICE O/P EST MOD 30 MIN: CPT

## 2020-10-07 NOTE — DISCUSSION/SUMMARY
[de-identified] : right knee patellofemoral syndrome\par popliteal cyst\par \par Patellofemoral syndrome or chondritis or chondromalacia is a spectrum of disease of the cartilage in the joint between the patella, or knee cap, and the femoral trochlea, or thigh bone ranging from overload of the joint, to irritation of the cartilage and finally wear of the cartilage. The patella has a convex v shaped joint surface and the trochlea a matching concave v shape. The patella runs in the trochlea valley as the knee bends, increasing the leverage and allowing knee range of motion. However the patellofemoral joint experiences 20-50x the body weight in force when going up and down stairs during mid-flexion of the knee. The joint is most symptomatic with prolonged knee flexion or going up/down stairs. The treatment for this problem is predominantly non-operative consisting of patient education, activity modification, physical therapy with focus on VMO strengthening, oral and topical non-steroidal anti-inflammatories, knee braces including lateral J/shield's brace or infrapatellar band, and corticosteroid/viscosupplementation injection. The symptoms are chronic and are difficult to treat. In cases of failure to respond to non-operative management surgical options can be considered.\par \par Lateral J brace\par \par Physical therapy prescribed with knee ROM exercises, quad/hamstring/VMO/Hip abductor strengthening, knee taping, patella mobilization, modalities PRN, and home exercise program.\par \par The patient was prescribed Diclofenac PO non-steroidal anti-inflammatory medication. 50mg tablets twice daily to be taken for at least 1-2 weeks in a row and then PRN afterwards. Risks and benefits were discussed and include but not limited to renal damage and GI ulceration and bleeding.  They were advised to take with food to limit stomach upset as well as warned to stop the medication if worsening gastric pain or dizziness or other side effects. Also to immediately stop the medication and seek appriate medical attention if any severe stomach ache, gastritis, black/red vomit, black/red stools or any other medical concern.\par \par patient declined CSI\par \par The patient verifies their understanding the the visit, diagnosis and plan. They agree with the treatment plan and will contact the office with any questions or problems.\par \par Follow up\par PRN\par

## 2020-10-07 NOTE — PHYSICAL EXAM
[de-identified] : Physical Examination\par General: well nourished, in no acute distress, alert and oriented to person, place and time\par Psychiatric: normal mood and affect, no abnormal movements or speech patterns\par Eyes: vision intact - glasses\par Throat: no thyromegaly\par Lymph: no enlarged nodes, no lymphedema in extremity\par Respiratory: no wheezing, no shortness of breath with ambulation\par Cardiac: no cardiac leg swelling, 2+ peripheral pulses\par Neurology: normal gross sensation in extremities to light touch\par Abdomen: soft, non-tender, tympanic, no masses\par \par Musculoskeletal Examination\par Ambulation	+ antalgic gait, - assistive devices\par \par Knee			Right			Left\par General\par      Swelling/Deformity	normal			normal	\par      Skin			normal			normal\par      Erythema		-			-\par      Standing Alignment	neutral			neutral\par      Effusion		small			none\par Range of Motion\par      Hip			full painless ROM		full painless ROM\par      Knee Flexion		60			120+\par      Knee Extension	30			10+\par Patella\par      J Sign		-			-\par      Quad Medial/Lateral	1/1 1/1\par      Apprehension		-			-\par      Elmer's		+			+\par      Grind Sign		+			-\par      Crepitus		+			-\par Palpation\par      Medial Joint Line	-			-\par      Medial Fem Condyle	-			-\par      Lateral Joint Line	+			-\par      Quad Tendon		-			-\par      Patella Tendon	-			-\par      Medial Patella		-			-\par      Lateral Patella 	-			-\par      Posterior Knee	-			-\par Ligamentous\par      Varus @ 0° / 30°	-/-			-/-\par      Valgus @ 0° / 30°	-/-			-/-\par      Lachman		-			-\par      Pivot Shift		-&			-\par      Anterior Drawer	-&			-\par      Posterior Drawer	-&			-\par Meniscus\par      Hugo		- unable			-\par      Flexion Pinch		- unable			-\par Strength Examination/Atrophy\par      Hip Flexors 		5+			5+\par      Quadriceps		5+			5+\par      Hamstring		5+			5+\par      Tibialis Anterior	5+			5+\par      Achilles/Soleus	5+			5+\par Sensation\par      Deep Peroneal	normal			normal\par      Superficial Peroneal 	normal			normal\par      Sural  		normal			normal\par      Posterior Tibial 	normal			normal\par      Saphneous 		normal			normal\par Pulses\par      DP			2+			2+ [de-identified] : 5 views of the affected Right knee (standing AP, flexing standing AP, 30degree flexed lateral, 0degree lateral, sunrise view)\par 9-16-20\par demonstrate:\par There is mild medially pain asymmetric narrowing\par no osteophytic lipping\par Trace suprapatellar effusion\par no patellofemoral joint space loss without evidence of tilt [or] subluxation on sunrise view\par Normal soft tissue density\par Otherwise normal osseous bone structure without fracture or dislocation\par \par \par MRI Right knee from [facility] on 9-23-20\par My impression of the images:\par Quality of the MRI is good\par Medial Meniscus ok\par Lateral Meniscus ok\par There is no chondral loss in the tricompartments\par There is [Not] bone marrow edema[/subchondral cysts] in the tricompartments\par LCL is intact\par MCL is intact\par ACL is intact\par PCL is intact \par Quadriceps Tendon is intact\par Patella Tendon is intact\par small pop cyst post tibia\par \par The Final Radiologist Impression:\par ANTERIOR CRUCIATE LIGAMENT: Normal.\par POSTERIOR CRUCIATE LIGAMENT: Normal.\par MEDIAL STRUCTURES:\par MCL: Superficial tibial collateral ligament, deep meniscal femoral, and meniscal tibial\par ligaments are intact.\par PES ANSERINE TENDONS AND DISTAL SEMIMEMBRANOSUS: Unremarkable.\par lateral structures:\par ILIOTIBIAL BAND: Unremarkable.\par BICEPS FEMORIS TENDON: Unremarkable.\par FIBULAR COLLATERAL LIGAMENT: Unremarkable.\par POPLITEUS MUSCLE AND TENDON: Unremarkable. There is a 1.7 x 1 X 0.5 cm ganglion situated\par between the posterior aspect of the lateral tibial plateau and the popliteus muscle.\par Posterior lateral corner structures Unremarkable.\par Medial Meniscus : Normal without tears.\par Lateral Meniscus : Normal without tears.\par EXTENSOR MECHANISM:\par PATELLA: Normally situated without tilt or subluxation.\par Hoffa's fat-pad: Within normal limits.\par Distal quadriceps and patellar tendon: Normal.\par PATELLOFEMORAL LIGAMENTS AND RETINACULA: Normal.\par OSSEOUS AND ARTICULAR STRUCTURES\par BONES: No fracture, stress reaction, or osseous lesion is seen.\par PATELLOFEMORAL COMPARTMENT: No hyaline cartilage disease.\par MEDIAL COMPARTMENT: No hyaline cartilage disease.\par LATERAL COMPARTMENT: No hyaline cartilage disease.\par MUSCLES: Within normal limits. No edema or atrophy.\par popliteal cyst: None.\par periarticular bursitis: None.\par JOINT EFFUSION: None.\par SOFT TISSUES: Unremarkable.\par Nerves and vasculature: Unremarkable.\par IMPRESSION:\par * No internal derangement\par * 1.7 cm posterior ganglion which may be arising from the popliteus tendon sheath.

## 2020-10-07 NOTE — HISTORY OF PRESENT ILLNESS
[de-identified] : CC Right knee\par \par HPI 35 yo female presents for mri fu of acute onset of one month of activity related pain in the lateral Right knee [without injury]. The pain is worse, and rated a 10 out of 10, described as sharp stabbing, [without radiation]. Nothing makes the pain better and any knee motion makes the pain worse. The patient reports associated symptoms of locking and inability to move the knee. The patient + pain at night affecting sleep, and - similar pain previously.\par \par The patient has tried the following treatments:\par Activity modification	+\par Ice/Compression  	+\par Braces    		-\par Nsaids    		+\par Physical Therapy 	-\par Cortisone Injection	-\par Visco Injection		-\par Zilretta			-\par Arthroscopy		-\par \par Review of Systems is positive for the above musculoskeletal symptoms and is otherwise non-contributory for general, constitutional, psychiatric, neurologic, HEENT, cardiac, respiratory, gastrointestinal, reproductive, lymphatic, and dermatologic complaints.\par \par Consult by

## 2020-10-13 ENCOUNTER — LABORATORY RESULT (OUTPATIENT)
Age: 35
End: 2020-10-13

## 2020-10-13 ENCOUNTER — APPOINTMENT (OUTPATIENT)
Dept: RHEUMATOLOGY | Facility: CLINIC | Age: 35
End: 2020-10-13
Payer: MEDICAID

## 2020-10-13 PROCEDURE — 36415 COLL VENOUS BLD VENIPUNCTURE: CPT

## 2020-10-13 PROCEDURE — 96375 TX/PRO/DX INJ NEW DRUG ADDON: CPT

## 2020-10-13 PROCEDURE — 96413 CHEMO IV INFUSION 1 HR: CPT

## 2020-10-13 PROCEDURE — 96415 CHEMO IV INFUSION ADDL HR: CPT

## 2020-10-14 ENCOUNTER — APPOINTMENT (OUTPATIENT)
Dept: INTERNAL MEDICINE | Facility: CLINIC | Age: 35
End: 2020-10-14
Payer: MEDICAID

## 2020-10-14 ENCOUNTER — APPOINTMENT (OUTPATIENT)
Dept: NEUROLOGY | Facility: CLINIC | Age: 35
End: 2020-10-14
Payer: MEDICAID

## 2020-10-14 VITALS
TEMPERATURE: 98.2 F | WEIGHT: 200 LBS | HEART RATE: 89 BPM | HEIGHT: 59 IN | OXYGEN SATURATION: 99 % | DIASTOLIC BLOOD PRESSURE: 82 MMHG | BODY MASS INDEX: 40.32 KG/M2 | SYSTOLIC BLOOD PRESSURE: 117 MMHG

## 2020-10-14 PROCEDURE — 99215 OFFICE O/P EST HI 40 MIN: CPT

## 2020-10-14 RX ORDER — PNEUMOCOCCAL VACCINE POLYVALENT 25; 25; 25; 25; 25; 25; 25; 25; 25; 25; 25; 25; 25; 25; 25; 25; 25; 25; 25; 25; 25; 25; 25 UG/.5ML; UG/.5ML; UG/.5ML; UG/.5ML; UG/.5ML; UG/.5ML; UG/.5ML; UG/.5ML; UG/.5ML; UG/.5ML; UG/.5ML; UG/.5ML; UG/.5ML; UG/.5ML; UG/.5ML; UG/.5ML; UG/.5ML; UG/.5ML; UG/.5ML; UG/.5ML; UG/.5ML; UG/.5ML; UG/.5ML
25 INJECTION, SOLUTION INTRAMUSCULAR; SUBCUTANEOUS
Qty: 1 | Refills: 0 | Status: DISCONTINUED | COMMUNITY
Start: 2020-10-14 | End: 2020-10-14

## 2020-10-14 NOTE — HISTORY OF PRESENT ILLNESS
[FreeTextEntry1] : fu  [de-identified] : Pt is a 34 yr old woman with RA on immunosuppressors and recently had an episode of oral thrush requiring treatment  .  she was seen by ent and treated with  nystatin .  she also saw Dr Pineda for knee pain and is to start Pt for patellofemoral syndrome.  She has not lost weight and gained weight.  She states her pain is in both knees  and on right she has numbness in her right  leg from knee down to her feet.  She doesn’t have lower back pain, no trauma.  She has been having numbness since Aug .  It is making it harder for her to walk because she doesn’t feel herself making the step.  It is constant.

## 2020-10-14 NOTE — END OF VISIT
[FreeTextEntry3] : resident present  [>50% of the face to face encounter time was spent on counseling and/or coordination of care for ___] : Greater than 50% of the face to face encounter time was spent on counseling and/or coordination of care for [unfilled]

## 2020-10-14 NOTE — COUNSELING
[Fall prevention counseling provided] : Fall prevention counseling provided [No throw rugs] : No throw rugs [Use proper foot wear] : Use proper foot wear [Adequate lighting] : Adequate lighting [Use recommended devices] : Use recommended devices [Sleep ___ hours/day] : Sleep [unfilled] hours/day [Engage in a relaxing activity] : Engage in a relaxing activity [Plan in advance] : Plan in advance [Potential consequences of obesity discussed] : Potential consequences of obesity discussed [Structured Weight Management Program suggested:] : Structured weight management program suggested [Benefits of weight loss discussed] : Benefits of weight loss discussed [Encouraged to maintain food diary] : Encouraged to maintain food diary [Encouraged to increase physical activity] : Encouraged to increase physical activity [Encouraged to use exercise tracking device] : Encouraged to use exercise tracking device [____ min/wk Activity] : [unfilled] min/wk activity

## 2020-10-14 NOTE — ASSESSMENT
[FreeTextEntry1] : bmi  40  risks of obesity and need for diet and eating healthy Obesity is a complex disorder involving an excessive amount of body fat. Obesity isn't just a cosmetic concern. It increases your risk of diseases and health problems, such as heart disease, diabetes and high blood pressure.\par \par Being extremely obese means you are especially likely to have health problems related to your weight.\par \par \par The good news is that even modest weight loss can improve or prevent the health problems associated with obesity. Dietary changes, increased physical activity and behavior changes can help you lose weight. Prescription medications and weight-loss surgery are additional options for treating obesity.\par \par \par \par \par Symptoms\par \par Obesity is diagnosed when your body mass index (BMI) is 30 or higher. Your body mass index is calculated by dividing your weight in kilograms (kg) by your height in meters (m) squared. \par \par \par BMI\par \par Weight status\par \par \par Below 18.5 Underweight \par 18.5-24.9 Normal \par 25.0-29.9 Overweight \par 30.0-34.9 Obese (Class I) \par 35.0-39.9 Obese (Class II) \par 40.0 and higher Extreme obesity (Class III) \par \par For most people, BMI provides a reasonable estimate of body fat. However, BMI doesn't directly measure body fat, so some people, such as muscular athletes, may have a BMI in the obese category even though they don't have excess body fat. Ask your doctor if your BMI is a problem. \par \par When to see a doctor\par \par If you think you may be obese, and especially if you're concerned about weight-related health problems, see your doctor or health care provider. You and your provider can evaluate your health risks and discuss your weight-loss options. \par \par Request an Appointment at [facility]\par \par Causes\par \par Although there are genetic, behavioral and hormonal influences on body weight, obesity occurs when you take in more calories than you burn through exercise and normal daily activities. Your body stores these excess calories as fat.\par \par Obesity can sometimes be traced to a medical cause, such as Prader-Willi syndrome, Cushing's syndrome, and other diseases and conditions. However, these disorders are rare and, in general, the principal causes of obesity are:\par •Inactivity. If you're not very active, you don't burn as many calories. With a sedentary lifestyle, you can easily take in more calories every day than you use through exercise and normal daily activities.\par •Unhealthy diet and eating habits. Weight gain is inevitable if you regularly eat more calories than you burn. And most Americans' diets are too high in calories and are full of fast food and high-calorie beverages.\par \par Risk factors\par \par Obesity usually results from a combination of causes and contributing factors, including:\par •Genetics. Your genes may affect the amount of body fat you store, and where that fat is distributed. Genetics may also play a role in how efficiently your body converts food into energy and how your body burns calories during exercise.\par •Family lifestyle. Obesity tends to run in families. If one or both of your parents are obese, your risk of being obese is increased. That's not just because of genetics. Family members tend to share similar eating and activity habits.\par •Inactivity. If you're not very active, you don't burn as many calories. With a sedentary lifestyle, you can easily take in more calories every day than you burn through exercise and routine daily activities. Having medical problems, such as arthritis, can lead to decreased activity, which contributes to weight gain.\par •Unhealthy diet. A diet that's high in calories, lacking in fruits and vegetables, full of fast food, and laden with high-calorie beverages and oversized portions contributes to weight gain.\par •Medical problems. In some people, obesity can be traced to a medical cause, such as Prader-Willi syndrome, Cushing's syndrome and other conditions. Medical problems, such as arthritis, also can lead to decreased activity, which may result in weight gain.\par •Certain medications. Some medications can lead to weight gain if you don't compensate through diet or activity. These medications include some antidepressants, anti-seizure medications, diabetes medications, antipsychotic medications, steroids and beta blockers.\par •Social and economic issues. Research has linked social and economic factors to obesity. Avoiding obesity is difficult if you don't have safe areas to exercise. Similarly, you may not have been taught healthy ways of cooking, or you may not have money to buy healthier foods. In addition, the people you spend time with may influence your weight — you're more likely to become obese if you have obese friends or relatives.\par •Age. Obesity can occur at any age, even in young children. But as you age, hormonal changes and a less active lifestyle increase your risk of obesity. In addition, the amount of muscle in your body tends to decrease with age. This lower muscle mass leads to a decrease in metabolism. These changes also reduce calorie needs, and can make it harder to keep off excess weight. If you don't consciously control what you eat and become more physically active as you age, you'll likely gain weight.\par •Pregnancy. During pregnancy, a woman's weight necessarily increases. Some women find this weight difficult to lose after the baby is born. This weight gain may contribute to the development of obesity in women.\par •Quitting smoking. Quitting smoking is often associated with weight gain. And for some, it can lead to enough weight gain that the person becomes obese. In the long run, however, quitting smoking is still a greater benefit to your health than continuing to smoke.\par •Lack of sleep. Not getting enough sleep or getting too much sleep can cause changes in hormones that increase your appetite. You may also crave foods high in calories and carbohydrates, which can contribute to weight gain.\par \par Even if you have one or more of these risk factors, it doesn't mean that you're destined to become obese. You can counteract most risk factors through diet, physical activity and exercise, and behavior changes.\par \par Complications\par \par If you're obese, you're more likely to develop a number of potentially serious health problems, including:\par •High triglycerides and low high-density lipoprotein (HDL) cholesterol\par •Type 2 diabetes\par •High blood pressure\par •Metabolic syndrome — a combination of high blood sugar, high blood pressure, high triglycerides and low HDL cholesterol\par •Heart disease\par •Stroke\par •Cancer, including cancer of the uterus, cervix, endometrium, ovaries, breast, colon, rectum, esophagus, liver, gallbladder, pancreas, kidney and prostate\par •Breathing disorders, including sleep apnea, a potentially serious sleep disorder in which breathing repeatedly stops and starts\par •Gallbladder disease\par •Gynecological problems, such as infertility and irregular periods\par •Erectile dysfunction and sexual health issues\par •Nonalcoholic fatty liver disease, a condition in which fat builds up in the liver and can cause inflammation or scarring\par •Osteoarthritis\par \par Quality of life\par \par When you're obese, your overall quality of life may be diminished. You may not be able to do things you used to do, such as participating in enjoyable activities. You may avoid public places. Obese people may even encounter discrimination.\par \par Other weight-related issues that may affect your quality of life include:\par •Depression\par •Disability\par •Sexual problems\par •Shame and guilt\par •Social isolation\par •Lower work achievement\par \par Prevention\par \par Whether you're at risk of becoming obese, currently overweight or at a healthy weight, you can take steps to prevent unhealthy weight gain and related health problems. Not surprisingly, the steps to prevent weight gain are the same as the steps to lose weight: daily exercise, a healthy diet, and a long-term commitment to watch what you eat and drink.\par •Exercise regularly. You need to get 150 to 300 minutes of moderate-intensity activity a week to prevent weight gain. Moderately intense physical activities include fast walking and swimming.\par •Follow a healthy eating plan. Focus on low-calorie, nutrient-dense foods, such as fruits, veg\par 2. RA  Pts that consume certain foods can worsen their symptoms of arthritis.  The five worse foods are bagels, gluten, French fries and processed fast foods, trans fats  such as soybean, sunflower oil, omega 6 , vegetable oil and corn oil .  Blackened and barbecue foods  Advanced glycogen enzymes , night shade vegetables such as tomatoes, peppers, potatoes , and sugars.  Foods that help   tart cherries, broccoli, papaya, olive oil probiotics nuts such as almonds, pineapple  green tea.  Papain breaks down protein and bromelin reduces inflammation tumeric destroys free radicals and devils claw reduces arthritis pain.  as well as ginger extract , rutin , Nixa yucca root, citrus bioflavonoids  and Boswelia extract.\par 3.  leg paresthesia   refer to neurologist also test for b12 def folic acid mag   could be related to her RA and vasculitis   or due to lumbar-sacral disease involving [id number]\par 4 asthma- stable Asthma is believed to be caused by inherited (genetic) and environmental factor, but its exact cause is unknown. Asthma may be triggered by allergens, lung infections, or irritants in the air. Asthma triggers are different for each person \par \par -Inhaler technique reviewed as well as oral hygiene techniques reviewed with patient. Avoidance of cold air, extremes of temperature, rescue inhaler should be used before exercise. Order of medication reviewed with patient. Recommended use of a cool mist humidifier in the bedroom. \par \par 5 methotrexate  she has normal us of abd except  gb stone.

## 2020-10-14 NOTE — PHYSICAL EXAM
[Normal Sclera/Conjunctiva] : normal sclera/conjunctiva [PERRL] : pupils equal round and reactive to light [Normal Oropharynx] : the oropharynx was normal [Supple] : supple [Normal Rhythm/Effort] : normal respiratory rhythm and effort [Rate ___] : at [unfilled] breaths per minute [Clear Bilaterally] : the lungs were clear to auscultation bilaterally [Normal to Percussion] : the lungs were normal to percussion [Normal S1, S2] : normal S1 and S2 [Regular Rhythm] : with a regular rhythm [Normal Rate] : normal [Normal S1] : normal S1 [Normal S2] : normal S2 [S3] : no S3 [S4] : no S4 [No Pitting Edema] : no pitting edema present [Rt] : no varicose veins of the right leg [Lt] : no varicose veins of the left leg [Right Carotid Bruit] : no bruit heard over the right carotid [Left Carotid Bruit] : no bruit heard over the left carotid [Right Femoral Bruit] : no bruit heard over the right femoral artery [Left Femoral Bruit] : no bruit heard over the left femoral artery [2+] : left 2+ [No Abnormalities] : the abdominal aorta was not enlarged and no bruit was heard [Bruit] : no bruit heard [Soft, Nontender] : the abdomen was soft and nontender [No Mass] : no masses were palpated [No HSM] : no hepatosplenomegaly noted [Normal Posterior Cervical Nodes] : no posterior cervical lymphadenopathy [Normal Anterior Cervical Nodes] : no anterior cervical lymphadenopathy [Normal Kyphosis] : normal kyphosis [No Visual Abnormalities] : no visible abnormalities [No Scoliosis] : no scoliosis [Normal Lordosis] : normal lordosis [No Tenderness to Palpation] : no spine tenderness on palpation [No Masses] : no masses [Full ROM] : full ROM [No Pain with ROM] : no pain with motion in any direction [Intact] : all reflexes within normal limits bilaterally [Normal Motor Tone] : the muscle tone was normal [Normal Station and Gait] : the gait and station were normal [Involuntary Movements] : no involuntary movements were seen [Normal] : affect was normal and insight and judgment were intact

## 2020-10-14 NOTE — HEALTH RISK ASSESSMENT
[] : No [No] : In the past 12 months have you used drugs other than those required for medical reasons? No [No falls in past year] : Patient reported no falls in the past year [0] : 2) Feeling down, depressed, or hopeless: Not at all (0) [de-identified] : Dr Miriam saleh , [facility]  [de-identified] : walking  [de-identified] : no fired grilled trying to eat healthier.

## 2020-10-16 ENCOUNTER — APPOINTMENT (OUTPATIENT)
Dept: RHEUMATOLOGY | Facility: CLINIC | Age: 35
End: 2020-10-16
Payer: MEDICAID

## 2020-10-16 VITALS
WEIGHT: 201 LBS | OXYGEN SATURATION: 99 % | SYSTOLIC BLOOD PRESSURE: 112 MMHG | BODY MASS INDEX: 40.52 KG/M2 | HEART RATE: 84 BPM | DIASTOLIC BLOOD PRESSURE: 69 MMHG | TEMPERATURE: 98.3 F | RESPIRATION RATE: 16 BRPM | HEIGHT: 59 IN

## 2020-10-16 LAB
FOLATE SERPL-MCNC: >20 NG/ML
MAGNESIUM SERPL-MCNC: 1.9 MG/DL
T PALLIDUM AB SER QL IA: NEGATIVE
VIT B12 SERPL-MCNC: 565 PG/ML

## 2020-10-16 PROCEDURE — 99213 OFFICE O/P EST LOW 20 MIN: CPT | Mod: 25

## 2020-10-16 PROCEDURE — G0009: CPT

## 2020-10-16 PROCEDURE — 90732 PPSV23 VACC 2 YRS+ SUBQ/IM: CPT

## 2020-10-18 ENCOUNTER — MED ADMIN CHARGE (OUTPATIENT)
Age: 35
End: 2020-10-18

## 2020-10-18 ENCOUNTER — RX RENEWAL (OUTPATIENT)
Age: 35
End: 2020-10-18

## 2020-10-18 NOTE — ASSESSMENT
[FreeTextEntry1] : 32 year-old female with pmh of ALMA ROSA, now with return of her symptoms with polyarthritis, highly positive DEBORAH\par \par \par PSSV 23 today\par \par 1. RA  with vasculitis - methotrexate 5 tablets -  s/p  2 cycles of Rituxan - doing well with no flares -  - s/p 4th cycle - improving symptoms - \par 2. Iron deficiency - stable levels - continue with oral replacement\par 3. right side lumbar pain -not active at this time. \par 4. chronic pain -using CBD oil\par 5. muscle cramps over the right leg - not active\par 6. PE - likely related to tubal ligation (5 days prior) -  still on eliquis - plan is to continue indefinitely given hx of clotting in her family\par 7. Right leg paresthesias - pending EMG and Brain MRI - has f/u with neurology\par \par \par I reviewed previous labs results with patients.\par Laboratory tests ordered today\par Diagnosis and Prognosis discussed\par Continue with current medications\par education provided on \par f/u after rituxan infusion\par

## 2020-10-18 NOTE — PROCEDURE
[Today's Date:] : Date: [unfilled] [Soft Tissue Injection] : soft tissue injection was performed [Patient] : the patient [Risks] : risks [Consent Obtained] : written consent was obtained prior to the procedure and is detailed in the patient's record [#1 Site: ______] : #1 site identified in the [unfilled] [Therapeutic] : therapeutic [Tolerated Well] : the patient tolerated the procedure well [No Complications] : there were no complications [Instructions Given] : handouts/patient instructions were given to patient [Patient Instructed to Call] : patient was instructed to call if redness at site, a decrease in range of motion or an increase in pain is noted after procedure. [de-identified] : PPSV 23 - 0.5 ml IM

## 2020-10-18 NOTE — HISTORY OF PRESENT ILLNESS
[___ Month(s) Ago] : [unfilled] month(s) ago [FreeTextEntry1] : just finished another cycle of rituxan - 4th\par still with morning stiffness lasting up to 45 - 60 minutes\par new onset of right leg numbness - seen by PCP and neurology - pending nerve test  11/03 and brain MRI\par this is a sudden onset - being work=-up for neuropathy X MS\par bone density is normal

## 2020-10-18 NOTE — PHYSICAL EXAM
[General Appearance - Alert] : alert [General Appearance - In No Acute Distress] : in no acute distress [Outer Ear] : the ears and nose were normal in appearance [Oropharynx] : the oropharynx was normal [Neck Appearance] : the appearance of the neck was normal [Neck Cervical Mass (___cm)] : no neck mass was observed [Jugular Venous Distention Increased] : there was no jugular-venous distention [Thyroid Nodule] : there were no palpable thyroid nodules [Thyroid Diffuse Enlargement] : the thyroid was not enlarged [Heart Rate And Rhythm] : heart rate was normal and rhythm regular [Auscultation Breath Sounds / Voice Sounds] : lungs were clear to auscultation bilaterally [Heart Sounds] : normal S1 and S2 [Murmurs] : no murmurs [Heart Sounds Gallop] : no gallops [Full Pulse] : the pedal pulses are present [Heart Sounds Pericardial Friction Rub] : no pericardial rub [Edema] : there was no peripheral edema [Bowel Sounds] : normal bowel sounds [Abdomen Soft] : soft [Abdomen Tenderness] : non-tender [Cervical Lymph Nodes Enlarged Posterior Bilaterally] : posterior cervical [Abdomen Mass (___ Cm)] : no abdominal mass palpated [Supraclavicular Lymph Nodes Enlarged Bilaterally] : supraclavicular [Cervical Lymph Nodes Enlarged Anterior Bilaterally] : anterior cervical [Abnormal Walk] : normal gait [No CVA Tenderness] : no ~M costovertebral angle tenderness [No Spinal Tenderness] : no spinal tenderness [Nail Clubbing] : no clubbing  or cyanosis of the fingernails [Motor Tone] : muscle strength and tone were normal [Involuntary Movements] : no involuntary movements were seen [Musculoskeletal - Swelling] : no joint swelling seen [Skin Color & Pigmentation] : normal skin color and pigmentation [Skin Turgor] : normal skin turgor [] : no rash [Impaired Insight] : insight and judgment were intact [Oriented To Time, Place, And Person] : oriented to person, place, and time [Affect] : the affect was normal [FreeTextEntry1] : tender joints: elbows and wrists, shoulders

## 2020-10-19 LAB
B BURGDOR AB SER-IMP: NEGATIVE
B BURGDOR IGM PATRN SER IB-IMP: NEGATIVE
B BURGDOR18KD IGG SER QL IB: NORMAL
B BURGDOR23KD IGG SER QL IB: NORMAL
B BURGDOR23KD IGM SER QL IB: NORMAL
B BURGDOR28KD IGG SER QL IB: NORMAL
B BURGDOR30KD IGG SER QL IB: NORMAL
B BURGDOR31KD IGG SER QL IB: NORMAL
B BURGDOR39KD IGG SER QL IB: PRESENT
B BURGDOR39KD IGM SER QL IB: NORMAL
B BURGDOR41KD IGG SER QL IB: PRESENT
B BURGDOR41KD IGM SER QL IB: NORMAL
B BURGDOR45KD IGG SER QL IB: NORMAL
B BURGDOR58KD IGG SER QL IB: NORMAL
B BURGDOR66KD IGG SER QL IB: NORMAL
B BURGDOR93KD IGG SER QL IB: NORMAL
VIT B2 SERPL-MCNC: 179 UG/L

## 2020-10-26 ENCOUNTER — TRANSCRIPTION ENCOUNTER (OUTPATIENT)
Age: 35
End: 2020-10-26

## 2020-11-01 NOTE — HISTORY OF PRESENT ILLNESS
[FreeTextEntry1] : Numbness in the right leg from the nee down for 3-4 months. Started on Savella for fibromyalgia

## 2020-11-01 NOTE — REVIEW OF SYSTEMS
[Feeling Poorly] : feeling poorly [Feeling Tired] : feeling tired [Recent Weight Gain (___ Lbs)] : recent [unfilled] ~Ulb weight gain [Memory Lapses or Loss] : memory loss [Decr. Concentrating Ability] : decreased concentrating ability [Difficulty with Language] : ~M difficulty with language [Leg Weakness] : leg weakness [Poor Coordination] : poor coordination [Numbness] : numbness [Tingling] : tingling [Abnormal Sensation] : an abnormal sensation [Hypersensitivity] : hypersensitivity [Arthralgias] : arthralgias [Joint Pain] : joint pain [Joint Swelling] : joint swelling [Negative] : Genitourinary [Fever] : no fever [Chills] : no chills [Recent Weight Loss (___ Lbs)] : no recent weight loss [Confused or Disoriented] : no confusion [Facial Weakness] : no facial weakness [Arm Weakness] : no arm weakness [Hand Weakness] : no hand weakness [Difficulty Writing] : no difficulty writing [Difficulties in Speech] : no speech difficulties [Seizures] : no convulsions [Dizziness] : no dizziness [Cluster Headache] : no cluster headache [Migraine Headache] : no migraine headache [Tension Headache] : no tension-type headache [Difficulty Walking] : no difficulty walking [Inability to Walk] : able to walk [Ataxia] : no ataxia [Frequent Falls] : not falling [Suicidal] : not suicidal

## 2020-11-05 ENCOUNTER — OUTPATIENT (OUTPATIENT)
Dept: OUTPATIENT SERVICES | Facility: HOSPITAL | Age: 35
LOS: 1 days | End: 2020-11-05
Payer: MEDICAID

## 2020-11-05 ENCOUNTER — APPOINTMENT (OUTPATIENT)
Dept: MRI IMAGING | Facility: HOSPITAL | Age: 35
End: 2020-11-05
Payer: MEDICAID

## 2020-11-05 DIAGNOSIS — M72.2 PLANTAR FASCIAL FIBROMATOSIS: Chronic | ICD-10-CM

## 2020-11-05 DIAGNOSIS — R20.0 ANESTHESIA OF SKIN: ICD-10-CM

## 2020-11-05 DIAGNOSIS — M35.9 SYSTEMIC INVOLVEMENT OF CONNECTIVE TISSUE, UNSPECIFIED: ICD-10-CM

## 2020-11-05 DIAGNOSIS — G83.11 MONOPLEGIA OF LOWER LIMB AFFECTING RIGHT DOMINANT SIDE: ICD-10-CM

## 2020-11-05 DIAGNOSIS — Z98.891 HISTORY OF UTERINE SCAR FROM PREVIOUS SURGERY: Chronic | ICD-10-CM

## 2020-11-05 DIAGNOSIS — Z79.899 OTHER LONG TERM (CURRENT) DRUG THERAPY: ICD-10-CM

## 2020-11-05 DIAGNOSIS — Z98.51 TUBAL LIGATION STATUS: Chronic | ICD-10-CM

## 2020-11-05 PROCEDURE — 70553 MRI BRAIN STEM W/O & W/DYE: CPT | Mod: 26

## 2020-11-05 PROCEDURE — 70553 MRI BRAIN STEM W/O & W/DYE: CPT

## 2020-11-09 ENCOUNTER — TRANSCRIPTION ENCOUNTER (OUTPATIENT)
Age: 35
End: 2020-11-09

## 2020-11-10 ENCOUNTER — TRANSCRIPTION ENCOUNTER (OUTPATIENT)
Age: 35
End: 2020-11-10

## 2020-11-11 ENCOUNTER — TRANSCRIPTION ENCOUNTER (OUTPATIENT)
Age: 35
End: 2020-11-11

## 2020-11-12 ENCOUNTER — TRANSCRIPTION ENCOUNTER (OUTPATIENT)
Age: 35
End: 2020-11-12

## 2020-11-13 ENCOUNTER — TRANSCRIPTION ENCOUNTER (OUTPATIENT)
Age: 35
End: 2020-11-13

## 2020-11-13 ENCOUNTER — RX RENEWAL (OUTPATIENT)
Age: 35
End: 2020-11-13

## 2020-11-14 ENCOUNTER — RX RENEWAL (OUTPATIENT)
Age: 35
End: 2020-11-14

## 2020-11-17 ENCOUNTER — TRANSCRIPTION ENCOUNTER (OUTPATIENT)
Age: 35
End: 2020-11-17

## 2020-11-19 ENCOUNTER — APPOINTMENT (OUTPATIENT)
Dept: NEUROLOGY | Facility: CLINIC | Age: 35
End: 2020-11-19
Payer: MEDICAID

## 2020-11-19 VITALS
HEIGHT: 59 IN | DIASTOLIC BLOOD PRESSURE: 74 MMHG | SYSTOLIC BLOOD PRESSURE: 107 MMHG | TEMPERATURE: 97.9 F | BODY MASS INDEX: 40.12 KG/M2 | OXYGEN SATURATION: 97 % | WEIGHT: 199 LBS | HEART RATE: 79 BPM

## 2020-11-19 PROCEDURE — 99214 OFFICE O/P EST MOD 30 MIN: CPT

## 2020-11-19 NOTE — HISTORY OF PRESENT ILLNESS
[FreeTextEntry1] : Today reports a stiffness in the neck that runs down the spine into the lower back, worse when in bed and awakens her form sleep. The rest of the day she is active caring for her three children at home and homemaker chores.

## 2020-11-19 NOTE — DISCUSSION/SUMMARY
[FreeTextEntry1] : There is no evidence of myelopathy/weakness. The diminished sensation on the right leg is likely radiculopathic from the right lumbar spine disease. Scheduled for NCV to distinguish radiculopathy from mononeuritis. \par Recommend topiramate for neck pain and headache and follow up. Provided a sheet of instruction to begin topiramate gradually

## 2020-11-19 NOTE — PHYSICAL EXAM
[General Appearance - Alert] : alert [General Appearance - In No Acute Distress] : in no acute distress [General Appearance - Well Nourished] : well nourished [Oriented To Time, Place, And Person] : oriented to person, place, and time [Person] : oriented to person [Place] : oriented to place [Time] : oriented to time [Short Term Intact] : short term memory intact [Remote Intact] : remote memory intact [Registration Intact] : recent registration memory intact [Span Intact] : the attention span was normal [Concentration Intact] : normal concentrating ability [Visual Intact] : visual attention was ~T not ~L decreased [Naming Objects] : no difficulty naming common objects [Repeating Phrases] : no difficulty repeating a phrase [Fluency] : fluency intact [Comprehension] : comprehension intact [Reading] : reading intact [Current Events] : adequate knowledge of current events [Vocabulary] : adequate range of vocabulary [Cranial Nerves Optic (II)] : visual acuity intact bilaterally,  visual fields full to confrontation, pupils equal round and reactive to light [Cranial Nerves Oculomotor (III)] : extraocular motion intact [Cranial Nerves Trigeminal (V)] : facial sensation intact symmetrically [Cranial Nerves Facial (VII)] : face symmetrical [Cranial Nerves Vestibulocochlear (VIII)] : hearing was intact bilaterally [Cranial Nerves Glossopharyngeal (IX)] : tongue and palate midline [Cranial Nerves Accessory (XI - Cranial And Spinal)] : head turning and shoulder shrug symmetric [Cranial Nerves Hypoglossal (XII)] : there was no tongue deviation with protrusion [Motor Strength] : muscle strength was normal in all four extremities [Sensation Vibration Decrease] : vibration was intact [Proprioception] : proprioception was intact [Tactile Decrease Entire Leg Right] : diminished right leg [Pain / Temp Decrease Entire Leg - Right] : diminished right leg [Abnormal Walk] : normal gait [Balance] : balance was intact [2+] : Ankle jerk left 2+ [Sclera] : the sclera and conjunctiva were normal [PERRL With Normal Accommodation] : pupils were equal in size, round, reactive to light, with normal accommodation [Extraocular Movements] : extraocular movements were intact [Outer Ear] : the ears and nose were normal in appearance [Oropharynx] : the oropharynx was normal [Paresis Pronator Drift Right-Sided] : no pronator drift on the right [Paresis Pronator Drift Left-Sided] : no pronator drift on the left [Motor Strength Upper Extremities Bilaterally] : strength was normal in both upper extremities [Motor Strength Lower Extremities Bilaterally] : strength was normal in both lower extremities [Allodynia] : no ~T allodynia present [Dysesthesia] : no dysesthesia [Hyperesthesia] : no hyperesthesia [Limited Balance] : balance was intact [Past-pointing] : there was no past-pointing [Tremor] : no tremor present [Dysdiadochokinesia Bilaterally] : not present [Coordination - Dysmetria Impaired Finger-to-Nose Bilateral] : not present [Plantar Reflex Right Only] : normal on the right [Plantar Reflex Left Only] : normal on the left [___] : absent on the right [___] : absent on the left [Primitive Reflexes] : primitive reflexes were absent

## 2020-11-20 ENCOUNTER — APPOINTMENT (OUTPATIENT)
Dept: PHYSICAL MEDICINE AND REHAB | Facility: CLINIC | Age: 35
End: 2020-11-20
Payer: MEDICAID

## 2020-11-20 VITALS
BODY MASS INDEX: 40.32 KG/M2 | TEMPERATURE: 98 F | HEART RATE: 82 BPM | DIASTOLIC BLOOD PRESSURE: 75 MMHG | HEIGHT: 59 IN | OXYGEN SATURATION: 100 % | SYSTOLIC BLOOD PRESSURE: 110 MMHG | WEIGHT: 200 LBS

## 2020-11-20 DIAGNOSIS — M62.830 MUSCLE SPASM OF BACK: ICD-10-CM

## 2020-11-20 PROCEDURE — 99204 OFFICE O/P NEW MOD 45 MIN: CPT

## 2020-11-20 NOTE — REVIEW OF SYSTEMS
[Insomnia] : insomnia [Anxiety] : anxiety [Negative] : ENT [FreeTextEntry2] : Fatigue [FreeTextEntry9] : widespread pain [de-identified] : Radiation of pain down arms/legs [de-identified] : history of blood clot

## 2020-11-20 NOTE — ASSESSMENT
[FreeTextEntry1] : Plan: After review of the history, physical examination, and available imaging, patient's symptoms consistent with fibromyalgia, less likely lumbar radiculopathy or facet syndrome.  Has ongoing PT and oral medications with no obvious benefits.  \par - continue with PT.  Discuss benefits of aquatherapy as well as regular aerobic exercise. \par - suggested adding Effexor - will send message to rheumatologist. \par - given persistent symptoms - cane use, and pain wakes her up from sleep - will order for lumbar MRI\par - pending EMG with neuro\par - Could consider further cervical imaging in future. \par -RTC after MRI/EMG\par \par Patient educated on red flag signs including any changes to his bowel/bladder control, groin numbness or new weakness. Patient knows to seek immediate attention by calling 911 or going to nearest ER if these symptoms appear. Patient agreed to this plan.\par

## 2020-11-20 NOTE — DATA REVIEWED
[FreeTextEntry1] : \par EXAM: C-SPINE AP LAT DENS MAX 3 VIEWS \par \par \par PROCEDURE DATE: 10/04/2018 \par \par INTERPRETATION: Cervical spine x-rays \par \par Indication: Neck pain. \par \par AP, lateral and open-mouth odontoid views of the cervical spine are acquired \par without a previous examination for comparison. \par \par Impression: No evidence for an acute fracture, facet dislocation or \par subluxation. \par \par The vertebral bodies maintain normal height. \par \par Straightening of normal cervical lordosis. \par \par The intervertebral disc spaces are within normal limits. \par \par No prevertebral soft tissue swelling. \par \par

## 2020-11-20 NOTE — PHYSICAL EXAM
[FreeTextEntry1] : PE:Done entirely with NP Sarah Caballero in room\par Constitutional: In NAD, calm and cooperative\par HEENT: NCAT, Anicteric sclera, no lid-lag\par Cardio: Extremities appear pink and well perfused, no peripheral edema\par Respiratory: Normal respiratory effort on room air, no accessory muscle use\par Skin: no rashes seen on exposed skin, no visible abrasions\par Psych: Normal affect, intact judgment and insight\par Neuro: Awake, alert and oriented x 3, see below for focused neurological exam\par MSK (low back): \par 	Inspection: no gross swelling identified\par 	Palpation: diffused generalize tenderness \par 	ROM: Pain at end cervical/lumbar extension + oblique, lumbar 1/4 flexion, neck flexion without pain\par 	Strength: 5/5 strength in bilateral upper and lower extremities\par 	Reflexes: 2+ Biceps/Brachioradialis reflex bilaterally; 2+ patella + mild quad overflow R>L, and 2+ achilles reflex bilaterally;   negative castellanos and clonus bilaterally\par 	Sensation: dec over bilateral L4/5 distribution\par 	Special tests: Spurling’s test negative bilaterally, seated slump - negative bilaterally, facet loading + bilateral to cervical and lumbar, tandem gait without instability

## 2020-11-20 NOTE — HISTORY OF PRESENT ILLNESS
[FreeTextEntry1] : Ms. OSIEL LEE  is a 35 year old female who presents with pain from her neck down her entire spine into lower back. Of note patient recently saw neurology who recommended a trial of topiramate for neck pain and her history of headaches. She also has a history of RA and fibromyalgia, currently followed by Rheumatology for which she is on rheumtological medications methotrexate and rituxan. She was previously on Savella and Cymbalta (at different times) without change in her pain. She also has a history of PE on eliquis. \par \par Location: spinal pain neck down to sacral area\par Onset: worsening of chronic pain x 18 months - worsening over past 3 months - denies any trauma or triggering event\par Provocation/Palliative: worse at night, prolong sitting; Better with movement\par Quality: lock, stiffen\par Radiation: entire arms and legs bilaterally\par Severity: now 2/10, max 10/10\par Timing: constant, but worse at night\par \par Associated symptoms: pain wakes her up from sleep,  right > left weakness - knee juanjo, no foot drag.  Numnbness to hands, dropping things, mild dexterity changes, gait imbalance w/ hx of falls - needing cane assist with ambulation.  Notes infrequent episodes of urinary incontinence but chronic as per patient. Denies any loss of bowel control or any groin numbness.\par Previous medications trialed: tried gabapentin, lyrica, and cymbalta, robaxin - all discontinued without much help, has also tried oral steroid taper last year\par Previous procedures relevant to complaint: no previous injections or surgery for this pain\par Has tried conservative treatment?: ongoing PT\par

## 2020-11-22 ENCOUNTER — TRANSCRIPTION ENCOUNTER (OUTPATIENT)
Age: 35
End: 2020-11-22

## 2020-11-24 ENCOUNTER — TRANSCRIPTION ENCOUNTER (OUTPATIENT)
Age: 35
End: 2020-11-24

## 2020-11-24 ENCOUNTER — NON-APPOINTMENT (OUTPATIENT)
Age: 35
End: 2020-11-24

## 2020-12-11 ENCOUNTER — APPOINTMENT (OUTPATIENT)
Dept: RHEUMATOLOGY | Facility: CLINIC | Age: 35
End: 2020-12-11
Payer: MEDICAID

## 2020-12-11 VITALS
WEIGHT: 200 LBS | TEMPERATURE: 97.7 F | HEART RATE: 105 BPM | HEIGHT: 59 IN | OXYGEN SATURATION: 95 % | SYSTOLIC BLOOD PRESSURE: 121 MMHG | BODY MASS INDEX: 40.32 KG/M2 | DIASTOLIC BLOOD PRESSURE: 63 MMHG | RESPIRATION RATE: 16 BRPM

## 2020-12-11 DIAGNOSIS — M54.9 DORSALGIA, UNSPECIFIED: ICD-10-CM

## 2020-12-11 PROCEDURE — 99072 ADDL SUPL MATRL&STAF TM PHE: CPT

## 2020-12-11 PROCEDURE — 99214 OFFICE O/P EST MOD 30 MIN: CPT

## 2020-12-11 RX ORDER — METHOCARBAMOL 500 MG/1
500 TABLET, FILM COATED ORAL
Qty: 30 | Refills: 0 | Status: DISCONTINUED | COMMUNITY
Start: 2020-11-12 | End: 2020-12-11

## 2020-12-11 NOTE — PHYSICAL EXAM
[General Appearance - Alert] : alert [General Appearance - In No Acute Distress] : in no acute distress [Outer Ear] : the ears and nose were normal in appearance [Oropharynx] : the oropharynx was normal [Neck Appearance] : the appearance of the neck was normal [Neck Cervical Mass (___cm)] : no neck mass was observed [Jugular Venous Distention Increased] : there was no jugular-venous distention [Thyroid Diffuse Enlargement] : the thyroid was not enlarged [Thyroid Nodule] : there were no palpable thyroid nodules [Auscultation Breath Sounds / Voice Sounds] : lungs were clear to auscultation bilaterally [Heart Rate And Rhythm] : heart rate was normal and rhythm regular [Heart Sounds] : normal S1 and S2 [Heart Sounds Gallop] : no gallops [Murmurs] : no murmurs [Heart Sounds Pericardial Friction Rub] : no pericardial rub [Full Pulse] : the pedal pulses are present [Edema] : there was no peripheral edema [Bowel Sounds] : normal bowel sounds [Abdomen Soft] : soft [Abdomen Tenderness] : non-tender [Abdomen Mass (___ Cm)] : no abdominal mass palpated [Cervical Lymph Nodes Enlarged Posterior Bilaterally] : posterior cervical [Cervical Lymph Nodes Enlarged Anterior Bilaterally] : anterior cervical [Supraclavicular Lymph Nodes Enlarged Bilaterally] : supraclavicular [No CVA Tenderness] : no ~M costovertebral angle tenderness [No Spinal Tenderness] : no spinal tenderness [Abnormal Walk] : normal gait [Nail Clubbing] : no clubbing  or cyanosis of the fingernails [Involuntary Movements] : no involuntary movements were seen [Musculoskeletal - Swelling] : no joint swelling seen [Motor Tone] : muscle strength and tone were normal [Skin Color & Pigmentation] : normal skin color and pigmentation [Skin Turgor] : normal skin turgor [] : no rash [Oriented To Time, Place, And Person] : oriented to person, place, and time [Impaired Insight] : insight and judgment were intact [Affect] : the affect was normal [FreeTextEntry1] : right shoulder tenderness to palpation and movement

## 2020-12-11 NOTE — ASSESSMENT
[FreeTextEntry1] : 32 year-old female with pmh of ALMA ROSA, now with return of her symptoms with polyarthritis, highly positive DEBORAH\par \par 1. RA  with vasculitis - methotrexate 5 tablets -   s/p 3 cycles of rituxan - no swelling or tenderness on exam\par 2. Iron deficiency - stable levels - continue with oral replacement\par 3. chronic back pain - ongoing work-up by PMR x-rays with degenerative changes and mild scoliosis - felt to groggy on methocarbamol - cannot take any anti-inflammatory because of eliquis\par 4. chronic pain -pending medical marijuana\par 5. muscle cramps over the right leg - not active\par 6. PE - likely related to tubal ligation (5 days prior) -  still on eliquis - plan is to continue indefinitely given hx of clotting in her family\par 7. Right leg paresthesias - pending EMG on 12/17\par \par \par I reviewed previous labs results with patients.\par Diagnosis and Prognosis discussed\par Continue with current medications\par education provided on \par f/\par \par

## 2020-12-11 NOTE — HISTORY OF PRESENT ILLNESS
[___ Month(s) Ago] : [unfilled] month(s) ago [FreeTextEntry1] : seen by PMR - pending MRI of the lumbar spine today\par pain is worse when at rest -  \par mild scoliosis os x-rays and mild degenerative changes\par mild stiffness in the morning. \par last Rituxan in 10/2020

## 2020-12-15 ENCOUNTER — TRANSCRIPTION ENCOUNTER (OUTPATIENT)
Age: 35
End: 2020-12-15

## 2020-12-16 ENCOUNTER — TRANSCRIPTION ENCOUNTER (OUTPATIENT)
Age: 35
End: 2020-12-16

## 2020-12-22 ENCOUNTER — TRANSCRIPTION ENCOUNTER (OUTPATIENT)
Age: 35
End: 2020-12-22

## 2020-12-27 ENCOUNTER — RX RENEWAL (OUTPATIENT)
Age: 35
End: 2020-12-27

## 2021-01-14 ENCOUNTER — APPOINTMENT (OUTPATIENT)
Dept: INTERNAL MEDICINE | Facility: CLINIC | Age: 36
End: 2021-01-14
Payer: MEDICAID

## 2021-01-14 ENCOUNTER — LABORATORY RESULT (OUTPATIENT)
Age: 36
End: 2021-01-14

## 2021-01-14 VITALS
WEIGHT: 200 LBS | DIASTOLIC BLOOD PRESSURE: 80 MMHG | OXYGEN SATURATION: 98 % | TEMPERATURE: 97.3 F | HEART RATE: 97 BPM | SYSTOLIC BLOOD PRESSURE: 124 MMHG | HEIGHT: 59 IN | BODY MASS INDEX: 40.32 KG/M2

## 2021-01-14 DIAGNOSIS — Z86.79 PERSONAL HISTORY OF OTHER DISEASES OF THE CIRCULATORY SYSTEM: ICD-10-CM

## 2021-01-14 DIAGNOSIS — G83.11 MONOPLEGIA OF LOWER LIMB AFFECTING RIGHT DOMINANT SIDE: ICD-10-CM

## 2021-01-14 DIAGNOSIS — I26.99 OTHER PULMONARY EMBOLISM W/OUT ACUTE COR PULMONALE: ICD-10-CM

## 2021-01-14 DIAGNOSIS — R20.0 ANESTHESIA OF SKIN: ICD-10-CM

## 2021-01-14 DIAGNOSIS — Z87.898 PERSONAL HISTORY OF OTHER SPECIFIED CONDITIONS: ICD-10-CM

## 2021-01-14 PROCEDURE — 99215 OFFICE O/P EST HI 40 MIN: CPT

## 2021-01-14 PROCEDURE — 99072 ADDL SUPL MATRL&STAF TM PHE: CPT

## 2021-01-14 NOTE — HEALTH RISK ASSESSMENT
[] : No [No] : No [No falls in past year] : Patient reported no falls in the past year [0] : 2) Feeling down, depressed, or hopeless: Not at all (0) [de-identified] : rheum  Dr lopez and [place] bryan Dr  [de-identified] : exercise regularly  [de-identified] : low carb portion control

## 2021-01-14 NOTE — PHYSICAL EXAM
[Normal Sclera/Conjunctiva] : normal sclera/conjunctiva [PERRL] : pupils equal round and reactive to light [Normal Oropharynx] : the oropharynx was normal [Normal TMs] : both tympanic membranes were normal [No JVD] : no jugular venous distention [Supple] : supple [Normal Rate] : normal rate  [Regular Rhythm] : with a regular rhythm [Normal S1, S2] : normal S1 and S2 [No Edema] : there was no peripheral edema [No Extremity Clubbing/Cyanosis] : no extremity clubbing/cyanosis [Normal Posterior Cervical Nodes] : no posterior cervical lymphadenopathy [Normal Anterior Cervical Nodes] : no anterior cervical lymphadenopathy [Normal] : affect was normal and insight and judgment were intact

## 2021-01-14 NOTE — HISTORY OF PRESENT ILLNESS
[FreeTextEntry1] : oral thrush  arthritis dysphagia  [de-identified] : She states her back pain is worse at night and has numbness  in her  legs  She has seen neurologist and has testing for nerve conduction .  She states her  ra is under control and feeling less pain.  She saw ortho Dr Pineda and was referred for pt and she started it and it has improved.   She still feels she has dysphagia and occurs with solids not liquids and occurs intermittently.  she has not lost weight and doesn’t have reflux of acid.  No black stools  No allergies It is likely related to her auto immune disease   .  No diarrhea or constipation not taking any nsaids .

## 2021-01-14 NOTE — COUNSELING
[Sleep ___ hours/day] : Sleep [unfilled] hours/day [Engage in a relaxing activity] : Engage in a relaxing activity [Plan in advance] : Plan in advance [Potential consequences of obesity discussed] : Potential consequences of obesity discussed [Benefits of weight loss discussed] : Benefits of weight loss discussed [Structured Weight Management Program suggested:] : Structured weight management program suggested [Encouraged to maintain food diary] : Encouraged to maintain food diary [Encouraged to increase physical activity] : Encouraged to increase physical activity [Encouraged to use exercise tracking device] : Encouraged to use exercise tracking device [FreeTextEntry1] : low car b portion control

## 2021-01-14 NOTE — ASSESSMENT
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  i will refer her back to hematologist and also pulmonary for evaluation . \par 2 paresthesia    mri of brain was normal and is having nerve conduction testing done  She is to fu with Dr Qiu\par 3. arthritis improved with immunotherapy and will continue to fu and understands risks of  immunotherapy.  \par 4.  dysphagia  modifeid barium swallow and esophagram to be done  and if  needed egd.  She will have hpylori testing as well and  food allergies tested for  A barium X-ray – For this test, you drink a thick liquid called a "barium solution." It coats the inside of your esophagus. The barium shows up on X-rays, so the doctor can see any problems in the esophagus.\par \par \par ?A swallowing study – For this test, you eat different foods coated with barium. X-rays taken throughout the test show if you have problems with the muscles in your mouth or throat. This test is also called a "videofluoroscopy."\par \par \par ?Upper endoscopy – For this test, the doctor puts a thin, flexible tube into your mouth, down your throat, and into your esophagus. The tube (called an endoscope) has a camera and a light on it, so it allows the doctor to see inside the esophagus.\par \par \par ?Manometry – This test measures the pressure at different places inside your esophagus. To do this, a small tube that senses pressure is inserted through your nose, down your throat, and into your esophagus. The results of the test can tell your doctor how well the muscles that help you swallow are working.\par \par \par \par How is dysphagia treated?\par The treatment depends on what is causing the dysphagia.\par \par If you have dysphagia because of a problem in your mouth and the upper part of your throat, your doctor might refer you to a speech or swallowing specialist. This person can teach you exercises to help you swallow, and might also suggest ways to modify your diet.\par \par If the problem affects your esophagus, treatments can include:\par \par ?Esophageal dilation – For this procedure, the doctor uses an endoscope (see above) with a special balloon on the end to gently stretch and widen your esophagus.\par \par \par ?Surgery – Doctors can do surgery to remove any tumors or other abnormal tissue in the esophagus.\par \par \par ?Medicines – Medicines used to treat dysphagia include:\par \par \par •Medicines that reduce stomach acid, such as proton pump inhibitors (table 1)\par \par \par •Medicines to treat an infection of the esophagus.\par 5 bmi 40   risks of obesity and need for diet and eating healthy Obesity is a complex disorder involving an excessive amount of body fat. Obesity isn't just a cosmetic concern. It increases your risk of diseases and health problems, such as heart disease, diabetes and high blood pressure.\par \par Being extremely obese means you are especially likely to have health problems related to your weight.\par \par \par The good news is that even modest weight loss can improve or prevent the health problems associated with obesity. Dietary changes, increased physical activity and behavior changes can help you lose weight. Prescription medications and weight-loss surgery are additional options for treating obesity.\par \par \par \par \par Symptoms\par \par Obesity is diagnosed when your body mass index (BMI) is 30 or higher. Your body mass index is calculated by dividing your weight in kilograms (kg) by your height in meters (m) squared. \par \par \par BMI\par \par Weight status\par \par \par Below 18.5 Underweight \par 18.5-24.9 Normal \par 25.0-29.9 Overweight \par 30.0-34.9 Obese (Class I) \par 35.0-39.9 Obese (Class II) \par 40.0 and higher Extreme obesity (Class III) \par \par For most people, BMI provides a reasonable estimate of body fat. However, BMI doesn't directly measure body fat, so some people, such as muscular athletes, may have a BMI in the obese category even though they don't have excess body fat. Ask your doctor if your BMI is a problem. \par \par When to see a doctor\par \par If you think you may be obese, and especially if you're concerned about weight-related health problems, see your doctor or health care provider. You and your provider can evaluate your health risks and discuss your weight-loss options. \par \par Request an Appointment at [facility]\par \par Causes\par \par Although there are genetic, behavioral and hormonal influences on body weight, obesity occurs when you take in more calories than you burn through exercise and normal daily activities. Your body stores these excess calories as fat.\par \par Obesity can sometimes be traced to a medical cause, such as Prader-Willi syndrome, Cushing's syndrome, and other diseases and conditions. However, these disorders are rare and, in general, the principal causes of obesity are:\par •Inactivity. If you're not very active, you don't burn as many calories. With a sedentary lifestyle, you can easily take in more calories every day than you use through exercise and normal daily activities.\par •Unhealthy diet and eating habits. Weight gain is inevitable if you regularly eat more calories than you burn. And most Americans' diets are too high in calories and are full of fast food and high-calorie beverages.\par \par Risk factors\par \par Obesity usually results from a combination of causes and contributing factors, including:\par •Genetics. Your genes may affect the amount of body fat you store, and where that fat is distributed. Genetics may also play a role in how efficiently your body converts food into energy and how your body burns calories during exercise.\par •Family lifestyle. Obesity tends to run in families. If one or both of your parents are obese, your risk of being obese is increased. That's not just because of genetics. Family members tend to share similar eating and activity habits.\par •Inactivity. If you're not very active, you don't burn as many calories. With a sedentary lifestyle, you can easily take in more calories every day than you burn through exercise and routine daily activities. Having medical problems, such as arthritis, can lead to decreased activity, which contributes to weight gain.\par •Unhealthy diet. A diet that's high in calories, lacking in fruits and vegetables, full of fast food, and laden with high-calorie beverages and oversized portions contributes to weight gain.\par •Medical problems. In some people, obesity can be traced to a medical cause, such as Prader-Willi syndrome, Cushing's syndrome and other conditions. Medical problems, such as arthritis, also can lead to decreased activity, which may result in weight gain.\par •Certain medications. Some medications can lead to weight gain if you don't compensate through diet or activity. These medications include some antidepressants, anti-seizure medications, diabetes medications, antipsychotic medications, steroids and beta blockers.\par •Social and economic issues. Research has linked social and economic factors to obesity. Avoiding obesity is difficult if you don't have safe areas to exercise. Similarly, you may not have been taught healthy ways of cooking, or you may not have money to buy healthier foods. In addition, the people you spend time with may influence your weight — you're more likely to become obese if you have obese friends or relatives.\par •Age. Obesity can occur at any age, even in young children. But as you age, hormonal changes and a less active lifestyle increase your risk of obesity. In addition, the amount of muscle in your body tends to decrease with age. This lower muscle mass leads to a decrease in metabolism. These changes also reduce calorie needs, and can make it harder to keep off excess weight. If you don't consciously control what you eat and become more physically active as you age, you'll likely gain weight.\par •Pregnancy. During pregnancy, a woman's weight necessarily increases. Some women find this weight difficult to lose after the baby is born. This weight gain may contribute to the development of obesity in women.\par •Quitting smoking. Quitting smoking is often associated with weight gain. And for some, it can lead to enough weight gain that the person becomes obese. In the long run, however, quitting smoking is still a greater benefit to your health than continuing to smoke.\par •Lack of sleep. Not getting enough sleep or getting too much sleep can cause changes in hormones that increase your appetite. You may also crave foods high in calories and carbohydrates, which can contribute to weight gain.\par \par Even if you have one or more of these risk factors, it doesn't mean that you're destined to become obese. You can counteract most risk factors through diet, physical activity and exercise, and behavior changes.\par \par Complications\par \par If you're obese, you're more likely to develop a number of potentially serious health problems, including:\par •High triglycerides and low high-density lipoprotein (HDL) cholesterol\par •Type 2 diabetes\par •High blood pressure\par •Metabolic syndrome — a combination of high blood sugar, high blood pressure, high triglycerides and low HDL cholesterol\par •Heart disease\par •Stroke\par •Cancer, including cancer of the uterus, cervix, endometrium, ovaries, breast, colon, rectum, esophagus, liver, gallbladder, pancreas, kidney and prostate\par •Breathing disorders, including sleep apnea, a potentially serious sleep disorder in which breathing repeatedly stops and starts\par •Gallbladder disease\par •Gynecological problems, such as infertility and irregular periods\par •Erectile dysfunction and sexual health issues\par •Nonalcoholic fatty liver disease, a condition in which fat builds up in the liver and can cause inflammation or scarring\par •Osteoarthritis\par \par Quality of life\par \par When you're obese, your overall quality of life may be diminished. You may not be able to do things you used to do, such as participating in enjoyable activities. You may avoid public places. Obese people may even encounter discrimination.\par \par Other weight-related issues that may affect your quality of life include:\par •Depression\par •Disability\par •Sexual problems\par •Shame and guilt\par •Social isolation\par •Lower work achievement\par \par Prevention\par \par Whether you're at risk of becoming obese, currently overweight or at a healthy weight, you can take steps to prevent unhealthy weight gain and related health problems. Not surprisingly, the steps to prevent weight gain are the same as the steps to lose weight: daily exercise, a healthy diet, and a long-term commitment to watch what you eat and drink.\par •Exercise regularly. You need to get 150 to 300 minutes of moderate-intensity activity a week to prevent weight gain. Moderately intense physical activities include fast walking and swimming.\par •Follow a healthy eating plan. Focus on low-calorie, nutrient-dense foods, such as fruits, veg\par 6. Asthma is believed to be caused by inherited (genetic) and environmental factor, but its exact cause is unknown. Asthma may be triggered by allergens, lung infections, or irritants in the air. Asthma triggers are different for each person \par -Inhaler technique reviewed as well as oral hygiene techniques reviewed with patient. Avoidance of cold air, extremes of temperature, rescue inhaler should be used before exercise. Order of medication reviewed with patient. Recommended use of a cool mist humidifier in the bedroom. \par  \par \par \par \par

## 2021-01-19 LAB
A ALTERNATA IGE QN: <0.1 KUA/L
A FUMIGATUS IGE QN: <0.1 KUA/L
BARLEY IGE QN: <0.1 KUA/L
BERMUDA GRASS IGE QN: <0.1 KUA/L
BOXELDER IGE QN: <0.1 KUA/L
C HERBARUM IGE QN: <0.1 KUA/L
CALIF WALNUT IGE QN: <0.1 KUA/L
CAT DANDER IGE QN: <0.1 KUA/L
CHERRY IGE QN: <0.1 KUA/L
CMN PIGWEED IGE QN: <0.1 KUA/L
COMMON RAGWEED IGE QN: <0.1 KUA/L
COTTONWOOD IGE QN: <0.1 KUA/L
COW MILK IGE QN: <0.1 KUA/L
CRAB IGE QN: <0.1 KUA/L
D FARINAE IGE QN: 3.39 KUA/L
D PTERONYSS IGE QN: 2.82 KUA/L
DEPRECATED A ALTERNATA IGE RAST QL: 0
DEPRECATED A FUMIGATUS IGE RAST QL: 0
DEPRECATED BARLEY IGE RAST QL: 0
DEPRECATED BERMUDA GRASS IGE RAST QL: 0
DEPRECATED BOXELDER IGE RAST QL: 0
DEPRECATED C HERBARUM IGE RAST QL: 0
DEPRECATED CAT DANDER IGE RAST QL: 0
DEPRECATED CHERRY IGE RAST QL: 0
DEPRECATED COMMON PIGWEED IGE RAST QL: 0
DEPRECATED COMMON RAGWEED IGE RAST QL: 0
DEPRECATED COTTONWOOD IGE RAST QL: 0
DEPRECATED COW MILK IGE RAST QL: 0
DEPRECATED CRAB IGE RAST QL: 0
DEPRECATED D FARINAE IGE RAST QL: 2
DEPRECATED D PTERONYSS IGE RAST QL: 2
DEPRECATED DOG DANDER IGE RAST QL: 0
DEPRECATED EGG WHITE IGE RAST QL: 0
DEPRECATED GOOSEFOOT IGE RAST QL: 0
DEPRECATED LONDON PLANE IGE RAST QL: 0
DEPRECATED MUGWORT IGE RAST QL: 0
DEPRECATED OAT IGE RAST QL: 0
DEPRECATED P NOTATUM IGE RAST QL: 0
DEPRECATED PEANUT IGE RAST QL: 0
DEPRECATED RED CEDAR IGE RAST QL: 0
DEPRECATED ROACH IGE RAST QL: 0
DEPRECATED RYE IGE RAST QL: 0
DEPRECATED SHEEP SORREL IGE RAST QL: 0
DEPRECATED SILVER BIRCH IGE RAST QL: 0
DEPRECATED SOYBEAN IGE RAST QL: 0
DEPRECATED TIMOTHY IGE RAST QL: 0
DEPRECATED WHEAT IGE RAST QL: 0
DEPRECATED WHITE ASH IGE RAST QL: 0
DEPRECATED WHITE OAK IGE RAST QL: 0
DOG DANDER IGE QN: <0.1 KUA/L
EGG WHITE IGE QN: <0.1 KUA/L
GOOSEFOOT IGE QN: <0.1 KUA/L
LONDON PLANE IGE QN: <0.1 KUA/L
MUGWORT IGE QN: <0.1 KUA/L
MULBERRY (T70) CLASS: 0
MULBERRY (T70) CONC: <0.1 KUA/L
OAT IGE QN: <0.1 KUA/L
P NOTATUM IGE QN: <0.1 KUA/L
PEANUT IGE QN: <0.1 KUA/L
RED CEDAR IGE QN: <0.1 KUA/L
ROACH IGE QN: <0.1 KUA/L
RYE IGE QN: <0.1 KUA/L
SHEEP SORREL IGE QN: <0.1 KUA/L
SILVER BIRCH IGE QN: <0.1 KUA/L
SOYBEAN IGE QN: <0.1 KUA/L
TIMOTHY IGE QN: <0.1 KUA/L
TOTAL IGE SMQN RAST: 34 KU/L
TREE ALLERG MIX1 IGE QL: 0
WHEAT IGE QN: <0.1 KUA/L
WHITE ASH IGE QN: <0.1 KUA/L
WHITE ELM IGE QN: 0
WHITE ELM IGE QN: <0.1 KUA/L
WHITE OAK IGE QN: <0.1 KUA/L

## 2021-01-20 ENCOUNTER — LABORATORY RESULT (OUTPATIENT)
Age: 36
End: 2021-01-20

## 2021-01-21 ENCOUNTER — APPOINTMENT (OUTPATIENT)
Dept: OBGYN | Facility: CLINIC | Age: 36
End: 2021-01-21
Payer: MEDICAID

## 2021-01-21 VITALS — DIASTOLIC BLOOD PRESSURE: 74 MMHG | BODY MASS INDEX: 41.2 KG/M2 | SYSTOLIC BLOOD PRESSURE: 126 MMHG | WEIGHT: 204 LBS

## 2021-01-21 DIAGNOSIS — Z01.419 ENCOUNTER FOR GYNECOLOGICAL EXAMINATION (GENERAL) (ROUTINE) W/OUT ABNORMAL FINDINGS: ICD-10-CM

## 2021-01-21 PROCEDURE — 99395 PREV VISIT EST AGE 18-39: CPT

## 2021-01-21 PROCEDURE — 99072 ADDL SUPL MATRL&STAF TM PHE: CPT

## 2021-01-21 NOTE — HISTORY OF PRESENT ILLNESS
[FreeTextEntry1] : here for annual\par complains of hot flashes/ fam hx with early menopause\par hx PE / on anticoagulation for life\par hx vasculitis \par obesity \par

## 2021-02-23 ENCOUNTER — APPOINTMENT (OUTPATIENT)
Dept: INTERNAL MEDICINE | Facility: CLINIC | Age: 36
End: 2021-02-23

## 2021-03-08 ENCOUNTER — APPOINTMENT (OUTPATIENT)
Dept: NEUROLOGY | Facility: CLINIC | Age: 36
End: 2021-03-08
Payer: MEDICAID

## 2021-03-11 ENCOUNTER — APPOINTMENT (OUTPATIENT)
Dept: OTOLARYNGOLOGY | Facility: CLINIC | Age: 36
End: 2021-03-11
Payer: MEDICAID

## 2021-03-11 ENCOUNTER — OUTPATIENT (OUTPATIENT)
Dept: OUTPATIENT SERVICES | Facility: HOSPITAL | Age: 36
LOS: 1 days | Discharge: HOME | End: 2021-03-11
Payer: MEDICAID

## 2021-03-11 VITALS — HEIGHT: 59 IN | SYSTOLIC BLOOD PRESSURE: 122 MMHG | DIASTOLIC BLOOD PRESSURE: 83 MMHG | HEART RATE: 76 BPM

## 2021-03-11 DIAGNOSIS — M72.2 PLANTAR FASCIAL FIBROMATOSIS: Chronic | ICD-10-CM

## 2021-03-11 DIAGNOSIS — Z98.891 HISTORY OF UTERINE SCAR FROM PREVIOUS SURGERY: Chronic | ICD-10-CM

## 2021-03-11 DIAGNOSIS — Z98.51 TUBAL LIGATION STATUS: Chronic | ICD-10-CM

## 2021-03-11 PROCEDURE — 99072 ADDL SUPL MATRL&STAF TM PHE: CPT

## 2021-03-11 PROCEDURE — 69210 REMOVE IMPACTED EAR WAX UNI: CPT

## 2021-03-11 PROCEDURE — 99214 OFFICE O/P EST MOD 30 MIN: CPT | Mod: 25

## 2021-03-11 PROCEDURE — 31579 LARYNGOSCOPY TELESCOPIC: CPT

## 2021-03-11 RX ORDER — OXYCODONE AND ACETAMINOPHEN 5; 325 MG/1; MG/1
5-325 TABLET ORAL
Qty: 5 | Refills: 0 | Status: COMPLETED | COMMUNITY
Start: 2020-09-15

## 2021-03-11 NOTE — REASON FOR VISIT
[Subsequent Evaluation] : a subsequent evaluation for [FreeTextEntry2] : follow up dysphagia and dysphonia.

## 2021-03-11 NOTE — HISTORY OF PRESENT ILLNESS
[de-identified] : 35 year female follow up dysphagia and dysphonia. Swallowing has worsened since last visit. Reports have painful white sores in mouth, tongue numbness since 09/2020 making it difficult to eat and speak. Reports globus sensation. Reports dysphagia, odynophagia with solids and liquids. Reports still has hoarseness, with occasional loss of voice. Denies hemoptysis. Continues to take famotidine daily. Used nystatin as prescribed with immediate relief, but then reucrred. Has not had MBS. Takes famotidine once per day, qOD.\par \par

## 2021-03-11 NOTE — PHYSICAL EXAM
[Midline] : trachea located in midline position [Laryngoscopy Performed] : laryngoscopy was performed, see procedure section for findings [Normal] : no rashes [de-identified] : moderately raspy and breathy

## 2021-03-11 NOTE — CONSULT LETTER
[Dear  ___] : Dear  [unfilled], [Consult Letter:] : I had the pleasure of evaluating your patient, [unfilled]. [Please see my note below.] : Please see my note below. [Consult Closing:] : Thank you very much for allowing me to participate in the care of this patient.  If you have any questions, please do not hesitate to contact me. [Sincerely,] : Sincerely, [FreeTextEntry3] : Ashok Davison MD, PhD\par Chief, Division of Laryngology\par Department of Otolaryngology\par [facility]\par Pediatric Otolaryngology, [facility]\par  of Otolaryngology\par [facility] [employer] [facility] [employer]\par

## 2021-03-12 ENCOUNTER — APPOINTMENT (OUTPATIENT)
Dept: RHEUMATOLOGY | Facility: CLINIC | Age: 36
End: 2021-03-12
Payer: MEDICAID

## 2021-03-12 VITALS
BODY MASS INDEX: 41.12 KG/M2 | TEMPERATURE: 98.1 F | HEART RATE: 70 BPM | WEIGHT: 204 LBS | DIASTOLIC BLOOD PRESSURE: 80 MMHG | HEIGHT: 59 IN | SYSTOLIC BLOOD PRESSURE: 117 MMHG | RESPIRATION RATE: 16 BRPM | OXYGEN SATURATION: 98 %

## 2021-03-12 PROCEDURE — 99072 ADDL SUPL MATRL&STAF TM PHE: CPT

## 2021-03-12 PROCEDURE — 99214 OFFICE O/P EST MOD 30 MIN: CPT

## 2021-03-14 NOTE — ASSESSMENT
[FreeTextEntry1] : 32 year-old female with pmh of ALMA ROSA, now with return of her symptoms with polyarthritis, highly positive DEBORAH\par \par 1. RA  with vasculitis - methotrexate 5 tablets -   s/p 3 cycles of rituxan -mild joint pain - pending new Rituxan infusion - but currently has thrush - will hold until clear by ENT - increase methotrexate to 6 tablet daily\par 3. chronic back pain - ongoing work-up by PMR x-rays with degenerative changes and mild scoliosis -  send for PT\par 4. chronic pain - on medical marijuana\par 5. muscle cramps over the right leg - not active\par 6. PE - likely related to tubal ligation (5 days prior) - chronic eliquis - given hx of clotting in her family\par 7. Right leg paresthesias - pending EMG  04/2021\par \par \par I reviewed previous labs results with patients.\par Laboratory tests ordered today\par Diagnosis and Prognosis discussed\par Continue with current medications\par medications refilled\par education provided on\par F/u 2 months\par

## 2021-03-14 NOTE — PHYSICAL EXAM
[General Appearance - Alert] : alert [General Appearance - In No Acute Distress] : in no acute distress [Outer Ear] : the ears and nose were normal in appearance [Oropharynx] : the oropharynx was normal [Neck Appearance] : the appearance of the neck was normal [Neck Cervical Mass (___cm)] : no neck mass was observed [Jugular Venous Distention Increased] : there was no jugular-venous distention [Thyroid Diffuse Enlargement] : the thyroid was not enlarged [Thyroid Nodule] : there were no palpable thyroid nodules [Auscultation Breath Sounds / Voice Sounds] : lungs were clear to auscultation bilaterally [Heart Rate And Rhythm] : heart rate was normal and rhythm regular [Heart Sounds] : normal S1 and S2 [Heart Sounds Gallop] : no gallops [Murmurs] : no murmurs [Heart Sounds Pericardial Friction Rub] : no pericardial rub [Full Pulse] : the pedal pulses are present [Edema] : there was no peripheral edema [Bowel Sounds] : normal bowel sounds [Abdomen Soft] : soft [Abdomen Tenderness] : non-tender [Abdomen Mass (___ Cm)] : no abdominal mass palpated [Cervical Lymph Nodes Enlarged Posterior Bilaterally] : posterior cervical [Cervical Lymph Nodes Enlarged Anterior Bilaterally] : anterior cervical [Supraclavicular Lymph Nodes Enlarged Bilaterally] : supraclavicular [No CVA Tenderness] : no ~M costovertebral angle tenderness [No Spinal Tenderness] : no spinal tenderness [Abnormal Walk] : normal gait [Nail Clubbing] : no clubbing  or cyanosis of the fingernails [Involuntary Movements] : no involuntary movements were seen [Musculoskeletal - Swelling] : no joint swelling seen [Motor Tone] : muscle strength and tone were normal [Skin Color & Pigmentation] : normal skin color and pigmentation [Skin Turgor] : normal skin turgor [] : no rash [Oriented To Time, Place, And Person] : oriented to person, place, and time [Impaired Insight] : insight and judgment were intact [Affect] : the affect was normal [FreeTextEntry1] : no tender joints - no overt synovitis

## 2021-03-14 NOTE — HISTORY OF PRESENT ILLNESS
[___ Month(s) Ago] : [unfilled] month(s) ago [FreeTextEntry1] : ongoing thrush - now on nystatin again - considering diflucan if no improvement - being monitored by ENT\par mild joint pain\par - starting" to feel her joints" - no swelling - or stiffness\par ongoing back pain - with right leg radiculopathy

## 2021-03-19 ENCOUNTER — APPOINTMENT (OUTPATIENT)
Dept: PHYSICAL MEDICINE AND REHAB | Facility: CLINIC | Age: 36
End: 2021-03-19
Payer: MEDICAID

## 2021-03-19 VITALS
OXYGEN SATURATION: 100 % | SYSTOLIC BLOOD PRESSURE: 131 MMHG | TEMPERATURE: 97.3 F | HEART RATE: 73 BPM | DIASTOLIC BLOOD PRESSURE: 80 MMHG

## 2021-03-19 DIAGNOSIS — M60.9 MYOSITIS, UNSPECIFIED: ICD-10-CM

## 2021-03-19 PROCEDURE — 99213 OFFICE O/P EST LOW 20 MIN: CPT | Mod: 25

## 2021-03-19 PROCEDURE — 20552 NJX 1/MLT TRIGGER POINT 1/2: CPT

## 2021-03-19 PROCEDURE — 99072 ADDL SUPL MATRL&STAF TM PHE: CPT

## 2021-03-19 NOTE — HISTORY OF PRESENT ILLNESS
[FreeTextEntry1] : Ms. OSIEL LEE is a 35 year old  female who presents for follow up. At last visit, she was continued on PT, ordered an MRI L Spine, she is pending an EMG in 4/2021. She is currently on Rituxin, Methotrexate and medical marijuana. \par \par Location: Located more in the right lower back at this time\par Onset: worsening of chronic pain x 18 months - worsening over past 3 months - denies any trauma or triggering event\par Provocation/Palliative: worse at night, prolong sitting; Better with movement\par Quality: lock, stiffen\par Radiation: Previously more in entire arms and legs bilaterally, but now only occasional numbness in RLE. Denies shooting pain from back into leg\par Severity: 10/10 pain\par Timing: constant, but worse at night\par \par No bowel/bladder changes. No groin numbness. \par \par Previous medications trialed: tried gabapentin, lyrica, and cymbalta, robaxin - all discontinued without much help, has also tried oral steroid taper last year

## 2021-03-19 NOTE — PHYSICAL EXAM
[FreeTextEntry1] : PE:Entire PE done with NIDIA Jackman in room\par Constitutional: In NAD, calm and cooperative\par HEENT: NCAT, Anicteric sclera, no lid-lag\par Cardio: Extremities appear pink and well perfused, no peripheral edema\par Respiratory: Normal respiratory effort on room air, no accessory muscle use\par Skin: no rashes seen on exposed skin, no visible abrasions\par Psych: Normal affect, intact judgment and insight\par Neuro: Awake, alert and oriented x 3, see below for focused neurological exam\par MSK (Back)\par 	Inspection: no gross swelling identified\par 	Palpation: Tenderness of the right lower lumbar paraspinals, Trigger point over R lower lumbar paraspinals\par 	ROM: Pain with minimal lumbar extension/flexion\par 	Strength: Grossly 4/5 strength in bilateral lower extremities due to poor effort\par 	Reflexes: 2+ Patella reflex bilaterally, 2+ Achilles reflex bilaterally, negative clonus bilaterally\par Special tests:\par SLR:equivocal on right\par ROYA:positive bilaterally\par FADIR: positive bilaterally

## 2021-03-19 NOTE — ASSESSMENT
[FreeTextEntry1] : Ms. OSIEL LEE is a 35 year old female who presents with worsened low back pain, likely myofascial/muscle related, but also associated with some intermittent numbness into RLE. Denies any new bowel/bladder issues or groin numbness. MRI L Spine revealed L4-5 herniation with compression of L5 nerve root. Patient pending EMG of LE next month. Will recommend:\par - TPI done today of the right lumbar paraspinal region. Patient tolerated procedure well.\par - Patient to restart PT neck week. May need new assistive device pending PT eval. \par - Referred patient to ortho spine as she is not interested in an LORNE (and patient is currently on eliquis), for surgical considerations for her L4-5 disc herniation. \par - Patient has regular follow up with Rheum\par \par RTC as needed. Patient educated on red flag signs including any changes to their bowel/bladder control, groin numbness or new weakness. Patient knows to seek immediate attention by calling 911 or going to nearest ER if these symptoms appear.

## 2021-03-19 NOTE — PROCEDURE
[de-identified] : Reason for procedure: Myositis\par \par Procedure: Trigger Point Injection\par Physician: Dr. Mckeon\par Medication injected: Lidocaine 1%\par Sedation medications: None\par Estimated blood loss: None\par Complications: None\par \par Technique: R/B/A to trigger point injection reviewed with patient. The patient is agreeable and wishes to proceed.   The patient was placed in prone position and trigger point of the right lower lumbar paraspinals were identified. The area was prepped in normal sterile fashion with Chloroprep. A 25 gauge 1.5 inch needle was advanced into the palpable trigger point with reproduction of pain. After negative aspiration of heme, the above medication was injected into the trigger area. Needle was then removed, bandaid placed over injection sites. There was no complications, the patient was provided with post injection instructions.\par

## 2021-03-22 ENCOUNTER — RX RENEWAL (OUTPATIENT)
Age: 36
End: 2021-03-22

## 2021-04-02 ENCOUNTER — APPOINTMENT (OUTPATIENT)
Dept: ORTHOPEDIC SURGERY | Facility: CLINIC | Age: 36
End: 2021-04-02
Payer: MEDICAID

## 2021-04-02 DIAGNOSIS — M51.26 OTHER INTERVERTEBRAL DISC DISPLACEMENT, LUMBAR REGION: ICD-10-CM

## 2021-04-02 PROCEDURE — 99072 ADDL SUPL MATRL&STAF TM PHE: CPT

## 2021-04-02 PROCEDURE — 99203 OFFICE O/P NEW LOW 30 MIN: CPT

## 2021-04-02 NOTE — DISCUSSION/SUMMARY
[de-identified] : We discussed further treatment options. She complains of more back than leg pain. She states her leg symptoms are manageable. I have emphasized to her that the goals of surgery would be to address her leg symptoms. She voiced understanding of this. This time she wishes to continue with nonsurgical treatment. She will let me know of any changes or worsening of her symptoms.

## 2021-04-02 NOTE — HISTORY OF PRESENT ILLNESS
[de-identified] : Ms. OSIEL LEE  is a 35 year old female who presents with back pain and right leg numbness for the past 6 months which is getting worse.  Normal bowel and bladder control.   Denies any recent fevers, chills, sweats, weight loss, or infection.\par \par The patients past medical history, past surgical history, medications, allergies, and social history were reviewed by me today with the patient and documented accordingly.  In addition, the patient's family history, which is noncontributory to their visit, was also reviewed.\par

## 2021-04-02 NOTE — PHYSICAL EXAM
[Antalgic] : antalgic [de-identified] : Examination of the lumbar spine reveals no midline tenderness palpation, step-offs, or skin lesions. Decreased range of motion with respect to flexion, extension, lateral bending, and rotation. No tenderness to palpation of the sciatic notch. No tenderness palpation of the bilateral greater trochanters. No pain with passive internal/external rotation of the hips. No instability of bilateral lower extremities.  Negative ROYA. Negative straight leg raise bilaterally. No bowstring. Negative femoral stretch. Grossly preserved strength with the exception of 4+ out of 5 right foot strength. Grossly intact sensation to light touch bilateral lower extremities. 1+ patellar and Achilles reflexes. Downgoing Babinski. No clonus. Intact proprioception. Palpable pulses. No skin lesion and no edema on the right and left lower extremities. [de-identified] : Reviewed lumbar MRI done at [facility] on 12/11/20. Reveals degenerative disc disease as well as a herniated disc at L4-5 with stenosis

## 2021-04-08 DIAGNOSIS — R13.12 DYSPHAGIA, OROPHARYNGEAL PHASE: ICD-10-CM

## 2021-04-08 DIAGNOSIS — J38.3 OTHER DISEASES OF VOCAL CORDS: ICD-10-CM

## 2021-04-08 DIAGNOSIS — B37.0 CANDIDAL STOMATITIS: ICD-10-CM

## 2021-04-08 DIAGNOSIS — R49.0 DYSPHONIA: ICD-10-CM

## 2021-04-08 DIAGNOSIS — H61.21 IMPACTED CERUMEN, RIGHT EAR: ICD-10-CM

## 2021-04-08 DIAGNOSIS — K21.9 GASTRO-ESOPHAGEAL REFLUX DISEASE WITHOUT ESOPHAGITIS: ICD-10-CM

## 2021-04-08 DIAGNOSIS — M35.9 SYSTEMIC INVOLVEMENT OF CONNECTIVE TISSUE, UNSPECIFIED: ICD-10-CM

## 2021-04-15 ENCOUNTER — APPOINTMENT (OUTPATIENT)
Dept: SPEECH THERAPY | Facility: HOSPITAL | Age: 36
End: 2021-04-15

## 2021-04-15 ENCOUNTER — OUTPATIENT (OUTPATIENT)
Dept: OUTPATIENT SERVICES | Facility: HOSPITAL | Age: 36
LOS: 1 days | Discharge: HOME | End: 2021-04-15

## 2021-04-15 ENCOUNTER — APPOINTMENT (OUTPATIENT)
Dept: RADIOLOGY | Facility: HOSPITAL | Age: 36
End: 2021-04-15

## 2021-04-15 DIAGNOSIS — Z98.891 HISTORY OF UTERINE SCAR FROM PREVIOUS SURGERY: Chronic | ICD-10-CM

## 2021-04-15 DIAGNOSIS — Z98.51 TUBAL LIGATION STATUS: Chronic | ICD-10-CM

## 2021-04-15 DIAGNOSIS — M72.2 PLANTAR FASCIAL FIBROMATOSIS: Chronic | ICD-10-CM

## 2021-04-15 PROCEDURE — 74230 X-RAY XM SWLNG FUNCJ C+: CPT | Mod: 26

## 2021-04-20 DIAGNOSIS — R13.10 DYSPHAGIA, UNSPECIFIED: ICD-10-CM

## 2021-05-04 ENCOUNTER — TRANSCRIPTION ENCOUNTER (OUTPATIENT)
Age: 36
End: 2021-05-04

## 2021-05-06 ENCOUNTER — TRANSCRIPTION ENCOUNTER (OUTPATIENT)
Age: 36
End: 2021-05-06

## 2021-05-11 ENCOUNTER — APPOINTMENT (OUTPATIENT)
Dept: PULMONOLOGY | Facility: CLINIC | Age: 36
End: 2021-05-11
Payer: MEDICAID

## 2021-05-11 ENCOUNTER — RX RENEWAL (OUTPATIENT)
Age: 36
End: 2021-05-11

## 2021-05-11 VITALS
OXYGEN SATURATION: 100 % | HEART RATE: 71 BPM | HEIGHT: 59 IN | DIASTOLIC BLOOD PRESSURE: 81 MMHG | WEIGHT: 205 LBS | BODY MASS INDEX: 41.33 KG/M2 | SYSTOLIC BLOOD PRESSURE: 136 MMHG

## 2021-05-11 PROCEDURE — 99214 OFFICE O/P EST MOD 30 MIN: CPT

## 2021-05-13 ENCOUNTER — APPOINTMENT (OUTPATIENT)
Dept: OTOLARYNGOLOGY | Facility: CLINIC | Age: 36
End: 2021-05-13
Payer: MEDICAID

## 2021-05-13 VITALS
HEART RATE: 62 BPM | SYSTOLIC BLOOD PRESSURE: 132 MMHG | WEIGHT: 205 LBS | BODY MASS INDEX: 41.33 KG/M2 | DIASTOLIC BLOOD PRESSURE: 83 MMHG | HEIGHT: 59 IN

## 2021-05-13 PROCEDURE — 92557 COMPREHENSIVE HEARING TEST: CPT

## 2021-05-13 PROCEDURE — 92567 TYMPANOMETRY: CPT

## 2021-05-13 PROCEDURE — 99214 OFFICE O/P EST MOD 30 MIN: CPT | Mod: 25

## 2021-05-13 PROCEDURE — 31579 LARYNGOSCOPY TELESCOPIC: CPT

## 2021-05-13 RX ORDER — FAMOTIDINE 20 MG/1
20 TABLET, FILM COATED ORAL
Qty: 60 | Refills: 0 | Status: DISCONTINUED | COMMUNITY
Start: 2020-08-21 | End: 2021-05-13

## 2021-06-08 ENCOUNTER — APPOINTMENT (OUTPATIENT)
Dept: NEUROLOGY | Facility: CLINIC | Age: 36
End: 2021-06-08
Payer: MEDICAID

## 2021-06-08 VITALS
WEIGHT: 205 LBS | TEMPERATURE: 97.9 F | OXYGEN SATURATION: 98 % | BODY MASS INDEX: 41.33 KG/M2 | HEIGHT: 59 IN | DIASTOLIC BLOOD PRESSURE: 70 MMHG | SYSTOLIC BLOOD PRESSURE: 105 MMHG | HEART RATE: 92 BPM

## 2021-06-08 PROCEDURE — 99212 OFFICE O/P EST SF 10 MIN: CPT | Mod: 25

## 2021-06-08 PROCEDURE — 95885 MUSC TST DONE W/NERV TST LIM: CPT

## 2021-06-08 PROCEDURE — 95910 NRV CNDJ TEST 7-8 STUDIES: CPT

## 2021-06-08 NOTE — PHYSICAL EXAM
[General Appearance - Alert] : alert [General Appearance - In No Acute Distress] : in no acute distress [General Appearance - Well Nourished] : well nourished [Oriented To Time, Place, And Person] : oriented to person, place, and time [Person] : oriented to person [Place] : oriented to place [Time] : oriented to time [Short Term Intact] : short term memory intact [Remote Intact] : remote memory intact [Registration Intact] : recent registration memory intact [Span Intact] : the attention span was normal [Concentration Intact] : normal concentrating ability [Visual Intact] : visual attention was ~T not ~L decreased [Naming Objects] : no difficulty naming common objects [Repeating Phrases] : no difficulty repeating a phrase [Fluency] : fluency intact [Comprehension] : comprehension intact [Reading] : reading intact [Current Events] : adequate knowledge of current events [Vocabulary] : adequate range of vocabulary [Cranial Nerves Optic (II)] : visual acuity intact bilaterally,  visual fields full to confrontation, pupils equal round and reactive to light [Cranial Nerves Oculomotor (III)] : extraocular motion intact [Cranial Nerves Trigeminal (V)] : facial sensation intact symmetrically [Cranial Nerves Facial (VII)] : face symmetrical [Cranial Nerves Vestibulocochlear (VIII)] : hearing was intact bilaterally [Cranial Nerves Glossopharyngeal (IX)] : tongue and palate midline [Cranial Nerves Accessory (XI - Cranial And Spinal)] : head turning and shoulder shrug symmetric [Cranial Nerves Hypoglossal (XII)] : there was no tongue deviation with protrusion [Motor Strength] : muscle strength was normal in all four extremities [Paresis Pronator Drift Right-Sided] : no pronator drift on the right [Paresis Pronator Drift Left-Sided] : no pronator drift on the left [Motor Strength Lower Extremities Bilaterally] : strength was normal in both lower extremities [Motor Strength Upper Extremities Bilaterally] : strength was normal in both upper extremities [Sensation Vibration Decrease] : vibration was intact [Proprioception] : proprioception was intact [Allodynia] : no ~T allodynia present [Dysesthesia] : no dysesthesia [Hyperesthesia] : no hyperesthesia [Tactile Decrease Entire Leg Right] : diminished right leg [Pain / Temp Decrease Entire Leg - Right] : diminished right leg [Abnormal Walk] : normal gait [Balance] : balance was intact [Limited Balance] : balance was intact [Past-pointing] : there was no past-pointing [Tremor] : no tremor present [Dysdiadochokinesia Bilaterally] : not present [Coordination - Dysmetria Impaired Finger-to-Nose Bilateral] : not present [2+] : Ankle jerk left 2+ [Plantar Reflex Right Only] : normal on the right [Plantar Reflex Left Only] : normal on the left [___] : absent on the right [___] : absent on the left [Primitive Reflexes] : primitive reflexes were absent [Sclera] : the sclera and conjunctiva were normal [Extraocular Movements] : extraocular movements were intact [PERRL With Normal Accommodation] : pupils were equal in size, round, reactive to light, with normal accommodation [Outer Ear] : the ears and nose were normal in appearance [Oropharynx] : the oropharynx was normal

## 2021-06-08 NOTE — HISTORY OF PRESENT ILLNESS
[FreeTextEntry1] : Numbness in right hand and right feet; as if God wants does not want me to be balanced. Received pain management treatment that was temporarily helpful

## 2021-06-09 ENCOUNTER — APPOINTMENT (OUTPATIENT)
Dept: RHEUMATOLOGY | Facility: CLINIC | Age: 36
End: 2021-06-09
Payer: MEDICAID

## 2021-06-09 VITALS
HEART RATE: 91 BPM | OXYGEN SATURATION: 97 % | WEIGHT: 200 LBS | DIASTOLIC BLOOD PRESSURE: 85 MMHG | TEMPERATURE: 98.1 F | HEIGHT: 59 IN | SYSTOLIC BLOOD PRESSURE: 121 MMHG | BODY MASS INDEX: 40.32 KG/M2 | RESPIRATION RATE: 16 BRPM

## 2021-06-09 DIAGNOSIS — M25.511 PAIN IN RIGHT SHOULDER: ICD-10-CM

## 2021-06-09 DIAGNOSIS — G89.29 PAIN IN RIGHT SHOULDER: ICD-10-CM

## 2021-06-09 DIAGNOSIS — R20.2 PARESTHESIA OF SKIN: ICD-10-CM

## 2021-06-09 PROCEDURE — 99214 OFFICE O/P EST MOD 30 MIN: CPT

## 2021-06-09 NOTE — ASSESSMENT
[FreeTextEntry1] : 32 year-old female with pmh of ALMA ROSA, now with return of her symptoms with polyarthritis, highly positive DEBORAH\par \par 1. RA  with vasculitis - methotrexate 5 tablets -   s/p 3 cycles of rituxan -mild joint pain - pending new Rituxan infusion - but currently has thrush - on methotrexate 6 tablets - no joint pain - hold for Rituxan\par 3. chronic back pain - ongoing work-up by PMR x-rays with degenerative changes and mild scoliosis -had nerve block - no pain at this time. \par 4. chronic pain - on medical marijuana\par 5. muscle cramps over the right leg - not active\par 6. PE - likely related to tubal ligation (5 days prior) - chronic eliquis - given hx of clotting in her family\par 7. Right leg paresthesias - EMG done yesterday - mild sensory axonal loss\par 8. Right shoulder pain - send for US diagnostic\par \par \par I reviewed previous labs results with patients.\par Laboratory tests ordered today\par Diagnosis and Prognosis discussed\par Continue with current medications\par medications refilled\par education provided on\par F/u 2 months\par

## 2021-06-09 NOTE — REASON FOR VISIT
Rhofade Pregnancy And Lactation Text: This medication has not been assigned a Pregnancy Risk Category. It is unknown if the medication is excreted in breast milk. Cosentyx Pregnancy And Lactation Text: This medication is Pregnancy Category B and is considered safe during pregnancy. It is unknown if this medication is excreted in breast milk. Rhofade Counseling: Rhofade is a topical medication which can decrease superficial blood flow where applied. Side effects are uncommon and include stinging, redness and allergic reactions. Otezla Pregnancy And Lactation Text: This medication is Pregnancy Category C and it isn't known if it is safe during pregnancy. It is unknown if it is excreted in breast milk. Rifampin Counseling: I discussed with the patient the risks of rifampin including but not limited to liver damage, kidney damage, red-orange body fluids, nausea/vomiting and severe allergy. Arava Pregnancy And Lactation Text: This medication is Pregnancy Category X and is absolutely contraindicated during pregnancy. It is unknown if it is excreted in breast milk. Methotrexate Pregnancy And Lactation Text: This medication is Pregnancy Category X and is known to cause fetal harm. This medication is excreted in breast milk. Topical Retinoid Pregnancy And Lactation Text: This medication is Pregnancy Category C. It is unknown if this medication is excreted in breast milk. Cimetidine Counseling:  I discussed with the patient the risks of Cimetidine including but not limited to gynecomastia, headache, diarrhea, nausea, drowsiness, arrhythmias, pancreatitis, skin rashes, psychosis, bone marrow suppression and kidney toxicity. Prednisone Pregnancy And Lactation Text: This medication is Pregnancy Category C and it isn't know if it is safe during pregnancy. This medication is excreted in breast milk. Terbinafine Counseling: Patient counseling regarding adverse effects of terbinafine including but not limited to headache, diarrhea, rash, upset stomach, liver function test abnormalities, itching, taste/smell disturbance, nausea, abdominal pain, and flatulence.  There is a rare possibility of liver failure that can occur when taking terbinafine.  The patient understands that a baseline LFT and kidney function test may be required. The patient verbalized understanding of the proper use and possible adverse effects of terbinafine.  All of the patient's questions and concerns were addressed. Ketoconazole Counseling:   Patient counseled regarding improving absorption with orange juice.  Adverse effects include but are not limited to breast enlargement, headache, diarrhea, nausea, upset stomach, liver function test abnormalities, taste disturbance, and stomach pain.  There is a rare possibility of liver failure that can occur when taking ketoconazole. The patient understands that monitoring of LFTs may be required, especially at baseline. The patient verbalized understanding of the proper use and possible adverse effects of ketoconazole.  All of the patient's questions and concerns were addressed. Minocycline Counseling: Patient advised regarding possible photosensitivity and discoloration of the teeth, skin, lips, tongue and gums.  Patient instructed to avoid sunlight, if possible.  When exposed to sunlight, patients should wear protective clothing, sunglasses, and sunscreen.  The patient was instructed to call the office immediately if the following severe adverse effects occur:  hearing changes, easy bruising/bleeding, severe headache, or vision changes.  The patient verbalized understanding of the proper use and possible adverse effects of minocycline.  All of the patient's questions and concerns were addressed. Topical Sulfur Applications Pregnancy And Lactation Text: This medication is Pregnancy Category C and has an unknown safety profile during pregnancy. It is unknown if this topical medication is excreted in breast milk. Griseofulvin Pregnancy And Lactation Text: This medication is Pregnancy Category X and is known to cause serious birth defects. It is unknown if this medication is excreted in breast milk but breast feeding should be avoided. Griseofulvin Counseling:  I discussed with the patient the risks of griseofulvin including but not limited to photosensitivity, cytopenia, liver damage, nausea/vomiting and severe allergy.  The patient understands that this medication is best absorbed when taken with a fatty meal (e.g., ice cream or french fries). Doxepin Pregnancy And Lactation Text: This medication is Pregnancy Category C and it isn't known if it is safe during pregnancy. It is also excreted in breast milk and breast feeding isn't recommended. Isotretinoin Counseling: Patient should get monthly blood tests, not donate blood, not drive at night if vision affected, not share medication, and not undergo elective surgery for 6 months after tx completed. Side effects reviewed, pt to contact office should one occur. Ivermectin Pregnancy And Lactation Text: This medication is Pregnancy Category C and it isn't known if it is safe during pregnancy. It is also excreted in breast milk. Tranexamic Acid Pregnancy And Lactation Text: It is unknown if this medication is safe during pregnancy or breast feeding. Colchicine Pregnancy And Lactation Text: This medication is Pregnancy Category C and isn't considered safe during pregnancy. It is excreted in breast milk. Picato Counseling:  I discussed with the patient the risks of Picato including but not limited to erythema, scaling, itching, weeping, crusting, and pain. Drysol Pregnancy And Lactation Text: This medication is considered safe during pregnancy and breast feeding. Eucrisa Pregnancy And Lactation Text: This medication has not been assigned a Pregnancy Risk Category but animal studies failed to show danger with the topical medication. It is unknown if the medication is excreted in breast milk. Doxepin Counseling:  Patient advised that the medication is sedating and not to drive a car after taking this medication. Patient informed of potential adverse effects including but not limited to dry mouth, urinary retention, and blurry vision.  The patient verbalized understanding of the proper use and possible adverse effects of doxepin.  All of the patient's questions and concerns were addressed. Sski Pregnancy And Lactation Text: This medication is Pregnancy Category D and isn't considered safe during pregnancy. It is excreted in breast milk. Hydroxychloroquine Pregnancy And Lactation Text: This medication has been shown to cause fetal harm but it isn't assigned a Pregnancy Risk Category. There are small amounts excreted in breast milk. Bexarotene Pregnancy And Lactation Text: This medication is Pregnancy Category X and should not be given to women who are pregnant or may become pregnant. This medication should not be used if you are breast feeding. Azithromycin Counseling:  I discussed with the patient the risks of azithromycin including but not limited to GI upset, allergic reaction, drug rash, diarrhea, and yeast infections. Bactrim Pregnancy And Lactation Text: This medication is Pregnancy Category D and is known to cause fetal risk.  It is also excreted in breast milk. Azithromycin Pregnancy And Lactation Text: This medication is considered safe during pregnancy and is also secreted in breast milk. Albendazole Counseling:  I discussed with the patient the risks of albendazole including but not limited to cytopenia, kidney damage, nausea/vomiting and severe allergy.  The patient understands that this medication is being used in an off-label manner. Wartpeel Pregnancy And Lactation Text: This medication is Pregnancy Category X and contraindicated in pregnancy and in women who may become pregnant. It is unknown if this medication is excreted in breast milk. Use Enhanced Medication Counseling?: No Xolair Counseling:  Patient informed of potential adverse effects including but not limited to fever, muscle aches, rash and allergic reactions.  The patient verbalized understanding of the proper use and possible adverse effects of Xolair.  All of the patient's questions and concerns were addressed. Cimzia Counseling:  I discussed with the patient the risks of Cimzia including but not limited to immunosuppression, allergic reactions and infections.  The patient understands that monitoring is required including a PPD at baseline and must alert us or the primary physician if symptoms of infection or other concerning signs are noted. Wartpeel Counseling:  I discussed with the patient the risks of Wartpeel including but not limited to erythema, scaling, itching, weeping, crusting, and pain. Azathioprine Counseling:  I discussed with the patient the risks of azathioprine including but not limited to myelosuppression, immunosuppression, hepatotoxicity, lymphoma, and infections.  The patient understands that monitoring is required including baseline LFTs, Creatinine, possible TPMP genotyping and weekly CBCs for the first month and then every 2 weeks thereafter.  The patient verbalized understanding of the proper use and possible adverse effects of azathioprine.  All of the patient's questions and concerns were addressed. Methotrexate Counseling:  Patient counseled regarding adverse effects of methotrexate including but not limited to nausea, vomiting, abnormalities in liver function tests. Patients may develop mouth sores, rash, diarrhea, and abnormalities in blood counts. The patient understands that monitoring is required including LFT's and blood counts.  There is a rare possibility of scarring of the liver and lung problems that can occur when taking methotrexate. Persistent nausea, loss of appetite, pale stools, dark urine, cough, and shortness of breath should be reported immediately. Patient advised to discontinue methotrexate treatment at least three months before attempting to become pregnant.  I discussed the need for folate supplements while taking methotrexate.  These supplements can decrease side effects during methotrexate treatment. The patient verbalized understanding of the proper use and possible adverse effects of methotrexate.  All of the patient's questions and concerns were addressed. [Follow-Up: _____] : a [unfilled] follow-up visit Glycopyrrolate Counseling:  I discussed with the patient the risks of glycopyrrolate including but not limited to skin rash, drowsiness, dry mouth, difficulty urinating, and blurred vision. Propranolol Counseling:  I discussed with the patient the risks of propranolol including but not limited to low heart rate, low blood pressure, low blood sugar, restlessness and increased cold sensitivity. They should call the office if they experience any of these side effects. Isotretinoin Pregnancy And Lactation Text: This medication is Pregnancy Category X and is considered extremely dangerous during pregnancy. It is unknown if it is excreted in breast milk. Metronidazole Counseling:  I discussed with the patient the risks of metronidazole including but not limited to seizures, nausea/vomiting, a metallic taste in the mouth, nausea/vomiting and severe allergy. Tazorac Counseling:  Patient advised that medication is irritating and drying.  Patient may need to apply sparingly and wash off after an hour before eventually leaving it on overnight.  The patient verbalized understanding of the proper use and possible adverse effects of tazorac.  All of the patient's questions and concerns were addressed. Xolair Pregnancy And Lactation Text: This medication is Pregnancy Category B and is considered safe during pregnancy. This medication is excreted in breast milk. Cyclophosphamide Counseling:  I discussed with the patient the risks of cyclophosphamide including but not limited to hair loss, hormonal abnormalities, decreased fertility, abdominal pain, diarrhea, nausea and vomiting, bone marrow suppression and infection. The patient understands that monitoring is required while taking this medication. Itraconazole Pregnancy And Lactation Text: This medication is Pregnancy Category C and it isn't know if it is safe during pregnancy. It is also excreted in breast milk. Topical Sulfur Applications Counseling: Topical Sulfur Counseling: Patient counseled that this medication may cause skin irritation or allergic reactions.  In the event of skin irritation, the patient was advised to reduce the amount of the drug applied or use it less frequently.   The patient verbalized understanding of the proper use and possible adverse effects of topical sulfur application.  All of the patient's questions and concerns were addressed. Simponi Pregnancy And Lactation Text: The risk during pregnancy and breastfeeding is uncertain with this medication. Finasteride Pregnancy And Lactation Text: This medication is absolutely contraindicated during pregnancy. It is unknown if it is excreted in breast milk. Doxycycline Counseling:  Patient counseled regarding possible photosensitivity and increased risk for sunburn.  Patient instructed to avoid sunlight, if possible.  When exposed to sunlight, patients should wear protective clothing, sunglasses, and sunscreen.  The patient was instructed to call the office immediately if the following severe adverse effects occur:  hearing changes, easy bruising/bleeding, severe headache, or vision changes.  The patient verbalized understanding of the proper use and possible adverse effects of doxycycline.  All of the patient's questions and concerns were addressed. Spironolactone Counseling: Patient advised regarding risks of diarrhea, abdominal pain, hyperkalemia, birth defects (for female patients), liver toxicity and renal toxicity. The patient may need blood work to monitor liver and kidney function and potassium levels while on therapy. The patient verbalized understanding of the proper use and possible adverse effects of spironolactone.  All of the patient's questions and concerns were addressed. Cosentyx Counseling:  I discussed with the patient the risks of Cosentyx including but not limited to worsening of Crohn's disease, immunosuppression, allergic reactions and infections.  The patient understands that monitoring is required including a PPD at baseline and must alert us or the primary physician if symptoms of infection or other concerning signs are noted. Xeljanz Counseling: I discussed with the patient the risks of Xeljanz therapy including increased risk of infection, liver issues, headache, diarrhea, or cold symptoms. Live vaccines should be avoided. They were instructed to call if they have any problems. Carac Counseling:  I discussed with the patient the risks of Carac including but not limited to erythema, scaling, itching, weeping, crusting, and pain. Topical Clindamycin Counseling: Patient counseled that this medication may cause skin irritation or allergic reactions.  In the event of skin irritation, the patient was advised to reduce the amount of the drug applied or use it less frequently.   The patient verbalized understanding of the proper use and possible adverse effects of clindamycin.  All of the patient's questions and concerns were addressed. Benzoyl Peroxide Counseling: Patient counseled that medicine may cause skin irritation and bleach clothing.  In the event of skin irritation, the patient was advised to reduce the amount of the drug applied or use it less frequently.   The patient verbalized understanding of the proper use and possible adverse effects of benzoyl peroxide.  All of the patient's questions and concerns were addressed. Propranolol Pregnancy And Lactation Text: This medication is Pregnancy Category C and it isn't known if it is safe during pregnancy. It is excreted in breast milk. Erivedge Counseling- I discussed with the patient the risks of Erivedge including but not limited to nausea, vomiting, diarrhea, constipation, weight loss, changes in the sense of taste, decreased appetite, muscle spasms, and hair loss.  The patient verbalized understanding of the proper use and possible adverse effects of Erivedge.  All of the patient's questions and concerns were addressed. Solaraze Pregnancy And Lactation Text: This medication is Pregnancy Category B and is considered safe. There is some data to suggest avoiding during the third trimester. It is unknown if this medication is excreted in breast milk. Topical Clindamycin Pregnancy And Lactation Text: This medication is Pregnancy Category B and is considered safe during pregnancy. It is unknown if it is excreted in breast milk. Hydroquinone Counseling:  Patient advised that medication may result in skin irritation, lightening (hypopigmentation), dryness, and burning.  In the event of skin irritation, the patient was advised to reduce the amount of the drug applied or use it less frequently.  Rarely, spots that are treated with hydroquinone can become darker (pseudoochronosis).  Should this occur, patient instructed to stop medication and call the office. The patient verbalized understanding of the proper use and possible adverse effects of hydroquinone.  All of the patient's questions and concerns were addressed. Siliq Counseling:  I discussed with the patient the risks of Siliq including but not limited to new or worsening depression, suicidal thoughts and behavior, immunosuppression, malignancy, posterior leukoencephalopathy syndrome, and serious infections.  The patient understands that monitoring is required including a PPD at baseline and must alert us or the primary physician if symptoms of infection or other concerning signs are noted. There is also a special program designed to monitor depression which is required with Siliq. Quinolones Counseling:  I discussed with the patient the risks of fluoroquinolones including but not limited to GI upset, allergic reaction, drug rash, diarrhea, dizziness, photosensitivity, yeast infections, liver function test abnormalities, tendonitis/tendon rupture. Opioid Counseling: I discussed with the patient the potential side effects of opioids including but not limited to addiction, altered mental status, and depression. I stressed avoiding alcohol, benzodiazepines, muscle relaxants and sleep aids unless specifically okayed by a physician. The patient verbalized understanding of the proper use and possible adverse effects of opioids. All of the patient's questions and concerns were addressed. They were instructed to flush the remaining pills down the toilet if they did not need them for pain. Cyclophosphamide Pregnancy And Lactation Text: This medication is Pregnancy Category D and it isn't considered safe during pregnancy. This medication is excreted in breast milk. Hydroxyzine Pregnancy And Lactation Text: This medication is not safe during pregnancy and should not be taken. It is also excreted in breast milk and breast feeding isn't recommended. Topical Retinoid counseling:  Patient advised to apply a pea-sized amount only at bedtime and wait 30 minutes after washing their face before applying.  If too drying, patient may add a non-comedogenic moisturizer. The patient verbalized understanding of the proper use and possible adverse effects of retinoids.  All of the patient's questions and concerns were addressed. Cimetidine Pregnancy And Lactation Text: This medication is Pregnancy Category B and is considered safe during pregnancy. It is also excreted in breast milk and breast feeding isn't recommended. Glycopyrrolate Pregnancy And Lactation Text: This medication is Pregnancy Category B and is considered safe during pregnancy. It is unknown if it is excreted breast milk. Gabapentin Counseling: I discussed with the patient the risks of gabapentin including but not limited to dizziness, somnolence, fatigue and ataxia. Bexarotene Counseling:  I discussed with the patient the risks of bexarotene including but not limited to hair loss, dry lips/skin/eyes, liver abnormalities, hyperlipidemia, pancreatitis, depression/suicidal ideation, photosensitivity, drug rash/allergic reactions, hypothyroidism, anemia, leukopenia, infection, cataracts, and teratogenicity.  Patient understands that they will need regular blood tests to check lipid profile, liver function tests, white blood cell count, thyroid function tests and pregnancy test if applicable. Protopic Pregnancy And Lactation Text: This medication is Pregnancy Category C. It is unknown if this medication is excreted in breast milk when applied topically. Tremfya Counseling: I discussed with the patient the risks of guselkumab including but not limited to immunosuppression, serious infections, worsening of inflammatory bowel disease and drug reactions.  The patient understands that monitoring is required including a PPD at baseline and must alert us or the primary physician if symptoms of infection or other concerning signs are noted. Rifampin Pregnancy And Lactation Text: This medication is Pregnancy Category C and it isn't know if it is safe during pregnancy. It is also excreted in breast milk and should not be used if you are breast feeding. Birth Control Pills Counseling: Birth Control Pill Counseling: I discussed with the patient the potential side effects of OCPs including but not limited to increased risk of stroke, heart attack, thrombophlebitis, deep venous thrombosis, hepatic adenomas, breast changes, GI upset, headaches, and depression.  The patient verbalized understanding of the proper use and possible adverse effects of OCPs. All of the patient's questions and concerns were addressed. Clofazimine Counseling:  I discussed with the patient the risks of clofazimine including but not limited to skin and eye pigmentation, liver damage, nausea/vomiting, gastrointestinal bleeding and allergy. High Dose Vitamin A Counseling: Side effects reviewed, pt to contact office should one occur. Dapsone Counseling: I discussed with the patient the risks of dapsone including but not limited to hemolytic anemia, agranulocytosis, rashes, methemoglobinemia, kidney failure, peripheral neuropathy, headaches, GI upset, and liver toxicity.  Patients who start dapsone require monitoring including baseline LFTs and weekly CBCs for the first month, then every month thereafter.  The patient verbalized understanding of the proper use and possible adverse effects of dapsone.  All of the patient's questions and concerns were addressed. Hydroxyzine Counseling: Patient advised that the medication is sedating and not to drive a car after taking this medication.  Patient informed of potential adverse effects including but not limited to dry mouth, urinary retention, and blurry vision.  The patient verbalized understanding of the proper use and possible adverse effects of hydroxyzine.  All of the patient's questions and concerns were addressed. Infliximab Counseling:  I discussed with the patient the risks of infliximab including but not limited to myelosuppression, immunosuppression, autoimmune hepatitis, demyelinating diseases, lymphoma, and serious infections.  The patient understands that monitoring is required including a PPD at baseline and must alert us or the primary physician if symptoms of infection or other concerning signs are noted. Mirvaso Counseling: Mirvaso is a topical medication which can decrease superficial blood flow where applied. Side effects are uncommon and include stinging, redness and allergic reactions. Ivermectin Counseling:  Patient instructed to take medication on an empty stomach with a full glass of water.  Patient informed of potential adverse effects including but not limited to nausea, diarrhea, dizziness, itching, and swelling of the extremities or lymph nodes.  The patient verbalized understanding of the proper use and possible adverse effects of ivermectin.  All of the patient's questions and concerns were addressed. Otezla Counseling: The side effects of Otezla were discussed with the patient, including but not limited to worsening or new depression, weight loss, diarrhea, nausea, upper respiratory tract infection, and headache. Patient instructed to call the office should any adverse effect occur.  The patient verbalized understanding of the proper use and possible adverse effects of Otezla.  All the patient's questions and concerns were addressed. Skyrizi Counseling: I discussed with the patient the risks of risankizumab-rzaa including but not limited to immunosuppression, and serious infections.  The patient understands that monitoring is required including a PPD at baseline and must alert us or the primary physician if symptoms of infection or other concerning signs are noted. Spironolactone Pregnancy And Lactation Text: This medication can cause feminization of the male fetus and should be avoided during pregnancy. The active metabolite is also found in breast milk. Protopic Counseling: Patient may experience a mild burning sensation during topical application. Protopic is not approved in children less than 2 years of age. There have been case reports of hematologic and skin malignancies in patients using topical calcineurin inhibitors although causality is questionable. Cimzia Pregnancy And Lactation Text: This medication crosses the placenta but can be considered safe in certain situations. Cimzia may be excreted in breast milk. Imiquimod Counseling:  I discussed with the patient the risks of imiquimod including but not limited to erythema, scaling, itching, weeping, crusting, and pain.  Patient understands that the inflammatory response to imiquimod is variable from person to person and was educated regarded proper titration schedule.  If flu-like symptoms develop, patient knows to discontinue the medication and contact us. Birth Control Pills Pregnancy And Lactation Text: This medication should be avoided if pregnant and for the first 30 days post-partum. Tetracycline Pregnancy And Lactation Text: This medication is Pregnancy Category D and not consider safe during pregnancy. It is also excreted in breast milk. Prednisone Counseling:  I discussed with the patient the risks of prolonged use of prednisone including but not limited to weight gain, insomnia, osteoporosis, mood changes, diabetes, susceptibility to infection, glaucoma and high blood pressure.  In cases where prednisone use is prolonged, patients should be monitored with blood pressure checks, serum glucose levels and an eye exam.  Additionally, the patient may need to be placed on GI prophylaxis, PCP prophylaxis, and calcium and vitamin D supplementation and/or a bisphosphonate.  The patient verbalized understanding of the proper use and the possible adverse effects of prednisone.  All of the patient's questions and concerns were addressed. Clindamycin Pregnancy And Lactation Text: This medication can be used in pregnancy if certain situations. Clindamycin is also present in breast milk. Odomzo Counseling- I discussed with the patient the risks of Odomzo including but not limited to nausea, vomiting, diarrhea, constipation, weight loss, changes in the sense of taste, decreased appetite, muscle spasms, and hair loss.  The patient verbalized understanding of the proper use and possible adverse effects of Odomzo.  All of the patient's questions and concerns were addressed. Bactrim Counseling:  I discussed with the patient the risks of sulfa antibiotics including but not limited to GI upset, allergic reaction, drug rash, diarrhea, dizziness, photosensitivity, and yeast infections.  Rarely, more serious reactions can occur including but not limited to aplastic anemia, agranulocytosis, methemoglobinemia, blood dyscrasias, liver or kidney failure, lung infiltrates or desquamative/blistering drug rashes. Cephalexin Pregnancy And Lactation Text: This medication is Pregnancy Category B and considered safe during pregnancy.  It is also excreted in breast milk but can be used safely for shorter doses. Tetracycline Counseling: Patient counseled regarding possible photosensitivity and increased risk for sunburn.  Patient instructed to avoid sunlight, if possible.  When exposed to sunlight, patients should wear protective clothing, sunglasses, and sunscreen.  The patient was instructed to call the office immediately if the following severe adverse effects occur:  hearing changes, easy bruising/bleeding, severe headache, or vision changes.  The patient verbalized understanding of the proper use and possible adverse effects of tetracycline.  All of the patient's questions and concerns were addressed. Patient understands to avoid pregnancy while on therapy due to potential birth defects. Acitretin Counseling:  I discussed with the patient the risks of acitretin including but not limited to hair loss, dry lips/skin/eyes, liver damage, hyperlipidemia, depression/suicidal ideation, photosensitivity.  Serious rare side effects can include but are not limited to pancreatitis, pseudotumor cerebri, bony changes, clot formation/stroke/heart attack.  Patient understands that alcohol is contraindicated since it can result in liver toxicity and significantly prolong the elimination of the drug by many years. Nsaids Counseling: NSAID Counseling: I discussed with the patient that NSAIDs should be taken with food. Prolonged use of NSAIDs can result in the development of stomach ulcers.  Patient advised to stop taking NSAIDs if abdominal pain occurs.  The patient verbalized understanding of the proper use and possible adverse effects of NSAIDs.  All of the patient's questions and concerns were addressed. Thalidomide Counseling: I discussed with the patient the risks of thalidomide including but not limited to birth defects, anxiety, weakness, chest pain, dizziness, cough and severe allergy. Cephalexin Counseling: I counseled the patient regarding use of cephalexin as an antibiotic for prophylactic and/or therapeutic purposes. Cephalexin (commonly prescribed under brand name Keflex) is a cephalosporin antibiotic which is active against numerous classes of bacteria, including most skin bacteria. Side effects may include nausea, diarrhea, gastrointestinal upset, rash, hives, yeast infections, and in rare cases, hepatitis, kidney disease, seizures, fever, confusion, neurologic symptoms, and others. Patients with severe allergies to penicillin medications are cautioned that there is about a 10% incidence of cross-reactivity with cephalosporins. When possible, patients with penicillin allergies should use alternatives to cephalosporins for antibiotic therapy. Detail Level: Simple SSKI Counseling:  I discussed with the patient the risks of SSKI including but not limited to thyroid abnormalities, metallic taste, GI upset, fever, headache, acne, arthralgias, paraesthesias, lymphadenopathy, easy bleeding, arrhythmias, and allergic reaction. Itraconazole Counseling:  I discussed with the patient the risks of itraconazole including but not limited to liver damage, nausea/vomiting, neuropathy, and severe allergy.  The patient understands that this medication is best absorbed when taken with acidic beverages such as non-diet cola or ginger ale.  The patient understands that monitoring is required including baseline LFTs and repeat LFTs at intervals.  The patient understands that they are to contact us or the primary physician if concerning signs are noted. Enbrel Counseling:  I discussed with the patient the risks of etanercept including but not limited to myelosuppression, immunosuppression, autoimmune hepatitis, demyelinating diseases, lymphoma, and infections.  The patient understands that monitoring is required including a PPD at baseline and must alert us or the primary physician if symptoms of infection or other concerning signs are noted. Dupixent Pregnancy And Lactation Text: This medication likely crosses the placenta but the risk for the fetus is uncertain. This medication is excreted in breast milk. Stelara Counseling:  I discussed with the patient the risks of ustekinumab including but not limited to immunosuppression, malignancy, posterior leukoencephalopathy syndrome, and serious infections.  The patient understands that monitoring is required including a PPD at baseline and must alert us or the primary physician if symptoms of infection or other concerning signs are noted. Niacinamide Counseling: I recommended taking niacin or niacinamide, also know as vitamin B3, twice daily. Recent evidence suggests that taking vitamin B3 (500 mg twice daily) can reduce the risk of actinic keratoses and non-melanoma skin cancers. Side effects of vitamin B3 include flushing and headache. 5-Fu Counseling: 5-Fluorouracil Counseling:  I discussed with the patient the risks of 5-fluorouracil including but not limited to erythema, scaling, itching, weeping, crusting, and pain. Metronidazole Pregnancy And Lactation Text: This medication is Pregnancy Category B and considered safe during pregnancy.  It is also excreted in breast milk. Acitretin Pregnancy And Lactation Text: This medication is Pregnancy Category X and should not be given to women who are pregnant or may become pregnant in the future. This medication is excreted in breast milk. Dupixent Counseling: I discussed with the patient the risks of dupilumab including but not limited to eye infection and irritation, cold sores, injection site reactions, worsening of asthma, allergic reactions and increased risk of parasitic infection.  Live vaccines should be avoided while taking dupilumab. Dupilumab will also interact with certain medications such as warfarin and cyclosporine. The patient understands that monitoring is required and they must alert us or the primary physician if symptoms of infection or other concerning signs are noted. Nsaids Pregnancy And Lactation Text: These medications are considered safe up to 30 weeks gestation. It is excreted in breast milk. Erythromycin Pregnancy And Lactation Text: This medication is Pregnancy Category B and is considered safe during pregnancy. It is also excreted in breast milk. Tranexamic Acid Counseling:  Patient advised of the small risk of bleeding problems with tranexamic acid. They were also instructed to call if they developed any nausea, vomiting or diarrhea. All of the patient's questions and concerns were addressed. Colchicine Counseling:  Patient counseled regarding adverse effects including but not limited to stomach upset (nausea, vomiting, stomach pain, or diarrhea).  Patient instructed to limit alcohol consumption while taking this medication.  Colchicine may reduce blood counts especially with prolonged use.  The patient understands that monitoring of kidney function and blood counts may be required, especially at baseline. The patient verbalized understanding of the proper use and possible adverse effects of colchicine.  All of the patient's questions and concerns were addressed. Minoxidil Counseling: Minoxidil is a topical medication which can increase blood flow where it is applied. It is uncertain how this medication increases hair growth. Side effects are uncommon and include stinging and allergic reactions. Cellcept Pregnancy And Lactation Text: This medication is Pregnancy Category D and isn't considered safe during pregnancy. It is unknown if this medication is excreted in breast milk. Cellcept Counseling:  I discussed with the patient the risks of mycophenolate mofetil including but not limited to infection/immunosuppression, GI upset, hypokalemia, hypercholesterolemia, bone marrow suppression, lymphoproliferative disorders, malignancy, GI ulceration/bleed/perforation, colitis, interstitial lung disease, kidney failure, progressive multifocal leukoencephalopathy, and birth defects.  The patient understands that monitoring is required including a baseline creatinine and regular CBC testing. In addition, patient must alert us immediately if symptoms of infection or other concerning signs are noted. Erythromycin Counseling:  I discussed with the patient the risks of erythromycin including but not limited to GI upset, allergic reaction, drug rash, diarrhea, increase in liver enzymes, and yeast infections. Taltz Counseling: I discussed with the patient the risks of ixekizumab including but not limited to immunosuppression, serious infections, worsening of inflammatory bowel disease and drug reactions.  The patient understands that monitoring is required including a PPD at baseline and must alert us or the primary physician if symptoms of infection or other concerning signs are noted. Ketoconazole Pregnancy And Lactation Text: This medication is Pregnancy Category C and it isn't know if it is safe during pregnancy. It is also excreted in breast milk and breast feeding isn't recommended. Hydroxychloroquine Counseling:  I discussed with the patient that a baseline ophthalmologic exam is needed at the start of therapy and every year thereafter while on therapy. A CBC may also be warranted for monitoring.  The side effects of this medication were discussed with the patient, including but not limited to agranulocytosis, aplastic anemia, seizures, rashes, retinopathy, and liver toxicity. Patient instructed to call the office should any adverse effect occur.  The patient verbalized understanding of the proper use and possible adverse effects of Plaquenil.  All the patient's questions and concerns were addressed. Cyclosporine Counseling:  I discussed with the patient the risks of cyclosporine including but not limited to hypertension, gingival hyperplasia,myelosuppression, immunosuppression, liver damage, kidney damage, neurotoxicity, lymphoma, and serious infections. The patient understands that monitoring is required including baseline blood pressure, CBC, CMP, lipid panel and uric acid, and then 1-2 times monthly CMP and blood pressure. Valtrex Counseling: I discussed with the patient the risks of valacyclovir including but not limited to kidney damage, nausea, vomiting and severe allergy.  The patient understands that if the infection seems to be worsening or is not improving, they are to call. Ilumya Counseling: I discussed with the patient the risks of tildrakizumab including but not limited to immunosuppression, malignancy, posterior leukoencephalopathy syndrome, and serious infections.  The patient understands that monitoring is required including a PPD at baseline and must alert us or the primary physician if symptoms of infection or other concerning signs are noted. Zyclara Counseling:  I discussed with the patient the risks of imiquimod including but not limited to erythema, scaling, itching, weeping, crusting, and pain.  Patient understands that the inflammatory response to imiquimod is variable from person to person and was educated regarded proper titration schedule.  If flu-like symptoms develop, patient knows to discontinue the medication and contact us. Elidel Counseling: Patient may experience a mild burning sensation during topical application. Elidel is not approved in children less than 2 years of age. There have been case reports of hematologic and skin malignancies in patients using topical calcineurin inhibitors although causality is questionable. Valtrex Pregnancy And Lactation Text: this medication is Pregnancy Category B and is considered safe during pregnancy. This medication is not directly found in breast milk but it's metabolite acyclovir is present. Benzoyl Peroxide Pregnancy And Lactation Text: This medication is Pregnancy Category C. It is unknown if benzoyl peroxide is excreted in breast milk. Xelangiez Pregnancy And Lactation Text: This medication is Pregnancy Category D and is not considered safe during pregnancy.  The risk during breast feeding is also uncertain. Opioid Pregnancy And Lactation Text: These medications can lead to premature delivery and should be avoided during pregnancy. These medications are also present in breast milk in small amounts. Oxybutynin Counseling:  I discussed with the patient the risks of oxybutynin including but not limited to skin rash, drowsiness, dry mouth, difficulty urinating, and blurred vision. Rituxan Counseling:  I discussed with the patient the risks of Rituxan infusions. Side effects can include infusion reactions, severe drug rashes including mucocutaneous reactions, reactivation of latent hepatitis and other infections and rarely progressive multifocal leukoencephalopathy.  All of the patient's questions and concerns were addressed. Humira Counseling:  I discussed with the patient the risks of adalimumab including but not limited to myelosuppression, immunosuppression, autoimmune hepatitis, demyelinating diseases, lymphoma, and serious infections.  The patient understands that monitoring is required including a PPD at baseline and must alert us or the primary physician if symptoms of infection or other concerning signs are noted. High Dose Vitamin A Pregnancy And Lactation Text: High dose vitamin A therapy is contraindicated during pregnancy and breast feeding. Solaraze Counseling:  I discussed with the patient the risks of Solaraze including but not limited to erythema, scaling, itching, weeping, crusting, and pain. Simponi Counseling:  I discussed with the patient the risks of golimumab including but not limited to myelosuppression, immunosuppression, autoimmune hepatitis, demyelinating diseases, lymphoma, and serious infections.  The patient understands that monitoring is required including a PPD at baseline and must alert us or the primary physician if symptoms of infection or other concerning signs are noted. Dapsone Pregnancy And Lactation Text: This medication is Pregnancy Category C and is not considered safe during pregnancy or breast feeding. Rituxan Pregnancy And Lactation Text: This medication is Pregnancy Category C and it isn't know if it is safe during pregnancy. It is unknown if this medication is excreted in breast milk but similar antibodies are known to be excreted. Clindamycin Counseling: I counseled the patient regarding use of clindamycin as an antibiotic for prophylactic and/or therapeutic purposes. Clindamycin is active against numerous classes of bacteria, including skin bacteria. Side effects may include nausea, diarrhea, gastrointestinal upset, rash, hives, yeast infections, and in rare cases, colitis. Finasteride Male Counseling: Finasteride Counseling:  I discussed with the patient the risks of use of finasteride including but not limited to decreased libido, decreased ejaculate volume, gynecomastia, and depression. Women should not handle medication.  All of the patient's questions and concerns were addressed. Drysol Counseling:  I discussed with the patient the risks of drysol/aluminum chloride including but not limited to skin rash, itching, irritation, burning. Arava Counseling:  Patient counseled regarding adverse effects of Arava including but not limited to nausea, vomiting, abnormalities in liver function tests. Patients may develop mouth sores, rash, diarrhea, and abnormalities in blood counts. The patient understands that monitoring is required including LFTs and blood counts.  There is a rare possibility of scarring of the liver and lung problems that can occur when taking methotrexate. Persistent nausea, loss of appetite, pale stools, dark urine, cough, and shortness of breath should be reported immediately. Patient advised to discontinue Arava treatment and consult with a physician prior to attempting conception. The patient will have to undergo a treatment to eliminate Arava from the body prior to conception. Niacinamide Pregnancy And Lactation Text: These medications are considered safe during pregnancy. Doxycycline Pregnancy And Lactation Text: This medication is Pregnancy Category D and not consider safe during pregnancy. It is also excreted in breast milk but is considered safe for shorter treatment courses. Tazorac Pregnancy And Lactation Text: This medication is not safe during pregnancy. It is unknown if this medication is excreted in breast milk. Eucrisa Counseling: Patient may experience a mild burning sensation during topical application. Eucrisa is not approved in children less than 2 years of age. Fluconazole Counseling:  Patient counseled regarding adverse effects of fluconazole including but not limited to headache, diarrhea, nausea, upset stomach, liver function test abnormalities, taste disturbance, and stomach pain.  There is a rare possibility of liver failure that can occur when taking fluconazole.  The patient understands that monitoring of LFTs and kidney function test may be required, especially at baseline. The patient verbalized understanding of the proper use and possible adverse effects of fluconazole.  All of the patient's questions and concerns were addressed.

## 2021-06-09 NOTE — HISTORY OF PRESENT ILLNESS
[___ Month(s) Ago] : [unfilled] month(s) ago [FreeTextEntry1] : pending [facility]BR for hearing test - will likely needs a hearing aids\par joint pain is mild and stable\par seen by PMR and surgery - given  nerve block -  improved - \par now new onset of right shoulder pain - radiating from shoulder to upper arm for the last 2 months - no trauma\par last rituxan infusion - 10/2020 - no joint pain at this time\par thrush is cleared\par had both doses of covid vaccine

## 2021-06-09 NOTE — PHYSICAL EXAM
Well-Baby Checkup: [place]    Your baby’s first checkup will likely happen within a week of birth. At this  visit, the healthcare provider will examine your baby and ask questions about the first few days at home.  This sheet describes some of what · Ask the healthcare provider if your baby should take vitamin D. If you breastfeed  · Once your milk comes in, your breasts should feel full before a feeding and soft and deflated afterward. This likely means that your baby is getting enough to eat.   · B ? Cleaning the umbilical cord gently with a baby wipe or with a cotton swab dipped in rubbing alcohol. · Call your healthcare provider if the umbilical cord area has pus or redness. · After the cord falls off, bathe your  a few times per week.  You · Avoid placing infants on a couch or armchair for sleep. Sleeping on a couch or armchair puts the infant at a much higher risk of death, including SIDS. · Avoid using infant seats, car seats, and infant swings for routine sleep and daily naps.  These may [General Appearance - Alert] : alert · In the car, always put the baby in a rear-facing car seat. This should be secured in the back seat, according to the car seat’s directions. Never leave your baby alone in the car.   · Do not leave your baby on a high surface, such as a table, bed, or couc Taking care of a  can be physically and emotionally draining. Right now it may seem like you have time for nothing else. But taking good care of yourself will help you care for your baby too. Here are some tips:  · Take a break.  When your baby is sl [General Appearance - In No Acute Distress] : in no acute distress Reminder: Your child will have her next physical exam at 2 months age. Your baby will be due to receive the following immunizations:      Pediarix (DTaP, IPV, Hep B), Prevnar, HIB and Rotateq (oral)   Safe Sleep Recommendations:   The [employer] of [Outer Ear] : the ears and nose were normal in appearance -Avoid overheating and head covering in infants  -Avoid using wedges or positioners  -Supervised tummy time while the infant is awake can help develop core strength and minimize the flattening of the head.   -There is no evidence that swaddling reduces the [Oropharynx] : the oropharynx was normal ALWAYS TRAVEL WITH THE INFANT SAFELY STRAPPED INTO AN APPROVED CAR SEAT THAT IS STRAPPED INTO THE CAR   Use a five-point restraint car seat placed in the rear passenger seat. Never place the car seat in the front passenger seat.   Your child should face t [Neck Appearance] : the appearance of the neck was normal This is very common. Try feeding your baby smaller amounts more frequently, burping your baby more often and letting your baby rest after eating. CONSTIPATION   This occurs when stools are hard and cause your infant discomfort when passed.  Many babies [Neck Cervical Mass (___cm)] : no neck mass was observed [Jugular Venous Distention Increased] : there was no jugular-venous distention [Thyroid Diffuse Enlargement] : the thyroid was not enlarged [Thyroid Nodule] : there were no palpable thyroid nodules [Auscultation Breath Sounds / Voice Sounds] : lungs were clear to auscultation bilaterally [Heart Rate And Rhythm] : heart rate was normal and rhythm regular [Heart Sounds] : normal S1 and S2 [Heart Sounds Gallop] : no gallops [Murmurs] : no murmurs [Heart Sounds Pericardial Friction Rub] : no pericardial rub [Full Pulse] : the pedal pulses are present [Edema] : there was no peripheral edema [Bowel Sounds] : normal bowel sounds [Abdomen Soft] : soft [Abdomen Tenderness] : non-tender [Abdomen Mass (___ Cm)] : no abdominal mass palpated [Cervical Lymph Nodes Enlarged Posterior Bilaterally] : posterior cervical [Cervical Lymph Nodes Enlarged Anterior Bilaterally] : anterior cervical [Supraclavicular Lymph Nodes Enlarged Bilaterally] : supraclavicular [No CVA Tenderness] : no ~M costovertebral angle tenderness [No Spinal Tenderness] : no spinal tenderness [Abnormal Walk] : normal gait [Nail Clubbing] : no clubbing  or cyanosis of the fingernails [Involuntary Movements] : no involuntary movements were seen [Musculoskeletal - Swelling] : no joint swelling seen [Motor Tone] : muscle strength and tone were normal [Skin Color & Pigmentation] : normal skin color and pigmentation [Skin Turgor] : normal skin turgor [] : no rash [Oriented To Time, Place, And Person] : oriented to person, place, and time [Impaired Insight] : insight and judgment were intact [Affect] : the affect was normal [FreeTextEntry1] : right shoulder with decreased ROM up to 90 degrees - with pain on movement and positive empty can sign

## 2021-06-13 NOTE — HISTORY OF PRESENT ILLNESS
[de-identified] : 35 year old female follow up for laryngeal stenosis, history of RA and PE, associated symptoms of dysphagia and dysphonia, acute fungal infection, started on Nystatin and Fluconazole, Famotidine prescribed for laryngeal edema and dysphagia, reflux precautions encouraged, s/p MBS 4/15/21.  States voice has improved with intermittent, mild dysphagia, solids more than liquids, not as bothersome as before.  Reports straining to speak occasionally, no loss of voice. Denies throat pain, dyspnea, hemoptysis, recent fevers and throat/oral infections.  Reports recent (2-3 months) Right ear issues, constant fullness/pressure with significant hearing impairment.  Reports intermittent tinnitus.  Denies otorrhea.

## 2021-06-13 NOTE — CONSULT LETTER
[Dear  ___] : Dear  [unfilled], [Courtesy Letter:] : I had the pleasure of seeing your patient, [unfilled], in my office today. [Please see my note below.] : Please see my note below. [Consult Closing:] : Thank you very much for allowing me to participate in the care of this patient.  If you have any questions, please do not hesitate to contact me. [Sincerely,] : Sincerely, [FreeTextEntry2] : Arik Colon MD [FreeTextEntry3] : Ashok Davison MD, PhD\par Chief, Division of Laryngology\par Department of Otolaryngology\par [facility]\par Pediatric Otolaryngology, [facility]\par  of Otolaryngology\par [facility] [employer] [facility]

## 2021-06-13 NOTE — DATA REVIEWED
[de-identified] : Audio: normal hearing AS; possible profound loss on the right but + jamie- unclear results\par \par

## 2021-06-13 NOTE — REASON FOR VISIT
[Subsequent Evaluation] : a subsequent evaluation for [FreeTextEntry2] : follow up for laryngeal stenosis

## 2021-06-13 NOTE — PHYSICAL EXAM
[Midline] : trachea located in midline position [Laryngoscopy Performed] : laryngoscopy was performed, see procedure section for findings [Normal] : no rashes [de-identified] : moderately raspy and breathy

## 2021-06-14 ENCOUNTER — APPOINTMENT (OUTPATIENT)
Dept: CARDIOLOGY | Facility: CLINIC | Age: 36
End: 2021-06-14

## 2021-06-15 ENCOUNTER — APPOINTMENT (OUTPATIENT)
Dept: OTOLARYNGOLOGY | Facility: CLINIC | Age: 36
End: 2021-06-15
Payer: MEDICAID

## 2021-06-15 ENCOUNTER — TRANSCRIPTION ENCOUNTER (OUTPATIENT)
Age: 36
End: 2021-06-15

## 2021-06-15 LAB
ALBUMIN MFR SERPL ELPH: 57.6 %
ALBUMIN SERPL ELPH-MCNC: 4.3 G/DL
ALBUMIN SERPL-MCNC: 4 G/DL
ALBUMIN/GLOB SERPL: 1.3 RATIO
ALP BLD-CCNC: 167 U/L
ALPHA1 GLOB MFR SERPL ELPH: 4.4 %
ALPHA1 GLOB SERPL ELPH-MCNC: 0.3 G/DL
ALPHA2 GLOB MFR SERPL ELPH: 10.8 %
ALPHA2 GLOB SERPL ELPH-MCNC: 0.8 G/DL
ALT SERPL-CCNC: 12 U/L
ANION GAP SERPL CALC-SCNC: 11 MMOL/L
AST SERPL-CCNC: 11 U/L
B-GLOBULIN MFR SERPL ELPH: 12.9 %
B-GLOBULIN SERPL ELPH-MCNC: 0.9 G/DL
BASOPHILS # BLD AUTO: 0.04 K/UL
BASOPHILS NFR BLD AUTO: 0.5 %
BILIRUB SERPL-MCNC: 0.3 MG/DL
BUN SERPL-MCNC: 7 MG/DL
CALCIUM SERPL-MCNC: 9.5 MG/DL
CHLORIDE SERPL-SCNC: 102 MMOL/L
CO2 SERPL-SCNC: 26 MMOL/L
CREAT SERPL-MCNC: 0.67 MG/DL
CRP SERPL-MCNC: 28 MG/L
DEPRECATED KAPPA LC FREE/LAMBDA SER: 0.87 RATIO
EOSINOPHIL # BLD AUTO: 0.05 K/UL
EOSINOPHIL NFR BLD AUTO: 0.6 %
ERYTHROCYTE [SEDIMENTATION RATE] IN BLOOD BY WESTERGREN METHOD: 27 MM/HR
GAMMA GLOB FLD ELPH-MCNC: 1 G/DL
GAMMA GLOB MFR SERPL ELPH: 14.3 %
GLUCOSE SERPL-MCNC: 81 MG/DL
HBV CORE IGG+IGM SER QL: NONREACTIVE
HBV SURFACE AB SER QL: REACTIVE
HBV SURFACE AG SER QL: NONREACTIVE
HCT VFR BLD CALC: 41.3 %
HCV AB SER QL: NONREACTIVE
HCV S/CO RATIO: 0.11 S/CO
HGB BLD-MCNC: 12.6 G/DL
IGA SER QL IEP: 173 MG/DL
IGG SER QL IEP: 734 MG/DL
IGM SER QL IEP: 60 MG/DL
IMM GRANULOCYTES NFR BLD AUTO: 0.2 %
INTERPRETATION SERPL IEP-IMP: NORMAL
KAPPA LC CSF-MCNC: 1.72 MG/DL
KAPPA LC SERPL-MCNC: 1.5 MG/DL
LYMPHOCYTES # BLD AUTO: 1.68 K/UL
LYMPHOCYTES NFR BLD AUTO: 20 %
M PROTEIN SPEC IFE-MCNC: NORMAL
M TB IFN-G BLD-IMP: NEGATIVE
MAN DIFF?: NORMAL
MCHC RBC-ENTMCNC: 27.9 PG
MCHC RBC-ENTMCNC: 30.5 GM/DL
MCV RBC AUTO: 91.6 FL
MONOCYTES # BLD AUTO: 0.69 K/UL
MONOCYTES NFR BLD AUTO: 8.2 %
NEUTROPHILS # BLD AUTO: 5.91 K/UL
NEUTROPHILS NFR BLD AUTO: 70.5 %
PLATELET # BLD AUTO: 284 K/UL
POTASSIUM SERPL-SCNC: 4.5 MMOL/L
PROT SERPL-MCNC: 7 G/DL
QUANTIFERON TB PLUS MITOGEN MINUS NIL: 9.61 IU/ML
QUANTIFERON TB PLUS NIL: 0.02 IU/ML
QUANTIFERON TB PLUS TB1 MINUS NIL: 0 IU/ML
QUANTIFERON TB PLUS TB2 MINUS NIL: 0 IU/ML
RBC # BLD: 4.51 M/UL
RBC # FLD: 13.8 %
SODIUM SERPL-SCNC: 140 MMOL/L
TSH SERPL-ACNC: 1.05 UIU/ML
WBC # FLD AUTO: 8.39 K/UL

## 2021-06-15 PROCEDURE — 92652 AEP THRSHLD EST MLT FREQ I&R: CPT

## 2021-06-16 ENCOUNTER — TRANSCRIPTION ENCOUNTER (OUTPATIENT)
Age: 36
End: 2021-06-16

## 2021-06-25 ENCOUNTER — TRANSCRIPTION ENCOUNTER (OUTPATIENT)
Age: 36
End: 2021-06-25

## 2021-06-29 ENCOUNTER — TRANSCRIPTION ENCOUNTER (OUTPATIENT)
Age: 36
End: 2021-06-29

## 2021-06-30 ENCOUNTER — RX RENEWAL (OUTPATIENT)
Age: 36
End: 2021-06-30

## 2021-06-30 ENCOUNTER — APPOINTMENT (OUTPATIENT)
Dept: RHEUMATOLOGY | Facility: CLINIC | Age: 36
End: 2021-06-30
Payer: MEDICAID

## 2021-06-30 VITALS
DIASTOLIC BLOOD PRESSURE: 65 MMHG | OXYGEN SATURATION: 97 % | BODY MASS INDEX: 39.51 KG/M2 | HEART RATE: 87 BPM | HEIGHT: 59 IN | TEMPERATURE: 97.3 F | SYSTOLIC BLOOD PRESSURE: 106 MMHG | WEIGHT: 196 LBS | RESPIRATION RATE: 16 BRPM

## 2021-06-30 DIAGNOSIS — M75.100 UNSPECIFIED ROTATOR CUFF TEAR OR RUPTURE OF UNSPECIFIED SHOULDER, NOT SPECIFIED AS TRAUMATIC: ICD-10-CM

## 2021-06-30 PROCEDURE — 99213 OFFICE O/P EST LOW 20 MIN: CPT

## 2021-06-30 NOTE — PHYSICAL EXAM
[General Appearance - Alert] : alert [General Appearance - In No Acute Distress] : in no acute distress [Outer Ear] : the ears and nose were normal in appearance [Oropharynx] : the oropharynx was normal [Neck Appearance] : the appearance of the neck was normal [Neck Cervical Mass (___cm)] : no neck mass was observed [Jugular Venous Distention Increased] : there was no jugular-venous distention [Thyroid Diffuse Enlargement] : the thyroid was not enlarged [Thyroid Nodule] : there were no palpable thyroid nodules [Auscultation Breath Sounds / Voice Sounds] : lungs were clear to auscultation bilaterally [Heart Rate And Rhythm] : heart rate was normal and rhythm regular [Heart Sounds] : normal S1 and S2 [Heart Sounds Gallop] : no gallops [Murmurs] : no murmurs [Heart Sounds Pericardial Friction Rub] : no pericardial rub [Full Pulse] : the pedal pulses are present [Edema] : there was no peripheral edema [Bowel Sounds] : normal bowel sounds [Abdomen Soft] : soft [Abdomen Tenderness] : non-tender [Abdomen Mass (___ Cm)] : no abdominal mass palpated [Cervical Lymph Nodes Enlarged Posterior Bilaterally] : posterior cervical [Cervical Lymph Nodes Enlarged Anterior Bilaterally] : anterior cervical [Supraclavicular Lymph Nodes Enlarged Bilaterally] : supraclavicular [No CVA Tenderness] : no ~M costovertebral angle tenderness [No Spinal Tenderness] : no spinal tenderness [Abnormal Walk] : normal gait [Nail Clubbing] : no clubbing  or cyanosis of the fingernails [Involuntary Movements] : no involuntary movements were seen [Musculoskeletal - Swelling] : no joint swelling seen [Motor Tone] : muscle strength and tone were normal [Skin Color & Pigmentation] : normal skin color and pigmentation [Skin Turgor] : normal skin turgor [] : no rash [Oriented To Time, Place, And Person] : oriented to person, place, and time [Impaired Insight] : insight and judgment were intact [Affect] : the affect was normal [FreeTextEntry1] : right shoulder with decreased ROM up to 90 degrees - with pain on movement and positive empty can sign

## 2021-06-30 NOTE — HISTORY OF PRESENT ILLNESS
[FreeTextEntry1] : ABR was normal - but possible auditory disorder - ongoing feeling of being unable to hear\par mild joint pain - morning stiffness about 15-30 minutes\par \par US with possible supraspinatus tearn- pending MRI\par pending new Rituxan infusion - elevated CRP

## 2021-06-30 NOTE — ASSESSMENT
[FreeTextEntry1] : 32 year-old female with pmh of ALMA ROSA, now with return of her symptoms with polyarthritis, highly positive DEBORAH\par \par 1. RA  with vasculitis - methotrexate 5 tablets -   s/p 3 cycles of rituxan -mild joint pain - pending new Rituxan infusion - but currently has thrush - on methotrexate 6 tablets - no joint pain -elevated inflammatory markers - will send for new infusion\par 3. chronic back pain - ongoing work-up by PMR x-rays with degenerative changes and mild scoliosis -had nerve block - no pain at this time. \par 4. chronic pain - on medical marijuana\par 5. muscle cramps over the right leg - not active\par 6. PE - likely related to tubal ligation (5 days prior) - chronic eliquis - given hx of clotting in her family\par 7. Right leg paresthesias - EMG done yesterday - mild sensory axonal loss\par 8. Right shoulder pain - supraspinatus tear - pending MRI\par 9. otitis interna with loss of hearing - send for doxy - if no improvement will send for MRI of auditory canal\par \par I reviewed previous labs results with patients.\par Laboratory tests ordered today\par Diagnosis and Prognosis discussed\par Continue with current medications\par medications refilled\par education provided on\par F/u 2 months\par

## 2021-07-02 ENCOUNTER — TRANSCRIPTION ENCOUNTER (OUTPATIENT)
Age: 36
End: 2021-07-02

## 2021-07-06 RX ORDER — ALBUTEROL SULFATE 90 UG/1
108 (90 BASE) INHALANT RESPIRATORY (INHALATION)
Qty: 1 | Refills: 0 | Status: COMPLETED | COMMUNITY
Start: 2019-04-04 | End: 2021-07-06

## 2021-07-18 ENCOUNTER — TRANSCRIPTION ENCOUNTER (OUTPATIENT)
Age: 36
End: 2021-07-18

## 2021-07-18 ENCOUNTER — EMERGENCY (EMERGENCY)
Facility: HOSPITAL | Age: 36
LOS: 1 days | Discharge: HOME | End: 2021-07-18
Attending: EMERGENCY MEDICINE
Payer: MEDICAID

## 2021-07-18 VITALS
HEIGHT: 62 IN | WEIGHT: 190.04 LBS | OXYGEN SATURATION: 98 % | HEART RATE: 80 BPM | SYSTOLIC BLOOD PRESSURE: 125 MMHG | RESPIRATION RATE: 17 BRPM | DIASTOLIC BLOOD PRESSURE: 77 MMHG | TEMPERATURE: 98 F

## 2021-07-18 DIAGNOSIS — Z98.51 TUBAL LIGATION STATUS: Chronic | ICD-10-CM

## 2021-07-18 DIAGNOSIS — Z98.891 HISTORY OF UTERINE SCAR FROM PREVIOUS SURGERY: Chronic | ICD-10-CM

## 2021-07-18 DIAGNOSIS — M72.2 PLANTAR FASCIAL FIBROMATOSIS: Chronic | ICD-10-CM

## 2021-07-18 PROCEDURE — 73610 X-RAY EXAM OF ANKLE: CPT | Mod: 26,RT

## 2021-07-18 PROCEDURE — 73610 X-RAY EXAM OF ANKLE: CPT

## 2021-07-18 PROCEDURE — 99283 EMERGENCY DEPT VISIT LOW MDM: CPT

## 2021-07-18 PROCEDURE — 99283 EMERGENCY DEPT VISIT LOW MDM: CPT | Mod: 25

## 2021-07-18 RX ORDER — IBUPROFEN 200 MG
1 TABLET ORAL
Qty: 30 | Refills: 0
Start: 2021-07-18

## 2021-07-18 RX ORDER — OXYCODONE AND ACETAMINOPHEN 5; 325 MG/1; MG/1
1 TABLET ORAL
Qty: 6 | Refills: 0
Start: 2021-07-18

## 2021-07-18 NOTE — ED PROVIDER NOTE - PHYSICAL EXAMINATION
tenderness to palpation to right achilles tendon area. no palpable defect in tendon on palpation as compared to left leg. no tenderness to palpation to ankle or foot. no calf swelling

## 2021-07-18 NOTE — ED PROVIDER NOTE - PATIENT PORTAL LINK FT
You can access the FollowMyHealth Patient Portal offered by [facility] by registering at the following website: [email or web address]. By joining [facility]’s FollowMyHealth portal, you will also be able to view your health information using other applications (apps) compatible with our system.

## 2021-07-18 NOTE — ED PROVIDER NOTE - NSFOLLOWUPINSTRUCTIONS_ED_ALL_ED_FT
You might had an acchiles tendon partial tear. Followup with your orthopedist. avoid straining the tendon further, use ibuprofen for pain and inlammation  Achilles Tendinitis       Achilles tendinitis is inflammation of the tough, cord-like band that attaches the lower leg muscles to the heel bone (Achilles tendon). This is usually caused by overusing the tendon and the ankle joint.    Achilles tendinitis usually gets better over time with treatment and caring for yourself at home. It can take weeks or months to heal completely.      What are the causes?  This condition may be caused by:  •A sudden increase in exercise or activity, such as running.      •Doing the same exercises or activities, such as jumping, over and over.      •Not warming up calf muscles before exercising.      •Exercising in shoes that are worn out or not made for exercise.      •Having arthritis or a bone growth (spur) on the back of the heel bone. This can rub against the tendon and hurt it.      •Age-related wear and tear. Tendons become less flexible with age and are more likely to be injured.        What are the signs or symptoms?  Common symptoms of this condition include:  •Pain in the Achilles tendon or in the back of the leg, just above the heel. The pain usually gets worse with exercise.      •Stiffness or soreness in the back of the leg, especially in the morning.      •Swelling of the skin over the Achilles tendon.      •Thickening of the tendon.      •Trouble standing on tiptoe.        How is this diagnosed?  This condition is diagnosed based on your symptoms and a physical exam. You may have tests, including:  •X-rays.      •MRI.        How is this treated?  The goal of treatment is to relieve symptoms and help your injury heal. Treatment may include:  •Decreasing or stopping activities that caused the tendinitis. This may mean switching to low-impact exercises like biking or swimming.      •Icing the injured area.      •Doing physical therapy, including strengthening and stretching exercises.      •Taking NSAIDs, such as ibuprofen, to help relieve pain and swelling.      •Using supportive shoes, wraps, heel lifts, or a walking boot (air cast).      •Having surgery. This may be done if your symptoms do not improve after other treatments.      •Using high-energy shock wave impulses to stimulate the healing process (extracorporeal shock wave therapy). This is rare.      •Having an injection of medicines that help relieve inflammation (corticosteroids). This is rare.        Follow these instructions at home:    If you have an air cast:     •Wear the air cast as told by your health care provider. Remove it only as told by your health care provider.      •Loosen it if your toes tingle, become numb, or turn cold and blue.      •Keep it clean.    •If the air cast is not waterproof:  •Do not let it get wet.      •Cover it with a watertight covering when you take a bath or shower.          Managing pain, stiffness, and swelling    •If directed, put ice on the injured area. To do this:  •If you have a removable air cast, remove it as told by your health care provider.      •Put ice in a plastic bag.      •Place a towel between your skin and the bag.      •Leave the ice on for 20 minutes, 2–3 times a day.        •Move your toes often to reduce stiffness and swelling.      •Raise (elevate) your foot above the level of your heart while you are sitting or lying down.      Activity     •Gradually return to your normal activities as told by your health care provider. Ask your health care provider what activities are safe for you.      • Do not do activities that cause pain.      •Consider doing low-impact exercises, like cycling or swimming.      •Ask your health care provider when it is safe to drive if you have an air cast on your foot.      •If physical therapy was prescribed, do exercises as told by your health care provider or physical therapist.      General instructions     •If directed, wrap your foot with an elastic bandage or other wrap. This can help to keep your tendon from moving too much while it heals. Your health care provider will show you how to wrap your foot correctly.      •Wear supportive shoes or heel lifts only as told by your health care provider.      •Take over-the-counter and prescription medicines only as told by your health care provider.      •Keep all follow-up visits as told by your health care provider. This is important.        Contact a health care provider if you:    •Have symptoms that get worse.      •Have pain that does not get better with medicine.      •Develop new, unexplained symptoms.      •Develop warmth and swelling in your foot.      •Have a fever.        Get help right away if you:    •Have a sudden popping sound or sensation in your Achilles tendon followed by severe pain.      •Cannot move your toes or foot.      •Cannot put any weight on your foot.      •Your foot or toes become numb and look white or blue even after loosening your bandage or air cast.        Summary    •Achilles tendinitis is inflammation of the tough, cord-like band that attaches the lower leg muscles to the heel bone (Achilles tendon).      •This condition is usually caused by overusing the tendon and the ankle joint. It can also be caused by arthritis or normal aging.      •The most common symptoms of this condition include pain, swelling, or stiffness in the Achilles tendon or in the back of the leg.      •This condition is usually treated by decreasing or stopping activities that caused the tendinitis, icing the injured area, taking NSAIDs, and doing physical therapy.      This information is not intended to replace advice given to you by your health care provider. Make sure you discuss any questions you have with your health care provider.

## 2021-07-18 NOTE — ED PROVIDER NOTE - HIV OFFER
----- Message from Isaac Olmos MD sent at 1/6/2021 11:34 AM CST -----  Please inform patient that her vaginal culture was positive for bacterial vaginosis (BV). This is due to an overgrowth of vaginal bacteria.   It is readily treated with an antibiot
[facility]  Candida result also available, see result note.
Pt Name and  verified. Patient informed and verbalized understanding. Requested flagyl an order sent in to pharmacy on file. No further questions.
Opt out

## 2021-07-18 NOTE — ED PROVIDER NOTE - OBJECTIVE STATEMENT
Patient was working out and felt a pain in the right achilles tendon area 2 days ago. pain still persistent. no other injuries

## 2021-07-18 NOTE — ED PROVIDER NOTE - CLINICAL SUMMARY MEDICAL DECISION MAKING FREE TEXT BOX
Patient with achilles tendon injury, no clinical evidence of complete rupture. xray unremarkable. posterior splint applied in slight extension. patient has own orthopedist for followup

## 2021-07-22 ENCOUNTER — APPOINTMENT (OUTPATIENT)
Dept: OTOLARYNGOLOGY | Facility: CLINIC | Age: 36
End: 2021-07-22
Payer: MEDICAID

## 2021-07-22 VITALS
WEIGHT: 196 LBS | HEART RATE: 67 BPM | DIASTOLIC BLOOD PRESSURE: 73 MMHG | HEIGHT: 59 IN | BODY MASS INDEX: 39.51 KG/M2 | SYSTOLIC BLOOD PRESSURE: 110 MMHG

## 2021-07-22 PROCEDURE — 31579 LARYNGOSCOPY TELESCOPIC: CPT

## 2021-07-22 PROCEDURE — 99214 OFFICE O/P EST MOD 30 MIN: CPT | Mod: 25

## 2021-07-23 ENCOUNTER — APPOINTMENT (OUTPATIENT)
Dept: NEUROLOGY | Facility: CLINIC | Age: 36
End: 2021-07-23

## 2021-07-27 ENCOUNTER — TRANSCRIPTION ENCOUNTER (OUTPATIENT)
Age: 36
End: 2021-07-27

## 2021-07-28 ENCOUNTER — APPOINTMENT (OUTPATIENT)
Dept: SPEECH THERAPY | Facility: CLINIC | Age: 36
End: 2021-07-28

## 2021-08-10 ENCOUNTER — TRANSCRIPTION ENCOUNTER (OUTPATIENT)
Age: 36
End: 2021-08-10

## 2021-08-13 ENCOUNTER — APPOINTMENT (OUTPATIENT)
Dept: RHEUMATOLOGY | Facility: CLINIC | Age: 36
End: 2021-08-13
Payer: MEDICAID

## 2021-08-13 VITALS
TEMPERATURE: 97.4 F | OXYGEN SATURATION: 99 % | SYSTOLIC BLOOD PRESSURE: 112 MMHG | BODY MASS INDEX: 39.51 KG/M2 | HEIGHT: 59 IN | WEIGHT: 196 LBS | DIASTOLIC BLOOD PRESSURE: 59 MMHG | HEART RATE: 54 BPM | RESPIRATION RATE: 16 BRPM

## 2021-08-13 DIAGNOSIS — Z11.59 ENCOUNTER FOR SCREENING FOR OTHER VIRAL DISEASES: ICD-10-CM

## 2021-08-13 PROCEDURE — 99213 OFFICE O/P EST LOW 20 MIN: CPT

## 2021-08-14 NOTE — HISTORY OF PRESENT ILLNESS
[___ Month(s) Ago] : [unfilled] month(s) ago [FreeTextEntry1] : on medical marijuana now -  improvement on pain level over the back\par going to [place] - vaccinated - pending new rituxan infusion\par has not done the shoulder MR\par ongoing hearing loss - pending fredrick with Dr. Davison \par mild joint pain today - no flares

## 2021-08-14 NOTE — ASSESSMENT
[FreeTextEntry1] : 32 year-old female with pmh of ALMA ROSA, now with return of her symptoms with polyarthritis, highly positive DEBORAH\par \par 1. RA  with vasculitis - methotrexate 5 tablets -   s/p 3 cycles of rituxan -mild joint pain - pending new Rituxan infusion - but currently has thrush - on methotrexate 6 tablets - no joint pain - pending new infusion of truxima - in the next week\par 3. chronic back pain - ongoing work-up by PMR x-rays with degenerative changes and mild scoliosis -had nerve block - no pain at this time. \par 4. chronic pain - on medical marijuana - improved - has f/u with pain management\par 5. muscle cramps over the right leg - not active\par 6. PE - likely related to tubal ligation (5 days prior) - chronic eliquis - given hx of clotting in her family\par 7. Right leg paresthesias - EMG done yesterday - mild sensory axonal loss\par 8. Right shoulder pain - supraspinatus tear - pending MRI\par 9. otitis interna with loss of hearing -pending new fredrick with ENT\par \par I reviewed previous labs results with patients.\par Laboratory tests ordered today\par Diagnosis and Prognosis discussed\par Continue with current medications\par medications refilled\par education provided on\par F/u 2 months\par

## 2021-08-18 ENCOUNTER — TRANSCRIPTION ENCOUNTER (OUTPATIENT)
Age: 36
End: 2021-08-18

## 2021-09-04 ENCOUNTER — TRANSCRIPTION ENCOUNTER (OUTPATIENT)
Age: 36
End: 2021-09-04

## 2021-09-05 ENCOUNTER — FORM ENCOUNTER (OUTPATIENT)
Age: 36
End: 2021-09-05

## 2021-09-06 ENCOUNTER — TRANSCRIPTION ENCOUNTER (OUTPATIENT)
Age: 36
End: 2021-09-06

## 2021-09-08 ENCOUNTER — OUTPATIENT (OUTPATIENT)
Dept: INPATIENT UNIT | Facility: HOSPITAL | Age: 36
LOS: 1 days | End: 2021-09-08
Payer: COMMERCIAL

## 2021-09-08 ENCOUNTER — APPOINTMENT (OUTPATIENT)
Dept: DISASTER EMERGENCY | Facility: HOSPITAL | Age: 36
End: 2021-09-08

## 2021-09-08 VITALS
DIASTOLIC BLOOD PRESSURE: 74 MMHG | HEART RATE: 100 BPM | TEMPERATURE: 99 F | RESPIRATION RATE: 16 BRPM | OXYGEN SATURATION: 98 % | SYSTOLIC BLOOD PRESSURE: 113 MMHG

## 2021-09-08 VITALS
SYSTOLIC BLOOD PRESSURE: 119 MMHG | DIASTOLIC BLOOD PRESSURE: 70 MMHG | OXYGEN SATURATION: 99 % | TEMPERATURE: 100 F | RESPIRATION RATE: 16 BRPM | HEART RATE: 101 BPM

## 2021-09-08 DIAGNOSIS — Z98.51 TUBAL LIGATION STATUS: Chronic | ICD-10-CM

## 2021-09-08 DIAGNOSIS — M72.2 PLANTAR FASCIAL FIBROMATOSIS: Chronic | ICD-10-CM

## 2021-09-08 DIAGNOSIS — Z98.891 HISTORY OF UTERINE SCAR FROM PREVIOUS SURGERY: Chronic | ICD-10-CM

## 2021-09-08 DIAGNOSIS — U07.1 COVID-19: ICD-10-CM

## 2021-09-08 PROCEDURE — M0243: CPT

## 2021-09-08 RX ORDER — SODIUM CHLORIDE 9 MG/ML
250 INJECTION INTRAMUSCULAR; INTRAVENOUS; SUBCUTANEOUS
Refills: 0 | Status: COMPLETED | OUTPATIENT
Start: 2021-09-08 | End: 2021-09-08

## 2021-09-08 RX ADMIN — SODIUM CHLORIDE 310 MILLILITER(S): 9 INJECTION INTRAMUSCULAR; INTRAVENOUS; SUBCUTANEOUS at 12:00

## 2021-09-08 NOTE — CHART NOTE - NSCHARTNOTEFT_GEN_A_CORE
CC: Monoclonal Antibody Infusion/COVID 19 Positive on 2021      History: Patient presents for infusion of monoclonal antibody infusion. Patient has been screened and was deemed to be a candidate.    Symptoms/ Criteria: Fever, cough, nasal congestion, Malaise    Risk Profile includes: RA receiving tx, BMI >25, h/o PE, COPD/ Asthma    casirivimab/imdevimab in sodium chloride 0.9% (EUA) IVPB 100 milliLiter(s) IV Intermittent once  sodium chloride 0.9%. 250 milliLiter(s) IV Continuous <Continuous>      PMHx:  Infection due to severe acute respiratory syndrome coronavirus 2 (SARS-CoV-2)    FH: hypertension    MEWS Score    No pertinent past medical history    Rheumatoid arthritis    Kidney stone    Fibromyalgia    Asthma    PE (pulmonary thromboembolism)    Rheumatoid arthritis    Kidney stone    PE (pulmonary thromboembolism)    No significant past surgical history    H/O  section    Plantar fasciitis, left    S/P tubal ligation    No significant past surgical history    S/P tubal ligation    COVID-19          T(C): 37.8 (21 @ 11:47), Max: 37.8 (21 @ 11:32)  HR: 90 (21 @ 11:47) (90 - 100)  BP: 100/72 (21 @ 11:47) (100/72 - 113/74)  RR: 16 (21 @ 11:47) (16 - 16)  SpO2: 100% (21 @ 11:47) (98% - 100%)      PE:   Appearance: NAD	  HEENT:   Normal oral mucosa.   Lymphatic: No lymphadenopathy  Cardiovascular: Normal S1 S2, No JVD, No murmurs, No edema  Respiratory: Lungs clear to auscultation	  Gastrointestinal:  Soft, Non-tender. No guarding   Skin: warm and dry  Neurologic: Non-focal  Extremities: Normal range of motion.     ASSESSMENT:  Pt is a 35y year old Female Covid +  referred to the infusion center for Monoclonal antibody infusion (Regeneron).  Pt was fully vaccinated with Pfizer-last dose-2021  Pt states she had a tubal ligation-pregnancy waiver signed secondary to age.    PLAN:  - infusion procedure explained to patient   - Consent for monoclonal antibody infusion obtained   - Risk & benefits discussed/all questions answered  - infuse Regeneron over 21 minutes  - will observe patient for one hour post infusion  and then if stable discharge home with outpt follow up as planned by PMD.    POST INFUSION ASSESSMENT:   DISCHARGE at approximately  1  hour post infusion    - Patient tolerated infusion well denies complaints of chest pain/SOB/dizziness/ palps  - VSS for discharge home  - D/C instructions given/ fact sheet included.  - Patient to follow-up with PCP as needed.

## 2021-09-10 ENCOUNTER — TRANSCRIPTION ENCOUNTER (OUTPATIENT)
Age: 36
End: 2021-09-10

## 2021-09-11 NOTE — PHYSICAL EXAM
[Midline] : trachea located in midline position [Laryngoscopy Performed] : laryngoscopy was performed, see procedure section for findings [Normal] : no rashes [de-identified] : moderately raspy and breathy

## 2021-09-11 NOTE — CONSULT LETTER
[Dear  ___] : Dear  [unfilled], [Courtesy Letter:] : I had the pleasure of seeing your patient, [unfilled], in my office today. [Please see my note below.] : Please see my note below. [Consult Closing:] : Thank you very much for allowing me to participate in the care of this patient.  If you have any questions, please do not hesitate to contact me. [Sincerely,] : Sincerely, [FreeTextEntry2] : Arik Colon MD [FreeTextEntry3] : Ashok Davison MD, PhD\par Chief, Division of Laryngology\par Department of Otolaryngology\par [facility]\par Pediatric Otolaryngology, [facility]\par  of Otolaryngology\par [facility] [employer] [facility]

## 2021-09-11 NOTE — DATA REVIEWED
[de-identified] : Audio: normal hearing AS; possible profound loss on the right but + jamie- unclear results\par \par

## 2021-09-11 NOTE — HISTORY OF PRESENT ILLNESS
[de-identified] : 35 year old female follow up for laryngeal stenosis, history of RA and PE, dysphagia and dysphonia improved. Denies throat pain, dyspnea, hemoptysis, recent fevers and throat/oral infections. Was taking famotidine and nystatin for 1 week and stopped.  Reports recent (2-3 months) Right ear issues, constant fullness/pressure with significant hearing impairment.No tinnitus. No otalgia.  Denies otorrhea.

## 2021-10-05 ENCOUNTER — APPOINTMENT (OUTPATIENT)
Dept: PULMONOLOGY | Facility: CLINIC | Age: 36
End: 2021-10-05
Payer: MEDICAID

## 2021-10-05 VITALS
BODY MASS INDEX: 39.31 KG/M2 | WEIGHT: 195 LBS | OXYGEN SATURATION: 100 % | DIASTOLIC BLOOD PRESSURE: 64 MMHG | HEART RATE: 80 BPM | HEIGHT: 59 IN | SYSTOLIC BLOOD PRESSURE: 97 MMHG

## 2021-10-05 VITALS — TEMPERATURE: 97.8 F

## 2021-10-05 PROCEDURE — 99214 OFFICE O/P EST MOD 30 MIN: CPT | Mod: 25

## 2021-10-05 PROCEDURE — 71046 X-RAY EXAM CHEST 2 VIEWS: CPT

## 2021-10-05 NOTE — HISTORY OF PRESENT ILLNESS
[TextBox_4] : 35F with h/o asthma and RA, previously on MTX and rituximab, here for evaluation of sob.\par Pt reports had covid 1 month ago (pfiser 2nd shot 5/21). She had mild to moderate symptoms and received MAB infusion. Cough has improved but SAMANIEGO has persisted and slowly getting better. NO wheezing, no chest tightness. Has been off MTX since august.\par

## 2021-10-05 NOTE — ASSESSMENT
[FreeTextEntry1] : 35F with asthma, RA and now 1 month after covid dx with persistent SAMANIEGO.\par No evidence clinically of asthma exacerbation.\par CXR clear.\par O2 sat 100% resting RA.\par Do not exclude some pulm inflammation, will give short 5d course of decadron and cont on symbicort and prn ventolin.\par Counseled pt that post covid recovery can take 3-6months and is a slow process. \par Pt will f/u in 3 months or sooner if symptoms worsen.\par \par

## 2021-10-09 ENCOUNTER — OUTPATIENT (OUTPATIENT)
Dept: OUTPATIENT SERVICES | Facility: HOSPITAL | Age: 36
LOS: 1 days | End: 2021-10-09
Payer: MEDICAID

## 2021-10-09 ENCOUNTER — APPOINTMENT (OUTPATIENT)
Dept: MRI IMAGING | Facility: HOSPITAL | Age: 36
End: 2021-10-09
Payer: MEDICAID

## 2021-10-09 DIAGNOSIS — Z98.51 TUBAL LIGATION STATUS: Chronic | ICD-10-CM

## 2021-10-09 DIAGNOSIS — U09.9 POST COVID-19 CONDITION, UNSPECIFIED: ICD-10-CM

## 2021-10-09 DIAGNOSIS — M72.2 PLANTAR FASCIAL FIBROMATOSIS: Chronic | ICD-10-CM

## 2021-10-09 DIAGNOSIS — Z98.891 HISTORY OF UTERINE SCAR FROM PREVIOUS SURGERY: Chronic | ICD-10-CM

## 2021-10-09 PROCEDURE — 70553 MRI BRAIN STEM W/O & W/DYE: CPT

## 2021-10-09 PROCEDURE — 70553 MRI BRAIN STEM W/O & W/DYE: CPT | Mod: 26

## 2021-10-15 ENCOUNTER — APPOINTMENT (OUTPATIENT)
Dept: RHEUMATOLOGY | Facility: CLINIC | Age: 36
End: 2021-10-15
Payer: MEDICAID

## 2021-10-15 ENCOUNTER — LABORATORY RESULT (OUTPATIENT)
Age: 36
End: 2021-10-15

## 2021-10-15 VITALS
TEMPERATURE: 98.4 F | HEART RATE: 66 BPM | HEIGHT: 59 IN | OXYGEN SATURATION: 98 % | BODY MASS INDEX: 39.11 KG/M2 | WEIGHT: 194 LBS | RESPIRATION RATE: 16 BRPM | SYSTOLIC BLOOD PRESSURE: 119 MMHG | DIASTOLIC BLOOD PRESSURE: 78 MMHG

## 2021-10-15 PROCEDURE — 99214 OFFICE O/P EST MOD 30 MIN: CPT

## 2021-10-15 NOTE — HISTORY OF PRESENT ILLNESS
[___ Month(s) Ago] : [unfilled] month(s) ago [FreeTextEntry1] : Patient is a 35 year old female patient had COVID in beginning of September, received antibody treatment on September 8th. Patient vaccinated for covid. Since the diagnosis of COVID, patient has had blurry vision and dizziness, worsening fatigue sleeping about 8 hours a night still feeling tired, headaches throughout the day relieved by acetaminophen, speech irregularities such as trouble finishing her thoughts and mispronunciation of words, brain fog with associated short term memory loss, and hearing loss in right ear, sweating excessively  Patient states RA has worsened in knees and hands. Methotrexate has been discontinued since September 8th after antibody treatment. Patient still taking eliquis for PE. Patient states she missed her period this month but has not been sexually active.

## 2021-10-15 NOTE — ASSESSMENT
[FreeTextEntry1] : 32 year-old female with pmh of ALMA ROSA, now with return of her symptoms with polyarthritis, highly positive DEBORAH\par \par 1. RA  with vasculitis - methotrexate 5 tablets -   s/p 3 cycles of rituxan -mild joint pain - pending new Rituxan infusion - but currently has thrush - on methotrexate 6 tablets  rituxan on hold - had covid 19 in 09/2021 - despite being vaccinated can continue with methotrexate\par 3. chronic back pain - ongoing work-up by PMR x-rays with degenerative changes and mild scoliosis -had nerve block - no pain at this time. \par 4. chronic pain - on medical marijuana - improved - has f/u with pain management\par 5. muscle cramps over the right leg - not active\par 6. PE - likely related to tubal ligation (5 days prior) - chronic eliquis - given hx of clotting in her family\par 7. Right leg paresthesias - EMG done yesterday - mild sensory axonal loss\par 8. Right shoulder pain - supraspinatus tear - pending MRI\par 9. otitis interna with loss of hearing -pending new fredrick with ENT\par \par I reviewed previous labs results with patients.\par Laboratory tests ordered today\par Diagnosis and Prognosis discussed\par Continue with current medications\par medications refilled\par education provided on\par F/u 2 months\par

## 2021-10-16 ENCOUNTER — LABORATORY RESULT (OUTPATIENT)
Age: 36
End: 2021-10-16

## 2021-10-20 ENCOUNTER — TRANSCRIPTION ENCOUNTER (OUTPATIENT)
Age: 36
End: 2021-10-20

## 2021-10-20 LAB
ALBUMIN SERPL ELPH-MCNC: 4.6 G/DL
ALP BLD-CCNC: 173 U/L
ALT SERPL-CCNC: 16 U/L
ANION GAP SERPL CALC-SCNC: 12 MMOL/L
APPEARANCE: ABNORMAL
AST SERPL-CCNC: 13 U/L
BASOPHILS # BLD AUTO: 0.05 K/UL
BASOPHILS NFR BLD AUTO: 0.4 %
BILIRUB SERPL-MCNC: 0.3 MG/DL
BILIRUBIN URINE: NEGATIVE
BLOOD URINE: NEGATIVE
BUN SERPL-MCNC: 13 MG/DL
CALCIUM SERPL-MCNC: 9.6 MG/DL
CHLORIDE SERPL-SCNC: 106 MMOL/L
CO2 SERPL-SCNC: 25 MMOL/L
COLOR: NORMAL
COVID-19 SPIKE DOMAIN ANTIBODY INTERPRETATION: POSITIVE
CREAT SERPL-MCNC: 0.72 MG/DL
CRP SERPL-MCNC: 13 MG/L
DEPRECATED KAPPA LC FREE/LAMBDA SER: 0.93 RATIO
EOSINOPHIL # BLD AUTO: 0.07 K/UL
EOSINOPHIL NFR BLD AUTO: 0.6 %
ERYTHROCYTE [SEDIMENTATION RATE] IN BLOOD BY WESTERGREN METHOD: 38 MM/HR
GLUCOSE QUALITATIVE U: NEGATIVE
GLUCOSE SERPL-MCNC: 62 MG/DL
HCT VFR BLD CALC: 42 %
HGB BLD-MCNC: 12.9 G/DL
IGA SER QL IEP: 132 MG/DL
IGG SER QL IEP: 941 MG/DL
IGM SER QL IEP: 67 MG/DL
IMM GRANULOCYTES NFR BLD AUTO: 0.3 %
KAPPA LC CSF-MCNC: 1.24 MG/DL
KAPPA LC SERPL-MCNC: 1.15 MG/DL
KETONES URINE: NEGATIVE
LEUKOCYTE ESTERASE URINE: ABNORMAL
LYMPHOCYTES # BLD AUTO: 2 K/UL
LYMPHOCYTES NFR BLD AUTO: 17.7 %
MAN DIFF?: NORMAL
MCHC RBC-ENTMCNC: 27.4 PG
MCHC RBC-ENTMCNC: 30.7 GM/DL
MCV RBC AUTO: 89.4 FL
MONOCYTES # BLD AUTO: 0.73 K/UL
MONOCYTES NFR BLD AUTO: 6.4 %
NEUTROPHILS # BLD AUTO: 8.45 K/UL
NEUTROPHILS NFR BLD AUTO: 74.6 %
NITRITE URINE: NEGATIVE
PH URINE: 6
PLATELET # BLD AUTO: 284 K/UL
POTASSIUM SERPL-SCNC: 4.5 MMOL/L
PROT SERPL-MCNC: 6.8 G/DL
PROTEIN URINE: NORMAL
RBC # BLD: 4.7 M/UL
RBC # FLD: 14.3 %
SARS-COV-2 AB SERPL IA-ACNC: >250 U/ML
SODIUM SERPL-SCNC: 143 MMOL/L
SPECIFIC GRAVITY URINE: 1.03
TSH SERPL-ACNC: 0.96 UIU/ML
UROBILINOGEN URINE: NORMAL
WBC # FLD AUTO: 11.33 K/UL

## 2021-10-21 ENCOUNTER — TRANSCRIPTION ENCOUNTER (OUTPATIENT)
Age: 36
End: 2021-10-21

## 2021-11-01 ENCOUNTER — NON-APPOINTMENT (OUTPATIENT)
Age: 36
End: 2021-11-01

## 2021-11-01 ENCOUNTER — APPOINTMENT (OUTPATIENT)
Dept: CARDIOLOGY | Facility: CLINIC | Age: 36
End: 2021-11-01
Payer: MEDICAID

## 2021-11-01 VITALS
SYSTOLIC BLOOD PRESSURE: 113 MMHG | DIASTOLIC BLOOD PRESSURE: 72 MMHG | HEART RATE: 87 BPM | BODY MASS INDEX: 39.11 KG/M2 | OXYGEN SATURATION: 99 % | TEMPERATURE: 97.4 F | RESPIRATION RATE: 16 BRPM | WEIGHT: 194 LBS | HEIGHT: 59 IN

## 2021-11-01 PROCEDURE — 99214 OFFICE O/P EST MOD 30 MIN: CPT

## 2021-11-01 PROCEDURE — 93000 ELECTROCARDIOGRAM COMPLETE: CPT

## 2021-11-02 ENCOUNTER — TRANSCRIPTION ENCOUNTER (OUTPATIENT)
Age: 36
End: 2021-11-02

## 2021-11-04 ENCOUNTER — APPOINTMENT (OUTPATIENT)
Dept: OTOLARYNGOLOGY | Facility: CLINIC | Age: 36
End: 2021-11-04
Payer: MEDICAID

## 2021-11-04 VITALS
WEIGHT: 194 LBS | SYSTOLIC BLOOD PRESSURE: 117 MMHG | HEART RATE: 63 BPM | DIASTOLIC BLOOD PRESSURE: 71 MMHG | BODY MASS INDEX: 39.11 KG/M2 | HEIGHT: 59 IN

## 2021-11-04 PROCEDURE — 31579 LARYNGOSCOPY TELESCOPIC: CPT

## 2021-11-04 PROCEDURE — 99214 OFFICE O/P EST MOD 30 MIN: CPT | Mod: 25

## 2021-11-04 NOTE — HISTORY OF PRESENT ILLNESS
[de-identified] : 36 year old woman follow up for dysphagia and dysphonia\par History of laryngeal stenosis, RA, PE, Right hearing loss, s/p ABR, normal\par Treated for acute fungal infection, prescribed Nystatin, taken with good relief\par Recommended to maintain reflux precautions, states under control\par Reports no throat/voice issues, positive COVID-19 Sept 2021, taste has returned, still has anosmia\par Denies pain, dysphagia, odynophagia, dyspnea, hemoptysis, straining/loss of voice, recent fevers, throat/oral infections\par No APD testing yet\par Reports continues to have Right hearing loss, worsened since last visit, having intermittent dizziness and imbalance\par Denies otalgia, otorrhea, tinnitus, headaches related to hearing and ear infections.  Left ear unchanged, asymptomatic

## 2021-11-04 NOTE — PHYSICAL EXAM
[Midline] : trachea located in midline position [Laryngoscopy Performed] : laryngoscopy was performed, see procedure section for findings [Normal] : no rashes [de-identified] : moderately raspy and breathy

## 2021-11-08 ENCOUNTER — RX RENEWAL (OUTPATIENT)
Age: 36
End: 2021-11-08

## 2021-11-09 ENCOUNTER — APPOINTMENT (OUTPATIENT)
Dept: NEUROLOGY | Facility: CLINIC | Age: 36
End: 2021-11-09

## 2021-11-09 ENCOUNTER — APPOINTMENT (OUTPATIENT)
Dept: NEUROLOGY | Facility: CLINIC | Age: 36
End: 2021-11-09
Payer: MEDICAID

## 2021-11-09 ENCOUNTER — RX RENEWAL (OUTPATIENT)
Age: 36
End: 2021-11-09

## 2021-11-09 VITALS
OXYGEN SATURATION: 98 % | HEIGHT: 59 IN | TEMPERATURE: 98.2 F | WEIGHT: 196 LBS | BODY MASS INDEX: 39.51 KG/M2 | HEART RATE: 83 BPM | DIASTOLIC BLOOD PRESSURE: 67 MMHG | SYSTOLIC BLOOD PRESSURE: 106 MMHG

## 2021-11-09 PROCEDURE — 99214 OFFICE O/P EST MOD 30 MIN: CPT

## 2021-11-15 ENCOUNTER — APPOINTMENT (OUTPATIENT)
Dept: INTERNAL MEDICINE | Facility: CLINIC | Age: 36
End: 2021-11-15

## 2021-11-19 ENCOUNTER — TRANSCRIPTION ENCOUNTER (OUTPATIENT)
Age: 36
End: 2021-11-19

## 2021-11-24 ENCOUNTER — APPOINTMENT (OUTPATIENT)
Dept: SPEECH THERAPY | Facility: CLINIC | Age: 36
End: 2021-11-24

## 2021-11-24 NOTE — ED PROVIDER NOTE - CLINICAL SUMMARY MEDICAL DECISION MAKING FREE TEXT BOX
November 24, 2021       Haily Watson MD  [address]  [address] [address]  [facility] [id number]  Via In Basket      Patient: Marilyn Weinstein   YOB: 1955   Date of Visit: 11/24/2021       Dear Dr. Watson:    Thank you for referring Marilyn Weinstein to me for evaluation. Below are my notes for this visit with her.    If you have questions, please do not hesitate to call me. I look forward to following your patient along with you.      Sincerely,        Alphonso Rich MD        CC: No Recipients  Alphonso Rich MD  11/24/2021  3:59 PM  Signed  Plastic and Reconstructive Surgery Postop Note    Reason for Visit  S/p Right breast reduction, left breast oncoplastic reconstruction 10/11/21, (POW 6).    Postoperative assessment    Subjective    Using bacitracin to L inferior breast. Otherwise doing well.  Min drainage. Improving.    Review of systems: No fevers/chills.  No wound symptoms such as redness, swelling, increasing pain, or drainage.  No CP/SOB.      Physical Examination   height is 5' 5.4\" (1.661 m) and weight is 99.4 kg (219 lb 3.2 oz). Her temporal temperature is 96.1 °F (35.6 °C). Her blood pressure is 149/84 (abnormal) and her pulse is 92. Her oxygen saturation is 98%.   Estimated body mass index is 36.03 kg/m² as calculated from the following:    Height as of this encounter: 5' 5.4\" (1.661 m).    Weight as of this encounter: 99.4 kg (219 lb 3.2 oz).  General: NAD, well appearing  Chest: non-labored breathing  BL breast incisions healing well  BL NAC sensation intact- mildly hypersensitive  L trifurcation with 0.9 x 0.4 cm area of dehiscence, minimal necrotic tissue at base of wound, no surrounding erythema, no purulence  Extremities: Neurovascularly intact      Assessment, Plan: Marilyn Weinstein is a 66 year old female s/p R breast reduction, L breast oncoplastic recon for DCIS- delayed wound healing improving.    Macromastia  (primary encounter diagnosis)  Status post breast reduction  Breast neoplasm, Tis  (DCIS), left  Breast asymmetry between native breast and reconstructed breast     - minimal sharp excisional debridement of soft tissue today in office, 0.9 cm  - silver nitrate applied to L breast trifurcation today- patient tolerated well  - recommend bacitracin and gauze 2x/day to L breast wound  - ok to shower, no rubbing, scrubbing incisions.  Pat dry.  - encouraged patient to start moisturizing incisions daily w vaseline  - no heavy lifting > 10 pounds, no straining, no strenuous exercise x 6 weeks postop  - continue to wear soft surgical bra during the day, ok to remove at night  - will re-eval in 2 weeks as patient going out of town next week, hope XRT to start in next few weeks- will update Dr Watson in near future  - f/u 1 week      A total of 20 minutes of face-to-face time was spent in this encounter, of which >50% was spent in counseling.                    34 y/o F presents to the ED s/p surgery. Will rule out PE, check labs, CT scan and reassess.

## 2021-12-03 NOTE — DISCUSSION/SUMMARY
[FreeTextEntry1] : Many of her symptoms could be explained by myasthenia gravis or neuromuscular disorder.  Requested appropriate blood tests for myasthenia gravis and also for myopathy.  Finally cerebral vasculitis might possibly explain the droopy eyelid on the left and some of her other symptoms.  Because she has been on immunotherapy, nosocomial sinus infections or infections of the brain may have to be investigated.  Currently her neurological examination does not demonstrate significant abnormalities but these tests will be repeated at follow-up in 2 months.  \par Recommend nerve conduction examination of the left hand and neuromuscular testing.

## 2021-12-03 NOTE — HISTORY OF PRESENT ILLNESS
[FreeTextEntry1] : 35F with a history of asthma and rheumatoid arthritis, previously on methotrexate and rituximab, who returns for follow-up of neurological symptoms.\par \par She brought list of her symptoms for follow-up.  These are:\par (A) memory loss (short-term), and confusion, occurring daily.  She is often unable or finds it difficult to retain information.\par (B) her facial expression she believes is altered.  She is unable to express happiness and pleasure through her facial expression.\par (C) lack of sleep.  She wakes up frequently throughout the night and falls asleep during the day.\par (D) tremor in the hands throughout the day, but she has noticed the hands do not shake at night when she is asleep.\par (E) others have noticed speech problems.  Others have complained that her speech is slurred,  softer tone hesitation.

## 2021-12-03 NOTE — PHYSICAL EXAM
[General Appearance - Alert] : alert [General Appearance - In No Acute Distress] : in no acute distress [General Appearance - Well Nourished] : well nourished [Oriented To Time, Place, And Person] : oriented to person, place, and time [Person] : oriented to person [Place] : oriented to place [Time] : oriented to time [Short Term Intact] : short term memory intact [Remote Intact] : remote memory intact [Registration Intact] : recent registration memory intact [Span Intact] : the attention span was normal [Concentration Intact] : normal concentrating ability [Visual Intact] : visual attention was ~T not ~L decreased [Naming Objects] : no difficulty naming common objects [Repeating Phrases] : no difficulty repeating a phrase [Fluency] : fluency intact [Comprehension] : comprehension intact [Reading] : reading intact [Current Events] : adequate knowledge of current events [Vocabulary] : adequate range of vocabulary [Cranial Nerves Optic (II)] : visual acuity intact bilaterally,  visual fields full to confrontation, pupils equal round and reactive to light [Cranial Nerves Oculomotor (III)] : extraocular motion intact [Cranial Nerves Trigeminal (V)] : facial sensation intact symmetrically [Cranial Nerves Facial (VII)] : face symmetrical [Cranial Nerves Vestibulocochlear (VIII)] : hearing was intact bilaterally [Cranial Nerves Glossopharyngeal (IX)] : tongue and palate midline [Cranial Nerves Accessory (XI - Cranial And Spinal)] : head turning and shoulder shrug symmetric [Cranial Nerves Hypoglossal (XII)] : there was no tongue deviation with protrusion [Motor Strength] : muscle strength was normal in all four extremities [Paresis Pronator Drift Right-Sided] : no pronator drift on the right [Paresis Pronator Drift Left-Sided] : no pronator drift on the left [Motor Strength Upper Extremities Bilaterally] : strength was normal in both upper extremities [Motor Strength Lower Extremities Bilaterally] : strength was normal in both lower extremities [Sensation Vibration Decrease] : vibration was intact [Proprioception] : proprioception was intact [Allodynia] : no ~T allodynia present [Dysesthesia] : no dysesthesia [Hyperesthesia] : no hyperesthesia [Tactile Decrease Entire Leg Right] : diminished right leg [Pain / Temp Decrease Entire Leg - Right] : diminished right leg [Abnormal Walk] : normal gait [Balance] : balance was intact [Limited Balance] : balance was intact [Past-pointing] : there was no past-pointing [Tremor] : no tremor present [Dysdiadochokinesia Bilaterally] : not present [Coordination - Dysmetria Impaired Finger-to-Nose Bilateral] : not present [2+] : Ankle jerk left 2+ [Plantar Reflex Right Only] : normal on the right [Plantar Reflex Left Only] : normal on the left [___] : absent on the right [___] : absent on the left [Primitive Reflexes] : primitive reflexes were absent [Sclera] : the sclera and conjunctiva were normal [PERRL With Normal Accommodation] : pupils were equal in size, round, reactive to light, with normal accommodation [Extraocular Movements] : extraocular movements were intact [Outer Ear] : the ears and nose were normal in appearance [Oropharynx] : the oropharynx was normal

## 2021-12-03 NOTE — REVIEW OF SYSTEMS
[Fever] : no fever [Chills] : no chills [Feeling Poorly] : feeling poorly [Feeling Tired] : feeling tired [Confused or Disoriented] : confusion [Memory Lapses or Loss] : memory loss [Difficulty with Language] : no ~M difficulty with language [Facial Weakness] : no facial weakness [Arm Weakness] : no arm weakness [Hand Weakness] : no hand weakness [Leg Weakness] : no leg weakness [Poor Coordination] : good coordination [Difficulty Writing] : no difficulty writing [Numbness] : no numbness [Tingling] : no tingling [Suicidal] : not suicidal [Sleep Disturbances] : no sleep disturbances [Anxiety] : no anxiety [Depression] : no depression [Eye Pain] : no eye pain [Discharge From Eyes] : no purulent discharge from the eyes [Dry Eyes] : no dryness of the eyes

## 2021-12-07 ENCOUNTER — APPOINTMENT (OUTPATIENT)
Dept: PULMONOLOGY | Facility: CLINIC | Age: 36
End: 2021-12-07

## 2021-12-14 ENCOUNTER — TRANSCRIPTION ENCOUNTER (OUTPATIENT)
Age: 36
End: 2021-12-14

## 2021-12-15 ENCOUNTER — APPOINTMENT (OUTPATIENT)
Dept: SPEECH THERAPY | Facility: CLINIC | Age: 36
End: 2021-12-15

## 2021-12-15 NOTE — ASU PREOP CHECKLIST - BLOOD AVAILABLE
[facility] Intensivist Note     Date of Service: 12/15/2021    SUBJECTIVE   Patient seen and examined  Discussed with overnight intensivist  Data reviewed        Pressors:  Remains on norepinephrine 8 mcg/h    Drips:  Midazolam 5 mg/h and fentanyl 50 mcg/h    On a ventilator  Chest x-ray shows stable findings of diffuse interstitial marking  Lung compliance diminished at 20 ml/Cm water        Vent Settings Last Value   FiO2 60 % (12/15/21 0700)   Mode Pressure Regulated Volume Control (PRVC) (12/15/21 0600)   Rate 32 (12/15/21 0700)   Tidal Volume 500 mL (12/15/21 0700)   Pressure Support 6 cm H20 (12/07/21 0918)   PEEP/CPAC/EPAP 16 cm H20 (12/15/21 0700)       HEMODYNAMIC SETTINGS LAST 24 HOURS:  CVP:  [1 mmHg] 1 mmHg    ACTIVE MEDS:  sodium chloride, fentaNYL (SUBLIMAZE) infusion **AND** fentaNYL, potassium phosphate IVPB **OR** sodium phosphate IVPB, magnesium sulfate, potassium CHLORIDE, potassium CHLORIDE, sodium bicarbonate, sodium chloride (PF), sodium chloride, calcium gluconate IVPB, MIDAZolam, acetaminophen, chlorhexidine gluconate **AND** chlorhexidine gluconate, fentaNYL, atropine, sodium chloride, sodium chloride, dextrose, dextrose, glucagon, dextrose, dextrose  • VANCOMYCIN - PHARMACIST MONITORED   Does not apply See Admin Instructions   • hydroCORTisone (Solu-CORTEF) injection  50 mg Intravenous 4 times per day   • vancomycin (VANCOCIN) IVPB  1,250 mg Intravenous Q24H   • [START ON 12/16/2021] VANCOMYCIN - PHARMACIST MONITORED   Does not apply Once   • insulin lispro   Subcutaneous 4 times per day   • heparin (porcine)  7,500 Units Subcutaneous 3 times per day   • cycloSPORINE modified oral  150 mg Per NG tube Daily   • cycloSPORINE modified oral  100 mg Per NG tube Nightly   • chlorhexidine gluconate  15 mL Swish & Spit 2 times per day   • petrolatum (white)-mineral oil  1 application Both Eyes 6 times per day   • famotidine  20 mg Oral Daily   • docusate sodium-sennosides  1 tablet Oral Nightly   •  acyclovir  200 mg Per NG tube 2 times per day   • polyethylene glycol  17 g Oral Daily          PHYSICAL EXAM:     VITAL SIGNS:   Visit Vitals  /85   Pulse 83   Temp 98.6 °F (37 °C)   Resp (!) 32   Ht 5' 6\" (1.676 m)   Wt 82.2 kg (181 lb 3.5 oz)   SpO2 94%   BMI 29.25 kg/m²       Patient is sedated intubated on ventilator via ET tube    Head and neck exam: Pupils reactive, normal oral mucosa, neck is supple with no JVD.    Lungs: Bilateral breath sounds: Clear and symmetric , No Crackles, no rhonchi , exp wheezes    Cardiac exam:   Regular rate and rhythm S1 and S2 normal. No murmur.    Abdominal exam:   Soft nontender nondistended normoactive bowel sounds no organomegaly.    Extremities:   no edema no clubbing or cyanosis palpable pulsatile lower extremities.     Neuro Exam: Sedated not following commands    Skin Exam normal          DATA REVIEW:   CBC:   Recent Labs     12/14/21  0600 12/15/21  0520   WBC 15.7* 16.0*   RBC 4.14* 4.26*   HGB 12.3* 12.5*   HCT 39.2 41.6   * 69*     CMP:  Recent Labs     12/14/21  0600 12/14/21  0600 12/14/21  1551 12/15/21  0520   SODIUM 138   < > 140 137   POTASSIUM 4.8   < > 4.7 4.9   CHLORIDE 98   < > 103 105   ANIONGAP 10   < > 12 9*   GLUCOSE 130*   < > 147* 127*   *   < > 141* 57*   CREATININE 3.65*   < > 3.35* 1.66*   ALBUMIN 2.1*  --   --  2.3*   CALCIUM 8.6   < > 8.3* 8.6   *  --   --  99*    < > = values in this interval not displayed.     Coagulation: No results for input(s): INR, PTT in the last 72 hours.    Invalid input(s): PTP  Cardiac markers: No results for input(s): TROPONINI in the last 72 hours.  ABGs: No results for input(s): PHARTERIAL in the last 72 hours.    Invalid input(s): CO2ART, WOB7AYR, PO2ART, O2ART  Mg: No results for input(s): MAGNESIUM in the last 72 hours.  BNP: No results for input(s): BNP in the last 72 hours.    I&O:     Intake/Output Summary (Last 24 hours) at 12/15/2021 0708  Last data filed at 12/15/2021 0659  Gross  per 24 hour   Intake 2226.02 ml   Output 2078 ml   Net 148.02 ml       IMAGING DATA:  US VASC EXTREMITY LOWER VENOUS DUPLEX BILATERAL   Final Result by Christine Desouza MD (12/14 4050)      No evidence of acute DVT of the investigated bilateral lower extremities as   above.            Electronically Signed by: CHRISTINE DESOUZA M.D.    Signed on: 12/14/2021 5:17 PM          XR CHEST AP OR PA 1 VIEW   Final Result by Bassam Jorge MD (12/14 4786)   FINDINGS/IMPRESSION:      There is residual accentuation of the bilateral interstitial markings, not   inconsistent with the appearance of interstitial pneumonitis, which appears   similar to the findings observed on the previous examination of 12/12/2021.      There are endotracheal and feeding tubes.  There is a left jugular central   venous catheter.  There is evidence of a previous lower cervical anterior   fusion.  There are minimal degenerative changes of the thoracic spine and   bilateral acromioclavicular joints.  The heart size is normal.  There is   calcification of the thoracic aorta.      There is no demonstrable pneumothorax.      Electronically Signed by: BASSAM JORGE MD    Signed on: 12/14/2021 10:51 AM          XR ABDOMEN 1 VIEW   Final Result by Isa Jacob MD (12/13 1036)   1.    No evidence of bowel obstruction or free intraperitoneal air   2.    Coarse markings at the lung bases noted   3.    Dobbhoff feeding tube terminates in the gastric fundus   4.   Please see additional comments/observations as outlined in the body   the report      Electronically Signed by: ISA JACOB MD    Signed on: 12/13/2021 10:36 AM          XR CHEST AP OR PA 1 VIEW   Final Result by Darnell Robles MD (12/12 1007)      XR CHEST AP OR PA 1 VIEW   Final Result by Darnell Robles MD (12/10 0920)      XR CHEST AP OR PA 1 VIEW   Final Result by Bassam Jorge MD (12/09 1205)   FINDINGS/IMPRESSION:      There is accentuation of the bilateral interstitial markings, not    inconsistent with the appearance of residual interstitial pneumonitis,   which appears similar to the findings observed on the examination of the   previous day.      There are endotracheal and feeding tubes.  There is a left jugular central   venous catheter.  There is evidence of a previous lower cervical anterior   fusion.  There are \"cystic\" changes involving the proximal left humerus.    There are degenerative changes of the thoracic spine.  The heart size is   normal.  There is tortuosity of the great vessels.  There is relative   elevation of the right hemidiaphragm.      There is no demonstrable pneumothorax.      Electronically Signed by: EDILBERTO JORGE MD    Signed on: 12/9/2021 12:05 PM          US GALLBLADDER   Final Result by Karly Cifuentes MD (12/08 2240)      1. Tumefactive sludge within the gallbladder although the gallbladder   evaluation is limited as described above.      Electronically Signed by: KARLY CIFUENTES M.D.    Signed on: 12/8/2021 10:40 PM          XR CHEST PA OR AP 1 VIEW   Final Result by Rob Santos MD (12/08 0617)     Interval placement of a left internal jugular approach central venous    catheter with the tip in the superior vena cava.  No pneumothorax.     Otherwise no significant alteration from the previous exam.            Electronically signed by Rob Santos MD on 12 08 21 at 06:17      XR CHEST PA OR AP 1 VIEW   Final Result by Raul Roe MD (12/08 0603)   No interval change as compared to prior study.         Electronically signed by Raul Roe M.D. on 12 08 21 at 06:03      XR CHEST PA OR AP 1 VIEW   Final Result by Cristopher, [facility] Incoming Radiant Results And Orders (12/08 0258)   1.  New endotracheal tube, with the tip 4 cm above the octavio.   2.  New feeding tube, with the tip in the proximal stomach.   3.  Increased interstitial markings diffusely, similar to the prior.            Electronically signed by Steve Segal M.D. on 12 08 21 at 02:58      XR  TUBE CHECK ABDOMEN KUB   Final Result by Cristopher, [facility] Incoming Radiant Results And Orders (12/08 0253)     Feeding tube tip in the proximal stomach in the left upper quadrant.            Electronically signed by Steve Segal M.D. on 12 08 21 at 02:53      NM LUNG PERFUSION IMAGING   Final Result by Nicole Kim MD (12/07 4776)   Technically limited study (perfusion only).      No evidence of pulmonary embolism.      Radiation Dosimetry:   The radiopharmaceutical used for this exam delivers approximately 0.4   mSv/mCi (40 mRem/mCi)   Source:  RADIATION DOSE ESTIMATES TO ADULTS AND CHILDREN, [employer];   Effective dose RADAR         Electronically Signed by: NICOLE KIM MD    Signed on: 12/7/2021 3:35 PM          US VASC EXTREMITY LOWER VENOUS DUPLEX   Final Result by Gabby Peterson MD (12/07 6327)      No evidence for acute DVT in the bilateral lower extremities.       Electronically Signed by: GABBY PETERSON M.D.    Signed on: 12/7/2021 3:47 PM          XR CHEST PA OR AP 1 VIEW   Final Result by Sade Rosario MD (12/06 2947)   Bilateral lung perihilar interstitial opacities.        Electronically Signed by: SADE ROSARIO MD    Signed on: 12/6/2021 12:35 PM          XR CHEST AP OR PA 1 VIEW   Final Result by Darnell Robles MD (11/26 5033)      CTA CHEST PULMONARY EMBOLISM W CONTRAST   Final Result by Bernadette Araujo MD (11/24 3976)   1. Limited evaluation of pulmonary arteries with no pulmonary artery   filling defect centrally oriented lobar or proximal segmental branches.   2. Subtle bilateral groundglass opacities could represent infectious   inflammatory process likely superimposed on the background of interstitial   lung disease.   3. Cholelithiasis.    4. Hiatal hernia, small.           Electronically Signed by: BERNADETTE ARAUJO M.D.    Signed on: 11/24/2021 3:40 PM          XR CHEST AP OR PA 1 VIEW   Final Result by Darnell Rueda MD (11/24 1320)   Impression: Cardiomegaly with left basilar infiltrate/effusion.   Mild   prominence of the interstitial, likely secondary to atelectasis.      Electronically Signed by: JOEY GUAMAN MD    Signed on: 11/24/2021 3:15 PM                ASSESSMENT / PLAN:   This is a 64-year-old gentleman history of renal transplantation who was admitted on November 24 with positive COVID-19, nonvaccinated patient initially transferred to ICU on 11/26 with shortness of breath and downgraded to stepdown    Transfer back to ICU on December/08 with worsening respiratory failure and intubated since then        Assessment  Acute hypoxemic respiratory failure  -Covid pneumonia diagnosed 11/24  -Status post Tocilizumab 12/8  -Currently on 65% FiO2  -Chest x-ray finding stable  -Lung compliance diminished at 20 mL/Cm  -Was proning earlier last few days  Shock, suspect septic  Bacteremia with Enterococcus facialis  -Source is not clear  -Repeat cultures so far negative  -Currently on vancomycin  Acute renal failure  -History of renal transplant at [facility] in 2015  -Failed hemodialysis currently on CRRT due to hemodynamic  Leukocytosis  Thrombocytopenia probably from consumption  Elevated D-dimer probably to sepsis      Plan  Continue to wean down sedation slowly  Decrease to 14 cm  After we will start going down on tidal volume to 418 mL  If patient tolerates lower sedation next couple of days we will start weaning process  Currently still requiring pressors once off pressors try to switch him to dialysis  Currently continue CRRT keep euvolemic  Follow-up with renal service in terms of the cyclosporine dosing  Continue antibiotics  Monitor repeat cultures         Lines: Right radial A-line, left IJ triple-lumen catheter, right upper extremity PICC line  Code Status:  full  FEN: NG tube feed  Prophylaxis: Subcu heparin 7500 every 8 hour  Disposition: ICU        Patient is critically ill. Patient has still ongoing imminent organs dysfunction  that  requires  Critical Care.  Radiographs and Labs were  reviewed personally with ICU team.  Critical care time spent is  45 minutes excluding teaching and procedure time     Aniyah Florence MD [facility]   Pulmonary and Critical Care Medicine  12/15/2021, 7:08 AM          n/a

## 2022-01-07 ENCOUNTER — APPOINTMENT (OUTPATIENT)
Dept: NEUROLOGY | Facility: CLINIC | Age: 37
End: 2022-01-07
Payer: MEDICAID

## 2022-01-07 PROCEDURE — 99215 OFFICE O/P EST HI 40 MIN: CPT | Mod: 95

## 2022-01-08 NOTE — DISCUSSION/SUMMARY
[FreeTextEntry1] : Reviewed and discussed the normal creatinine kinase aldolase acetylcholine receptor antibody and other antibodies for myasthenia gravis reviewed again the MRI scan done previously which shows no abnormality.  Previous sedimentation rate and C-reactive protein although abnormal are likely related to her autoimmune disease and treatment.  \par \par \par Recommend neuropsychological testing repeat MRI head with neuro quant imaging, after review of neuropsychological report and neuro quant she may require a spinal fluid examination and or PET scan.\par Time spent reviewing other physicians records, discussing the progress and need for documentation of type of memory impairment with formal neuropsychological testing and repeat MR brain to look for pattern of abnormalities such as vasculitis of brain vessels due to RA or volume loss that might distinguish sequelae of SARS coronavirus 2 infection from neurodegenerative disorder. Explained the risk of low grade fungal infection of the brain similar to fungal ENT infection that could also cause her symptoms of ptosis and may be diagnosed by spinal tap

## 2022-01-08 NOTE — HISTORY OF PRESENT ILLNESS
[Home] : at home, [unfilled] , at the time of the visit. [Medical Office: ([facility])___] : at the medical office located in  [Verbal consent obtained from patient] : the patient, [unfilled] [FreeTextEntry1] : 35F with a history of asthma and rheumatoid arthritis, previously on methotrexate and rituximab, who returns for follow-up of neurological symptoms.\par \par She brought list of her symptoms for follow-up.  These are:\par (A) memory loss (short-term), and confusion, occurring daily.  She is often unable or finds it difficult to retain information.\par (B) her facial expression she believes is altered.  She is unable to express happiness and pleasure through her facial expression.\par (C) lack of sleep.  She wakes up frequently throughout the night and falls asleep during the day.\par (D) tremor in the hands throughout the day, but she has noticed the hands do not shake at night when she is asleep.\par (E) others have noticed speech problems.  Others have complained that her speech is slurred,  softer tone hesitation.\par \par Rituximab treatment for rheumatoid arthritis has been withheld temporarily until after the SARS coronavirus oh micron variant searches past.  She had suffered a SARS coronavirus 2 infection in September and October and believes she is not completely better after the infection.  She wonders if her symptoms are related to sequelae of the infection with Covid in September or October 2021

## 2022-01-08 NOTE — PHYSICAL EXAM
[FreeTextEntry1] : Examination: \par \par Patient is well appearing \par \par Neurological Examination: \par \par Mental Status: Patient is awake and alert. Oriented to person, place, time.  \par \par Language: No aphasia or paraphasic errors. \par \par Cranial Nerves: \par \par EOMI, No facial weakness, tongue protrudes in the midline, facial sensation intact \par \par Motor Exam: No pronator drift. Barrel roll intact. Fine motor movements intact in hands \par \par Able to stand up from chair without difficulty \par \par Sensory:normal sensations reported to pin and touch by patient\par Reflexes: Unable to examine \par \par Cerebellar: Finger to nose intact bilaterally\par \par Gait: Able to walk tandem\par \par

## 2022-01-26 ENCOUNTER — APPOINTMENT (OUTPATIENT)
Dept: OTOLARYNGOLOGY | Facility: CLINIC | Age: 37
End: 2022-01-26
Payer: MEDICAID

## 2022-01-26 VITALS
WEIGHT: 200 LBS | DIASTOLIC BLOOD PRESSURE: 76 MMHG | SYSTOLIC BLOOD PRESSURE: 111 MMHG | HEIGHT: 59 IN | HEART RATE: 82 BPM | BODY MASS INDEX: 40.32 KG/M2 | TEMPERATURE: 98 F

## 2022-01-26 PROCEDURE — 99214 OFFICE O/P EST MOD 30 MIN: CPT | Mod: 25

## 2022-01-26 RX ORDER — FOLIC ACID 1 MG/1
1 TABLET ORAL DAILY
Qty: 90 | Refills: 3 | Status: COMPLETED | COMMUNITY
Start: 2019-04-05 | End: 2022-01-26

## 2022-01-26 RX ORDER — DOXYCYCLINE 100 MG/1
100 TABLET, FILM COATED ORAL TWICE DAILY
Qty: 14 | Refills: 0 | Status: COMPLETED | COMMUNITY
Start: 2021-06-30 | End: 2022-01-26

## 2022-01-26 RX ORDER — METHYLPREDNISOLONE 125 MG/2ML
125 INJECTION, POWDER, LYOPHILIZED, FOR SOLUTION INTRAMUSCULAR; INTRAVENOUS
Qty: 1 | Refills: 0 | Status: COMPLETED | COMMUNITY
Start: 2021-08-10 | End: 2022-01-26

## 2022-01-26 RX ORDER — NYSTATIN 100000 [USP'U]/ML
100000 SUSPENSION ORAL 3 TIMES DAILY
Qty: 1 | Refills: 1 | Status: COMPLETED | COMMUNITY
Start: 2020-08-21 | End: 2022-01-26

## 2022-01-26 RX ORDER — METHYLPREDNISOLONE 125 MG/2ML
125 INJECTION, POWDER, LYOPHILIZED, FOR SOLUTION INTRAMUSCULAR; INTRAVENOUS
Qty: 1 | Refills: 0 | Status: COMPLETED | OUTPATIENT
Start: 2021-08-10 | End: 2022-01-26

## 2022-01-26 RX ORDER — DIPHENHYDRAMINE HYDROCHLORIDE 50 MG/ML
50 INJECTION, SOLUTION INTRAMUSCULAR; INTRAVENOUS
Qty: 2 | Refills: 0 | Status: COMPLETED | OUTPATIENT
Start: 2021-08-10 | End: 2022-01-26

## 2022-01-26 RX ORDER — DOXYCYCLINE HYCLATE 100 MG/1
100 CAPSULE ORAL
Qty: 14 | Refills: 0 | Status: COMPLETED | COMMUNITY
Start: 2021-06-30 | End: 2022-01-26

## 2022-01-26 RX ORDER — CONTAINER,EMPTY
EACH MISCELLANEOUS
Qty: 1 | Refills: 0 | Status: COMPLETED | COMMUNITY
Start: 2018-10-25 | End: 2022-01-26

## 2022-01-26 RX ORDER — ISOPROPYL ALCOHOL 70 ML/100ML
SWAB TOPICAL
Qty: 100 | Refills: 0 | Status: COMPLETED | COMMUNITY
Start: 2018-10-25 | End: 2022-01-26

## 2022-01-26 RX ORDER — DEXAMETHASONE 6 MG/1
6 TABLET ORAL DAILY
Qty: 5 | Refills: 0 | Status: COMPLETED | COMMUNITY
Start: 2021-10-05 | End: 2022-01-26

## 2022-01-26 RX ORDER — HYDROXYZINE HYDROCHLORIDE 25 MG/1
25 TABLET ORAL 3 TIMES DAILY
Qty: 30 | Refills: 0 | Status: COMPLETED | COMMUNITY
Start: 2020-03-20 | End: 2022-01-26

## 2022-01-26 RX ORDER — FLUCONAZOLE 100 MG/1
100 TABLET ORAL
Qty: 11 | Refills: 1 | Status: COMPLETED | COMMUNITY
Start: 2021-03-11 | End: 2022-01-26

## 2022-01-26 RX ORDER — ALBUTEROL SULFATE 90 UG/1
108 (90 BASE) INHALANT RESPIRATORY (INHALATION)
Qty: 1 | Refills: 6 | Status: COMPLETED | COMMUNITY
Start: 2021-10-05 | End: 2022-01-26

## 2022-01-26 RX ORDER — RITUXIMAB-ABBS 10 MG/ML
500 INJECTION, SOLUTION INTRAVENOUS
Qty: 1 | Refills: 0 | Status: COMPLETED | OUTPATIENT
Start: 2021-08-10 | End: 2022-01-26

## 2022-01-26 RX ORDER — BUDESONIDE AND FORMOTEROL FUMARATE DIHYDRATE 160; 4.5 UG/1; UG/1
160-4.5 AEROSOL RESPIRATORY (INHALATION)
Qty: 1 | Refills: 5 | Status: COMPLETED | COMMUNITY
Start: 2021-11-08 | End: 2022-01-26

## 2022-01-26 RX ORDER — MUPIROCIN 20 MG/G
2 OINTMENT TOPICAL
Qty: 22 | Refills: 0 | Status: COMPLETED | COMMUNITY
Start: 2021-05-06 | End: 2022-01-26

## 2022-01-26 RX ORDER — METRONIDAZOLE 7.5 MG/G
0.75 CREAM TOPICAL
Qty: 45 | Refills: 0 | Status: COMPLETED | COMMUNITY
Start: 2021-01-04 | End: 2022-01-26

## 2022-01-26 RX ORDER — TRIAMCINOLONE ACETONIDE 1 MG/G
0.1 CREAM TOPICAL
Qty: 80 | Refills: 0 | Status: COMPLETED | COMMUNITY
Start: 2021-01-04 | End: 2022-01-26

## 2022-01-26 RX ORDER — ACETAMINOPHEN 500 MG/1
500 TABLET ORAL
Qty: 2 | Refills: 0 | Status: COMPLETED | OUTPATIENT
Start: 2021-08-10 | End: 2022-01-26

## 2022-01-26 RX ORDER — FERROUS GLUCONATE 324(38)MG
324 (38 FE) TABLET ORAL DAILY
Qty: 90 | Refills: 3 | Status: COMPLETED | COMMUNITY
Start: 2017-12-18 | End: 2022-01-26

## 2022-01-26 RX ORDER — NYSTATIN 100000 [USP'U]/ML
100000 SUSPENSION ORAL 3 TIMES DAILY
Qty: 1 | Refills: 1 | Status: COMPLETED | COMMUNITY
Start: 2021-03-11 | End: 2022-01-26

## 2022-01-26 RX ORDER — KETOCONAZOLE 20 MG/G
2 CREAM TOPICAL
Qty: 60 | Refills: 0 | Status: COMPLETED | COMMUNITY
Start: 2019-04-22 | End: 2022-01-26

## 2022-01-26 RX ORDER — BUDESONIDE AND FORMOTEROL FUMARATE DIHYDRATE 160; 4.5 UG/1; UG/1
160-4.5 AEROSOL RESPIRATORY (INHALATION) EVERY 6 HOURS
Qty: 1 | Refills: 6 | Status: COMPLETED | COMMUNITY
Start: 2021-05-11 | End: 2022-01-26

## 2022-01-26 RX ORDER — FAMOTIDINE 40 MG/1
40 TABLET, FILM COATED ORAL
Qty: 30 | Refills: 4 | Status: COMPLETED | COMMUNITY
Start: 2021-03-11 | End: 2022-01-26

## 2022-01-26 RX ORDER — TOPIRAMATE 25 MG/1
25 TABLET, FILM COATED ORAL TWICE DAILY
Qty: 120 | Refills: 2 | Status: COMPLETED | COMMUNITY
Start: 2020-11-19 | End: 2022-01-26

## 2022-01-26 NOTE — CONSULT LETTER
[Dear  ___] : Dear  [unfilled], [Consult Letter:] : I had the pleasure of evaluating your patient, [unfilled]. [Please see my note below.] : Please see my note below. [Consult Closing:] : Thank you very much for allowing me to participate in the care of this patient.  If you have any questions, please do not hesitate to contact me. [Sincerely,] : Sincerely, [FreeTextEntry3] : Ashok Davison MD, PhD\par Chief, Division of Laryngology\par Department of Otolaryngology\par [facility]\par Pediatric Otolaryngology, [facility]\par  of Otolaryngology\par [facility]

## 2022-01-26 NOTE — PHYSICAL EXAM
[Midline] : trachea located in midline position [Laryngoscopy Performed] : laryngoscopy was performed, see procedure section for findings [Normal] : no rashes [de-identified] : moderately raspy and breathy

## 2022-01-26 NOTE — HISTORY OF PRESENT ILLNESS
[de-identified] : 36 year old woman with history of dysphagia and dysphonia. Presents for follow up evaluation of right hearing loss.\par s/p ABR, normal. Reports no throat/voice issues, positive COVID-19 Sept 2021, taste has returned, still has anosmia. Reports continues to have right hearing loss, no improvement since last visit. States intermittent dizziness and imbalance continues. Reports right otalgia and tinnitus began 1 month ago. Denies otorrhea, headaches and recent ear infections.  Left ear unchanged, asymptomatic. Last Audio 06/15/2021. No ADP testing yet. Follows with neuro, concern for myasthenia gravis. Rheum f/u pending for friday, rituxan on hold due to covid.\par \par

## 2022-01-28 ENCOUNTER — APPOINTMENT (OUTPATIENT)
Dept: RHEUMATOLOGY | Facility: CLINIC | Age: 37
End: 2022-01-28
Payer: MEDICAID

## 2022-01-28 VITALS
HEART RATE: 81 BPM | WEIGHT: 200 LBS | OXYGEN SATURATION: 98 % | BODY MASS INDEX: 40.32 KG/M2 | HEIGHT: 59 IN | SYSTOLIC BLOOD PRESSURE: 122 MMHG | RESPIRATION RATE: 16 BRPM | DIASTOLIC BLOOD PRESSURE: 87 MMHG | TEMPERATURE: 97.2 F

## 2022-01-28 PROCEDURE — 99213 OFFICE O/P EST LOW 20 MIN: CPT

## 2022-01-28 NOTE — ASSESSMENT
[FreeTextEntry1] : 32 year-old female with pmh of ALMA ROSA, now with return of her symptoms with polyarthritis, highly positive DEBORAH\par \par 1. RA  with vasculitis - methotrexate 5 tablets -   s/p 3 cycles of rituxan -mild joint pain - pending new Rituxan infusion - but currently has thrush - on methotrexate 6 tablets  rituxan on hold - had covid 19 in 09/2021 - despite being vaccinated can continue with methotrexate - hold rituximab - for now - controlled on methtorexate\par 3. chronic back pain - ongoing work-up by PMR x-rays with degenerative changes and mild scoliosis -had nerve block - no pain at this time. \par 4. chronic pain - on medical marijuana - improved - has f/u with pain management\par 5. muscle cramps over the right leg - not active\par 6. PE - likely related to tubal ligation (5 days prior) - chronic eliquis - given hx of clotting in her family\par 7. Right leg paresthesias - EMG done yesterday - mild sensory axonal loss\par 8. Right shoulder pain -with decreased ROM over 6 months worsening - with loss of ROM - could not complete PT due to  pain - new referral given - new request for MRI\par 9. otitis interna with loss of hearing -pending new fredrick with ENT\par \par I reviewed previous labs results with patients.\par Laboratory tests ordered today\par Diagnosis and Prognosis discussed\par Continue with current medications\par medications refilled\par education provided on\par F/u 2 months\par

## 2022-01-28 NOTE — HISTORY OF PRESENT ILLNESS
[___ Month(s) Ago] : [unfilled] month(s) ago [FreeTextEntry1] : stable symptoms on methotrexate and medical marijuana\par rituximab is on hold for now\par had covid 19 vaccine booster\par pending auditory processing gloria\par under the care of neuro - for underling issues with right facial droop - work up was negative for myathenia

## 2022-02-09 ENCOUNTER — APPOINTMENT (OUTPATIENT)
Dept: SPEECH THERAPY | Facility: CLINIC | Age: 37
End: 2022-02-09

## 2022-02-09 ENCOUNTER — OUTPATIENT (OUTPATIENT)
Dept: OUTPATIENT SERVICES | Facility: HOSPITAL | Age: 37
LOS: 1 days | Discharge: HOME | End: 2022-02-09

## 2022-02-09 DIAGNOSIS — M72.2 PLANTAR FASCIAL FIBROMATOSIS: Chronic | ICD-10-CM

## 2022-02-09 DIAGNOSIS — Z98.51 TUBAL LIGATION STATUS: Chronic | ICD-10-CM

## 2022-02-09 DIAGNOSIS — Z98.891 HISTORY OF UTERINE SCAR FROM PREVIOUS SURGERY: Chronic | ICD-10-CM

## 2022-02-14 ENCOUNTER — APPOINTMENT (OUTPATIENT)
Dept: NEUROLOGY | Facility: CLINIC | Age: 37
End: 2022-02-14
Payer: MEDICAID

## 2022-02-14 VITALS
HEART RATE: 85 BPM | SYSTOLIC BLOOD PRESSURE: 108 MMHG | RESPIRATION RATE: 16 BRPM | TEMPERATURE: 97.8 F | DIASTOLIC BLOOD PRESSURE: 78 MMHG | OXYGEN SATURATION: 99 % | BODY MASS INDEX: 40.32 KG/M2 | WEIGHT: 200 LBS | HEIGHT: 59 IN

## 2022-02-14 PROCEDURE — 95910 NRV CNDJ TEST 7-8 STUDIES: CPT

## 2022-02-14 PROCEDURE — 95886 MUSC TEST DONE W/N TEST COMP: CPT

## 2022-02-20 ENCOUNTER — TRANSCRIPTION ENCOUNTER (OUTPATIENT)
Age: 37
End: 2022-02-20

## 2022-03-10 DIAGNOSIS — H93.293 OTHER ABNORMAL AUDITORY PERCEPTIONS, BILATERAL: ICD-10-CM

## 2022-03-16 ENCOUNTER — TRANSCRIPTION ENCOUNTER (OUTPATIENT)
Age: 37
End: 2022-03-16

## 2022-04-04 ENCOUNTER — EMERGENCY (EMERGENCY)
Facility: HOSPITAL | Age: 37
LOS: 1 days | Discharge: HOME | End: 2022-04-04
Attending: STUDENT IN AN ORGANIZED HEALTH CARE EDUCATION/TRAINING PROGRAM
Payer: MEDICAID

## 2022-04-04 VITALS
OXYGEN SATURATION: 98 % | HEART RATE: 96 BPM | DIASTOLIC BLOOD PRESSURE: 76 MMHG | HEIGHT: 59 IN | SYSTOLIC BLOOD PRESSURE: 126 MMHG | RESPIRATION RATE: 17 BRPM | WEIGHT: 190.04 LBS | TEMPERATURE: 98 F

## 2022-04-04 VITALS
TEMPERATURE: 98 F | SYSTOLIC BLOOD PRESSURE: 121 MMHG | OXYGEN SATURATION: 98 % | RESPIRATION RATE: 18 BRPM | HEART RATE: 89 BPM | DIASTOLIC BLOOD PRESSURE: 74 MMHG

## 2022-04-04 DIAGNOSIS — Z98.51 TUBAL LIGATION STATUS: Chronic | ICD-10-CM

## 2022-04-04 DIAGNOSIS — M72.2 PLANTAR FASCIAL FIBROMATOSIS: Chronic | ICD-10-CM

## 2022-04-04 DIAGNOSIS — Z98.891 HISTORY OF UTERINE SCAR FROM PREVIOUS SURGERY: Chronic | ICD-10-CM

## 2022-04-04 LAB
ALBUMIN SERPL ELPH-MCNC: 3.8 G/DL — SIGNIFICANT CHANGE UP (ref 3.5–5)
ALP SERPL-CCNC: 170 U/L — HIGH (ref 40–120)
ALT FLD-CCNC: 25 U/L DA — SIGNIFICANT CHANGE UP (ref 10–60)
ANION GAP SERPL CALC-SCNC: 5 MMOL/L — SIGNIFICANT CHANGE UP (ref 5–17)
APTT BLD: 34.2 SEC — SIGNIFICANT CHANGE UP (ref 27.5–35.5)
AST SERPL-CCNC: 25 U/L — SIGNIFICANT CHANGE UP (ref 10–40)
BASOPHILS # BLD AUTO: 0.03 K/UL — SIGNIFICANT CHANGE UP (ref 0–0.2)
BASOPHILS NFR BLD AUTO: 0.3 % — SIGNIFICANT CHANGE UP (ref 0–2)
BILIRUB SERPL-MCNC: 0.2 MG/DL — SIGNIFICANT CHANGE UP (ref 0.2–1.2)
BLD GP AB SCN SERPL QL: SIGNIFICANT CHANGE UP
BUN SERPL-MCNC: 7 MG/DL — SIGNIFICANT CHANGE UP (ref 7–18)
CALCIUM SERPL-MCNC: 9.2 MG/DL — SIGNIFICANT CHANGE UP (ref 8.4–10.5)
CHLORIDE SERPL-SCNC: 108 MMOL/L — SIGNIFICANT CHANGE UP (ref 96–108)
CO2 SERPL-SCNC: 26 MMOL/L — SIGNIFICANT CHANGE UP (ref 22–31)
CREAT SERPL-MCNC: 0.73 MG/DL — SIGNIFICANT CHANGE UP (ref 0.5–1.3)
EGFR: 109 ML/MIN/1.73M2 — SIGNIFICANT CHANGE UP
EOSINOPHIL # BLD AUTO: 0.06 K/UL — SIGNIFICANT CHANGE UP (ref 0–0.5)
EOSINOPHIL NFR BLD AUTO: 0.6 % — SIGNIFICANT CHANGE UP (ref 0–6)
GLUCOSE SERPL-MCNC: 85 MG/DL — SIGNIFICANT CHANGE UP (ref 70–99)
HCG SERPL-ACNC: <1 MIU/ML — SIGNIFICANT CHANGE UP
HCT VFR BLD CALC: 39.9 % — SIGNIFICANT CHANGE UP (ref 34.5–45)
HGB BLD-MCNC: 13.1 G/DL — SIGNIFICANT CHANGE UP (ref 11.5–15.5)
IMM GRANULOCYTES NFR BLD AUTO: 0.4 % — SIGNIFICANT CHANGE UP (ref 0–1.5)
INR BLD: 1.22 RATIO — HIGH (ref 0.88–1.16)
LYMPHOCYTES # BLD AUTO: 2.37 K/UL — SIGNIFICANT CHANGE UP (ref 1–3.3)
LYMPHOCYTES # BLD AUTO: 25.3 % — SIGNIFICANT CHANGE UP (ref 13–44)
MAGNESIUM SERPL-MCNC: 1.9 MG/DL — SIGNIFICANT CHANGE UP (ref 1.6–2.6)
MCHC RBC-ENTMCNC: 28.4 PG — SIGNIFICANT CHANGE UP (ref 27–34)
MCHC RBC-ENTMCNC: 32.8 GM/DL — SIGNIFICANT CHANGE UP (ref 32–36)
MCV RBC AUTO: 86.4 FL — SIGNIFICANT CHANGE UP (ref 80–100)
MONOCYTES # BLD AUTO: 0.54 K/UL — SIGNIFICANT CHANGE UP (ref 0–0.9)
MONOCYTES NFR BLD AUTO: 5.8 % — SIGNIFICANT CHANGE UP (ref 2–14)
NEUTROPHILS # BLD AUTO: 6.33 K/UL — SIGNIFICANT CHANGE UP (ref 1.8–7.4)
NEUTROPHILS NFR BLD AUTO: 67.6 % — SIGNIFICANT CHANGE UP (ref 43–77)
NRBC # BLD: 0 /100 WBCS — SIGNIFICANT CHANGE UP (ref 0–0)
PLATELET # BLD AUTO: 239 K/UL — SIGNIFICANT CHANGE UP (ref 150–400)
POTASSIUM SERPL-MCNC: 3.6 MMOL/L — SIGNIFICANT CHANGE UP (ref 3.5–5.3)
POTASSIUM SERPL-SCNC: 3.6 MMOL/L — SIGNIFICANT CHANGE UP (ref 3.5–5.3)
PROT SERPL-MCNC: 7.7 G/DL — SIGNIFICANT CHANGE UP (ref 6–8.3)
PROTHROM AB SERPL-ACNC: 14.5 SEC — HIGH (ref 10.5–13.4)
RBC # BLD: 4.62 M/UL — SIGNIFICANT CHANGE UP (ref 3.8–5.2)
RBC # FLD: 13.9 % — SIGNIFICANT CHANGE UP (ref 10.3–14.5)
SODIUM SERPL-SCNC: 139 MMOL/L — SIGNIFICANT CHANGE UP (ref 135–145)
WBC # BLD: 9.37 K/UL — SIGNIFICANT CHANGE UP (ref 3.8–10.5)
WBC # FLD AUTO: 9.37 K/UL — SIGNIFICANT CHANGE UP (ref 3.8–10.5)

## 2022-04-04 PROCEDURE — 80053 COMPREHEN METABOLIC PANEL: CPT

## 2022-04-04 PROCEDURE — 86900 BLOOD TYPING SEROLOGIC ABO: CPT

## 2022-04-04 PROCEDURE — 86850 RBC ANTIBODY SCREEN: CPT

## 2022-04-04 PROCEDURE — 86901 BLOOD TYPING SEROLOGIC RH(D): CPT

## 2022-04-04 PROCEDURE — 99285 EMERGENCY DEPT VISIT HI MDM: CPT

## 2022-04-04 PROCEDURE — 99284 EMERGENCY DEPT VISIT MOD MDM: CPT | Mod: 25

## 2022-04-04 PROCEDURE — 76857 US EXAM PELVIC LIMITED: CPT

## 2022-04-04 PROCEDURE — 85610 PROTHROMBIN TIME: CPT

## 2022-04-04 PROCEDURE — 76857 US EXAM PELVIC LIMITED: CPT | Mod: 26

## 2022-04-04 PROCEDURE — 76830 TRANSVAGINAL US NON-OB: CPT

## 2022-04-04 PROCEDURE — 36415 COLL VENOUS BLD VENIPUNCTURE: CPT

## 2022-04-04 PROCEDURE — 85025 COMPLETE CBC W/AUTO DIFF WBC: CPT

## 2022-04-04 PROCEDURE — 83735 ASSAY OF MAGNESIUM: CPT

## 2022-04-04 PROCEDURE — 76830 TRANSVAGINAL US NON-OB: CPT | Mod: 26

## 2022-04-04 PROCEDURE — 85730 THROMBOPLASTIN TIME PARTIAL: CPT

## 2022-04-04 PROCEDURE — 84702 CHORIONIC GONADOTROPIN TEST: CPT

## 2022-04-04 RX ORDER — SODIUM CHLORIDE 9 MG/ML
1000 INJECTION INTRAMUSCULAR; INTRAVENOUS; SUBCUTANEOUS ONCE
Refills: 0 | Status: COMPLETED | OUTPATIENT
Start: 2022-04-04 | End: 2022-04-04

## 2022-04-04 NOTE — ED PROVIDER NOTE - PATIENT PORTAL LINK FT
You can access the FollowMyHealth Patient Portal offered by [facility] by registering at the following website: [email or web address]. By joining [employer]’s FollowMyHealth portal, you will also be able to view your health information using other applications (apps) compatible with our system.

## 2022-04-04 NOTE — ED PROVIDER NOTE - NSICDXPASTMEDICALHX_GEN_ALL_CORE_FT
PAST MEDICAL HISTORY:  Asthma     Fibromyalgia     H/O antiphospholipid syndrome     PE (pulmonary thromboembolism)     Rheumatoid arthritis

## 2022-04-04 NOTE — ED PROVIDER NOTE - OBJECTIVE STATEMENT
36F, pmh of htn, presenting with abnormal vaginal bleeding. patient reports she has had period like bleeding for 17 days. has to change her pad every 4 hours. mild abdominal pain. no chest pain, shortness of breath, pain ro burning with urination. no similar symptoms in the past. typically has regular periods lasting 4 days. had a tubal ligation.

## 2022-04-04 NOTE — ED PROVIDER NOTE - NSFOLLOWUPINSTRUCTIONS_ED_ALL_ED_FT
You were seen in the emergency department for abnormal vaginal bleeding.     Please follow-up with your gynecologist within the next week.     If you have any worsening symptoms, severe headache, chest pain, shortness of breath, or dizziness, please return to the emergency department.

## 2022-04-04 NOTE — ED PROVIDER NOTE - CLINICAL SUMMARY MEDICAL DECISION MAKING FREE TEXT BOX
36F presenting with abnormal vaginal bleeding. no headache, chest pain or shortness of breath. US without emergent findings. patient stable for outpatient GYN follow-up. patient updated on all results.

## 2022-04-04 NOTE — ED ADULT NURSE NOTE - SUICIDE SCREENING QUESTION 3
Subjective   Patient ID: Griselda Iarith is a 14 year old female.    Chief Complaint   Patient presents with   • Office Visit   • School Physical     HPI    {History Review (Optional):68056::\"Patient's medications, allergies, past medical, surgical, social and family histories were reviewed and updated as appropriate.\"}    Review of Systems    Objective   Physical Exam    Assessment   {Assess/PlanSmartLinks:49359}  
Subjective   Patient ID: Griselda Iarith is a 14 year old female.    Chief Complaint   Patient presents with   • Office Visit   • School Physical     HPI  See both school physical and sports physical. Patient came with mother . Currently in 9th grade and sports currently occurring in schools.  Chronic pain ankle since inversion injury one year ago and pain with edema whenever exercises  Patient's medications, allergies, past medical, surgical, social and family histories were reviewed and updated as appropriate.    Review of Systems   All other systems reviewed and are negative.      Objective   Physical Exam    Assessment   Problem List Items Addressed This Visit        Nervous    Chronic pain of left ankle    Relevant Orders    SERVICE TO ORTHOPEDICS       Other    School physical exam - Primary    Obesity (BMI 30.0-34.9)      see forms ine   
No

## 2022-04-06 ENCOUNTER — APPOINTMENT (OUTPATIENT)
Dept: OBGYN | Facility: CLINIC | Age: 37
End: 2022-04-06
Payer: MEDICAID

## 2022-04-06 ENCOUNTER — APPOINTMENT (OUTPATIENT)
Dept: OTOLARYNGOLOGY | Facility: CLINIC | Age: 37
End: 2022-04-06
Payer: MEDICAID

## 2022-04-06 VITALS
WEIGHT: 194 LBS | BODY MASS INDEX: 39.18 KG/M2 | SYSTOLIC BLOOD PRESSURE: 119 MMHG | HEART RATE: 89 BPM | DIASTOLIC BLOOD PRESSURE: 80 MMHG | OXYGEN SATURATION: 98 %

## 2022-04-06 VITALS
DIASTOLIC BLOOD PRESSURE: 77 MMHG | HEIGHT: 59 IN | WEIGHT: 195 LBS | HEART RATE: 52 BPM | SYSTOLIC BLOOD PRESSURE: 108 MMHG | BODY MASS INDEX: 39.31 KG/M2

## 2022-04-06 PROBLEM — Z86.2 PERSONAL HISTORY OF DISEASES OF THE BLOOD AND BLOOD-FORMING ORGANS AND CERTAIN DISORDERS INVOLVING THE IMMUNE MECHANISM: Chronic | Status: ACTIVE | Noted: 2022-04-04

## 2022-04-06 PROCEDURE — 31231 NASAL ENDOSCOPY DX: CPT

## 2022-04-06 PROCEDURE — 99212 OFFICE O/P EST SF 10 MIN: CPT

## 2022-04-06 PROCEDURE — 99214 OFFICE O/P EST MOD 30 MIN: CPT | Mod: 25

## 2022-04-06 NOTE — HISTORY OF PRESENT ILLNESS
[FreeTextEntry1] : Patient is a 36 year old female who presents with complaint of vaginal bleeding since 3/19.  Patient reports that she started as a normal menses on time, but it continued with clots and heavy bleeding since then.  Reports mild dizziness, but nothing that interrupts her daily habits.  Patient is on life long blood thinner eliquis due to hx of PE after

## 2022-04-06 NOTE — CONSULT LETTER
[Dear  ___] : Dear  [unfilled], [Consult Letter:] : I had the pleasure of evaluating your patient, [unfilled]. [Please see my note below.] : Please see my note below. [Consult Closing:] : Thank you very much for allowing me to participate in the care of this patient.  If you have any questions, please do not hesitate to contact me. [Sincerely,] : Sincerely, [FreeTextEntry3] : Ashok Davison MD, PhD\par Chief, Division of Laryngology\par Department of Otolaryngology\par [facility]\par Pediatric Otolaryngology, [facility]\par  of Otolaryngology\par [facility] [employer]\par

## 2022-04-06 NOTE — PHYSICAL EXAM
[Midline] : trachea located in midline position [Laryngoscopy Performed] : laryngoscopy was performed, see procedure section for findings [Normal] : no rashes [de-identified] : moderately raspy and breathy

## 2022-04-06 NOTE — PHYSICAL EXAM
[Appropriately responsive] : appropriately responsive [Alert] : alert [No Acute Distress] : no acute distress [No Lymphadenopathy] : no lymphadenopathy [Regular Rate Rhythm] : regular rate rhythm [No Murmurs] : no murmurs [Clear to Auscultation B/L] : clear to auscultation bilaterally [Soft] : soft [Non-tender] : non-tender [Non-distended] : non-distended [No HSM] : No HSM [No Lesions] : no lesions [No Mass] : no mass [Oriented x3] : oriented x3 [Labia Majora] : normal [Labia Minora] : normal [Moderate] : There was moderate vaginal bleeding [Normal] : normal [Uterine Adnexae] : normal

## 2022-04-06 NOTE — HISTORY OF PRESENT ILLNESS
[de-identified] : 36 year old woman with history of dysphagia and dysphonia presents for follow up evaluation of right hearing loss.\par s/p ABR, normal. Reports hearing has been stable since last visit. Denies otalgia, otorrhea, ear infections, tinnitus, dizziness, vertigo, headaches related to hearing. Auditory processing test 02/09/22- NO APD.\par ABR, audio, APD, MRI all normal.\par Due for D&C, has been having persistent bleeding\par Does have ETD symptoms on further questioning\par \par \par

## 2022-04-08 ENCOUNTER — APPOINTMENT (OUTPATIENT)
Dept: RHEUMATOLOGY | Facility: CLINIC | Age: 37
End: 2022-04-08
Payer: MEDICAID

## 2022-04-08 VITALS
TEMPERATURE: 97.8 F | SYSTOLIC BLOOD PRESSURE: 123 MMHG | RESPIRATION RATE: 16 BRPM | DIASTOLIC BLOOD PRESSURE: 82 MMHG | BODY MASS INDEX: 39.11 KG/M2 | WEIGHT: 194 LBS | HEIGHT: 59 IN | HEART RATE: 97 BPM | OXYGEN SATURATION: 97 %

## 2022-04-08 DIAGNOSIS — H90.3 SENSORINEURAL HEARING LOSS, BILATERAL: ICD-10-CM

## 2022-04-08 DIAGNOSIS — J38.3 OTHER DISEASES OF VOCAL CORDS: ICD-10-CM

## 2022-04-08 PROCEDURE — 99214 OFFICE O/P EST MOD 30 MIN: CPT

## 2022-04-08 NOTE — ASSESSMENT
[FreeTextEntry1] : 32 year-old female with pmh of ALMA ROSA, now with return of her symptoms with polyarthritis, highly positive DEBORAH\par \par 1. RA  with vasculitis - methotrexate 5 tablets -   s/p 3 cycles of rituxan - on methotrexate 6 tablets  covid 19 X 2 - despite being vaccinated - with worsening joint pain and stiffness  fully vaccinated  - last rituxan infusion 07/2021 - will send for rituxan now\par 3. chronic back pain - ongoing work-up by PMR x-rays with degenerative changes and mild scoliosis -had nerve block - no pain at this time. \par 4. chronic pain - on medical marijuana - improved - has f/u with pain management\par 5. muscle cramps over the right leg - not active\par 6. PE - likely related to tubal ligation (5 days prior) - chronic eliquis - given hx of clotting in her family\par 7. Right leg paresthesias - EMG done yesterday - mild sensory axonal loss\par 8. Right shoulder pain -with decreased ROM over 6 months worsening - with loss of ROM - could not complete PT due to  pain - new referral given -MRI not done\par 9.Loss of hearing -uner the care of ENT\par \par \par \par I reviewed previous labs results with patients.\par Laboratory tests ordered today\par Diagnosis and Prognosis discussed\par Continue with current medications\par medications refilled\par education provided on\par F/u 2 months\par

## 2022-04-08 NOTE — HISTORY OF PRESENT ILLNESS
[___ Month(s) Ago] : [unfilled] month(s) ago [FreeTextEntry1] : stable symptoms on methotrexate and medical marijuana\par ongoing uterine bleeding - now pending D&C\par also on progesterone pill\par rituximab is on hold for now - had covid X 2\par had covid 19 vaccine booster\par worsening joint pain and stiffness - last rituximab\par \par \par

## 2022-04-11 PROBLEM — Z11.59 SCREENING FOR VIRAL DISEASE: Status: ACTIVE | Noted: 2021-08-13

## 2022-04-12 ENCOUNTER — TRANSCRIPTION ENCOUNTER (OUTPATIENT)
Age: 37
End: 2022-04-12

## 2022-04-13 ENCOUNTER — APPOINTMENT (OUTPATIENT)
Dept: INTERNAL MEDICINE | Facility: CLINIC | Age: 37
End: 2022-04-13

## 2022-04-13 ENCOUNTER — OUTPATIENT (OUTPATIENT)
Dept: OUTPATIENT SERVICES | Facility: HOSPITAL | Age: 37
LOS: 1 days | End: 2022-04-13

## 2022-04-13 ENCOUNTER — APPOINTMENT (OUTPATIENT)
Dept: OBGYN | Facility: CLINIC | Age: 37
End: 2022-04-13

## 2022-04-13 ENCOUNTER — APPOINTMENT (OUTPATIENT)
Dept: DISASTER EMERGENCY | Facility: HOSPITAL | Age: 37
End: 2022-04-13

## 2022-04-13 DIAGNOSIS — Z98.891 HISTORY OF UTERINE SCAR FROM PREVIOUS SURGERY: Chronic | ICD-10-CM

## 2022-04-13 DIAGNOSIS — Z98.51 TUBAL LIGATION STATUS: Chronic | ICD-10-CM

## 2022-04-13 DIAGNOSIS — U07.1 COVID-19: ICD-10-CM

## 2022-04-13 DIAGNOSIS — M72.2 PLANTAR FASCIAL FIBROMATOSIS: Chronic | ICD-10-CM

## 2022-04-26 LAB
ALBUMIN SERPL ELPH-MCNC: 4.5 G/DL
ALP BLD-CCNC: 168 U/L
ALT SERPL-CCNC: 13 U/L
ANION GAP SERPL CALC-SCNC: 15 MMOL/L
AST SERPL-CCNC: 13 U/L
BASOPHILS # BLD AUTO: 0.04 K/UL
BASOPHILS NFR BLD AUTO: 0.4 %
BILIRUB SERPL-MCNC: 0.4 MG/DL
BUN SERPL-MCNC: 9 MG/DL
CALCIUM SERPL-MCNC: 10.1 MG/DL
CHLORIDE SERPL-SCNC: 103 MMOL/L
CO2 SERPL-SCNC: 24 MMOL/L
CREAT SERPL-MCNC: 0.71 MG/DL
CRP SERPL-MCNC: 20 MG/L
DEPRECATED KAPPA LC FREE/LAMBDA SER: 0.92 RATIO
EGFR: 112 ML/MIN/1.73M2
EOSINOPHIL # BLD AUTO: 0.07 K/UL
EOSINOPHIL NFR BLD AUTO: 0.7 %
ERYTHROCYTE [SEDIMENTATION RATE] IN BLOOD BY WESTERGREN METHOD: 20 MM/HR
ESTIMATED AVERAGE GLUCOSE: 117 MG/DL
FOLATE SERPL-MCNC: 15.9 NG/ML
GLUCOSE SERPL-MCNC: 89 MG/DL
HBA1C MFR BLD HPLC: 5.7 %
HBV CORE IGG+IGM SER QL: NONREACTIVE
HBV SURFACE AB SER QL: REACTIVE
HBV SURFACE AG SER QL: NONREACTIVE
HCT VFR BLD CALC: 41.7 %
HCV AB SER QL: NONREACTIVE
HCV S/CO RATIO: 0.13 S/CO
HGB BLD-MCNC: 13.1 G/DL
IGA SER QL IEP: 124 MG/DL
IGG SER QL IEP: 960 MG/DL
IGM SER QL IEP: 81 MG/DL
IMM GRANULOCYTES NFR BLD AUTO: 0.4 %
IRON SATN MFR SERPL: 14 %
IRON SERPL-MCNC: 50 UG/DL
KAPPA LC CSF-MCNC: 1.71 MG/DL
KAPPA LC SERPL-MCNC: 1.58 MG/DL
LYMPHOCYTES # BLD AUTO: 2.38 K/UL
LYMPHOCYTES NFR BLD AUTO: 25 %
M TB IFN-G BLD-IMP: NEGATIVE
MAN DIFF?: NORMAL
MCHC RBC-ENTMCNC: 28.1 PG
MCHC RBC-ENTMCNC: 31.4 GM/DL
MCV RBC AUTO: 89.3 FL
MONOCYTES # BLD AUTO: 0.52 K/UL
MONOCYTES NFR BLD AUTO: 5.5 %
NEUTROPHILS # BLD AUTO: 6.48 K/UL
NEUTROPHILS NFR BLD AUTO: 68 %
PLATELET # BLD AUTO: 291 K/UL
POTASSIUM SERPL-SCNC: 4.3 MMOL/L
PROT SERPL-MCNC: 7.2 G/DL
QUANTIFERON TB PLUS MITOGEN MINUS NIL: 9.97 IU/ML
QUANTIFERON TB PLUS NIL: 0.03 IU/ML
QUANTIFERON TB PLUS TB1 MINUS NIL: -0.01 IU/ML
QUANTIFERON TB PLUS TB2 MINUS NIL: 0.01 IU/ML
RBC # BLD: 4.67 M/UL
RBC # FLD: 14 %
SODIUM SERPL-SCNC: 142 MMOL/L
TIBC SERPL-MCNC: 361 UG/DL
TSH SERPL-ACNC: 1.83 UIU/ML
UIBC SERPL-MCNC: 311 UG/DL
VIT B12 SERPL-MCNC: 539 PG/ML
WBC # FLD AUTO: 9.53 K/UL

## 2022-04-28 ENCOUNTER — TRANSCRIPTION ENCOUNTER (OUTPATIENT)
Age: 37
End: 2022-04-28

## 2022-05-05 NOTE — ED ADULT TRIAGE NOTE - BP NONINVASIVE SYSTOLIC (MM HG)
126 [facility] Counseling: I discussed with the patient the risks of valacyclovir including but not limited to kidney damage, nausea, vomiting and severe allergy.  The patient understands that if the infection seems to be worsening or is not improving, they are to call.

## 2022-05-11 LAB
ACHR BLOCK AB SER QL: 21 %
ACRM BINDING ANTIBODY: 0.03 NMOL/L
ALDOLASE SERPL-CCNC: 5.2 U/L
CK SERPL-CCNC: 114 U/L
MUSK ANTIBODY TEST: <1 U/ML

## 2022-05-12 ENCOUNTER — NON-APPOINTMENT (OUTPATIENT)
Age: 37
End: 2022-05-12

## 2022-05-19 ENCOUNTER — TRANSCRIPTION ENCOUNTER (OUTPATIENT)
Age: 37
End: 2022-05-19

## 2022-05-19 NOTE — ASU PATIENT PROFILE, ADULT - ALCOHOL USE HISTORY SINGLE SELECT
Patient: Pari Cedeño Date: 2022   : 1960 Attending: No att. providers found   61 year old female      Chief Complaint   Patient presents with   • Office Visit   • Follow-up   • Chronic Kidney Disease     3b      INITIAL EVAL/HPI: Pari is a 61 year old female who presents today for elevated Cr  No HTN or DM  No NSAIDs    Cr 1.0 until 2020  Cr worsened between 25-40% through   Admitted 10/1/21-10/6/21  · Acute kidney injury on CKD 3b  ? Baseline creatinine appears to be near 1.4 mg/dL  ? Cr up to 2.76 mg/dL on presentation, now improving  ? Etiology likely pre-renal given anemia, hypotension  ? Renal US WNL. UPCr 252  ? Non-oliguric  ? Encourage PO intake  ? Hold lasix  ? Avoid nephrotoxins  · Anemia / GIB  ? NM scan positive for L upper abdominal GI bleed 10/5  ? Holding anticoagulation  ? Transfusing as needed  ? EGD at [facility] planned for 10/7  · Hypertension  ? BP acceptable at present, off PTA antihypertensives  · Asthma  ? Pulm following     Now following up in clinic 1/10.22    5/19/22  C/O sob and cough  Not on O2  No edema    Past Medical History:   Diagnosis Date   • Arthritis    • Atypical chest pain     Negative stress test.    • Chronic cough     Followed by Dr. Gildardo Bain [phone] [facility]   • Chronic kidney disease    • COPD (chronic obstructive pulmonary disease) (CMS/[facility])    • Depression    • Esophageal reflux    • Herpes genitalis    • HLD (hyperlipidemia)    • Injury 2015    rt elbow   • Lumbosacral spondylosis    • Migraine headache    • NSTEMI (non-ST elevated myocardial infarction) Stents ([facility]) (CMS/[facility])    • Obesity    • Obesity     Followed by Dr. Gildardo [phone](PULMONARY AND CRITICAL CARE)   • Restrictive lung disease     Followed by Dr. Gildardo [phone](PULMONARY AND CRITICAL CARE)   • Tobacco use disorder     Followed by Dr. Gildardo Bain [phone] [facility]   • Unspecified  sinusitis (chronic)    • Urinary tract infection, site not specified      Past Surgical History:   Procedure Laterality Date   • Bladder repair     • Colonoscopy  03/01/2009   • Colonoscopy  08/13/2014    5 year recall Dr [facility]   • Knee scope,aid ant cruciate repair  1/31/2014    L Knee Scope; PLM; chondroplasty of MFC; ACL-allograft   • Sacroiliac joint injection Right 07/17/2020   • Shoulder surgery      right   • Stress test  3/13    Possible old infract, no evidence of reversible ischemia   • Tonsillectomy and adenoidectomy     • Total knee replacement Left 05/29/2019    Left TKR     Social History     Socioeconomic History   • Marital status: Single     Spouse name: Not on file   • Number of children: Not on file   • Years of education: Not on file   • Highest education level: Not on file   Occupational History   • Not on file   Tobacco Use   • Smoking status: Current Every Day Smoker     Packs/day: 0.50     Years: 42.00     Pack years: 21.00     Types: Cigarettes     Start date: 10/5/1978   • Smokeless tobacco: Never Used   Vaping Use   • Vaping Use: never used   Substance and Sexual Activity   • Alcohol use: Yes     Alcohol/week: 0.0 - 1.0 standard drinks     Comment: rarely. 5-6 drinks per month   • Drug use: Yes     Frequency: 1.0 times per week     Types: Marijuana     Comment: \"every so often\"   • Sexual activity: Not on file   Other Topics Concern   • Not on file   Social History Narrative   • Not on file     Social Determinants of Health     Financial Resource Strain: Low Risk    • Social Determinants: Financial Resource Strain: None   Food Insecurity: No Food Insecurity   • Social Determinants: Food Insecurity: Never   Transportation Needs: No Transportation Needs   • Lack of Transportation (Medical): No   • Lack of Transportation (Non-Medical): No   Physical Activity: Not on file   Stress: Not on file   Social Connections: Not on file   Intimate Partner Violence: Not At Risk   •  Social Determinants: Intimate Partner Violence Past Fear: No   • Social Determinants: Intimate Partner Violence Current Fear: No     Family History   Problem Relation Age of Onset   • Diabetes Father      ALLERGIES:   Allergen Reactions   • Cymbalta [Duloxetine Hcl]      rash       Medications:  Current Outpatient Medications   Medication Sig Dispense Refill   • Folic Acid 0.8 MG Cap Take 0.8 mg by mouth daily. 30 capsule 11   • Zinc 25 MG Tab Take 25 mg by mouth daily. 30 tablet 11   • gabapentin (NEURONTIN) 300 MG capsule TAKE 1 CAPSULE BY MOUTH THREE TIMES DAILY 6 capsule 10   • latanoprost (XALATAN) 0.005 % ophthalmic solution Place 1 drop into both eyes nightly. 2.5 mL 12   • mirtazapine (REMERON) 15 MG tablet Take 1 tablet by mouth nightly. 90 tablet 0   • guaifenesin (ROBITUSSIN) 100 MG/5ML liquid Take 5 mLs by mouth 3 times daily as needed for Cough. 100 mL 3   • atorvastatin (LIPITOR) 40 MG tablet Take 1 tablet by mouth daily. Before bedtime.  Fasting lipids due September 2022 90 tablet 1   • famotidine (Pepcid) 20 MG tablet Take 1 tablet by mouth 2 times daily. 60 tablet 5   • nystatin (MYCOSTATIN) 361318 UNIT/GM powder Apply topically 3 times daily.     • nystatin (MYCOSTATIN) 005914 UNIT/GM cream Apply topically 2 times daily.     • acetaminophen (TYLENOL) 500 MG tablet Take 1-2 tablets or capsules by mouth every 4-6 hours as needed 90 tablet 3   • Polyethylene Glycol 3350 (PEG 3350) 17 g Pack Take as needed for constipation. Patient can self administer. 7 each 3   • ferrous gluconate (FERGON) 324 (38 Fe) MG tablet TAKE 1 TABLET BY MOUTH TWICE DAILY 22 tablet 10   • clopidogrel (PLAVIX) 75 MG tablet Take 1 tablet by mouth daily. 60 tablet 4   • mometasone-formoterol (Dulera) 200-5 MCG/ACT inhaler Inhale 2 puffs into the lungs 2 times daily.     • magnesium hydroxide (MILK OF MAGNESIA) 400 MG/5ML suspension Take 30 mLs by mouth daily as needed for Constipation.     • CARBOXYMethylcellulose (Refresh Tears)  0.5 % ophthalmic solution 1 drop 4 times daily as needed for Dry Eyes.     • ipratropium-albuterol (DUONEB) 0.5-2.5 (3) MG/3ML nebulizer solution Take 3 mLs by nebulization 3 times daily. Use 3 times daily scheduled (Patient taking differently: Take 3 mLs by nebulization 3 times daily. Use 3 times daily scheduled and every 6 hours as needed for wheezing) 360 mL 11   • magnesium oxide 250 MG tablet Take 250 mg by mouth daily.     • topiramate (TOPAMAX) 50 MG tablet Take 50 mg by mouth 2 times daily. 25 mg + 50 mg for a total of 75 mg     • Spacer/Aero-Holding Chambers (E-Z Spacer) Device Please issue patient a spacer to use with a Metered dose inhaler 1 Device 1   • topiramate (TOPAMAX) 25 MG tablet Take 25 mg by mouth 2 times daily. 25 mg + 50 mg for a total of 75 mg     • ARIPiprazole (ABILIFY) 2 MG tablet Take 1 tablet by mouth daily.      • venlafaxine XR (EFFEXOR XR) 150 MG 24 hr capsule Take 1 capsule by mouth daily. Due for office visit. (Patient taking differently: Take 150 mg by mouth daily. Add to the 75 mg to = 225 mg daily. Due for office visit.) 90 capsule 0   • PROAIR  (90 Base) MCG/ACT inhaler INHALE TWO PUFFS BY MOUTH EVERY 4 HOURS AS NEEDED FOR SHORTNESS OF BREATH OR WHEEZING (Patient taking differently: every 8 hours as needed. ) 3 Inhaler 0   • famotidine (PEPCID) 20 MG tablet Take 20 mg by mouth 2 times daily. Take twice daily as needed for heartburn.     • venlafaxine 75 MG TABLET SR 24 HR Take 1 tablet by mouth daily (with breakfast). Add to the 150 to = 225 mg daily. Due for office visit. 90 tablet 0     No current facility-administered medications for this visit.     Facility-Administered Medications Ordered in Other Visits   Medication Dose Route Frequency Provider Last Rate Last Admin   • sodium chloride 0.9% infusion   Intravenous Continuous PRN Trenton Rashid MD   Stopped at 09/16/21 1200       Immunization Status:  Immunization History   Administered Date(s) Administered   •  COVID-19 Moderna Vaccine 02/02/2021, 03/02/2021, 11/19/2021   • Influenza, high dose quadrivalent, preservative-free 01/30/2019, 01/31/2019   • Influenza, high dose seasonal, preservative-free 11/11/2019   • Influenza, injectable, quadrivalent 10/09/2014, 12/10/2015, 09/26/2016, 11/03/2017, 01/31/2019   • Influenza, injectable, quadrivalent, preservative-free 09/27/2021   • Influenza, seasonal, injectable, trivalent 11/06/2002, 10/25/2003, 10/24/2005, 12/13/2006, 11/19/2007, 10/15/2008, 10/07/2009, 03/15/2013, 11/11/2019, 10/19/2020   • Pneumococcal Conjugate 13 Valent Vacc (Prevnar 13) 06/02/2019   • Pneumococcal Polysaccharide Vacc (Pneumovax 23) 03/14/2013, 03/15/2013, 03/15/2013   • Td:Adult type tetanus/diphtheria 10/24/2005   • Tdap 06/14/2016, 09/26/2016         ROS: All systems reviewed and negative except for what is mentioned in the HPI.    Vitals 2/8/2022 2/16/2022 2/21/2022 3/25/2022 3/30/2022 3/31/2022 5/19/2022   SYSTOLIC - 102 104 124 110 110 110   DIASTOLIC - 68 72 62 78 72 64   Pulse - 93 68 - 90 100 82   Temp - 96.6 98.4 - - - -   Resp 16 - - - - 20 -   Weight kg 106.2 kg 103.647 kg - 103.874 kg 103.42 kg 103.42 kg 103.964 kg   Height 5' 4\" 5' 4\" - 5' 3\" - 5' 3\" 5' 4\"   BMI (Calculated) 40.19 39.22 - 40.57 - 40.39 39.34   BP - Patient Reported - - - - - - -   BP Location - Patient Reported - - - - - - -   BP Position - Patient Reported - - - - - - -   Some recent data might be hidden     Physical Exam:    Physical Exam:  GENERAL: ALERT ORIENTED X 3, NOT IN RESPIRATORY DISTRESS, APPEARS STATED AGE  EYES EOMI, NORMAL CONJUNCTIVA, NO ICTERUS  HENT: NORMOCEPHALLIC ATRAUMATIC, MUCOUS MEMBRANE MOIST, NO LESIONS  NECK: SUPPLE, TRACHEA MIDLINE, NO ELEVATED JVD  CHEST: SYMMETRIC AIRENTRY,NON LABORED,  CLEAR TO AUSCULATATION BILATERAL  HEART: REGULAR RATE & RHYTHM,  S1, S2, NO RUB  ABDOMEN: SOFT, NO DISTENTION, NON TENDER,  BS PRESENT  EXTREMITIES: NO  CYANOSIS, NOCLUBBING, NO EDEMA  NEURO  , GROSSLY INTACT,  NON-FOCAL  MUSCULAR: MOVES ALL EXTREMITIES, NORMAL ROM, NO DEFORMITY   SKIN: WARM, INTACT, NO RASH, NO LESIONS, NO NODULES  PSYCHIATRIC COOPERATIVE, APPROPRIATE MOOD AND AFFECT    Laboratory Results:    Recent Labs     09/17/21  0551 09/20/21  1228 10/05/21  0656 10/06/21  0724 01/10/22  1628 01/17/22  1132 02/16/22  1136 03/25/22  1140 05/16/22  0851   SODIUM 144   < > 142   < > 143  --  138 141 141   POTASSIUM 3.6   < > 3.9   < > 4.3  --  5.3* 3.9 4.4   CHLORIDE 108*   < > 109*   < > 115*  --  115* 112* 110*   CO2 24   < > 25   < > 25  --  19* 22 25   ANIONGAP 16   < > 12   < > 7*  --  9* 11 10   BUN 20   < > 46*   < > 24*  --  24* 22* 20   CREATININE 1.30*   < > 2.02*   < > 1.46*  --  1.86* 1.42* 1.23*   GLUCOSE 87   < > 82   < > 116*  --  90 190* 94   CALCIUM 8.7   < > 8.5   < > 9.5  --  9.2 9.2 9.2   MG 1.9  --  2.5*  --  2.1  --   --   --   --    PHOS  --   --   --   --   --   --  2.6  --  3.1   INTAC  --   --   --   --   --   --  132*  --  99*   VITD25  --   --   --   --   --  8.7*  --   --   --    ALBUMIN  --    < >  --    < > 3.5*  --  3.2* 3.4*  --     < > = values in this interval not displayed.         Recent Labs     09/11/21  1128 09/11/21  2116 10/05/21  0656 10/05/21  1358 01/10/22  1628 01/17/22  1132 02/16/22  1136 05/16/22  0851   WBC 13.6*   < > 5.3   < > 8.7  --  7.4 7.4   HGB 5.4*   < > 7.7*   < > 13.2  --  11.6* 10.3*   HCT 17.7*   < > 25.3*   < > 44.5  --  38.5 35.6*      < > 208   < > 214  --  250 267   FERR 20  --  112  --   --  74  --   --    PST 4*  --  27  --   --  29  --   --     < > = values in this interval not displayed.             Urine Panel  Recent Labs     10/05/21  1414 01/31/22  1435 05/16/22  0851   USPG 1.010 >=1.030 1.015   UPH 7.0 5.0 7.0   UPROT Negative Negative Negative   UROB 0.2 0.2 0.2   UNITR Negative Negative Negative   UKET Negative Negative Negative   UBILI Negative Negative Negative   UWBC Small* Trace* Negative   URBC Negative Negative Negative   MADDY 85   --   --        Kidney U/S 10/5/21    IMPRESSION:      Examination is significantly limited by patient body habitus. No definite  hydronephrosis.       Diagnosis/Plan:    · CKD: Stage  3/ABY  · IV contrast cardiac cath 2020  · H/O Hypotension  · GI bleed  10/21  · Cr 2.1 12/1/21  · Repeat Cr 1.23 5/16/22  · Kidney U/S noted   · Check urine studies, SPEP  · Hypertension: BP wnl  · Secondary Hyperparathyroidism,PTH 99, better  · H/H wnl  · Electrolyte/Acid Base:wnl  · Volume Status: stable  · On MOM and Mg Oxide, Mg wnl  · Anemia, Hb 10.3, check iron    Follow up in 6 months       never

## 2022-05-24 ENCOUNTER — APPOINTMENT (OUTPATIENT)
Dept: NEUROLOGY | Facility: CLINIC | Age: 37
End: 2022-05-24

## 2022-06-02 ENCOUNTER — TRANSCRIPTION ENCOUNTER (OUTPATIENT)
Age: 37
End: 2022-06-02

## 2022-06-03 ENCOUNTER — APPOINTMENT (OUTPATIENT)
Dept: RHEUMATOLOGY | Facility: CLINIC | Age: 37
End: 2022-06-03

## 2022-06-09 ENCOUNTER — APPOINTMENT (OUTPATIENT)
Dept: NEUROLOGY | Facility: CLINIC | Age: 37
End: 2022-06-09
Payer: MEDICAID

## 2022-06-09 VITALS
WEIGHT: 193 LBS | BODY MASS INDEX: 38.91 KG/M2 | TEMPERATURE: 97.6 F | DIASTOLIC BLOOD PRESSURE: 75 MMHG | HEIGHT: 59 IN | SYSTOLIC BLOOD PRESSURE: 114 MMHG | OXYGEN SATURATION: 99 % | HEART RATE: 85 BPM

## 2022-06-09 PROCEDURE — 95908 NRV CNDJ TST 3-4 STUDIES: CPT

## 2022-06-09 PROCEDURE — 99212 OFFICE O/P EST SF 10 MIN: CPT | Mod: 25

## 2022-06-09 PROCEDURE — 95886 MUSC TEST DONE W/N TEST COMP: CPT

## 2022-06-09 RX ORDER — RITUXIMAB-ABBS 10 MG/ML
500 INJECTION, SOLUTION INTRAVENOUS
Qty: 0 | Refills: 0 | Status: COMPLETED | OUTPATIENT
Start: 2022-05-12 | End: 1900-01-01

## 2022-06-09 RX ORDER — DIPHENHYDRAMINE HYDROCHLORIDE 50 MG/ML
50 INJECTION, SOLUTION INTRAMUSCULAR; INTRAVENOUS
Qty: 1 | Refills: 0 | Status: COMPLETED | OUTPATIENT
Start: 2022-05-31 | End: 1900-01-01

## 2022-06-09 RX ORDER — METHYLPREDNISOLONE 125 MG/2ML
125 INJECTION, POWDER, LYOPHILIZED, FOR SOLUTION INTRAMUSCULAR; INTRAVENOUS
Qty: 0 | Refills: 0 | Status: COMPLETED | OUTPATIENT
Start: 2022-05-12 | End: 1900-01-01

## 2022-06-09 RX ORDER — ACETAMINOPHEN 500 MG/1
500 TABLET, COATED ORAL
Qty: 0 | Refills: 0 | Status: COMPLETED | OUTPATIENT
Start: 2022-05-12 | End: 1900-01-01

## 2022-06-10 ENCOUNTER — APPOINTMENT (OUTPATIENT)
Dept: RHEUMATOLOGY | Facility: CLINIC | Age: 37
End: 2022-06-10
Payer: MEDICAID

## 2022-06-10 VITALS
HEART RATE: 76 BPM | RESPIRATION RATE: 16 BRPM | HEIGHT: 59 IN | TEMPERATURE: 98.2 F | WEIGHT: 194 LBS | DIASTOLIC BLOOD PRESSURE: 76 MMHG | SYSTOLIC BLOOD PRESSURE: 114 MMHG | BODY MASS INDEX: 39.11 KG/M2 | OXYGEN SATURATION: 98 %

## 2022-06-10 DIAGNOSIS — M75.100 UNSPECIFIED ROTATOR CUFF TEAR OR RUPTURE OF UNSPECIFIED SHOULDER, NOT SPECIFIED AS TRAUMATIC: ICD-10-CM

## 2022-06-10 PROCEDURE — 99214 OFFICE O/P EST MOD 30 MIN: CPT

## 2022-06-10 NOTE — PROCEDURE
[FreeTextEntry1] : Right upper extremity median and ulnar motor and sensory nerve conduction velocity examination F waves are normal.  Right upper extremity and cervical paraspinal muscle electromyographic examination is normal

## 2022-06-10 NOTE — PHYSICAL EXAM
[General Appearance - Alert] : alert [General Appearance - In No Acute Distress] : in no acute distress [Outer Ear] : the ears and nose were normal in appearance [Oropharynx] : the oropharynx was normal [Neck Appearance] : the appearance of the neck was normal [Neck Cervical Mass (___cm)] : no neck mass was observed [Jugular Venous Distention Increased] : there was no jugular-venous distention [Thyroid Diffuse Enlargement] : the thyroid was not enlarged [Thyroid Nodule] : there were no palpable thyroid nodules [Auscultation Breath Sounds / Voice Sounds] : lungs were clear to auscultation bilaterally [Heart Rate And Rhythm] : heart rate was normal and rhythm regular [Heart Sounds] : normal S1 and S2 [Heart Sounds Gallop] : no gallops [Murmurs] : no murmurs [Heart Sounds Pericardial Friction Rub] : no pericardial rub [Full Pulse] : the pedal pulses are present [Edema] : there was no peripheral edema [Bowel Sounds] : normal bowel sounds [Abdomen Soft] : soft [Abdomen Tenderness] : non-tender [Abdomen Mass (___ Cm)] : no abdominal mass palpated [Cervical Lymph Nodes Enlarged Posterior Bilaterally] : posterior cervical [Cervical Lymph Nodes Enlarged Anterior Bilaterally] : anterior cervical [Supraclavicular Lymph Nodes Enlarged Bilaterally] : supraclavicular [No CVA Tenderness] : no ~M costovertebral angle tenderness [No Spinal Tenderness] : no spinal tenderness [Abnormal Walk] : normal gait [Nail Clubbing] : no clubbing  or cyanosis of the fingernails [Involuntary Movements] : no involuntary movements were seen [Musculoskeletal - Swelling] : no joint swelling seen [Motor Tone] : muscle strength and tone were normal [Skin Color & Pigmentation] : normal skin color and pigmentation [Skin Turgor] : normal skin turgor [] : no rash [Oriented To Time, Place, And Person] : oriented to person, place, and time [Impaired Insight] : insight and judgment were intact [Affect] : the affect was normal [FreeTextEntry1] : right shoulder with decreased ROM up to 90 degrees - with pain on movement and positive empty can sign - tenderness throughout

## 2022-06-10 NOTE — HISTORY OF PRESENT ILLNESS
[FreeTextEntry1] : Right arm pain.  She has just begun a generic version of rituximab treatment for rheumatoid arthritis

## 2022-06-10 NOTE — DISCUSSION/SUMMARY
[FreeTextEntry1] : Based upon her neurological examination and EMG/NCV results (normal), normal neurological such as rheumatoid and arthritic pain is more likely to be the cause of her symptoms in the right upper extremity

## 2022-06-10 NOTE — HISTORY OF PRESENT ILLNESS
[___ Month(s) Ago] : [unfilled] month(s) ago [FreeTextEntry1] : stable symptoms on methotrexate and medical marijuana - pending rituximab next week\par uterine bleeding has stopped\par also on progesterone pill\par mild joint pain at this time. \par

## 2022-06-10 NOTE — ASSESSMENT
[FreeTextEntry1] : 32 year-old female with pmh of ALMA ROSA, now with return of her symptoms with polyarthritis, highly positive DEBORAH\par \par 1. RA  with vasculitis - methotrexate 5 tablets -   s/p 3 cycles of rituxan - on methotrexate 6 tablets  covid 19 X 2 - despite being vaccinated - with worsening joint pain and stiffness  fully vaccinated  - flaring secondary to stress at home\par 2. chronic back pain - ongoing work-up by PMR x-rays with degenerative changes and mild scoliosis -had nerve block - no pain at this time. \par 3. chronic pain - on medical marijuana - improved - has f/u with pain management\par 4. muscle cramps over the right leg - not active\par 5. PE - likely related to tubal ligation (5 days prior) - chronic eliquis - given hx of clotting in her family\par 6. Right leg paresthesias - EMG done yesterday - mild sensory axonal loss\par 7. Right shoulder pain -with decreased ROM over 6 months worsening - with loss of ROM - could not complete PT due to  pain - new referral given -MRI not done - missed appt - \par 8.Loss of hearing -under the care of ENT\par \par \par \par I reviewed previous labs results with patients.\par Laboratory tests ordered today\par Diagnosis and Prognosis discussed\par Continue with current medications\par medications refilled\par education provided on\par F/u 2 months\par

## 2022-06-12 PROBLEM — M75.100 ROTATOR CUFF TEAR: Status: ACTIVE | Noted: 2021-10-15

## 2022-06-13 ENCOUNTER — APPOINTMENT (OUTPATIENT)
Dept: CARDIOLOGY | Facility: CLINIC | Age: 37
End: 2022-06-13

## 2022-06-14 ENCOUNTER — LABORATORY RESULT (OUTPATIENT)
Age: 37
End: 2022-06-14

## 2022-06-14 ENCOUNTER — MED ADMIN CHARGE (OUTPATIENT)
Age: 37
End: 2022-06-14

## 2022-06-14 ENCOUNTER — APPOINTMENT (OUTPATIENT)
Dept: RHEUMATOLOGY | Facility: CLINIC | Age: 37
End: 2022-06-14
Payer: MEDICAID

## 2022-06-14 VITALS
TEMPERATURE: 98.3 F | OXYGEN SATURATION: 98 % | RESPIRATION RATE: 16 BRPM | DIASTOLIC BLOOD PRESSURE: 60 MMHG | HEART RATE: 83 BPM | SYSTOLIC BLOOD PRESSURE: 93 MMHG

## 2022-06-14 VITALS
TEMPERATURE: 98.2 F | DIASTOLIC BLOOD PRESSURE: 62 MMHG | OXYGEN SATURATION: 99 % | HEART RATE: 69 BPM | SYSTOLIC BLOOD PRESSURE: 98 MMHG | RESPIRATION RATE: 16 BRPM

## 2022-06-14 VITALS
TEMPERATURE: 98.2 F | OXYGEN SATURATION: 98 % | DIASTOLIC BLOOD PRESSURE: 70 MMHG | RESPIRATION RATE: 16 BRPM | HEART RATE: 80 BPM | SYSTOLIC BLOOD PRESSURE: 104 MMHG

## 2022-06-14 VITALS
RESPIRATION RATE: 16 BRPM | SYSTOLIC BLOOD PRESSURE: 102 MMHG | OXYGEN SATURATION: 100 % | TEMPERATURE: 98 F | HEART RATE: 90 BPM | DIASTOLIC BLOOD PRESSURE: 69 MMHG

## 2022-06-14 VITALS — BODY MASS INDEX: 40.4 KG/M2 | WEIGHT: 200 LBS

## 2022-06-14 PROCEDURE — 36415 COLL VENOUS BLD VENIPUNCTURE: CPT

## 2022-06-14 PROCEDURE — 96413 CHEMO IV INFUSION 1 HR: CPT

## 2022-06-14 PROCEDURE — 96415 CHEMO IV INFUSION ADDL HR: CPT

## 2022-06-14 PROCEDURE — 96375 TX/PRO/DX INJ NEW DRUG ADDON: CPT

## 2022-06-14 PROCEDURE — 96374 THER/PROPH/DIAG INJ IV PUSH: CPT | Mod: 59

## 2022-06-14 RX ORDER — DIPHENHYDRAMINE HYDROCHLORIDE 50 MG/ML
50 INJECTION, SOLUTION INTRAMUSCULAR; INTRAVENOUS
Qty: 1 | Refills: 0 | Status: COMPLETED | OUTPATIENT
Start: 2022-06-09

## 2022-06-14 RX ORDER — METHYLPREDNISOLONE 125 MG/2ML
125 INJECTION, POWDER, LYOPHILIZED, FOR SOLUTION INTRAMUSCULAR; INTRAVENOUS
Qty: 0 | Refills: 0 | Status: COMPLETED | OUTPATIENT
Start: 2022-06-09

## 2022-06-14 RX ORDER — RITUXIMAB-ABBS 10 MG/ML
500 INJECTION, SOLUTION INTRAVENOUS
Qty: 0 | Refills: 0 | Status: COMPLETED | OUTPATIENT
Start: 2022-06-09

## 2022-06-14 RX ORDER — ACETAMINOPHEN 500 MG/1
500 TABLET, COATED ORAL
Qty: 0 | Refills: 0 | Status: COMPLETED | OUTPATIENT
Start: 2022-06-09

## 2022-06-15 ENCOUNTER — APPOINTMENT (OUTPATIENT)
Dept: INTERNAL MEDICINE | Facility: CLINIC | Age: 37
End: 2022-06-15

## 2022-06-17 ENCOUNTER — APPOINTMENT (OUTPATIENT)
Dept: RHEUMATOLOGY | Facility: CLINIC | Age: 37
End: 2022-06-17

## 2022-06-30 ENCOUNTER — MED ADMIN CHARGE (OUTPATIENT)
Age: 37
End: 2022-06-30

## 2022-06-30 ENCOUNTER — LABORATORY RESULT (OUTPATIENT)
Age: 37
End: 2022-06-30

## 2022-06-30 ENCOUNTER — APPOINTMENT (OUTPATIENT)
Dept: RHEUMATOLOGY | Facility: CLINIC | Age: 37
End: 2022-06-30

## 2022-06-30 VITALS
TEMPERATURE: 98.3 F | OXYGEN SATURATION: 96 % | RESPIRATION RATE: 16 BRPM | DIASTOLIC BLOOD PRESSURE: 66 MMHG | SYSTOLIC BLOOD PRESSURE: 103 MMHG | HEART RATE: 69 BPM

## 2022-06-30 VITALS
RESPIRATION RATE: 16 BRPM | DIASTOLIC BLOOD PRESSURE: 72 MMHG | HEART RATE: 86 BPM | TEMPERATURE: 98.5 F | SYSTOLIC BLOOD PRESSURE: 114 MMHG | OXYGEN SATURATION: 98 %

## 2022-06-30 VITALS
DIASTOLIC BLOOD PRESSURE: 76 MMHG | SYSTOLIC BLOOD PRESSURE: 112 MMHG | OXYGEN SATURATION: 97 % | RESPIRATION RATE: 16 BRPM | HEART RATE: 93 BPM | TEMPERATURE: 98.2 F

## 2022-06-30 PROCEDURE — 96413 CHEMO IV INFUSION 1 HR: CPT

## 2022-06-30 PROCEDURE — 96374 THER/PROPH/DIAG INJ IV PUSH: CPT | Mod: 59

## 2022-06-30 PROCEDURE — 96415 CHEMO IV INFUSION ADDL HR: CPT

## 2022-06-30 PROCEDURE — 96375 TX/PRO/DX INJ NEW DRUG ADDON: CPT | Mod: 59

## 2022-06-30 RX ORDER — METHYLPREDNISOLONE 125 MG/2ML
125 INJECTION, POWDER, LYOPHILIZED, FOR SOLUTION INTRAMUSCULAR; INTRAVENOUS
Qty: 0 | Refills: 0 | Status: COMPLETED | OUTPATIENT
Start: 2022-06-25

## 2022-06-30 RX ORDER — RITUXIMAB-ABBS 10 MG/ML
500 INJECTION, SOLUTION INTRAVENOUS
Qty: 0 | Refills: 0 | Status: COMPLETED | OUTPATIENT
Start: 2022-06-25

## 2022-06-30 RX ORDER — ACETAMINOPHEN 500 MG/1
500 TABLET, COATED ORAL
Qty: 0 | Refills: 0 | Status: COMPLETED | OUTPATIENT
Start: 2022-06-25

## 2022-06-30 RX ORDER — DIPHENHYDRAMINE HYDROCHLORIDE 50 MG/ML
50 INJECTION, SOLUTION INTRAMUSCULAR; INTRAVENOUS
Qty: 1 | Refills: 0 | Status: COMPLETED | OUTPATIENT
Start: 2022-06-25

## 2022-07-01 ENCOUNTER — APPOINTMENT (OUTPATIENT)
Dept: RHEUMATOLOGY | Facility: CLINIC | Age: 37
End: 2022-07-01

## 2022-07-06 ENCOUNTER — APPOINTMENT (OUTPATIENT)
Dept: OTOLARYNGOLOGY | Facility: CLINIC | Age: 37
End: 2022-07-06

## 2022-07-06 VITALS
SYSTOLIC BLOOD PRESSURE: 106 MMHG | TEMPERATURE: 98.8 F | WEIGHT: 200 LBS | DIASTOLIC BLOOD PRESSURE: 74 MMHG | HEIGHT: 59 IN | HEART RATE: 82 BPM | BODY MASS INDEX: 40.32 KG/M2

## 2022-07-06 PROCEDURE — 92550 TYMPANOMETRY & REFLEX THRESH: CPT

## 2022-07-06 PROCEDURE — 99214 OFFICE O/P EST MOD 30 MIN: CPT | Mod: 25

## 2022-07-06 PROCEDURE — 31231 NASAL ENDOSCOPY DX: CPT

## 2022-07-06 RX ORDER — DULAGLUTIDE 0.75 MG/.5ML
0.75 INJECTION, SOLUTION SUBCUTANEOUS
Qty: 2 | Refills: 0 | Status: DISCONTINUED | COMMUNITY
Start: 2022-07-02

## 2022-07-06 RX ORDER — VALACYCLOVIR 1 G/1
1 TABLET, FILM COATED ORAL
Qty: 21 | Refills: 0 | Status: DISCONTINUED | COMMUNITY
Start: 2022-02-21

## 2022-07-06 RX ORDER — LORATADINE 10 MG/1
10 TABLET ORAL
Qty: 30 | Refills: 0 | Status: ACTIVE | COMMUNITY
Start: 2022-03-16

## 2022-07-11 ENCOUNTER — EMERGENCY (EMERGENCY)
Facility: HOSPITAL | Age: 37
LOS: 1 days | Discharge: HOME | End: 2022-07-11
Attending: EMERGENCY MEDICINE
Payer: MEDICAID

## 2022-07-11 ENCOUNTER — TRANSCRIPTION ENCOUNTER (OUTPATIENT)
Age: 37
End: 2022-07-11

## 2022-07-11 VITALS
HEART RATE: 80 BPM | SYSTOLIC BLOOD PRESSURE: 123 MMHG | RESPIRATION RATE: 18 BRPM | TEMPERATURE: 98 F | OXYGEN SATURATION: 98 % | DIASTOLIC BLOOD PRESSURE: 76 MMHG

## 2022-07-11 VITALS
OXYGEN SATURATION: 98 % | RESPIRATION RATE: 18 BRPM | DIASTOLIC BLOOD PRESSURE: 55 MMHG | HEIGHT: 59 IN | SYSTOLIC BLOOD PRESSURE: 101 MMHG | HEART RATE: 86 BPM | TEMPERATURE: 98 F | WEIGHT: 190.04 LBS

## 2022-07-11 DIAGNOSIS — Z98.51 TUBAL LIGATION STATUS: Chronic | ICD-10-CM

## 2022-07-11 DIAGNOSIS — M72.2 PLANTAR FASCIAL FIBROMATOSIS: Chronic | ICD-10-CM

## 2022-07-11 DIAGNOSIS — Z98.891 HISTORY OF UTERINE SCAR FROM PREVIOUS SURGERY: Chronic | ICD-10-CM

## 2022-07-11 LAB
ALBUMIN SERPL ELPH-MCNC: 3.8 G/DL — SIGNIFICANT CHANGE UP (ref 3.5–5)
ALP SERPL-CCNC: 139 U/L — HIGH (ref 40–120)
ALT FLD-CCNC: 23 U/L DA — SIGNIFICANT CHANGE UP (ref 10–60)
ANION GAP SERPL CALC-SCNC: 6 MMOL/L — SIGNIFICANT CHANGE UP (ref 5–17)
APPEARANCE UR: CLEAR — SIGNIFICANT CHANGE UP
AST SERPL-CCNC: 12 U/L — SIGNIFICANT CHANGE UP (ref 10–40)
BACTERIA # UR AUTO: ABNORMAL /HPF
BASOPHILS # BLD AUTO: 0.08 K/UL — SIGNIFICANT CHANGE UP (ref 0–0.2)
BASOPHILS NFR BLD AUTO: 0.5 % — SIGNIFICANT CHANGE UP (ref 0–2)
BILIRUB SERPL-MCNC: 0.6 MG/DL — SIGNIFICANT CHANGE UP (ref 0.2–1.2)
BILIRUB UR-MCNC: NEGATIVE — SIGNIFICANT CHANGE UP
BUN SERPL-MCNC: 7 MG/DL — SIGNIFICANT CHANGE UP (ref 7–18)
CALCIUM SERPL-MCNC: 9.8 MG/DL — SIGNIFICANT CHANGE UP (ref 8.4–10.5)
CHLORIDE SERPL-SCNC: 104 MMOL/L — SIGNIFICANT CHANGE UP (ref 96–108)
CO2 SERPL-SCNC: 27 MMOL/L — SIGNIFICANT CHANGE UP (ref 22–31)
COLOR SPEC: YELLOW — SIGNIFICANT CHANGE UP
CREAT SERPL-MCNC: 0.74 MG/DL — SIGNIFICANT CHANGE UP (ref 0.5–1.3)
DIFF PNL FLD: ABNORMAL
EGFR: 107 ML/MIN/1.73M2 — SIGNIFICANT CHANGE UP
EOSINOPHIL # BLD AUTO: 0.28 K/UL — SIGNIFICANT CHANGE UP (ref 0–0.5)
EOSINOPHIL NFR BLD AUTO: 1.8 % — SIGNIFICANT CHANGE UP (ref 0–6)
EPI CELLS # UR: SIGNIFICANT CHANGE UP /HPF
GLUCOSE SERPL-MCNC: 91 MG/DL — SIGNIFICANT CHANGE UP (ref 70–99)
GLUCOSE UR QL: NEGATIVE — SIGNIFICANT CHANGE UP
HCG UR QL: NEGATIVE — SIGNIFICANT CHANGE UP
HCT VFR BLD CALC: 41.4 % — SIGNIFICANT CHANGE UP (ref 34.5–45)
HGB BLD-MCNC: 13.4 G/DL — SIGNIFICANT CHANGE UP (ref 11.5–15.5)
IMM GRANULOCYTES NFR BLD AUTO: 0.4 % — SIGNIFICANT CHANGE UP (ref 0–1.5)
KETONES UR-MCNC: NEGATIVE — SIGNIFICANT CHANGE UP
LEUKOCYTE ESTERASE UR-ACNC: ABNORMAL
LIDOCAIN IGE QN: 60 U/L — LOW (ref 73–393)
LYMPHOCYTES # BLD AUTO: 17.3 % — SIGNIFICANT CHANGE UP (ref 13–44)
LYMPHOCYTES # BLD AUTO: 2.76 K/UL — SIGNIFICANT CHANGE UP (ref 1–3.3)
MCHC RBC-ENTMCNC: 28.2 PG — SIGNIFICANT CHANGE UP (ref 27–34)
MCHC RBC-ENTMCNC: 32.4 GM/DL — SIGNIFICANT CHANGE UP (ref 32–36)
MCV RBC AUTO: 87 FL — SIGNIFICANT CHANGE UP (ref 80–100)
MONOCYTES # BLD AUTO: 0.96 K/UL — HIGH (ref 0–0.9)
MONOCYTES NFR BLD AUTO: 6 % — SIGNIFICANT CHANGE UP (ref 2–14)
NEUTROPHILS # BLD AUTO: 11.82 K/UL — HIGH (ref 1.8–7.4)
NEUTROPHILS NFR BLD AUTO: 74 % — SIGNIFICANT CHANGE UP (ref 43–77)
NITRITE UR-MCNC: NEGATIVE — SIGNIFICANT CHANGE UP
NRBC # BLD: 0 /100 WBCS — SIGNIFICANT CHANGE UP (ref 0–0)
PH UR: 6 — SIGNIFICANT CHANGE UP (ref 5–8)
PLATELET # BLD AUTO: 257 K/UL — SIGNIFICANT CHANGE UP (ref 150–400)
POTASSIUM SERPL-MCNC: 3.8 MMOL/L — SIGNIFICANT CHANGE UP (ref 3.5–5.3)
POTASSIUM SERPL-SCNC: 3.8 MMOL/L — SIGNIFICANT CHANGE UP (ref 3.5–5.3)
PROT SERPL-MCNC: 7.9 G/DL — SIGNIFICANT CHANGE UP (ref 6–8.3)
PROT UR-MCNC: 15
RBC # BLD: 4.76 M/UL — SIGNIFICANT CHANGE UP (ref 3.8–5.2)
RBC # FLD: 14.5 % — SIGNIFICANT CHANGE UP (ref 10.3–14.5)
RBC CASTS # UR COMP ASSIST: SIGNIFICANT CHANGE UP /HPF (ref 0–2)
SODIUM SERPL-SCNC: 137 MMOL/L — SIGNIFICANT CHANGE UP (ref 135–145)
SP GR SPEC: 1.02 — SIGNIFICANT CHANGE UP (ref 1.01–1.02)
UROBILINOGEN FLD QL: NEGATIVE — SIGNIFICANT CHANGE UP
WBC # BLD: 15.96 K/UL — HIGH (ref 3.8–10.5)
WBC # FLD AUTO: 15.96 K/UL — HIGH (ref 3.8–10.5)
WBC UR QL: SIGNIFICANT CHANGE UP /HPF (ref 0–5)

## 2022-07-11 PROCEDURE — 74177 CT ABD & PELVIS W/CONTRAST: CPT | Mod: 26,MA

## 2022-07-11 PROCEDURE — 99285 EMERGENCY DEPT VISIT HI MDM: CPT

## 2022-07-11 PROCEDURE — 76856 US EXAM PELVIC COMPLETE: CPT | Mod: 26

## 2022-07-11 PROCEDURE — 76830 TRANSVAGINAL US NON-OB: CPT

## 2022-07-11 PROCEDURE — 74177 CT ABD & PELVIS W/CONTRAST: CPT | Mod: MA

## 2022-07-11 PROCEDURE — 80053 COMPREHEN METABOLIC PANEL: CPT

## 2022-07-11 PROCEDURE — 81025 URINE PREGNANCY TEST: CPT

## 2022-07-11 PROCEDURE — 76830 TRANSVAGINAL US NON-OB: CPT | Mod: 26

## 2022-07-11 PROCEDURE — 85025 COMPLETE CBC W/AUTO DIFF WBC: CPT

## 2022-07-11 PROCEDURE — 81001 URINALYSIS AUTO W/SCOPE: CPT

## 2022-07-11 PROCEDURE — 36415 COLL VENOUS BLD VENIPUNCTURE: CPT

## 2022-07-11 PROCEDURE — 83690 ASSAY OF LIPASE: CPT

## 2022-07-11 PROCEDURE — 99284 EMERGENCY DEPT VISIT MOD MDM: CPT | Mod: 25

## 2022-07-11 PROCEDURE — 76856 US EXAM PELVIC COMPLETE: CPT

## 2022-07-11 RX ORDER — SODIUM CHLORIDE 9 MG/ML
3 INJECTION INTRAMUSCULAR; INTRAVENOUS; SUBCUTANEOUS ONCE
Refills: 0 | Status: COMPLETED | OUTPATIENT
Start: 2022-07-11 | End: 2022-07-11

## 2022-07-11 RX ORDER — CIPROFLOXACIN LACTATE 400MG/40ML
500 VIAL (ML) INTRAVENOUS EVERY 12 HOURS
Refills: 0 | Status: DISCONTINUED | OUTPATIENT
Start: 2022-07-11 | End: 2022-07-15

## 2022-07-11 RX ORDER — METRONIDAZOLE 500 MG
3 TABLET ORAL
Qty: 63 | Refills: 0
Start: 2022-07-11 | End: 2022-07-17

## 2022-07-11 RX ORDER — CIPROFLOXACIN LACTATE 400MG/40ML
1 VIAL (ML) INTRAVENOUS
Qty: 20 | Refills: 0
Start: 2022-07-11 | End: 2022-07-20

## 2022-07-11 RX ORDER — ACETAMINOPHEN 500 MG
975 TABLET ORAL ONCE
Refills: 0 | Status: COMPLETED | OUTPATIENT
Start: 2022-07-11 | End: 2022-07-11

## 2022-07-11 RX ORDER — DIATRIZOATE MEGLUMINE 180 MG/ML
30 INJECTION, SOLUTION INTRAVESICAL ONCE
Refills: 0 | Status: COMPLETED | OUTPATIENT
Start: 2022-07-11 | End: 2022-07-11

## 2022-07-11 RX ORDER — METRONIDAZOLE 500 MG
500 TABLET ORAL ONCE
Refills: 0 | Status: COMPLETED | OUTPATIENT
Start: 2022-07-11 | End: 2022-07-11

## 2022-07-11 RX ADMIN — Medication 975 MILLIGRAM(S): at 16:46

## 2022-07-11 RX ADMIN — Medication 500 MILLIGRAM(S): at 23:50

## 2022-07-11 RX ADMIN — DIATRIZOATE MEGLUMINE 30 MILLILITER(S): 180 INJECTION, SOLUTION INTRAVESICAL at 19:54

## 2022-07-11 RX ADMIN — SODIUM CHLORIDE 3 MILLILITER(S): 9 INJECTION INTRAMUSCULAR; INTRAVENOUS; SUBCUTANEOUS at 16:56

## 2022-07-11 RX ADMIN — Medication 975 MILLIGRAM(S): at 17:30

## 2022-07-11 NOTE — ED PROVIDER NOTE - PROGRESS NOTE DETAILS
patient continues with left lower quadrant pain wbc is elevated normal sono will do ct scan ct scan shows uncomplicated diverticulitis

## 2022-07-11 NOTE — ED ADULT NURSE REASSESSMENT NOTE - NS ED NURSE REASSESS COMMENT FT1
1300 PM - pt  endorsed  to STEFANO Harrison  for continuity of care  , pt no sigsn of any distress  nor  discomfort , comfortable  in  chair 1  waiting for  results. 2304 PM - pt  endorsed  to STEFANO Dinero    for continuity of care  , pt no sigsn of any distress  nor  discomfort , comfortable  in  chair 1  waiting for  results.

## 2022-07-11 NOTE — ED PROVIDER NOTE - NSFOLLOWUPINSTRUCTIONS_ED_ALL_ED_FT
[facility]                                                                                                                    Diverticulitis       Diverticulitis is infection or inflammation of small pouches (diverticula) in the colon that form due to a condition called diverticulosis. Diverticula can trap stool (feces) and bacteria, causing infection and inflammation.    Diverticulitis may cause severe stomach pain and diarrhea. It may lead to tissue damage in the colon that causes bleeding or blockage. The diverticula may also burst (rupture) and cause infected stool to enter other areas of the abdomen.      What are the causes?    This condition is caused by stool becoming trapped in the diverticula, which allows bacteria to grow in the diverticula. This leads to inflammation and infection.      What increases the risk?    You are more likely to develop this condition if you have diverticulosis. The risk increases if you:  •Are overweight or obese.      •Do not get enough exercise.      •Drink alcohol.      •Use tobacco products.      •Eat a diet that has a lot of red meat such as beef, pork, or lamb.      •Eat a diet that does not include enough fiber. High-fiber foods include fruits, vegetables, beans, nuts, and whole grains.      •Are over 40 years of age.        What are the signs or symptoms?    Symptoms of this condition may include:  •Pain and tenderness in the abdomen. The pain is normally located on the left side of the abdomen, but it may occur in other areas.      •Fever and chills.      •Nausea.      •Vomiting.      •Cramping.      •Bloating.      •Changes in bowel routines.      •Blood in your stool.        How is this diagnosed?    This condition is diagnosed based on:  •Your medical history.      •A physical exam.    •Tests to make sure there is nothing else causing your condition. These tests may include:  •Blood tests.      •Urine tests.      •CT scan of the abdomen.          How is this treated?    Most cases of this condition are mild and can be treated at home. Treatment may include:  •Taking over-the-counter pain medicines.      •Following a clear liquid diet.      •Taking antibiotic medicines by mouth.      •Resting.      More severe cases may need to be treated at a hospital. Treatment may include:  •Not eating or drinking.      •Taking prescription pain medicine.      •Receiving antibiotic medicines through an IV.      •Receiving fluids and nutrition through an IV.      •Surgery.      When your condition is under control, your health care provider may recommend that you have a colonoscopy. This is an exam to look at the entire large intestine. During the exam, a lubricated, bendable tube is inserted into the anus and then passed into the rectum, colon, and other parts of the large intestine. A colonoscopy can show how severe your diverticula are and whether something else may be causing your symptoms.      Follow these instructions at home:    Medicines     •Take over-the-counter and prescription medicines only as told by your health care provider. These include fiber supplements, probiotics, and stool softeners.      •If you were prescribed an antibiotic medicine, take it as told by your health care provider. Do not stop taking the antibiotic even if you start to feel better.      •Ask your health care provider if the medicine prescribed to you requires you to avoid driving or using machinery.        Eating and drinking      •Follow a full liquid diet or another diet as directed by your health care provider.    •After your symptoms improve, your health care provider may tell you to change your diet. He or she may recommend that you eat a diet that contains at least 25 grams (25 g) of fiber daily. Fiber makes it easier to pass stool. Healthy sources of fiber include:  •Berries. One cup contains 4–8 grams of fiber.      •Beans or lentils. One-half cup contains 5–8 grams of fiber.      •Green vegetables. One cup contains 4 grams of fiber.        •Avoid eating red meat.      General instructions     • Do not use any products that contain nicotine or tobacco, such as cigarettes, e-cigarettes, and chewing tobacco. If you need help quitting, ask your health care provider.      •Exercise for at least 30 minutes, 3 times each week. You should exercise hard enough to raise your heart rate and break a sweat.      •Keep all follow-up visits as told by your health care provider. This is important. You may need to have a colonoscopy.        Contact a health care provider if:    •Your pain does not improve.      •Your bowel movements do not return to normal.        Get help right away if:    •Your pain gets worse.      •Your symptoms do not get better with treatment.      •Your symptoms suddenly get worse.      •You have a fever.      •You vomit more than one time.      •You have stools that are bloody, black, or tarry.        Summary    •Diverticulitis is infection or inflammation of small pouches (diverticula) in the colon that form due to a condition called diverticulosis. Diverticula can trap stool (feces) and bacteria, causing infection and inflammation.      •You are at higher risk for this condition if you have diverticulosis and you eat a diet that does not include enough fiber.      •Most cases of this condition are mild and can be treated at home. More severe cases may need to be treated at a hospital.      •When your condition is under control, your health care provider may recommend that you have an exam called a colonoscopy. This exam can show how severe your diverticula are and whether something else may be causing your symptoms.      •Keep all follow-up visits as told by your health care provider. This is important.      This information is not intended to replace advice given to you by your health care provider. Make sure you discuss any questions you have with your health care provider.      Document Revised: 09/28/2020 Document Reviewed: 09/28/2020    [employer] Patient Education © 2022 [employer]

## 2022-07-11 NOTE — ED PROVIDER NOTE - PATIENT PORTAL LINK FT
You can access the FollowMyHealth Patient Portal offered by [facility] by registering at the following website: [email or web address]. By joining [facility]’s FollowMyHealth portal, you will also be able to view your health information using other applications (apps) compatible with our system.

## 2022-07-11 NOTE — ED PROVIDER NOTE - PHYSICAL EXAMINATION
Chest/Heart/Abdomen: chest clear, heart regular rhythm; abdomen soft, nontender; bowel sounds active      Musculoskeletal: slight pelvic tenderness on the left side

## 2022-07-11 NOTE — ED PROVIDER NOTE - OBJECTIVE STATEMENT
35 y/o female has left-sided pelvic pain for 1 week. Has had irregular cycles, bleeding continuously in February and March and has not had her period for the last several months. Patient has a PMHx of antiphospholipid antibody, rheumatoid arthritis, pulmonary thromboembolism. Patient is allergic to penicillin.

## 2022-07-11 NOTE — ED ADULT NURSE NOTE - NSIMPLEMENTINTERV_GEN_ALL_ED
Implemented All Universal Safety Interventions:  [place] to call system. Call bell, personal items and telephone within reach. Instruct patient to call for assistance. Room bathroom lighting operational. Non-slip footwear when patient is off stretcher. Physically safe environment: no spills, clutter or unnecessary equipment. Stretcher in lowest position, wheels locked, appropriate side rails in place.

## 2022-07-13 ENCOUNTER — APPOINTMENT (OUTPATIENT)
Dept: OBGYN | Facility: CLINIC | Age: 37
End: 2022-07-13

## 2022-07-13 VITALS
SYSTOLIC BLOOD PRESSURE: 119 MMHG | DIASTOLIC BLOOD PRESSURE: 78 MMHG | WEIGHT: 192 LBS | BODY MASS INDEX: 38.78 KG/M2 | OXYGEN SATURATION: 99 % | HEART RATE: 79 BPM

## 2022-07-13 PROCEDURE — 99395 PREV VISIT EST AGE 18-39: CPT

## 2022-07-13 NOTE — HISTORY OF PRESENT ILLNESS
[FreeTextEntry1] : patient presents for her annual exam.  Reports that since she had the heavy vaginal bleeding episode, she stopped having her menses completely.  States that there was no change in medications, surgeries, or any other changes.  States that she also feels hot and sweaty a lot.  Wants to know if she is going through menopause.  Discussed hormone panel.  Due for pap.

## 2022-07-15 ENCOUNTER — APPOINTMENT (OUTPATIENT)
Dept: ENDOCRINOLOGY | Facility: CLINIC | Age: 37
End: 2022-07-15

## 2022-07-16 LAB
C TRACH RRNA SPEC QL NAA+PROBE: NOT DETECTED
N GONORRHOEA RRNA SPEC QL NAA+PROBE: NOT DETECTED
SOURCE TP AMPLIFICATION: NORMAL

## 2022-07-20 ENCOUNTER — APPOINTMENT (OUTPATIENT)
Dept: INTERNAL MEDICINE | Facility: CLINIC | Age: 37
End: 2022-07-20

## 2022-07-20 VITALS
SYSTOLIC BLOOD PRESSURE: 107 MMHG | OXYGEN SATURATION: 98 % | TEMPERATURE: 96.3 F | WEIGHT: 189 LBS | BODY MASS INDEX: 38.1 KG/M2 | HEART RATE: 97 BPM | HEIGHT: 59 IN | DIASTOLIC BLOOD PRESSURE: 75 MMHG

## 2022-07-20 VITALS
SYSTOLIC BLOOD PRESSURE: 107 MMHG | HEIGHT: 59 IN | BODY MASS INDEX: 38.1 KG/M2 | WEIGHT: 189 LBS | DIASTOLIC BLOOD PRESSURE: 75 MMHG

## 2022-07-20 DIAGNOSIS — E61.1 IRON DEFICIENCY: ICD-10-CM

## 2022-07-20 DIAGNOSIS — R13.12 DYSPHAGIA, OROPHARYNGEAL PHASE: ICD-10-CM

## 2022-07-20 DIAGNOSIS — Z87.42 PERSONAL HISTORY OF OTHER DISEASES OF THE FEMALE GENITAL TRACT: ICD-10-CM

## 2022-07-20 PROCEDURE — 99495 TRANSJ CARE MGMT MOD F2F 14D: CPT

## 2022-07-20 RX ORDER — PROGESTERONE 100 MG/1
100 CAPSULE ORAL
Qty: 30 | Refills: 0 | Status: COMPLETED | COMMUNITY
Start: 2022-04-06 | End: 2022-07-20

## 2022-07-20 RX ORDER — RITUXIMAB 10 MG/ML
500 INJECTION, SOLUTION INTRAVENOUS
Qty: 2 | Refills: 4 | Status: COMPLETED | OUTPATIENT
Start: 2019-07-25 | End: 2022-07-20

## 2022-07-20 NOTE — HISTORY OF PRESENT ILLNESS
[ER Visit] : was seen in the Emergency Department [Heartburn] : denies heartburn [Nausea] : resolved nausea [Vomiting] : denies vomiting [Diarrhea] : resolved diarrhea [Constipation] : denies constipation [Yellow Skin Or Eyes (Jaundice)] : denies jaundice [Abdominal Pain] : resolved abdominal pain [Abdominal Swelling] : denies abdominal swelling [Rectal Pain] : denies rectal pain [Wt Loss ___ Lbs] : recent [unfilled] ~Upound(s) weight loss [Diverticulitis] : diverticulitis [_________] : Performed [unfilled] [Good Compliance] : good compliance with treatment [Good Tolerance] : good tolerance of treatment [Good Symptom Control] : good symptom control [Wt Gain ___ Lbs] : no recent weight gain [GERD] : no gastroesophageal reflux disease [Hiatus Hernia] : no hiatus hernia [Pancreatitis] : no pancreatitis [Peptic Ulcer Disease] : no peptic ulcer disease [Cholelithiasis] : no cholelithiasis [Kidney Stone] : no kidney stone [Inflammatory Bowel Disease] : no inflammatory bowel disease [Irritable Bowel Syndrome] : no irritable bowel syndrome [Alcohol Abuse] : no alcohol abuse [Malignancy] : no malignancy [Abdominal Surgery] : no abdominal surgery [Appendectomy] : no appendectomy [Cholecystectomy] : no cholecystectomy [de-identified] : This is a 37 yo F with pmhx of SLE, RA, Myasthenia gravis, asthma presenting for new GI visit after she was diagnosed with Acute Diverticulitis when she visited [facility] ED on July 11 2022. She went to ED due to pelvic pain. Transvaginal and pelvic US were normal. CT abdomen with Oral and IV contrast revealed uncomplicated diverticulitis at the distal descending colon, proximal sigmoid colon. \par She was discharged with Cipro 500 BID, and metronidazole 500 TID for 10 days.\par Patient's pain was in the LLQ, it started on July 11 was  8/10, cramping in nature, she also had bloating. Nothing relieved the pain. Now the pain has resolved and she has no complaints. She endorsed having low grade fever, was nauseous, however did not vomit. Had diarrhea, unsure how many BM, but stool was loose, no blood. All these symptoms have resolved at the moment.   She doesn’t have reflux of acid  or black stools having normal formed bm daily   [de-identified] : Diverticulitis

## 2022-07-20 NOTE — OB HISTORY
[Definite:  ___ (Date)] : the last menstrual period was [unfilled] [Regular Cycle Intervals] : periods have been irregular

## 2022-07-20 NOTE — ASSESSMENT
[FreeTextEntry1] : Pt presented with diverticulitis, will complete her course of Ciprofloxacin and Metronidazole for total of 10 days. Will undergo colonoscopy in October. \par \par Pt planned for CPE on next available appointment.  diverticulosis high fiber diet and watch for signs of inflammation .  abd pain change in bowel movement and size and shape of stools.  A person with diverticulosis may have diverticulitis, or diverticular bleeding.\par \par Diverticulitis — Inflammation of a diverticulum (diverticulitis) occurs when there is thinning and breakdown of the diverticular wall. This may be caused by increased pressure within the colon or by hardened particles of stool, which can become lodged within the diverticulum.\par \par The symptoms of diverticulitis depend upon the degree of inflammation present. The most common symptom is pain in the left lower abdomen. Other symptoms can include nausea and vomiting, constipation, diarrhea, and urinary symptoms such as pain or burning when urinating or the frequent need to urinate.\par \par Diverticulitis is divided into simple and complicated forms.\par \par ?Simple diverticulitis, which accounts for 75 percent of cases, is not associated with complications and typically responds to medical treatment without surgery.\par \par \par ?Complicated diverticulitis occurs in 25 percent of cases and usually requires surgery. Complications associated with diverticulitis can include the following:\par \par \par •Abscess – a localized collection of pus\par \par •Fistula – an abnormal tract between two areas that are not normally connected (eg, bowel and bladder)\par \par •Obstruction – a blockage of the colon\par \par •Peritonitis – infection involving the space around the abdominal organ\par \par •Sepsis – overwhelming body-wide infection that can lead to failure of multiple organs\par \par \par Diverticular bleeding — Diverticular bleeding occurs when a small artery located within a diverticulum is eroded and bleeds into the colon.\par \par Diverticular bleeding usually causes painless bleeding from the rectum. In approximately 50 percent of cases, the person will see maroon or bright red blood with bowel movements.\par \par Is bleeding with a bowel movement normal? — It is not normal to see blood in a bowel movement; this can be a sign of several conditions, most of which are not serious (eg, hemorrhoids) but some of which are serious and require immediate treatment. Anyone who sees blood after a bowel movement should consult with their healthcare provider to determine if further testing or evaluation is neededPeople with diverticulosis who do not have symptoms do not require treatment. However, most clinicians recommend increasing fiber in the diet, which can help to bulk the stools and possibly prevent the development of new diverticula, diverticulitis, or diverticular bleeding. Fiber is not proven to prevent these conditions in all patients but may help to control recurrent episodes in some.\par \par Increase fiber — Fruits and vegetables are a good source of fiber (table 1). The fiber content of packaged foods can be calculated by reading the nutrition label (figure 2). (See "Patient education: High-fiber diet (Beyond the Basics)".)\par \par Seeds and nuts — Patients with diverticular disease have historically been advised to avoid whole pieces of fiber (such as seeds, corn, and nuts) because of concern that these foods could cause an episode of diverticulitis. However, this belief is completely unproven. We do not suggest that patients with diverticulosis avoid seeds, corn, or nuts.\par \par Diverticulitis — Treatment of diverticulitis depends upon how severe your symptoms are.\par \par Home treatment — If you have mild symptoms of diverticulitis (mild abdominal pain, usually left lower abdomen), you can be treated at home with a clear liquid diet and oral antibiotics. However, if you develop one or more of the following signs or symptoms, you should seek immediate medical attention:\par \par ?Temperature >100.1°F (38°C)\par \par ?Worsening or severe abdominal pain\par \par ?An inability to tolerate fluids\par \par \par Hospital treatment — If you have moderate to severe symptoms, you may be hospitalized for treatment. During this time, you are not allowed to eat or drink; antibiotics and fluids are given into a vein.\par \par If you develop an abscess of the colon, you may require drainage of the abscess (usually performed by placing a drainage tube across the abdominal wall) or by surgically opening the affected area.\par \par Surgery — If you develop a generalized infection in the abdomen (peritonitis), you will usually require an emergency operation. A two-part operation may be necessary in some cases.\par \par ?The first operation involves removal of the diseased colon and creation of a colostomy. A colostomy is an opening between the colon and the skin, where a bag is attached to collect waste from the intestine. The lower end of the colon is temporarily sewed closed to allow it to heal (figure 3).\par \par \par ?Approximately three to six months later, a second operation is performed to reconnect the two parts of the colon and close the opening in the skin. You are then able to empty your bowels through the rectum. Sometimes patients require up to a year to recover from the first operation, depending on how sick they were.\par \par \par In non-emergency situations, the diseased area of the colon can be removed and the two ends of the colon can be reconnected in one operation, without the need for a colostomy.\par \par Surgery versus medical therapy — An operation to remove the diseased area of the colon may be necessary if you do not improve with medical therapy. After an episode of uncomplicated diverticulitis, elective surgery is generally not required as the risk of another attack or requiring emergency surgery is low. However, patients with persistent symptoms attributable to diverticulitis, a history of complicated diverticulitis, or a compromised immune system should be evaluated for possible surgery to prevent another attack. In such patients, another attack has been associated with a higher risk of complications or death. Of course, the decision will also depend in part upon your other medical conditions and ability to undergo surgery.\par \par In many cases, an elective operation can be performed laparoscopically, using small incisions, rather than the typical vertical (up and down) abdominal incision. Laparoscopic surgery usually allows you to recover more quickly and shortens the hospital stay.\par \par After diverticulitis resolves — After an episode of diverticulitis resolves, if you have not had a recent colonoscopy, the entire length of the colon should be evaluated to determine the extent of disease and to rule out the presence of abnormal lesions such as polyps or cancer. Recommended tests include colonoscopy, barium enema and sigmoidoscopy, or CT colonography. (See "Patient education: Screening for colorectal cancer (Beyond the Basics)".)\par \par Diverticular bleeding — Most cases of diverticular bleeding resolve on their own. However, some people will need further testing or treatment to stop bleeding, which may include a colonoscopy, angiography (a treatment that blocks off the bleeding artery), bleeding scan, or surgery.\par \par \par DIVERTICULAR DISEASE PROGNOSIS\par \par Diverticulosis — Over time, diverticulosis may cause no problems or it may cause episodes of bleeding and/or diverticulitis. Approximately 15 to 25 percent of people with diverticulosis will develop diverticulitis, while 5 to 15 percent will develop diverticular bleeding.\par \par Diverticulitis — Approximately 85 percent of people with uncomplicated diverticulitis will respond to medical treatment, while approximately 15 percent of patients will need an operation. After successful treatment for a first attack of diverticulitis, one-third of patients will remain asymptomatic, one-third will have episodic cramps without diverticulitis, and one-third will go on to have a second attack of diverticulitis.\par \par The prognosis tends to remain similar following a second attack of diverticulitis. Only 10 percent of people remain symptom-free after a second attack. Subsequent attacks tend to be of similar severity, not increasing in severity as previously believed.\par   2  colonoscopy   WE discussed procedure and will return prior for blood testing and instruction.  Colon and rectal cancer screening is a way in which doctors check the colon and rectum for signs of cancer or growths (called polyps) that might become cancer. It is done in people who have no symptoms and no reason to think they have cancer. The goal is to find and remove polyps before they become cancer, or to find cancer early, before it grows, spreads, or causes problems.\par \par The colon and rectum are the last part of the digestive tract (figure 1). When doctors talk about colon and rectal cancer screening, they use the term "colorectal." That is just a shorter way of saying "colon and rectal." It's also possible to say just colon cancer screening.\par \par Studies show that having colon cancer screening lowers the chance of dying from colon cancer. There are several different types of screening test that can do this.\par \par What are the different screening tests for colon cancer? — They include:\par \par ?Colonoscopy – Colonoscopy allows the doctor to see directly inside the entire colon. Before you can have a colonoscopy, you must clean out your colon. You do this at home by drinking a special liquid that causes watery diarrhea for several hours. On the day of the test, you get medicine to help you relax. Then a doctor puts a thin tube into your anus and advances it into your colon (figure 2). The tube has a tiny camera attached to it, so the doctor can see inside your colon. The tube also has tiny tools on the end, so the doctor can remove pieces of tissue or polyps if they are there. After polyps or pieces of tissue are removed, they are sent to a lab to be checked for cancer.\par \par \par •Advantages of this test – Colonoscopy finds most small polyps and almost all large polyps and cancers. If found, polyps can be removed right away. This test gives the most accurate results. If any other screening tests are done first and come back positive, a colonoscopy will need to be done for follow-up. If you have a colonoscopy as your first test, you will probably not need a second follow-up test soon after.\par \par \par •Drawbacks to this test – Colonoscopy has some small risks. It can cause bleeding or tear the inside of the colon, but this only happens in 1 out of 1,000 people. Also, cleaning out the bowel beforehand can be unpleasant. Plus, people usually cannot work or drive for the rest of the day after the test, because of the relaxation medicine they must take during the test.\par \par \par Sigmoidoscopy – A sigmoidoscopy is similar to a colonoscopy. The difference is that this test looks only at the last part of the colon, and a colonoscopy looks at the whole colon. Before you have a sigmoidoscopy, you must clean out the lower part of your colon using an enema. This bowel cleaning is not as thorough or unpleasant as the one for colonoscopy. For this test, you do not need to take medicines to help you relax, so you can drive and work afterward if you want.\par \par \par •Advantages of this test – Sigmoidoscopy can find polyps and cancers in the rectum and the last part of the colon. If polyps are found, they can be removed right away.\par \par \par •Drawbacks to this test – In about 2 out of 10,000 people, sigmoidoscopy tears the inside of the colon. The test also can't find polyps or cancers that are in the part of the colon the test does not view (figure 3). If doctors find polyps or cancer during a sigmoidoscopy, they usually follow up with a colonoscopy.\par \par \par CT colonography (also known as virtual colonoscopy or CTC) – CTC looks for cancer and polyps using a special X-ray called a "CT scan." For most CTC tests, the preparation is the same as it is for colonoscopy.\par \par \par •Advantages of this test – CTC can find polyps and cancers in the whole colon without the need for medicines to relax.\par \par \par •Drawbacks to this test – If doctors find polyps or cancer with CTC, they usually follow up with a colonoscopy. CTC sometimes finds areas that look abnormal but that turn out to be healthy. This means that CTC can lead to tests and procedures you did not need. Plus, CTC exposes you to radiation. In most cases, the preparation needed to clean the bowel is the same as the one needed for a colonoscopy. The test is expensive, and some insurance companies might not cover this test for screening.\par \par \par Stool test for blood – "Stool" is another word for bowel movements. Stool tests most commonly check for blood in samples of stool. Cancers and polyps can bleed, and if they bleed around the time you do the stool test, then blood will show up on the test. The test can find even small amounts of blood that you can't see in your stool. Other less serious conditions can also cause small amounts of blood in the stool, and that will show up in this test. You will have to collect small samples from your bowel movements, which you will put in a special container you get from your doctor or nurse. Then you follow the instructions to mail the container out for the testing.\par \par \par •Advantages of this test – This test does not involve cleaning out the colon or having any procedures.\par \par \par •Drawbacks to this test – Stool tests are less likely to find polyps than other screening tests. These tests also often come up abnormal even in people who do not have cancer. If a stool test shows something abnormal, doctors usually follow up with a colonoscopy.\par \par \par Stool DNA test – The stool DNA test checks for genetic markers of cancer, as well as for signs of blood. For this test, you get a special kit in order to collect a whole bowel movement. Then you follow the instructions about how and where to ship it.\par \par \par •Advantages of this test – This test does not involve cleaning out the colon or having any procedures. When cancer is not present, it is less likely to be falsely abnormal than a stool test for blood. That means it leads to fewer unnecessary colonoscopies.\par \par \par •Drawbacks to this test – It might be unpleasant to collect and ship a whole bowel movement. If a DNA test shows something abnormal, doctors usually follow up with a colonoscopy.\par \par \par There is no blood test that most experts think is accurate enough to use for screening.\par \par How do I choose which test to have? — Work with your doctor or nurse to decide which test is best for you. Some doctors might choose to combine screening tests, for example, sigmoidoscopy plus stool testing for blood. Being screened–no matter how–is more important than which test you choose.\par \par Who should be screened for colon cancer? — Doctors recommend that most people begin having colon cancer screening at age 50. People who have an increased risk of getting colon cancer sometimes begin screening at a younger age. That might include people with a strong family history of colon cancer, and people with diseases of the colon called "Crohn's disease" and "ulcerative colitis."\par \par Most people can stop being screened around the age of 75, or at the latest 85.\par \par How often should I be screened? — That depends on your risk of colon cancer and which test you have. People who have a high risk of colon cancer often need to be tested more often and should have a colonoscopy.\par \par Most people are not at high risk, so they can choose one of these schedules:\par \par Colonoscopy every 10 years\par \par CT colonography (CTC) every 5 years\par \par Sigmoidoscopy every 5 to 10 years\par \par Stool testing for blood once a year\par \par Stool DNA testing every 3 years (but doctors are not yet sure of the best time frame)\par  3  ra  fu with  her rheum \par 4  methotrexate  fu  abd sonogram  watch for fibrosis of  liver.  and discussed with pt.  \par 5 asthma  Asthma is believed to be caused by inherited (genetic) and environmental factor, but its exact cause is unknown. Asthma may be triggered by allergens, lung infections, or irritants in the air. Asthma triggers are different for each person \par -Inhaler technique reviewed as well as oral hygiene techniques reviewed with patient. Avoidance of cold air, extremes of temperature, rescue inhaler should be used before exercise. Order of medication reviewed with patient. Recommended use of a cool mist humidifier in the bedroom.   well controlled \par 6.  bmi  38   Weight loss, exercise, and diet control were discussed and are highly encouraged. Treatment options were given such as, aqua therapy, and contacting a nutritionist. Recommended to use the elliptical, stationary bike, less use of treadmill. Mindful eating was explained to the patient Obesity is associated with worsening asthma, shortness of breath, and potential for cardiac disease, diabetes, and other underlying medical conditions.\par \par 7 hospital   dc  Pt is doing much better and if she develops abd pain  go to er or fever      we discussed antibiotic therapy and risks   take probiotic  number of factors can increase a person's risk of becoming infected with C. difficile:\par \par ?Current or recent antibiotic use – Certain antibiotics increase the risk of becoming infected with C. difficile more than others (table 1).\par \par \par ?Current or recent hospitalization – Up to 20 percent of people who are hospitalized and up to 50 percent of people in long-term care facilities (eg, nursing homes) carry C. difficile in their feces, but many do not have diarrhea or other symptoms. Exposure to these carriers significantly increases a person's risk of becoming infected.\par \par \par ?Older age – The risk of becoming infected with C. difficile is 10 times greater in people who are 65 years or older.\par \par \par ?Severe illness – People who have a weakened immune system as a result of an underlying medical condition or a treatment (eg, chemotherapy) are at increased risk of becoming infected with C. difficile, especially during a hospital stay.\par \par \par ?Recent infection with C. difficile – People who have been recently infected with C. difficile and treated have an increased risk of becoming infected again soon after stopping the treatment.\par \par \par ?Inflammatory bowel disease (ulcerative colitis or Crohn's disease with colitis) – People with colitis from inflammatory bowel disease have an increased risk of developing C. difficile infection. In this circumstance, C. difficile infection may develop in the absence of prior antibiotic treatment.\par \par \par \par C. DIFFICILE SYMPTOMS\par Symptoms of C. difficile may begin during antibiotic therapy or 5 to 10 days after the antibiotic is stopped; less commonly, symptoms do not develop until as late as 10 weeks later.\par \par The symptoms of C. difficile infection can vary in severity:\par \par ?Some people (called carriers) shed the bacteria in their feces but have no signs or symptoms of the infection and can spread the infection to others.\par \par \par ?The most common symptoms include diarrhea (three or more unformed bowel movements per day, or diarrhea associated with abdominal cramping).\par \par \par ?In more severe cases, patients may develop profuse watery diarrhea (up to 10 to 15 times per day), blood or pus in the stool, dehydration, abdominal tenderness and cramping, fever, nausea, loss of appetite, and weight loss. Anyone who develops one or more of these symptoms should seek medical care as soon as possible.\par \par \par ?Life-threatening complications of C. difficile infection develop in a small number of people. Signs and symptoms of severe infection may include abdominal distension, severe abdominal pain, fever (greater than 101ºF, or 38.3ºC), and profuse diarrhea. In rare cases, the bowels can rupture, potentially leading to a body-wide infection (sepsis), organ failure, or even death.\par \par \par \par C. DIFFICILE DIAGNOSIS\par The diagnosis of C. difficile is based upon laboratory analysis of a stool sample to identify toxin-producing C. difficile. (See "Clostridioides (formerly Clostridium) difficile infection in adults: Clinical manifestations and diagnosis".)\par \par \par C. DIFFICILE TREATMENT\par The most important step in treatment of C. difficile is to stop the antibiotic that allowed the infection to develop. If an antibiotic is necessary to treat an ongoing infection, the health care provider may choose an antibiotic that is less likely to allow further growth of C. difficile, when possible.\par \par Antibiotic treatment — Usually an oral antibiotic (most often vancomycin or fidaxomicin) is used to treat people who are infected with C. difficile. It is important to take each dose of the antibiotic on time and to finish the entire course of treatment (usually 10 to 14 days).\par \par Treatment of severe disease — People who become severely ill as a result of C. difficile are treated in the hospital with both oral and intravenous antibiotic and intravenous fluids. The person is monitored closely for signs of worsening disease. (See "Clostridioides (formerly Clostridium) difficile infection in adults: Treatment and prevention".)\par \par Surgery — If a person fails to improve with antibiotics and supportive care and the infection worsens, surgery (removal of the colon) may be warranted; this is generally limited to people with life-threatening illness.\par \par Supportive treatments for diarrhea — Diarrhea can cause a person to become dehydrated quickly, especially if it is severe. To avoid becoming dehydrated, several strategies are recommended.\par \par Drink adequate fluids — It is important to drink an adequate amount of fluids to counteract the loss of fluids from diarrhea. The fluids should contain water, salt, and sugar. The fluids used for sweat replacement (eg, Gatorade) are not optimal, although they may be sufficient for an adult with diarrhea who is not dehydrated and is otherwise healthy. Diluted fruit juices and flavored soft drinks along with saltine crackers and broths or soups may also be acceptable. Fluid replacement in children should be handled differently. (See "Patient education: Acute diarrhea in children (Beyond the Basics)".)\par \par One way to  hydration is by observing the color of the urine and how frequently the person urinates. Normally, urine should be light yellow to nearly colorless. A person who is well hydrated normally passes urine every three to five hours. A person who urinates infrequently or has urine that is dark yellow should drink more fluids.\par \par If a person becomes dehydrated and is unable to take fluids by mouth, a rehydration solution can be given into a vein (intravenous fluids) in a health care provider's office or in the emergency department.\par \par Diet — There is no particular food or group of foods that is best for a person with diarrhea. However, adequate nutrition is important during an episode of acute diarrhea. For patients without an appetite, it is acceptable to consume only liquids for a short period of time. Boiled starches and cereals (eg, potatoes, noodles, rice, wheat, and oats) with salt are recommended for people with watery diarrhea; crackers, bananas, soup, and boiled vegetables may also be eaten. It is advisable to avoid fresh fruits and vegetables and milk products until the diarrhea is gone.\par \par \par C. DIFFICILE PREVENTION\par It is uncommon for people who are not taking antibiotics to become infected with C. difficile. However, it is still important to avoid spreading the bacteria. A person can spread the bacteria for as long as the diarrhea continues.\par \par Hand washing — Hand washing is an effective way to prevent the spread of C. difficile. Hands should be washed after using the bathroom and before eating. Hands should ideally be wet with water and plain or antibacterial soap and rubbed together for 15 to 30 seconds. Special attention should be paid to the fingernails, between the fingers, and the wrists. Hands should be rinsed thoroughly and dried with a single-use towel. Patients and family members are encouraged to remind health care providers to wash their hands as well.\par \par Alcohol-based hand rubs may be less effective against C. difficile; using soap and running water is recommended if there is an outbreak of C. difficile infection.\par \par Fingernails that are artificial (eg, acrylic) are impossible to clean adequately, even after vigorous scrubbing or use of an antimicrobial soap. For this reason, health care workers are not allowed to wear artificial nails.\par \par Contact precautions — People who are hospitalized with C. difficile are placed on contact precautions, which mean that anyone who enters the patient's room must wash their hands before entering and after leaving. The person must also wear a clean gown (over their clothes) and clean gloves. These measures can help to prevent the spread of infection to other people in the hospital. Contact precautions and gowns and gloves are not recommended after patients are discharged. Person-to-person spread to family, work, or social contacts is very rare. Handwashing with soap and water after using the bathroom is recommended for all patients being treated for C. difficile. It is not necessary for a person who is being treated for C. difficile infection at home to avoid\par \par \par

## 2022-07-20 NOTE — PHYSICAL EXAM
[General Appearance - Alert] : alert [General Appearance - In No Acute Distress] : in no acute distress [Outer Ear] : the ears and nose were normal in appearance [Oropharynx] : the oropharynx was normal [Neck Appearance] : the appearance of the neck was normal [Neck Cervical Mass (___cm)] : no neck mass was observed [Jugular Venous Distention Increased] : there was no jugular-venous distention [Thyroid Diffuse Enlargement] : the thyroid was not enlarged [Thyroid Nodule] : there were no palpable thyroid nodules [Auscultation Breath Sounds / Voice Sounds] : lungs were clear to auscultation bilaterally [Heart Rate And Rhythm] : heart rate was normal and rhythm regular [Heart Sounds] : normal S1 and S2 [Heart Sounds Gallop] : no gallops [Murmurs] : no murmurs [Heart Sounds Pericardial Friction Rub] : no pericardial rub [Full Pulse] : the pedal pulses are present [Edema] : there was no peripheral edema [Bowel Sounds] : normal bowel sounds [Abdomen Soft] : soft [Abdomen Tenderness] : non-tender [Abdomen Mass (___ Cm)] : no abdominal mass palpated [No CVA Tenderness] : no ~M costovertebral angle tenderness [No Spinal Tenderness] : no spinal tenderness [Abnormal Walk] : normal gait [Nail Clubbing] : no clubbing  or cyanosis of the fingernails [Musculoskeletal - Swelling] : no joint swelling seen [Motor Tone] : muscle strength and tone were normal [Skin Color & Pigmentation] : normal skin color and pigmentation [Skin Turgor] : normal skin turgor [] : no rash [FreeTextEntry1] : Joint pain in knees and toes  [Deep Tendon Reflexes (DTR)] : deep tendon reflexes were 2+ and symmetric [Sensation] : the sensory exam was normal to light touch and pinprick [No Focal Deficits] : no focal deficits [Oriented To Time, Place, And Person] : oriented to person, place, and time [Impaired Insight] : insight and judgment were intact [Affect] : the affect was normal

## 2022-07-25 LAB — CYTOLOGY CVX/VAG DOC THIN PREP: NORMAL

## 2022-07-29 ENCOUNTER — APPOINTMENT (OUTPATIENT)
Dept: RHEUMATOLOGY | Facility: CLINIC | Age: 37
End: 2022-07-29

## 2022-07-29 VITALS
SYSTOLIC BLOOD PRESSURE: 114 MMHG | HEART RATE: 91 BPM | RESPIRATION RATE: 16 BRPM | WEIGHT: 195 LBS | OXYGEN SATURATION: 98 % | DIASTOLIC BLOOD PRESSURE: 77 MMHG | HEIGHT: 59 IN | TEMPERATURE: 98.3 F | BODY MASS INDEX: 39.31 KG/M2

## 2022-07-29 PROCEDURE — 99214 OFFICE O/P EST MOD 30 MIN: CPT

## 2022-07-29 NOTE — HISTORY OF PRESENT ILLNESS
[FreeTextEntry1] : s/p rituximab - pain is improving - no change in medications\par \par recently diagnosed with diverticulitis -  - treated with antibiotics\par on high fiber diet\par no other complains

## 2022-07-29 NOTE — PHYSICAL EXAM
[General Appearance - Alert] : alert [General Appearance - In No Acute Distress] : in no acute distress [Outer Ear] : the ears and nose were normal in appearance [Oropharynx] : the oropharynx was normal [Neck Appearance] : the appearance of the neck was normal [Neck Cervical Mass (___cm)] : no neck mass was observed [Jugular Venous Distention Increased] : there was no jugular-venous distention [Thyroid Diffuse Enlargement] : the thyroid was not enlarged [Thyroid Nodule] : there were no palpable thyroid nodules [Auscultation Breath Sounds / Voice Sounds] : lungs were clear to auscultation bilaterally [Heart Rate And Rhythm] : heart rate was normal and rhythm regular [Heart Sounds] : normal S1 and S2 [Heart Sounds Gallop] : no gallops [Murmurs] : no murmurs [Heart Sounds Pericardial Friction Rub] : no pericardial rub [Full Pulse] : the pedal pulses are present [Edema] : there was no peripheral edema [Bowel Sounds] : normal bowel sounds [Abdomen Soft] : soft [Abdomen Tenderness] : non-tender [Abdomen Mass (___ Cm)] : no abdominal mass palpated [Cervical Lymph Nodes Enlarged Posterior Bilaterally] : posterior cervical [Cervical Lymph Nodes Enlarged Anterior Bilaterally] : anterior cervical [Supraclavicular Lymph Nodes Enlarged Bilaterally] : supraclavicular [No CVA Tenderness] : no ~M costovertebral angle tenderness [No Spinal Tenderness] : no spinal tenderness [Abnormal Walk] : normal gait [Nail Clubbing] : no clubbing  or cyanosis of the fingernails [Involuntary Movements] : no involuntary movements were seen [Musculoskeletal - Swelling] : no joint swelling seen [Motor Tone] : muscle strength and tone were normal [Skin Color & Pigmentation] : normal skin color and pigmentation [Skin Turgor] : normal skin turgor [] : no rash [Oriented To Time, Place, And Person] : oriented to person, place, and time [Impaired Insight] : insight and judgment were intact [Affect] : the affect was normal [FreeTextEntry1] : all joints with full ROM - no active synovitis

## 2022-07-29 NOTE — ASSESSMENT
[FreeTextEntry1] : 32 year-old female with pmh of ALMA ROSA, now with return of her symptoms with polyarthritis, highly positive DEBORAH\par \par 1. RA  with vasculitis - methotrexate 5 tablets -   s/p 3 cycles of rituxan - on methotrexate 6 tablets  covid 19 X 2 - despite being vaccinated - with worsening joint pain and stiffness  fully vaccinated  -s/p rituximab - pain is improving\par 2. chronic back pain - ongoing work-up by PMR x-rays with degenerative changes and mild scoliosis -had nerve block - no pain at this time. \par 3. chronic pain - on medical marijuana - improved - has f/u with pain management\par 4. muscle cramps over the right leg - not active\par 5. PE - likely related to tubal ligation (5 days prior) - chronic eliquis - given hx of clotting in her family\par 6. Right leg paresthesias - EMG done yesterday - mild sensory axonal loss\par 7. Right shoulder pain -with decreased ROM over 6 months worsening - with loss of ROM - could not complete PT due to  pain - new referral given -MRI not done - missed appt -  pain is not severe at this time\par 8.Loss of hearing -under the care of ENT\par 9. diverticulitis - under he care of GI\par \par \par I reviewed previous labs results with patients.\par Laboratory tests ordered today\par Diagnosis and Prognosis discussed\par Continue with current medications\par medications refilled\par education provided on\par F/u 3 months\par

## 2022-08-01 ENCOUNTER — APPOINTMENT (OUTPATIENT)
Dept: CARDIOLOGY | Facility: CLINIC | Age: 37
End: 2022-08-01

## 2022-08-02 ENCOUNTER — TRANSCRIPTION ENCOUNTER (OUTPATIENT)
Age: 37
End: 2022-08-02

## 2022-08-02 LAB
BASOPHILS # BLD AUTO: 0.07 K/UL
BASOPHILS NFR BLD AUTO: 0.8 %
CRP SERPL-MCNC: 10 MG/L
EOSINOPHIL # BLD AUTO: 0.53 K/UL
EOSINOPHIL NFR BLD AUTO: 6 %
ERYTHROCYTE [SEDIMENTATION RATE] IN BLOOD BY WESTERGREN METHOD: 25 MM/HR
HCT VFR BLD CALC: 40.1 %
HGB BLD-MCNC: 12.5 G/DL
IMM GRANULOCYTES NFR BLD AUTO: 0.5 %
LYMPHOCYTES # BLD AUTO: 1.55 K/UL
LYMPHOCYTES NFR BLD AUTO: 17.5 %
MAN DIFF?: NORMAL
MCHC RBC-ENTMCNC: 27.8 PG
MCHC RBC-ENTMCNC: 31.2 GM/DL
MCV RBC AUTO: 89.1 FL
MONOCYTES # BLD AUTO: 0.52 K/UL
MONOCYTES NFR BLD AUTO: 5.9 %
NEUTROPHILS # BLD AUTO: 6.15 K/UL
NEUTROPHILS NFR BLD AUTO: 69.3 %
PLATELET # BLD AUTO: 293 K/UL
RBC # BLD: 4.5 M/UL
RBC # FLD: 15.2 %
WBC # FLD AUTO: 8.86 K/UL

## 2022-08-14 NOTE — CONSULT LETTER
[Dear  ___] : Dear  [unfilled], [Consult Letter:] : I had the pleasure of evaluating your patient, [unfilled]. [Please see my note below.] : Please see my note below. [Consult Closing:] : Thank you very much for allowing me to participate in the care of this patient.  If you have any questions, please do not hesitate to contact me. [Sincerely,] : Sincerely, [FreeTextEntry3] : Ashok Davison MD, PhD\par Chief, Division of Laryngology\par Department of Otolaryngology\par [facility]\par Pediatric Otolaryngology, [facility]\par  of Otolaryngology\par [facility] [employer] [facility]\par \par \par

## 2022-08-14 NOTE — PHYSICAL EXAM
[Midline] : trachea located in midline position [Laryngoscopy Performed] : laryngoscopy was performed, see procedure section for findings [Normal] : no rashes [de-identified] : mildly raspy and breathy

## 2022-08-14 NOTE — HISTORY OF PRESENT ILLNESS
[de-identified] : 36 year old female presents for follow up for right side hearing loss. History of RA, PE, improved dysphagia and dysphonia. States has no improvement in right side hearing loss, is more frequently dizzy now, and headaches. Denies denies otalgia, otorrhea, ear infections, tinnitus, vertigo, dysphonia, dysphagia or odynophagia. Currently using Astelin and Flonase daily. Last audio 2/9/22. Minimal improvement with sprays. \par Smell has improved, taste still off\par

## 2022-08-14 NOTE — DATA REVIEWED
[de-identified] : Audiogram ordered to assess for sensorineural +/- conductive hearing loss\par Results: normal tymps AU, negative ETF AU\par

## 2022-09-06 ENCOUNTER — APPOINTMENT (OUTPATIENT)
Dept: INTERNAL MEDICINE | Facility: CLINIC | Age: 37
End: 2022-09-06

## 2022-09-19 ENCOUNTER — APPOINTMENT (OUTPATIENT)
Dept: NEUROLOGY | Facility: CLINIC | Age: 37
End: 2022-09-19

## 2022-09-21 ENCOUNTER — APPOINTMENT (OUTPATIENT)
Dept: INTERNAL MEDICINE | Facility: CLINIC | Age: 37
End: 2022-09-21

## 2022-09-22 ENCOUNTER — NON-APPOINTMENT (OUTPATIENT)
Age: 37
End: 2022-09-22

## 2022-09-30 ENCOUNTER — APPOINTMENT (OUTPATIENT)
Dept: RHEUMATOLOGY | Facility: CLINIC | Age: 37
End: 2022-09-30

## 2022-09-30 VITALS
WEIGHT: 190 LBS | HEIGHT: 59 IN | SYSTOLIC BLOOD PRESSURE: 117 MMHG | OXYGEN SATURATION: 99 % | RESPIRATION RATE: 16 BRPM | BODY MASS INDEX: 38.3 KG/M2 | DIASTOLIC BLOOD PRESSURE: 83 MMHG | HEART RATE: 67 BPM | TEMPERATURE: 98.5 F

## 2022-09-30 DIAGNOSIS — M54.16 RADICULOPATHY, LUMBAR REGION: ICD-10-CM

## 2022-09-30 PROCEDURE — 99214 OFFICE O/P EST MOD 30 MIN: CPT

## 2022-09-30 NOTE — HISTORY OF PRESENT ILLNESS
[___ Month(s) Ago] : [unfilled] month(s) ago [Joint Swelling] : joint swelling [Morning Stiffness] : morning stiffness [Joint Pain] : joint pain [FreeTextEntry1] : rituximab infusion in 07/2022\par but has ongoing pain over the knees and the feet\par still riding her motorcycle\par denies any falls\par hand upper extremities are okay [TextBox_2] : 2017 [TextBox_40] : methotrexate [TextBox_44] : rituximab

## 2022-09-30 NOTE — ASSESSMENT
[FreeTextEntry1] : 32 year-old female with pmh of ALMA ROSA, now with return of her symptoms with polyarthritis, highly positive DEBORAH\par \par 1. RA  with vasculitis - methotrexate 5 tablets -   s/p 3 cycles of rituxan - on methotrexate 6 tablets  covid 19 X 2 - despite being vaccinated -s/p rituximab - pain has resolved after infusion but ongoing knee and ankle pain - likely related to riding her motorcycle - continue with methotrexte for now\par 2. chronic back pain - ongoing work-up by PMR x-rays with degenerative changes and mild scoliosis -had nerve block - no pain at this time.  - continue with current regimen\par 3. chronic pain - on medical marijuana - stable\par 4. muscle cramps over the right leg - not active\par 5. PE - likely related to tubal ligation (5 days prior) - chronic eliquis - given hx of clotting in her family\par 6. Right leg paresthesias - EMG done yesterday - mild sensory axonal loss\par 7. Right shoulder pain -with decreased ROM over 6 months worsening - with loss of ROM - could not complete PT due to  pain - new referral given -MRI not done - missed appt -  pain is not severe at this time\par 8.Loss of hearing -under the care of ENT\par 9. diverticulitis - under he care of GI - no flares\par 10. bilateral knee and ankle pain - mechanical can take  tylenol as needed -  cannot take nsaids due to being on eliquis\par \par \par I reviewed previous labs results with patients.\par Laboratory tests ordered today\par Diagnosis and Prognosis discussed\par Continue with current medications\par medications refilled\par education provided on\par F/u 3 months\par

## 2022-09-30 NOTE — PHYSICAL EXAM
[General Appearance - Alert] : alert [General Appearance - In No Acute Distress] : in no acute distress [Outer Ear] : the ears and nose were normal in appearance [Oropharynx] : the oropharynx was normal [Neck Appearance] : the appearance of the neck was normal [Neck Cervical Mass (___cm)] : no neck mass was observed [Jugular Venous Distention Increased] : there was no jugular-venous distention [Thyroid Diffuse Enlargement] : the thyroid was not enlarged [Thyroid Nodule] : there were no palpable thyroid nodules [Auscultation Breath Sounds / Voice Sounds] : lungs were clear to auscultation bilaterally [Heart Rate And Rhythm] : heart rate was normal and rhythm regular [Heart Sounds] : normal S1 and S2 [Heart Sounds Gallop] : no gallops [Murmurs] : no murmurs [Heart Sounds Pericardial Friction Rub] : no pericardial rub [Full Pulse] : the pedal pulses are present [Edema] : there was no peripheral edema [Bowel Sounds] : normal bowel sounds [Abdomen Soft] : soft [Abdomen Tenderness] : non-tender [Abdomen Mass (___ Cm)] : no abdominal mass palpated [Cervical Lymph Nodes Enlarged Posterior Bilaterally] : posterior cervical [Cervical Lymph Nodes Enlarged Anterior Bilaterally] : anterior cervical [Supraclavicular Lymph Nodes Enlarged Bilaterally] : supraclavicular [No CVA Tenderness] : no ~M costovertebral angle tenderness [No Spinal Tenderness] : no spinal tenderness [Abnormal Walk] : normal gait [Nail Clubbing] : no clubbing  or cyanosis of the fingernails [Involuntary Movements] : no involuntary movements were seen [Musculoskeletal - Swelling] : no joint swelling seen [Motor Tone] : muscle strength and tone were normal [Skin Color & Pigmentation] : normal skin color and pigmentation [Skin Turgor] : normal skin turgor [] : no rash [Oriented To Time, Place, And Person] : oriented to person, place, and time [Impaired Insight] : insight and judgment were intact [Affect] : the affect was normal [FreeTextEntry1] : all joints with full ROM - no active synovitis - tenderness over the knees - lateral aspects ankles and toes

## 2022-10-02 PROBLEM — M54.16 LUMBAR RADICULOPATHY: Status: ACTIVE | Noted: 2020-11-20

## 2022-10-04 ENCOUNTER — TRANSCRIPTION ENCOUNTER (OUTPATIENT)
Age: 37
End: 2022-10-04

## 2022-10-04 LAB
ALBUMIN SERPL ELPH-MCNC: 4.2 G/DL
ALP BLD-CCNC: 173 U/L
ALT SERPL-CCNC: 12 U/L
ANION GAP SERPL CALC-SCNC: 11 MMOL/L
AST SERPL-CCNC: 21 U/L
BASOPHILS # BLD AUTO: 0.05 K/UL
BASOPHILS NFR BLD AUTO: 0.5 %
BILIRUB SERPL-MCNC: 0.3 MG/DL
BUN SERPL-MCNC: 7 MG/DL
CALCIUM SERPL-MCNC: 9.1 MG/DL
CHLORIDE SERPL-SCNC: 104 MMOL/L
CO2 SERPL-SCNC: 27 MMOL/L
CREAT SERPL-MCNC: 0.58 MG/DL
CRP SERPL-MCNC: 22 MG/L
DEPRECATED KAPPA LC FREE/LAMBDA SER: 1.11 RATIO
EGFR: 119 ML/MIN/1.73M2
EOSINOPHIL # BLD AUTO: 0.58 K/UL
EOSINOPHIL NFR BLD AUTO: 5.6 %
ERYTHROCYTE [SEDIMENTATION RATE] IN BLOOD BY WESTERGREN METHOD: 45 MM/HR
GLUCOSE SERPL-MCNC: 85 MG/DL
HCT VFR BLD CALC: 39.3 %
HGB BLD-MCNC: 12.5 G/DL
IGA SER QL IEP: 112 MG/DL
IGG SER QL IEP: 951 MG/DL
IGM SER QL IEP: 51 MG/DL
IMM GRANULOCYTES NFR BLD AUTO: 0.4 %
KAPPA LC CSF-MCNC: 1.56 MG/DL
KAPPA LC SERPL-MCNC: 1.73 MG/DL
LYMPHOCYTES # BLD AUTO: 2.37 K/UL
LYMPHOCYTES NFR BLD AUTO: 22.9 %
MAN DIFF?: NORMAL
MCHC RBC-ENTMCNC: 27.7 PG
MCHC RBC-ENTMCNC: 31.8 GM/DL
MCV RBC AUTO: 87.1 FL
MONOCYTES # BLD AUTO: 0.44 K/UL
MONOCYTES NFR BLD AUTO: 4.2 %
NEUTROPHILS # BLD AUTO: 6.88 K/UL
NEUTROPHILS NFR BLD AUTO: 66.4 %
PLATELET # BLD AUTO: 268 K/UL
POTASSIUM SERPL-SCNC: 4.5 MMOL/L
PROT SERPL-MCNC: 6.9 G/DL
RBC # BLD: 4.51 M/UL
RBC # FLD: 13.2 %
SODIUM SERPL-SCNC: 141 MMOL/L
WBC # FLD AUTO: 10.36 K/UL

## 2022-10-05 ENCOUNTER — TRANSCRIPTION ENCOUNTER (OUTPATIENT)
Age: 37
End: 2022-10-05

## 2022-10-06 ENCOUNTER — TRANSCRIPTION ENCOUNTER (OUTPATIENT)
Age: 37
End: 2022-10-06

## 2022-10-27 ENCOUNTER — TRANSCRIPTION ENCOUNTER (OUTPATIENT)
Age: 37
End: 2022-10-27

## 2022-10-28 ENCOUNTER — TRANSCRIPTION ENCOUNTER (OUTPATIENT)
Age: 37
End: 2022-10-28

## 2022-11-10 ENCOUNTER — APPOINTMENT (OUTPATIENT)
Dept: PULMONOLOGY | Facility: CLINIC | Age: 37
End: 2022-11-10

## 2022-11-18 ENCOUNTER — APPOINTMENT (OUTPATIENT)
Dept: MRI IMAGING | Facility: CLINIC | Age: 37
End: 2022-11-18

## 2022-11-18 ENCOUNTER — OUTPATIENT (OUTPATIENT)
Dept: OUTPATIENT SERVICES | Facility: HOSPITAL | Age: 37
LOS: 1 days | End: 2022-11-18
Payer: MEDICAID

## 2022-11-18 DIAGNOSIS — Z98.51 TUBAL LIGATION STATUS: Chronic | ICD-10-CM

## 2022-11-18 DIAGNOSIS — M72.2 PLANTAR FASCIAL FIBROMATOSIS: Chronic | ICD-10-CM

## 2022-11-18 DIAGNOSIS — M06.9 RHEUMATOID ARTHRITIS, UNSPECIFIED: ICD-10-CM

## 2022-11-18 DIAGNOSIS — Z98.891 HISTORY OF UTERINE SCAR FROM PREVIOUS SURGERY: Chronic | ICD-10-CM

## 2022-11-18 PROCEDURE — 70553 MRI BRAIN STEM W/O & W/DYE: CPT

## 2022-11-18 PROCEDURE — A9585: CPT

## 2022-11-18 PROCEDURE — 70553 MRI BRAIN STEM W/O & W/DYE: CPT | Mod: 26

## 2022-11-21 ENCOUNTER — APPOINTMENT (OUTPATIENT)
Dept: CARDIOLOGY | Facility: CLINIC | Age: 37
End: 2022-11-21

## 2022-11-21 ENCOUNTER — NON-APPOINTMENT (OUTPATIENT)
Age: 37
End: 2022-11-21

## 2022-11-21 VITALS
HEART RATE: 98 BPM | WEIGHT: 190 LBS | SYSTOLIC BLOOD PRESSURE: 118 MMHG | OXYGEN SATURATION: 100 % | BODY MASS INDEX: 38.3 KG/M2 | TEMPERATURE: 94.3 F | HEIGHT: 59 IN | DIASTOLIC BLOOD PRESSURE: 84 MMHG

## 2022-11-21 DIAGNOSIS — M94.262 CHONDROMALACIA, LEFT KNEE: ICD-10-CM

## 2022-11-21 PROCEDURE — 99214 OFFICE O/P EST MOD 30 MIN: CPT | Mod: 25

## 2022-11-21 PROCEDURE — 93000 ELECTROCARDIOGRAM COMPLETE: CPT

## 2022-11-21 NOTE — REASON FOR VISIT
[FreeTextEntry1] : Dear Dr. Colon:\par \par I had the pleasure of re-evaluating Ms. Woody for follow-up cardiovascular/vascular medicine consultation.  I last saw her in the office in November 2021.\par \par As you know, Ms. Woody is a 36 yo woman who has a significant FH of CAD/CVA:\par Father, 70 \par --Multiple MIs x2-3 s/p CABG\par --Several CVAs \par \par Brother, mid 30s\par --April 2021 : MI  --> PCI to LAD\par --Brother told her that he has either a Protein C or S deficiency --> She is unclear which of the two blood clotting disorder he has. The brother is also on warfarin.\par \par She also has an extensive history of RA on methotrexate and Rituxan and prior unprovoked (?) PE on low-dose extended a/c with Eliquis 2.5 BID.  She does not currently complain of having chest pain or palpitations.\par \par My review of his 12-lead ECG notes SR, normal axis, normal intervals.  Physical examination was unremarkable. Minimal ankle edema. She appears euvolemic on exam.\par \par She is concerned about her overall atherosclerotic CAD risk.  We did not test for her risk as she just had a VTE event in the past.  \par \par At this time, will recommend:\par 1. Carotid artery US to help assess systemic atherosclerotic plaque burden.\par 2. Coronary CT calcium score test to help assess overall CAD risk.\par 3. F/U after testing to review results and updated recommendations.\par \par Arik, thank you for entrusting her care with me. I will be in touch.\par \par Warmest regards,\par Adalberto Antonio

## 2022-11-21 NOTE — PHYSICAL EXAM
[General Appearance - Well Developed] : well developed [Normal Appearance] : normal appearance [Well Groomed] : well groomed [General Appearance - Well Nourished] : well nourished [No Deformities] : no deformities [General Appearance - In No Acute Distress] : no acute distress [Normal Conjunctiva] : the conjunctiva exhibited no abnormalities [Eyelids - No Xanthelasma] : the eyelids demonstrated no xanthelasmas [Normal Oral Mucosa] : normal oral mucosa [No Oral Pallor] : no oral pallor [No Oral Cyanosis] : no oral cyanosis [Normal Jugular Venous A Waves Present] : normal jugular venous A waves present [Normal Jugular Venous V Waves Present] : normal jugular venous V waves present [No Jugular Venous Molina A Waves] : no jugular venous molina A waves [Respiration, Rhythm And Depth] : normal respiratory rhythm and effort [Exaggerated Use Of Accessory Muscles For Inspiration] : no accessory muscle use [Auscultation Breath Sounds / Voice Sounds] : lungs were clear to auscultation bilaterally [Heart Rate And Rhythm] : heart rate and rhythm were normal [Heart Sounds] : normal S1 and S2 [Murmurs] : no murmurs present [Abdomen Soft] : soft [Abdomen Tenderness] : non-tender [Abdomen Mass (___ Cm)] : no abdominal mass palpated [Abnormal Walk] : normal gait [Gait - Sufficient For Exercise Testing] : the gait was sufficient for exercise testing [Nail Clubbing] : no clubbing of the fingernails [Cyanosis, Localized] : no localized cyanosis [Petechial Hemorrhages (___cm)] : no petechial hemorrhages [Skin Color & Pigmentation] : normal skin color and pigmentation [] : no rash [No Venous Stasis] : no venous stasis [Skin Lesions] : no skin lesions [No Skin Ulcers] : no skin ulcer [No Xanthoma] : no  xanthoma was observed [Oriented To Time, Place, And Person] : oriented to person, place, and time [Affect] : the affect was normal [Mood] : the mood was normal [No Anxiety] : not feeling anxious

## 2022-11-23 ENCOUNTER — APPOINTMENT (OUTPATIENT)
Dept: OBGYN | Facility: CLINIC | Age: 37
End: 2022-11-23

## 2022-11-23 VITALS
WEIGHT: 190 LBS | HEART RATE: 87 BPM | BODY MASS INDEX: 38.38 KG/M2 | OXYGEN SATURATION: 98 % | SYSTOLIC BLOOD PRESSURE: 126 MMHG | DIASTOLIC BLOOD PRESSURE: 84 MMHG

## 2022-11-23 PROCEDURE — 99212 OFFICE O/P EST SF 10 MIN: CPT

## 2022-11-23 RX ORDER — CIPROFLOXACIN HYDROCHLORIDE 500 MG/1
500 TABLET, FILM COATED ORAL
Qty: 20 | Refills: 0 | Status: COMPLETED | COMMUNITY
Start: 2022-07-14 | End: 2022-11-23

## 2022-11-23 RX ORDER — AZITHROMYCIN 250 MG/1
250 TABLET, FILM COATED ORAL
Qty: 6 | Refills: 0 | Status: COMPLETED | COMMUNITY
Start: 2022-09-23

## 2022-11-23 RX ORDER — PREDNISONE 20 MG/1
20 TABLET ORAL
Qty: 10 | Refills: 0 | Status: COMPLETED | COMMUNITY
Start: 2022-10-06 | End: 2022-11-23

## 2022-11-23 RX ORDER — METRONIDAZOLE 500 MG/1
500 TABLET ORAL
Qty: 63 | Refills: 0 | Status: COMPLETED | COMMUNITY
Start: 2022-07-14 | End: 2022-11-23

## 2022-11-23 NOTE — HISTORY OF PRESENT ILLNESS
[FreeTextEntry1] : Patient presents with complaint of irregular bleeding.  Patient has history of very heavy bleeding, followed by no menses for period of time.  Discussed checking hormones and sono.  Patient also having hot flashes and problems sleeping.  Previously have talked about perimenopausal symptoms.  Discussed endometrial biopsy vs D&C for irreg menses also.

## 2022-11-28 ENCOUNTER — LABORATORY RESULT (OUTPATIENT)
Age: 37
End: 2022-11-28

## 2022-12-05 ENCOUNTER — TRANSCRIPTION ENCOUNTER (OUTPATIENT)
Age: 37
End: 2022-12-05

## 2022-12-07 ENCOUNTER — TRANSCRIPTION ENCOUNTER (OUTPATIENT)
Age: 37
End: 2022-12-07

## 2022-12-13 ENCOUNTER — APPOINTMENT (OUTPATIENT)
Dept: OBGYN | Facility: CLINIC | Age: 37
End: 2022-12-13

## 2022-12-19 ENCOUNTER — APPOINTMENT (OUTPATIENT)
Dept: OBGYN | Facility: CLINIC | Age: 37
End: 2022-12-19

## 2022-12-19 ENCOUNTER — ASOB RESULT (OUTPATIENT)
Age: 37
End: 2022-12-19

## 2022-12-19 PROCEDURE — 76830 TRANSVAGINAL US NON-OB: CPT

## 2022-12-22 ENCOUNTER — APPOINTMENT (OUTPATIENT)
Dept: OBGYN | Facility: CLINIC | Age: 37
End: 2022-12-22
Payer: MEDICAID

## 2022-12-22 DIAGNOSIS — N97.0 FEMALE INFERTILITY ASSOCIATED WITH ANOVULATION: ICD-10-CM

## 2022-12-22 DIAGNOSIS — N91.2 AMENORRHEA, UNSPECIFIED: ICD-10-CM

## 2022-12-22 PROCEDURE — 99213 OFFICE O/P EST LOW 20 MIN: CPT | Mod: 95

## 2023-01-06 ENCOUNTER — APPOINTMENT (OUTPATIENT)
Dept: RHEUMATOLOGY | Facility: CLINIC | Age: 38
End: 2023-01-06
Payer: MEDICAID

## 2023-01-06 VITALS
TEMPERATURE: 98.9 F | HEART RATE: 90 BPM | DIASTOLIC BLOOD PRESSURE: 85 MMHG | WEIGHT: 195 LBS | OXYGEN SATURATION: 98 % | BODY MASS INDEX: 39.31 KG/M2 | SYSTOLIC BLOOD PRESSURE: 136 MMHG | HEIGHT: 59 IN | RESPIRATION RATE: 16 BRPM

## 2023-01-06 PROCEDURE — 99214 OFFICE O/P EST MOD 30 MIN: CPT

## 2023-01-06 NOTE — HISTORY OF PRESENT ILLNESS
[___ Month(s) Ago] : [unfilled] month(s) ago [Joint Swelling] : joint swelling [Morning Stiffness] : morning stiffness [Joint Pain] : joint pain [FreeTextEntry1] : joint pain is stable at baseline 4/10 - \par elbows and knees are worse\par ongoing stress at the house [TextBox_2] : 2017 [TextBox_40] : methotrexate [TextBox_44] : rituximab

## 2023-01-06 NOTE — PHYSICAL EXAM
[General Appearance - Alert] : alert [General Appearance - In No Acute Distress] : in no acute distress [Outer Ear] : the ears and nose were normal in appearance [Oropharynx] : the oropharynx was normal [Neck Appearance] : the appearance of the neck was normal [Neck Cervical Mass (___cm)] : no neck mass was observed [Jugular Venous Distention Increased] : there was no jugular-venous distention [Thyroid Diffuse Enlargement] : the thyroid was not enlarged [Thyroid Nodule] : there were no palpable thyroid nodules [Auscultation Breath Sounds / Voice Sounds] : lungs were clear to auscultation bilaterally [Heart Rate And Rhythm] : heart rate was normal and rhythm regular [Heart Sounds] : normal S1 and S2 [Heart Sounds Gallop] : no gallops [Murmurs] : no murmurs [Heart Sounds Pericardial Friction Rub] : no pericardial rub [Full Pulse] : the pedal pulses are present [Edema] : there was no peripheral edema [Bowel Sounds] : normal bowel sounds [Abdomen Soft] : soft [Abdomen Tenderness] : non-tender [Abdomen Mass (___ Cm)] : no abdominal mass palpated [Cervical Lymph Nodes Enlarged Posterior Bilaterally] : posterior cervical [Cervical Lymph Nodes Enlarged Anterior Bilaterally] : anterior cervical [Supraclavicular Lymph Nodes Enlarged Bilaterally] : supraclavicular [No CVA Tenderness] : no ~M costovertebral angle tenderness [No Spinal Tenderness] : no spinal tenderness [Abnormal Walk] : normal gait [Nail Clubbing] : no clubbing  or cyanosis of the fingernails [Involuntary Movements] : no involuntary movements were seen [Musculoskeletal - Swelling] : no joint swelling seen [Motor Tone] : muscle strength and tone were normal [Skin Color & Pigmentation] : normal skin color and pigmentation [Skin Turgor] : normal skin turgor [] : no rash [Oriented To Time, Place, And Person] : oriented to person, place, and time [Impaired Insight] : insight and judgment were intact [Affect] : the affect was normal [FreeTextEntry1] : all joints with full ROM - no active synovitis - tenderness over the knees - elbows

## 2023-01-06 NOTE — ASSESSMENT
[FreeTextEntry1] : 32 year-old female with pmh of ALMA ROSA, now with return of her symptoms with polyarthritis, highly positive DEBORAH\par \par 1. RA  with vasculitis - methotrexate 5 tablets -   on methotrexate - joint pain is stable -  ongoing rituximab -  infusion every year - joint pain is stable at 4/0 worse over the elbows and left knee - check all labs today - next rituximab around 04/2023\par 2. chronic back pain - ongoing work-up by PMR x-rays with degenerative changes and mild scoliosis -had nerve block - no pain at this time.  - continue with current regimen\par 3. chronic pain - on medical marijuana - stable\par 4. muscle cramps over the right leg - not active\par 5. PE - likely related to tubal ligation (5 days prior) - chronic eliquis - given hx of clotting in her family\par 6. Right leg paresthesias - EMG done yesterday - mild sensory axonal loss\par 7. Right shoulder pain -with decreased ROM over 6 months worsening - with loss of ROM - could not complete PT due to  pain - new referral given -MRI not done - missed appt -  pain is not severe at this time\par 8.Loss of hearing -under the care of ENT\par 9. diverticulitis - under he care of GI - no flares\par 10. bilateral knee - mechanical can take  tylenol as needed -  cannot take nsaids due to being on eliquis\par \par \par I reviewed previous labs results with patients.\par Laboratory tests ordered today\par Diagnosis and Prognosis discussed\par Continue with current medications\par medications refilled\par education provided on\par F/u 3 months\par

## 2023-01-17 ENCOUNTER — OUTPATIENT (OUTPATIENT)
Dept: OUTPATIENT SERVICES | Facility: HOSPITAL | Age: 38
LOS: 1 days | End: 2023-01-17
Payer: MEDICAID

## 2023-01-17 VITALS
WEIGHT: 197.09 LBS | HEIGHT: 59 IN | DIASTOLIC BLOOD PRESSURE: 74 MMHG | RESPIRATION RATE: 16 BRPM | TEMPERATURE: 98 F | HEART RATE: 65 BPM | SYSTOLIC BLOOD PRESSURE: 112 MMHG | OXYGEN SATURATION: 95 %

## 2023-01-17 DIAGNOSIS — Z01.818 ENCOUNTER FOR OTHER PREPROCEDURAL EXAMINATION: ICD-10-CM

## 2023-01-17 DIAGNOSIS — M72.2 PLANTAR FASCIAL FIBROMATOSIS: Chronic | ICD-10-CM

## 2023-01-17 DIAGNOSIS — N92.6 IRREGULAR MENSTRUATION, UNSPECIFIED: ICD-10-CM

## 2023-01-17 DIAGNOSIS — Z98.51 TUBAL LIGATION STATUS: Chronic | ICD-10-CM

## 2023-01-17 DIAGNOSIS — Z98.891 HISTORY OF UTERINE SCAR FROM PREVIOUS SURGERY: Chronic | ICD-10-CM

## 2023-01-17 LAB
A1C WITH ESTIMATED AVERAGE GLUCOSE RESULT: 5.4 % — SIGNIFICANT CHANGE UP (ref 4–5.6)
ALBUMIN SERPL ELPH-MCNC: 3.6 G/DL — SIGNIFICANT CHANGE UP (ref 3.5–5)
ALP SERPL-CCNC: 165 U/L — HIGH (ref 40–120)
ALT FLD-CCNC: 24 U/L DA — SIGNIFICANT CHANGE UP (ref 10–60)
ANION GAP SERPL CALC-SCNC: 9 MMOL/L — SIGNIFICANT CHANGE UP (ref 5–17)
APTT BLD: 31.4 SEC — SIGNIFICANT CHANGE UP (ref 27.5–35.5)
AST SERPL-CCNC: 11 U/L — SIGNIFICANT CHANGE UP (ref 10–40)
BILIRUB SERPL-MCNC: 0.4 MG/DL — SIGNIFICANT CHANGE UP (ref 0.2–1.2)
BLD GP AB SCN SERPL QL: SIGNIFICANT CHANGE UP
BUN SERPL-MCNC: 17 MG/DL — SIGNIFICANT CHANGE UP (ref 7–18)
CALCIUM SERPL-MCNC: 9.2 MG/DL — SIGNIFICANT CHANGE UP (ref 8.4–10.5)
CHLORIDE SERPL-SCNC: 105 MMOL/L — SIGNIFICANT CHANGE UP (ref 96–108)
CO2 SERPL-SCNC: 26 MMOL/L — SIGNIFICANT CHANGE UP (ref 22–31)
CREAT SERPL-MCNC: 0.93 MG/DL — SIGNIFICANT CHANGE UP (ref 0.5–1.3)
EGFR: 81 ML/MIN/1.73M2 — SIGNIFICANT CHANGE UP
ESTIMATED AVERAGE GLUCOSE: 108 MG/DL — SIGNIFICANT CHANGE UP (ref 68–114)
GLUCOSE SERPL-MCNC: 82 MG/DL — SIGNIFICANT CHANGE UP (ref 70–99)
HCG SERPL-ACNC: 2 MIU/ML — SIGNIFICANT CHANGE UP
HCT VFR BLD CALC: 40.1 % — SIGNIFICANT CHANGE UP (ref 34.5–45)
HGB BLD-MCNC: 12.9 G/DL — SIGNIFICANT CHANGE UP (ref 11.5–15.5)
INR BLD: 1.32 RATIO — HIGH (ref 0.88–1.16)
MCHC RBC-ENTMCNC: 27.9 PG — SIGNIFICANT CHANGE UP (ref 27–34)
MCHC RBC-ENTMCNC: 32.2 GM/DL — SIGNIFICANT CHANGE UP (ref 32–36)
MCV RBC AUTO: 86.8 FL — SIGNIFICANT CHANGE UP (ref 80–100)
NRBC # BLD: 0 /100 WBCS — SIGNIFICANT CHANGE UP (ref 0–0)
PLATELET # BLD AUTO: 270 K/UL — SIGNIFICANT CHANGE UP (ref 150–400)
POTASSIUM SERPL-MCNC: 3.7 MMOL/L — SIGNIFICANT CHANGE UP (ref 3.5–5.3)
POTASSIUM SERPL-SCNC: 3.7 MMOL/L — SIGNIFICANT CHANGE UP (ref 3.5–5.3)
PROT SERPL-MCNC: 7.7 G/DL — SIGNIFICANT CHANGE UP (ref 6–8.3)
PROTHROM AB SERPL-ACNC: 15.7 SEC — HIGH (ref 10.5–13.4)
RBC # BLD: 4.62 M/UL — SIGNIFICANT CHANGE UP (ref 3.8–5.2)
RBC # FLD: 13.4 % — SIGNIFICANT CHANGE UP (ref 10.3–14.5)
SODIUM SERPL-SCNC: 140 MMOL/L — SIGNIFICANT CHANGE UP (ref 135–145)
WBC # BLD: 9.07 K/UL — SIGNIFICANT CHANGE UP (ref 3.8–10.5)
WBC # FLD AUTO: 9.07 K/UL — SIGNIFICANT CHANGE UP (ref 3.8–10.5)

## 2023-01-17 PROCEDURE — G0463: CPT

## 2023-01-17 RX ORDER — SODIUM CHLORIDE 9 MG/ML
3 INJECTION INTRAMUSCULAR; INTRAVENOUS; SUBCUTANEOUS EVERY 8 HOURS
Refills: 0 | Status: DISCONTINUED | OUTPATIENT
Start: 2023-01-24 | End: 2023-01-24

## 2023-01-17 NOTE — H&P PST ADULT - PROBLEM SELECTOR PLAN 1
Patient is scheduled for Dilation and Curettage with Hysteroscopy with Dr Oleary on 1/24/23    Labs drawn in PST 1/17/23- will f/u results   Pt scheduled to see PMD and Cardiologist for Medical and cardiac Clearance 1/18/23- will f.u with report   Pt was instructed to f/u with covid test 3-5 days prior to surgery  Pt instructed to follow instruction of cardiologist on when to stop Eliquis.    Pt was  instructed to Stop all Aspirin and over the counter medication including vitamins and herbal 7days before surgery   Instructions given to include using 4% chlorhexidine wash as directed starting 3days before day of surgery (inclusive of day of surgery)  Maintaining NPO status post midnight day before surgery  Patient is to expect a phone call day before surgery between the hours of 430- 630pm giving arrival time for surgery day.  Written and oral preoperative instructions given to patient with understanding verbalized.     Patient today with STOP bang score 1  low risk for MELITA.

## 2023-01-17 NOTE — H&P PST ADULT - ASSESSMENT
37 y.o female with irregular menstruation now schedule Dilation and Curettage with Hysteroscopy with Dr Oleary on 1/24/23    STOP BANG 1

## 2023-01-17 NOTE — H&P PST ADULT - NEGATIVE OPHTHALMOLOGIC SYMPTOMS
Stop metformin.  Stop aspirin and all OTC meds 7 days prior to surgery.  The morning of surgery, don't take lisinopril.    
no diplopia/no photophobia/no blurred vision L/no blurred vision R

## 2023-01-17 NOTE — H&P PST ADULT - HISTORY OF PRESENT ILLNESS
Dilation and Curettage with Hysteroscopy with Dr Oleary on 1/24/23 37 y.o female with PMHx for Asthma, Fibromyalgia, H/O antiphospholipid syndrome,  PE (on Eliquis and Rheumatoid arthritis.   c/o of  irregular menstruation now presents to presurgical testing for   schedule Dilation and Curettage with Hysteroscopy with Dr Oleary on 1/24/23    STOP BANG 1

## 2023-01-17 NOTE — H&P PST ADULT - NSICDXFAMILYHX_GEN_ALL_CORE_FT
FAMILY HISTORY:  FH: hypertension    Sibling  Still living? No  Family history of renal cancer, Age at diagnosis: Age Unknown

## 2023-01-17 NOTE — H&P PST ADULT - RESPIRATORY
clear to auscultation bilaterally/no wheezes/no rales/no respiratory distress/breath sounds equal/good air movement

## 2023-01-18 ENCOUNTER — APPOINTMENT (OUTPATIENT)
Dept: INTERNAL MEDICINE | Facility: CLINIC | Age: 38
End: 2023-01-18
Payer: MEDICAID

## 2023-01-18 VITALS
HEIGHT: 59 IN | TEMPERATURE: 97.6 F | SYSTOLIC BLOOD PRESSURE: 128 MMHG | BODY MASS INDEX: 39.31 KG/M2 | WEIGHT: 195 LBS | OXYGEN SATURATION: 99 % | DIASTOLIC BLOOD PRESSURE: 90 MMHG | RESPIRATION RATE: 17 BRPM | HEART RATE: 82 BPM

## 2023-01-18 DIAGNOSIS — Z87.19 PERSONAL HISTORY OF OTHER DISEASES OF THE DIGESTIVE SYSTEM: ICD-10-CM

## 2023-01-18 DIAGNOSIS — Z09 ENCOUNTER FOR FOLLOW-UP EXAMINATION AFTER COMPLETED TREATMENT FOR CONDITIONS OTHER THAN MALIGNANT NEOPLASM: ICD-10-CM

## 2023-01-18 DIAGNOSIS — Z86.718 PERSONAL HISTORY OF OTHER VENOUS THROMBOSIS AND EMBOLISM: ICD-10-CM

## 2023-01-18 DIAGNOSIS — M79.89 OTHER SPECIFIED SOFT TISSUE DISORDERS: ICD-10-CM

## 2023-01-18 DIAGNOSIS — R07.89 OTHER CHEST PAIN: ICD-10-CM

## 2023-01-18 PROCEDURE — 99215 OFFICE O/P EST HI 40 MIN: CPT

## 2023-01-18 NOTE — HISTORY OF PRESENT ILLNESS
[No Pertinent Cardiac History] : no history of aortic stenosis, atrial fibrillation, coronary artery disease, recent myocardial infarction, or implantable device/pacemaker [No Pertinent Pulmonary History] : no history of asthma, COPD, sleep apnea, or smoking [Asthma] : asthma [Chronic Anticoagulation] : chronic anticoagulation [(Patient denies any chest pain, claudication, dyspnea on exertion, orthopnea, palpitations or syncope)] : Patient denies any chest pain, claudication, dyspnea on exertion, orthopnea, palpitations or syncope [____ METs%] : [unfilled] METs% [Moderate (4-6 METs)] : Moderate (4-6 METs) [Anticoagulants: _____] : Anticoagulants: [unfilled] [Aortic Stenosis] : no aortic stenosis [Atrial Fibrillation] : no atrial fibrillation [Coronary Artery Disease] : no coronary artery disease [Recent Myocardial Infarction] : no recent myocardial infarction [Implantable Device/Pacemaker] : no implantable device/pacemaker [COPD] : no COPD [Sleep Apnea] : no sleep apnea [Smoker] : not a smoker [Family Member] : no family member with adverse anesthesia reaction/sudden death [Self] : no previous adverse anesthesia reaction [Chronic Kidney Disease] : no chronic kidney disease [Diabetes] : no diabetes [FreeTextEntry1] :   D  and  C hysteroscopy  [FreeTextEntry2] : 1/24/23 [FreeTextEntry3] : Dr Gabriel  [FreeTextEntry4] : Patient is a 38 y/o F  ([birth year]) w/ PMHx of RA on methotrexate, s/p bilateral tubal ligation complicated by PE on eliquis (2018), asthma. She came here for pre-op clearance. For the past 1.5 years, she noticed that her menstrual period became irregular with couple months of heavy bleeding with associated cramps on weeks she is not getting her period. LMP was 8/15/2022. She denies use of any oral contraceptive nor supplements. She has recently seen Cardio (Dr. Antonio) for cardiac clearance and +FHx of CAD, CVA, VTE. She is pending carotid doppler and CT calcium scoring. Last EKG (2022) showed SR, LAE  (VR 75, , QT/QTc 378/404, QRS 91). She also recently seen her GYN (Dr. lOeary) who did her pre-op labs. TVS (2022) showed homogenous uterus, endometrial lining 3.36 mm. She has FHx of brother with renal carcinoma (age 36), 1st degree cousin with ovarian Ca, maternal Hx of breast/ ovarian Ca. [FreeTextEntry5] : last asthma attack - 2022 [FreeTextEntry7] : EKG (1/2022) showed SR, LAE  (VR 75, , QT/QTc 378/404, QRS 91)\par ECHO (2020) - normal EF 60-65%\par pending Carotid Doppler and CT Calcium Scoring

## 2023-01-18 NOTE — PHYSICAL EXAM
[No Acute Distress] : no acute distress [Well Nourished] : well nourished [Well Developed] : well developed [Well-Appearing] : well-appearing [Normal Sclera/Conjunctiva] : normal sclera/conjunctiva [PERRL] : pupils equal round and reactive to light [EOMI] : extraocular movements intact [Normal Outer Ear/Nose] : the outer ears and nose were normal in appearance [Normal Oropharynx] : the oropharynx was normal [No JVD] : no jugular venous distention [No Lymphadenopathy] : no lymphadenopathy [Supple] : supple [Thyroid Normal, No Nodules] : the thyroid was normal and there were no nodules present [No Respiratory Distress] : no respiratory distress  [No Accessory Muscle Use] : no accessory muscle use [Clear to Auscultation] : lungs were clear to auscultation bilaterally [Normal Rate] : normal rate  [Regular Rhythm] : with a regular rhythm [Normal S1, S2] : normal S1 and S2 [No Murmur] : no murmur heard [No Carotid Bruits] : no carotid bruits [No Abdominal Bruit] : a ~M bruit was not heard ~T in the abdomen [No Varicosities] : no varicosities [Pedal Pulses Present] : the pedal pulses are present [No Edema] : there was no peripheral edema [No Palpable Aorta] : no palpable aorta [No Extremity Clubbing/Cyanosis] : no extremity clubbing/cyanosis [Soft] : abdomen soft [Non Tender] : non-tender [Non-distended] : non-distended [No Masses] : no abdominal mass palpated [No HSM] : no HSM [Normal Bowel Sounds] : normal bowel sounds [Normal Posterior Cervical Nodes] : no posterior cervical lymphadenopathy [Normal Anterior Cervical Nodes] : no anterior cervical lymphadenopathy [No CVA Tenderness] : no CVA  tenderness [No Spinal Tenderness] : no spinal tenderness [No Joint Swelling] : no joint swelling [Grossly Normal Strength/Tone] : grossly normal strength/tone [No Rash] : no rash [Coordination Grossly Intact] : coordination grossly intact [No Focal Deficits] : no focal deficits [Normal Gait] : normal gait [Deep Tendon Reflexes (DTR)] : deep tendon reflexes were 2+ and symmetric [Normal Affect] : the affect was normal [Normal Insight/Judgement] : insight and judgment were intact [de-identified] : (-) Marlene Sign, R calf 39 cm, L calf 41 cm [Normal] : normal gait, coordination grossly intact, no focal deficits [de-identified] : homans negative  bilateral   no calf swelling or tenderness or erythema  calf size   on right  36 cm and left  36.5  cm   [de-identified] : no lymphadenopathy  bilateral  [de-identified] : good skin turgor   normal hair and  scalp    tone  medium   [de-identified] : alert and oriented

## 2023-01-18 NOTE — PLAN
[FreeTextEntry1] :   Pt will need hcg  testing  48 hours prior to  surgery  and covid testing     .  if labs are normal and covid negative  and  hcg negative she can proceed with her surgery.      \par Asthma is believed to be caused by inherited (genetic) and environmental factor, but its exact cause is unknown. Asthma may be triggered by allergens, lung infections, or irritants in the air. Asthma triggers are different for each person \par -Inhaler technique reviewed as well as oral hygiene techniques reviewed with patient. Avoidance of cold air, extremes of temperature, rescue inhaler should be used before exercise. Order of medication reviewed with patient. Recommended use of a cool mist humidifier in the bedroom. \par

## 2023-01-18 NOTE — ASSESSMENT
[Patient Optimized for Surgery] : Patient optimized for surgery [Cardiology consultation] : Cardiology consultation [Modify anticoagulant treatment prior to procedure] : Modify anticoagulant treatment prior to procedure [Modify medications prior to procedure] : Modify medications prior to procedure [High Risk Surgery - Intraperitoneal, Intrathoracic or Supringuinal Vascular Procedures] : High Risk Surgery - Intraperitoneal, Intrathoracic or Supringuinal Vascular Procedures - No (0) [Ischemic Heart Disease] : Ischemic Heart Disease - No (0) [Congestive Heart Failure] : Congestive Heart Failure - No (0) [Prior Cerebrovascular Accident or TIA] : Prior Cerebrovascular Accident or TIA - No (0) [Creatinine >= 2mg/dL (1 Point)] : Creatinine >= 2mg/dL - No (0) [Insulin-dependent Diabetic (1 Point)] : Insulin-dependent Diabetic - No (0) [0] : 0 , RCRI Class: I, Risk of Post-Op Cardiac Complications: 3.9%, 95% CI for Risk Estimate: 2.8% - 5.4% [As per surgery] : as per surgery [FreeTextEntry4] : Pt is having a low risk procedure and is low risks for cardiac adverse outcome and cri is 0.4% .  She was explained the procedure (dilatation and curettage w/ hysteroscopy)   risks and complications alternatives , prep and post procedure care and all her questions have been answered by the surgeon .  She had pre-op labs done by her Gyn (Dr. Oleary). Pending clearance from Cardio (Dr. Antonio) regarding anticoagulation  She  will need to stop elquis  2 day prior to surgery  and if needed lovenox can be used to bridge.      I spoke with Elza and pt will  need bridging with lovenox  and stopped 12hrs prior and needs to see him before the surgery \par  [FreeTextEntry5] : confirm with Cardio (Dr. Antonio) if Eliquis can be held 2 days prior and if needed to be bridged with Lovenox [FreeTextEntry7] : NPO on day of surgery  bring with you your  emergency inhaler and take  2 puffs  30 mins  before surgery and take regular  inhaler in am    as usual.   [FreeTextEntry2] : follow-up with Cardiology (Dr. Antonio)

## 2023-01-18 NOTE — RESULTS/DATA
[ECG Reviewed] : reviewed [NSR] : normal sinus rhythm [Ventricular Rate___] : ventricular rate is [unfilled] beats per minute [P Waves Normal] : the P wave is normal [ECG Intervals WV.] : WV interval is normal [Left Atrial Hypertrophy] : left atrial hypertrophy (NASH) [CO Interval___] : [unfilled] seconds [Normal QRS] : the QRS is normal [QRS Interval___] : QRS interval: [unfilled] seconds [ECG Axis] : the QRS axis is normal [QTc Interval___] : QTc interval: [unfilled] [Normal ST Segments] : the ST segments are normal [ECG T. Waves] : normal

## 2023-01-18 NOTE — COUNSELING
[Weight management counseling provided] : Weight management [Healthy eating counseling provided] : healthy eating [Fall prevention counseling provided] : fall prevention  [Weigh Self Once Weekly] : Weigh self once weekly [___ min/wk activity recommended] : [unfilled] min/wk activity recommended [Walking] : Walking [Sleep ___ hours/day] : Sleep [unfilled] hours/day [Engage in a relaxing activity] : Engage in a relaxing activity [Plan in advance] : Plan in advance

## 2023-01-23 ENCOUNTER — TRANSCRIPTION ENCOUNTER (OUTPATIENT)
Age: 38
End: 2023-01-23

## 2023-01-23 ENCOUNTER — NON-APPOINTMENT (OUTPATIENT)
Age: 38
End: 2023-01-23

## 2023-01-23 ENCOUNTER — APPOINTMENT (OUTPATIENT)
Dept: CARDIOLOGY | Facility: CLINIC | Age: 38
End: 2023-01-23
Payer: MEDICAID

## 2023-01-23 VITALS
OXYGEN SATURATION: 97 % | BODY MASS INDEX: 39.79 KG/M2 | WEIGHT: 197 LBS | SYSTOLIC BLOOD PRESSURE: 113 MMHG | DIASTOLIC BLOOD PRESSURE: 75 MMHG | HEART RATE: 84 BPM | TEMPERATURE: 97.4 F

## 2023-01-23 DIAGNOSIS — Z01.810 ENCOUNTER FOR PREPROCEDURAL CARDIOVASCULAR EXAMINATION: ICD-10-CM

## 2023-01-23 LAB — SARS-COV-2 N GENE NPH QL NAA+PROBE: NOT DETECTED

## 2023-01-23 PROCEDURE — 99214 OFFICE O/P EST MOD 30 MIN: CPT | Mod: 25

## 2023-01-23 PROCEDURE — 93000 ELECTROCARDIOGRAM COMPLETE: CPT

## 2023-01-24 ENCOUNTER — TRANSCRIPTION ENCOUNTER (OUTPATIENT)
Age: 38
End: 2023-01-24

## 2023-01-24 ENCOUNTER — APPOINTMENT (OUTPATIENT)
Dept: OBGYN | Facility: HOSPITAL | Age: 38
End: 2023-01-24

## 2023-01-24 ENCOUNTER — OUTPATIENT (OUTPATIENT)
Dept: OUTPATIENT SERVICES | Facility: HOSPITAL | Age: 38
LOS: 1 days | End: 2023-01-24
Payer: MEDICAID

## 2023-01-24 VITALS
RESPIRATION RATE: 16 BRPM | HEIGHT: 59 IN | DIASTOLIC BLOOD PRESSURE: 66 MMHG | TEMPERATURE: 97 F | HEART RATE: 70 BPM | WEIGHT: 197.09 LBS | SYSTOLIC BLOOD PRESSURE: 128 MMHG | OXYGEN SATURATION: 99 %

## 2023-01-24 VITALS
OXYGEN SATURATION: 97 % | DIASTOLIC BLOOD PRESSURE: 84 MMHG | HEART RATE: 98 BPM | RESPIRATION RATE: 18 BRPM | TEMPERATURE: 98 F | SYSTOLIC BLOOD PRESSURE: 125 MMHG

## 2023-01-24 DIAGNOSIS — Z98.891 HISTORY OF UTERINE SCAR FROM PREVIOUS SURGERY: Chronic | ICD-10-CM

## 2023-01-24 DIAGNOSIS — N92.6 IRREGULAR MENSTRUATION, UNSPECIFIED: ICD-10-CM

## 2023-01-24 DIAGNOSIS — Z98.51 TUBAL LIGATION STATUS: Chronic | ICD-10-CM

## 2023-01-24 DIAGNOSIS — M72.2 PLANTAR FASCIAL FIBROMATOSIS: Chronic | ICD-10-CM

## 2023-01-24 LAB
BLD GP AB SCN SERPL QL: SIGNIFICANT CHANGE UP
HCG UR QL: NEGATIVE — SIGNIFICANT CHANGE UP

## 2023-01-24 PROCEDURE — 58558 HYSTEROSCOPY BIOPSY: CPT

## 2023-01-24 PROCEDURE — 86850 RBC ANTIBODY SCREEN: CPT

## 2023-01-24 PROCEDURE — 88305 TISSUE EXAM BY PATHOLOGIST: CPT

## 2023-01-24 PROCEDURE — 88305 TISSUE EXAM BY PATHOLOGIST: CPT | Mod: 26

## 2023-01-24 PROCEDURE — 86901 BLOOD TYPING SEROLOGIC RH(D): CPT

## 2023-01-24 PROCEDURE — 86900 BLOOD TYPING SEROLOGIC ABO: CPT

## 2023-01-24 PROCEDURE — 81025 URINE PREGNANCY TEST: CPT

## 2023-01-24 PROCEDURE — 36415 COLL VENOUS BLD VENIPUNCTURE: CPT

## 2023-01-24 RX ORDER — OXYCODONE HYDROCHLORIDE 5 MG/1
5 TABLET ORAL ONCE
Refills: 0 | Status: DISCONTINUED | OUTPATIENT
Start: 2023-01-24 | End: 2023-01-24

## 2023-01-24 RX ORDER — ACETAMINOPHEN 500 MG
2 TABLET ORAL
Qty: 40 | Refills: 0
Start: 2023-01-24 | End: 2023-01-28

## 2023-01-24 RX ORDER — ACETAMINOPHEN 500 MG
975 TABLET ORAL EVERY 6 HOURS
Refills: 0 | Status: DISCONTINUED | OUTPATIENT
Start: 2023-01-24 | End: 2023-02-07

## 2023-01-24 RX ORDER — SIMETHICONE 80 MG/1
1 TABLET, CHEWABLE ORAL
Qty: 12 | Refills: 0
Start: 2023-01-24 | End: 2023-01-26

## 2023-01-24 RX ORDER — SIMETHICONE 80 MG/1
80 TABLET, CHEWABLE ORAL EVERY 6 HOURS
Refills: 0 | Status: DISCONTINUED | OUTPATIENT
Start: 2023-01-24 | End: 2023-02-07

## 2023-01-24 RX ORDER — ONDANSETRON 8 MG/1
4 TABLET, FILM COATED ORAL EVERY 6 HOURS
Refills: 0 | Status: DISCONTINUED | OUTPATIENT
Start: 2023-01-24 | End: 2023-02-07

## 2023-01-24 RX ORDER — IBUPROFEN 200 MG
600 TABLET ORAL EVERY 6 HOURS
Refills: 0 | Status: DISCONTINUED | OUTPATIENT
Start: 2023-01-24 | End: 2023-02-07

## 2023-01-24 RX ORDER — SARILUMAB 150 MG/1.14ML
1 INJECTION, SOLUTION SUBCUTANEOUS
Qty: 0 | Refills: 0 | DISCHARGE

## 2023-01-24 RX ORDER — FOLIC ACID 0.8 MG
1 TABLET ORAL
Qty: 0 | Refills: 0 | DISCHARGE

## 2023-01-24 RX ORDER — IBUPROFEN 200 MG
1 TABLET ORAL
Qty: 20 | Refills: 0
Start: 2023-01-24 | End: 2023-01-28

## 2023-01-24 RX ADMIN — ONDANSETRON 4 MILLIGRAM(S): 8 TABLET, FILM COATED ORAL at 17:20

## 2023-01-24 NOTE — ASU PATIENT PROFILE, ADULT - FALL HARM RISK - UNIVERSAL INTERVENTIONS
Bed in lowest position, wheels locked, appropriate side rails in place/Call bell, personal items and telephone in reach/Instruct patient to call for assistance before getting out of bed or chair/Non-slip footwear when patient is out of bed/[place]ary to call system/Physically safe environment - no spills, clutter or unnecessary equipment/Purposeful Proactive Rounding/Room/bathroom lighting operational, light cord in reach

## 2023-01-24 NOTE — ASU DISCHARGE PLAN (ADULT/PEDIATRIC) - ASU DC SPECIAL INSTRUCTIONSFT
no sex nothing in vagina no heavy lifting no pushing eat high fiber food ambulation daily as tolerated shower daily clean wound well and keep dry after; see your gynecologist in 1-2wks for follow up  continue all home medications

## 2023-01-24 NOTE — ASU PREOP CHECKLIST - IV STARTED
yes Ketoconazole Pregnancy And Lactation Text: This medication is Pregnancy Category C and it isn't know if it is safe during pregnancy. It is also excreted in breast milk and breast feeding isn't recommended.

## 2023-01-24 NOTE — ASU DISCHARGE PLAN (ADULT/PEDIATRIC) - NS MD DC FALL RISK RISK
For information on Fall & Injury Prevention, visit: [email or web address] OR  [email or web address] OR  https://www.cdc.gov/steadi/patient.html

## 2023-01-24 NOTE — ASU DISCHARGE PLAN (ADULT/PEDIATRIC) - CARE PROVIDER_API CALL
Avery Oleary  OBSTETRICS AND GYNECOLOGY  [address], [place] [address]  Phone: [phone]  Fax: [phone]  Follow Up Time: 2 weeks

## 2023-01-30 PROBLEM — N91.2 AMENORRHEA: Status: ACTIVE | Noted: 2017-03-30

## 2023-01-30 PROBLEM — N97.0 ANOVULATION: Status: ACTIVE | Noted: 2023-01-30

## 2023-01-30 NOTE — HISTORY OF PRESENT ILLNESS
[Home] : at home, [unfilled] , at the time of the visit. [Other Location: e.g. Home (Enter Location, City,State)___] : at [unfilled] [Verbal consent obtained from patient] : the patient, [unfilled] [FreeTextEntry1] : discussed the results fo the blood work . She misses periods and difficulty in getting pregnant .

## 2023-02-08 ENCOUNTER — APPOINTMENT (OUTPATIENT)
Dept: OBGYN | Facility: CLINIC | Age: 38
End: 2023-02-08
Payer: MEDICAID

## 2023-02-08 ENCOUNTER — APPOINTMENT (OUTPATIENT)
Dept: OBGYN | Facility: CLINIC | Age: 38
End: 2023-02-08

## 2023-02-08 VITALS
DIASTOLIC BLOOD PRESSURE: 82 MMHG | SYSTOLIC BLOOD PRESSURE: 121 MMHG | OXYGEN SATURATION: 99 % | HEART RATE: 88 BPM | WEIGHT: 196 LBS | BODY MASS INDEX: 39.59 KG/M2

## 2023-02-08 PROCEDURE — 99212 OFFICE O/P EST SF 10 MIN: CPT

## 2023-02-08 NOTE — HISTORY OF PRESENT ILLNESS
[Clean/Dry/Intact] : clean, dry and intact [None] : no vaginal bleeding [Normal] : normal [de-identified] : Patient presents for post op visit s/p D&C.  Patient states that she feels well and has had no heavy bleeding, or any other concerns.   Reviewed normal results with patient. Discussed possibility of early perimenopause and supportive measures.

## 2023-02-17 ENCOUNTER — APPOINTMENT (OUTPATIENT)
Dept: NEUROLOGY | Facility: CLINIC | Age: 38
End: 2023-02-17
Payer: MEDICAID

## 2023-02-17 VITALS
SYSTOLIC BLOOD PRESSURE: 119 MMHG | WEIGHT: 199 LBS | OXYGEN SATURATION: 98 % | HEART RATE: 104 BPM | HEIGHT: 59 IN | BODY MASS INDEX: 40.12 KG/M2 | DIASTOLIC BLOOD PRESSURE: 87 MMHG | TEMPERATURE: 98.3 F

## 2023-02-17 PROCEDURE — 99213 OFFICE O/P EST LOW 20 MIN: CPT

## 2023-03-10 ENCOUNTER — TRANSCRIPTION ENCOUNTER (OUTPATIENT)
Age: 38
End: 2023-03-10

## 2023-03-22 ENCOUNTER — APPOINTMENT (OUTPATIENT)
Dept: OTOLARYNGOLOGY | Facility: CLINIC | Age: 38
End: 2023-03-22
Payer: MEDICAID

## 2023-03-22 VITALS
HEIGHT: 59 IN | WEIGHT: 195 LBS | SYSTOLIC BLOOD PRESSURE: 116 MMHG | BODY MASS INDEX: 39.31 KG/M2 | DIASTOLIC BLOOD PRESSURE: 82 MMHG | HEART RATE: 76 BPM

## 2023-03-22 DIAGNOSIS — H69.83 OTHER SPECIFIED DISORDERS OF EUSTACHIAN TUBE, BILATERAL: ICD-10-CM

## 2023-03-22 DIAGNOSIS — R49.0 DYSPHONIA: ICD-10-CM

## 2023-03-22 DIAGNOSIS — J34.89 OTHER SPECIFIED DISORDERS OF NOSE AND NASAL SINUSES: ICD-10-CM

## 2023-03-22 DIAGNOSIS — H91.91 UNSPECIFIED HEARING LOSS, RIGHT EAR: ICD-10-CM

## 2023-03-22 PROCEDURE — 99214 OFFICE O/P EST MOD 30 MIN: CPT | Mod: 25

## 2023-03-22 PROCEDURE — 31231 NASAL ENDOSCOPY DX: CPT

## 2023-03-22 RX ORDER — FLUTICASONE PROPIONATE 50 UG/1
50 SPRAY, METERED NASAL
Qty: 1 | Refills: 2 | Status: ACTIVE | COMMUNITY
Start: 2022-04-06 | End: 1900-01-01

## 2023-03-22 RX ORDER — AZELASTINE HYDROCHLORIDE 137 UG/1
0.1 SPRAY, METERED NASAL TWICE DAILY
Qty: 1 | Refills: 1 | Status: ACTIVE | COMMUNITY
Start: 2022-04-06 | End: 1900-01-01

## 2023-03-22 NOTE — PHYSICAL EXAM
[Midline] : trachea located in midline position [Laryngoscopy Performed] : laryngoscopy was performed, see procedure section for findings [Normal] : no rashes [de-identified] : mildly raspy and breathy

## 2023-03-22 NOTE — HISTORY OF PRESENT ILLNESS
[de-identified] : 37 year old female presents for follow up for right side hearing loss. History of RA, PE, improved dysphagia and dysphonia. States has no improvement in right side hearing loss, less frequently dizzy now, decreased headaches. Reports intermittent right otalgia and tinnitus. Denies otorrhea, ear infections.  Last audio 7/6/22. States voice has been stable. Denies dysphagia or odynophagia. Currently using and Flonase daily. Minimal improvement with sprays. States taste and smell is improved since last visit.

## 2023-03-22 NOTE — REASON FOR VISIT
[Subsequent Evaluation] : a subsequent evaluation for [Family Member] : family member [FreeTextEntry2] : right hearing loss.

## 2023-03-22 NOTE — CONSULT LETTER
[Dear  ___] : Dear  [unfilled], [Please see my note below.] : Please see my note below. [Sincerely,] : Sincerely, [Consult Letter:] : I had the pleasure of evaluating your patient, [unfilled]. [Consult Closing:] : Thank you very much for allowing me to participate in the care of this patient.  If you have any questions, please do not hesitate to contact me. [FreeTextEntry3] : Ashok Davison MD, PhD\par Chief, Division of Laryngology\par Department of Otolaryngology\par [facility]\par Pediatric Otolaryngology, [facility]\par  of Otolaryngology\par [facility] [employer] [facility]\par \par \par

## 2023-03-30 ENCOUNTER — TRANSCRIPTION ENCOUNTER (OUTPATIENT)
Age: 38
End: 2023-03-30

## 2023-03-31 NOTE — DISCUSSION/SUMMARY
[FreeTextEntry1] : Reviewed previous two MRI head one year apart - both are normal or not clinically significant; recommend continue topiramate for headache; call if facial droop worsens; may nee LP for lower cranial nerve palsy.\par

## 2023-03-31 NOTE — PHYSICAL EXAM
[General Appearance - Alert] : alert [General Appearance - In No Acute Distress] : in no acute distress [Oriented To Time, Place, And Person] : oriented to person, place, and time [General Appearance - Well Nourished] : well nourished [Person] : oriented to person [Place] : oriented to place [Time] : oriented to time [Short Term Intact] : short term memory intact [Remote Intact] : remote memory intact [Registration Intact] : recent registration memory intact [Span Intact] : the attention span was normal [Concentration Intact] : normal concentrating ability [Visual Intact] : visual attention was ~T not ~L decreased [Naming Objects] : no difficulty naming common objects [Repeating Phrases] : no difficulty repeating a phrase [Fluency] : fluency intact [Comprehension] : comprehension intact [Reading] : reading intact [Current Events] : adequate knowledge of current events [Vocabulary] : adequate range of vocabulary [Cranial Nerves Optic (II)] : visual acuity intact bilaterally,  visual fields full to confrontation, pupils equal round and reactive to light [Cranial Nerves Oculomotor (III)] : extraocular motion intact [Cranial Nerves Trigeminal (V)] : facial sensation intact symmetrically [Cranial Nerves Facial (VII)] : face symmetrical [Cranial Nerves Vestibulocochlear (VIII)] : hearing was intact bilaterally [Cranial Nerves Glossopharyngeal (IX)] : tongue and palate midline [Cranial Nerves Accessory (XI - Cranial And Spinal)] : head turning and shoulder shrug symmetric [Cranial Nerves Hypoglossal (XII)] : there was no tongue deviation with protrusion [Paresis Pronator Drift Right-Sided] : no pronator drift on the right [Motor Strength] : muscle strength was normal in all four extremities [Paresis Pronator Drift Left-Sided] : no pronator drift on the left [Motor Strength Upper Extremities Bilaterally] : strength was normal in both upper extremities [Motor Strength Lower Extremities Bilaterally] : strength was normal in both lower extremities [Sensation Vibration Decrease] : vibration was intact [Proprioception] : proprioception was intact [Allodynia] : no ~T allodynia present [Dysesthesia] : no dysesthesia [Hyperesthesia] : no hyperesthesia [Tactile Decrease Entire Leg Right] : diminished right leg [Pain / Temp Decrease Entire Leg - Right] : diminished right leg [Abnormal Walk] : normal gait [Balance] : balance was intact [Limited Balance] : balance was intact [Past-pointing] : there was no past-pointing [Tremor] : no tremor present [Dysdiadochokinesia Bilaterally] : not present [Coordination - Dysmetria Impaired Finger-to-Nose Bilateral] : not present [2+] : Ankle jerk left 2+ [Plantar Reflex Right Only] : normal on the right [Plantar Reflex Left Only] : normal on the left [___] : absent on the right [___] : absent on the left [Primitive Reflexes] : primitive reflexes were absent [Sclera] : the sclera and conjunctiva were normal [PERRL With Normal Accommodation] : pupils were equal in size, round, reactive to light, with normal accommodation [Extraocular Movements] : extraocular movements were intact [Outer Ear] : the ears and nose were normal in appearance [Oropharynx] : the oropharynx was normal

## 2023-04-03 ENCOUNTER — NON-APPOINTMENT (OUTPATIENT)
Age: 38
End: 2023-04-03

## 2023-04-04 ENCOUNTER — TRANSCRIPTION ENCOUNTER (OUTPATIENT)
Age: 38
End: 2023-04-04

## 2023-04-04 DIAGNOSIS — R21 RASH AND OTHER NONSPECIFIC SKIN ERUPTION: ICD-10-CM

## 2023-04-05 ENCOUNTER — NON-APPOINTMENT (OUTPATIENT)
Age: 38
End: 2023-04-05

## 2023-04-07 ENCOUNTER — APPOINTMENT (OUTPATIENT)
Dept: NEUROLOGY | Facility: CLINIC | Age: 38
End: 2023-04-07

## 2023-04-13 ENCOUNTER — TRANSCRIPTION ENCOUNTER (OUTPATIENT)
Age: 38
End: 2023-04-13

## 2023-04-14 ENCOUNTER — APPOINTMENT (OUTPATIENT)
Dept: RHEUMATOLOGY | Facility: CLINIC | Age: 38
End: 2023-04-14
Payer: MEDICAID

## 2023-04-14 VITALS
BODY MASS INDEX: 37.5 KG/M2 | RESPIRATION RATE: 16 BRPM | DIASTOLIC BLOOD PRESSURE: 78 MMHG | SYSTOLIC BLOOD PRESSURE: 108 MMHG | TEMPERATURE: 98.1 F | WEIGHT: 186 LBS | HEIGHT: 59 IN | OXYGEN SATURATION: 97 % | HEART RATE: 84 BPM

## 2023-04-14 DIAGNOSIS — M54.2 CERVICALGIA: ICD-10-CM

## 2023-04-14 PROCEDURE — 99214 OFFICE O/P EST MOD 30 MIN: CPT

## 2023-04-14 RX ORDER — FOLIC ACID 1 MG/1
1 TABLET ORAL DAILY
Qty: 1 | Refills: 1 | Status: ACTIVE | COMMUNITY
Start: 2022-04-08 | End: 1900-01-01

## 2023-04-14 NOTE — HISTORY OF PRESENT ILLNESS
[___ Month(s) Ago] : [unfilled] month(s) ago [Joint Swelling] : joint swelling [Morning Stiffness] : morning stiffness [Joint Pain] : joint pain [FreeTextEntry1] : joint pain is stable at baseline 4/10 - \par doing well overall - \par pain is worse over the right leg\par no flares since the last visit\par ongoing issues with the right eye with blurred vision - seen by ophto tomorrow\par has cervical pain with crepitus noise\par  [TextBox_2] : 2017 [TextBox_40] : methotrexate [TextBox_44] : rituximab

## 2023-04-14 NOTE — ASSESSMENT
[FreeTextEntry1] : 32 year-old female with pmh of ALMA ROSA, now with return of her symptoms with polyarthritis, highly positive DEBORAH\par \par 1. RA  with vasculitis - methotrexate 5 tablets -   on methotrexate - joint pain is stable -  ongoing rituximab -  infusion every year - joint pain is stable at 4/0 worse over the elbows and left knee - check all labs today - next rituximab around 06/2023\par 2. chronic back pain - ongoing work-up by PMR x-rays with degenerative changes and mild scoliosis -had nerve block - no pain at this time.  - continue with current regimen\par 3. chronic pain - on medical marijuana - stable\par 4. muscle cramps over the right leg - not active\par 5. PE - likely related to tubal ligation (5 days prior) - chronic eliquis - given hx of clotting in her family\par 6. Right leg paresthesias - EMG done yesterday - mild sensory axonal loss\par 7. Right shoulder pain -with decreased ROM over 6 months worsening - with loss of ROM - could not complete PT due to  pain - new referral given -MRI not done - missed appt -  pain is not severe at this time\par 8.Loss of hearing -under the care of ENT\par 9. diverticulitis - under he care of GI - no flares\par 10. bilateral knee - mechanical can take  tylenol as needed -  cannot take nsaids due to being on eliquis\par 11 cervical pain - hx of herniated discs - send for x-rays and PT - consider MRI after finishing PT\par \par \par \par I reviewed previous labs results with patients.\par Laboratory tests ordered today\par Diagnosis and Prognosis discussed\par Continue with current medications\par medications refilled\par education provided on\par F/u 3 months\par

## 2023-04-18 ENCOUNTER — TRANSCRIPTION ENCOUNTER (OUTPATIENT)
Age: 38
End: 2023-04-18

## 2023-04-18 LAB
ALBUMIN MFR SERPL ELPH: 56.2 %
ALBUMIN SERPL ELPH-MCNC: 4.6 G/DL
ALBUMIN SERPL-MCNC: 4 G/DL
ALBUMIN/GLOB SERPL: 1.2 RATIO
ALP BLD-CCNC: 163 U/L
ALPHA1 GLOB MFR SERPL ELPH: 5.3 %
ALPHA1 GLOB SERPL ELPH-MCNC: 0.4 G/DL
ALPHA2 GLOB MFR SERPL ELPH: 11.9 %
ALPHA2 GLOB SERPL ELPH-MCNC: 0.9 G/DL
ALT SERPL-CCNC: 24 U/L
ANION GAP SERPL CALC-SCNC: 15 MMOL/L
AST SERPL-CCNC: 17 U/L
B-GLOBULIN MFR SERPL ELPH: 12.8 %
B-GLOBULIN SERPL ELPH-MCNC: 0.9 G/DL
BASOPHILS # BLD AUTO: 0.02 K/UL
BASOPHILS NFR BLD AUTO: 0.3 %
BILIRUB SERPL-MCNC: 0.5 MG/DL
BUN SERPL-MCNC: 8 MG/DL
CALCIUM SERPL-MCNC: 10 MG/DL
CHLORIDE SERPL-SCNC: 102 MMOL/L
CO2 SERPL-SCNC: 25 MMOL/L
CREAT SERPL-MCNC: 0.69 MG/DL
CRP SERPL-MCNC: 10 MG/L
DEPRECATED KAPPA LC FREE/LAMBDA SER: 1.19 RATIO
EGFR: 114 ML/MIN/1.73M2
EOSINOPHIL # BLD AUTO: 0.08 K/UL
EOSINOPHIL NFR BLD AUTO: 1.1 %
ERYTHROCYTE [SEDIMENTATION RATE] IN BLOOD BY WESTERGREN METHOD: 18 MM/HR
ESTIMATED AVERAGE GLUCOSE: 111 MG/DL
FOLATE SERPL-MCNC: >20 NG/ML
GAMMA GLOB FLD ELPH-MCNC: 1 G/DL
GAMMA GLOB MFR SERPL ELPH: 13.8 %
GLUCOSE SERPL-MCNC: 101 MG/DL
HBA1C MFR BLD HPLC: 5.5 %
HBV CORE IGG+IGM SER QL: NONREACTIVE
HBV SURFACE AB SER QL: REACTIVE
HBV SURFACE AG SER QL: NONREACTIVE
HCT VFR BLD CALC: 41.4 %
HCV AB SER QL: NONREACTIVE
HCV S/CO RATIO: 0.11 S/CO
HGB BLD-MCNC: 13.4 G/DL
IGA SER QL IEP: 130 MG/DL
IGG SER QL IEP: 1076 MG/DL
IGM SER QL IEP: 73 MG/DL
IMM GRANULOCYTES NFR BLD AUTO: 0.4 %
INTERPRETATION SERPL IEP-IMP: NORMAL
KAPPA LC CSF-MCNC: 1.7 MG/DL
KAPPA LC SERPL-MCNC: 2.02 MG/DL
LYMPHOCYTES # BLD AUTO: 2.06 K/UL
LYMPHOCYTES NFR BLD AUTO: 27.2 %
M PROTEIN SPEC IFE-MCNC: NORMAL
M TB IFN-G BLD-IMP: NEGATIVE
MAN DIFF?: NORMAL
MCHC RBC-ENTMCNC: 28.3 PG
MCHC RBC-ENTMCNC: 32.4 GM/DL
MCV RBC AUTO: 87.3 FL
MONOCYTES # BLD AUTO: 0.43 K/UL
MONOCYTES NFR BLD AUTO: 5.7 %
NEUTROPHILS # BLD AUTO: 4.96 K/UL
NEUTROPHILS NFR BLD AUTO: 65.3 %
PLATELET # BLD AUTO: 369 K/UL
POTASSIUM SERPL-SCNC: 4 MMOL/L
PROT SERPL-MCNC: 7.2 G/DL
QUANTIFERON TB PLUS MITOGEN MINUS NIL: 3 IU/ML
QUANTIFERON TB PLUS NIL: 0.01 IU/ML
QUANTIFERON TB PLUS TB1 MINUS NIL: 0 IU/ML
QUANTIFERON TB PLUS TB2 MINUS NIL: 0 IU/ML
RBC # BLD: 4.74 M/UL
RBC # FLD: 13.9 %
SODIUM SERPL-SCNC: 141 MMOL/L
TSH SERPL-ACNC: 1.24 UIU/ML
VIT B12 SERPL-MCNC: 1081 PG/ML
WBC # FLD AUTO: 7.58 K/UL

## 2023-04-18 NOTE — PAST MEDICAL HISTORY
Spiritual Plan of Care    Pt Name: Fernando Mckinnon  Pt : 1926  Date: 2023    Visit Type: In person    Referral Source:     Reason for Visit: Routine Visit    Visited With: Patient not available (Receiving cares)    Length of Visit: 15 minutes    Requires Follow-up: No    Taxonomy:    · Intended Effects: Chitra Affirmation  · Methods: Offer support  · Interventions: Acknowledge current situation      Fernando had requested writer pray for him. Writer attempted to visit to pay and each time Fernando has been unavailable. Writer will pray silently from afar for him. Follow up if requested.   [Menstruating] : menstruating [Definite ___ (Date)] : the last menstrual period was [unfilled]

## 2023-04-19 ENCOUNTER — TRANSCRIPTION ENCOUNTER (OUTPATIENT)
Age: 38
End: 2023-04-19

## 2023-05-03 ENCOUNTER — TRANSCRIPTION ENCOUNTER (OUTPATIENT)
Age: 38
End: 2023-05-03

## 2023-05-08 ENCOUNTER — APPOINTMENT (OUTPATIENT)
Dept: PULMONOLOGY | Facility: CLINIC | Age: 38
End: 2023-05-08
Payer: MEDICAID

## 2023-05-08 VITALS
HEART RATE: 95 BPM | BODY MASS INDEX: 38.91 KG/M2 | HEIGHT: 59 IN | WEIGHT: 193 LBS | DIASTOLIC BLOOD PRESSURE: 85 MMHG | OXYGEN SATURATION: 97 % | SYSTOLIC BLOOD PRESSURE: 117 MMHG

## 2023-05-08 DIAGNOSIS — R05.9 COUGH, UNSPECIFIED: ICD-10-CM

## 2023-05-08 PROCEDURE — 99214 OFFICE O/P EST MOD 30 MIN: CPT

## 2023-05-15 ENCOUNTER — APPOINTMENT (OUTPATIENT)
Dept: PULMONOLOGY | Facility: CLINIC | Age: 38
End: 2023-05-15

## 2023-05-15 ENCOUNTER — APPOINTMENT (OUTPATIENT)
Dept: INTERNAL MEDICINE | Facility: CLINIC | Age: 38
End: 2023-05-15
Payer: MEDICAID

## 2023-05-15 ENCOUNTER — APPOINTMENT (OUTPATIENT)
Dept: CT IMAGING | Facility: HOSPITAL | Age: 38
End: 2023-05-15
Payer: MEDICAID

## 2023-05-15 ENCOUNTER — OUTPATIENT (OUTPATIENT)
Dept: OUTPATIENT SERVICES | Facility: HOSPITAL | Age: 38
LOS: 1 days | End: 2023-05-15
Payer: MEDICAID

## 2023-05-15 VITALS
OXYGEN SATURATION: 98 % | SYSTOLIC BLOOD PRESSURE: 120 MMHG | HEART RATE: 85 BPM | DIASTOLIC BLOOD PRESSURE: 80 MMHG | BODY MASS INDEX: 38.71 KG/M2 | WEIGHT: 192 LBS | TEMPERATURE: 98 F | HEIGHT: 59 IN

## 2023-05-15 DIAGNOSIS — Z98.891 HISTORY OF UTERINE SCAR FROM PREVIOUS SURGERY: Chronic | ICD-10-CM

## 2023-05-15 DIAGNOSIS — M06.9 RHEUMATOID ARTHRITIS, UNSPECIFIED: ICD-10-CM

## 2023-05-15 DIAGNOSIS — I77.6 ARTERITIS, UNSPECIFIED: ICD-10-CM

## 2023-05-15 DIAGNOSIS — R41.3 OTHER AMNESIA: ICD-10-CM

## 2023-05-15 DIAGNOSIS — Z86.711 PERSONAL HISTORY OF PULMONARY EMBOLISM: ICD-10-CM

## 2023-05-15 DIAGNOSIS — Z00.00 ENCOUNTER FOR GENERAL ADULT MEDICAL EXAMINATION W/OUT ABNORMAL FINDINGS: ICD-10-CM

## 2023-05-15 DIAGNOSIS — R06.02 SHORTNESS OF BREATH: ICD-10-CM

## 2023-05-15 DIAGNOSIS — G31.84 MILD COGNITIVE IMPAIRMENT, SO STATED: ICD-10-CM

## 2023-05-15 DIAGNOSIS — M72.2 PLANTAR FASCIAL FIBROMATOSIS: Chronic | ICD-10-CM

## 2023-05-15 DIAGNOSIS — Z87.09 PERSONAL HISTORY OF OTHER DISEASES OF THE RESPIRATORY SYSTEM: ICD-10-CM

## 2023-05-15 DIAGNOSIS — Z86.69 PERSONAL HISTORY OF OTHER DISEASES OF THE NERVOUS SYSTEM AND SENSE ORGANS: ICD-10-CM

## 2023-05-15 DIAGNOSIS — G43.111 MIGRAINE WITH AURA, INTRACTABLE, WITH STATUS MIGRAINOSUS: ICD-10-CM

## 2023-05-15 DIAGNOSIS — Z98.51 TUBAL LIGATION STATUS: Chronic | ICD-10-CM

## 2023-05-15 DIAGNOSIS — J98.4 OTHER DISORDERS OF LUNG: ICD-10-CM

## 2023-05-15 DIAGNOSIS — K21.9 GASTRO-ESOPHAGEAL REFLUX DISEASE W/OUT ESOPHAGITIS: ICD-10-CM

## 2023-05-15 PROCEDURE — 99395 PREV VISIT EST AGE 18-39: CPT | Mod: 25

## 2023-05-15 PROCEDURE — 71250 CT THORAX DX C-: CPT

## 2023-05-15 PROCEDURE — 99212 OFFICE O/P EST SF 10 MIN: CPT | Mod: 25

## 2023-05-15 PROCEDURE — 71250 CT THORAX DX C-: CPT | Mod: 26

## 2023-05-15 RX ORDER — MEDROXYPROGESTERONE ACETATE 5 MG/1
5 TABLET ORAL
Qty: 5 | Refills: 6 | Status: COMPLETED | COMMUNITY
Start: 2022-12-15 | End: 2023-05-15

## 2023-05-15 NOTE — REVIEW OF SYSTEMS
[Fatigue] : fatigue [Night Sweats] : night sweats [Dryness] : dryness  [Vision Problems] : vision problems [Hearing Loss] : hearing loss [Hoarseness] : hoarseness [Chest Pain] : chest pain [Shortness Of Breath] : shortness of breath [Cough] : cough [Dyspnea on Exertion] : dyspnea on exertion [Joint Pain] : joint pain [Joint Stiffness] : joint stiffness [Joint Swelling] : joint swelling [Muscle Pain] : muscle pain [Back Pain] : back pain [Easy Bruising] : easy bruising [Negative] : Psychiatric [Fever] : no fever [Chills] : no chills [Hot Flashes] : no hot flashes [Recent Change In Weight] : ~T no recent weight change [Discharge] : no discharge [Pain] : no pain [Redness] : no redness [Itching] : no itching [Palpitations] : no palpitations [Leg Claudication] : no leg claudication [Lower Ext Edema] : no lower extremity edema [Orthopnea] : no orthopnea [Paroxysmal Nocturnal Dyspnea] : no paroxysmal nocturnal dyspnea [Wheezing] : no wheezing [Muscle Weakness] : no muscle weakness [Headache] : no headache [Dizziness] : no dizziness [Fainting] : no fainting [Confusion] : no confusion [Memory Loss] : no memory loss [Unsteady Walking] : no ataxia [Easy Bleeding] : no easy bleeding

## 2023-05-15 NOTE — PAST MEDICAL HISTORY
[Menstruating] : menstruating [Menarche Age ____] : age at menarche was [unfilled] [Definite ___ (Date)] : the last menstrual period was [unfilled] [Normal Amount/Duration] : it was of a normal amount and duration [Irregular Cycle Intervals] : are  irregular [Total Preg ___] : G[unfilled] [Live Births ___] : P[unfilled]  [Full Term ___] : Full Term: [unfilled] [Living ___] : Living: [unfilled]

## 2023-05-15 NOTE — HISTORY OF PRESENT ILLNESS
[FreeTextEntry1] : cpe  [de-identified] : Pt is a  37 yr old woman with hx of  RA on methotrexate ,  Pe ,  asthma  , b9 intracranial  hypertension,   , fibromyalgia, herniated lumbar disc   paralytic  ptosis of right eye lkid  , hearing plsos of right eye , memory problmes , heterozygous   MTHFR  who has  bee progressively  getting sob,  and  coughing  .She has been evaluated by  pulmonary Dr Puckett  and having ct scan of lungs today. She has seen neurologist Dr Qiu  and ent .  \par   She doesn’t have abd pain,  rectal bleeding  constipation , diarrhea ,  dysphagia   or weight loss . she has easy bruising  and  chest pain when she coughs  too much.   She has not fu with cardiologist and is waiting till she has ct scan .   She is alos scheduled for  pft.

## 2023-05-15 NOTE — ASSESSMENT
[FreeTextEntry1] : health   She had covid vaccines and will bring in  dates  She is up to date with eye , dental ,  gyn  and vaccines  \par 2.  bmi  37  risks of obesity and need for diet and eating healthy Obesity is a complex disorder involving an excessive amount of body fat. Obesity isn't just a cosmetic concern. It increases your risk of diseases and health problems, such as heart disease, diabetes and high blood pressure.\par \par Being extremely obese means you are especially likely to have health problems related to your weight.\par \par \par The good news is that even modest weight loss can improve or prevent the health problems associated with obesity. Dietary changes, increased physical activity and behavior changes can help you lose weight. Prescription medications and weight-loss surgery are additional options for treating obesity.\par \par \par \par \par Symptoms\par \par Obesity is diagnosed when your body mass index (BMI) is 30 or higher. Your body mass index is calculated by dividing your weight in kilograms (kg) by your height in meters (m) squared. \par \par \par BMI\par \par Weight status\par \par \par Below 18.5 Underweight \par 18.5-24.9 Normal \par 25.0-29.9 Overweight \par 30.0-34.9 Obese (Class I) \par 35.0-39.9 Obese (Class II) \par 40.0 and higher Extreme obesity (Class III) \par \par For most people, BMI provides a reasonable estimate of body fat. However, BMI doesn't directly measure body fat, so some people, such as muscular athletes, may have a BMI in the obese category even though they don't have excess body fat. Ask your doctor if your BMI is a problem. \par \par When to see a doctor\par \par If you think you may be obese, and especially if you're concerned about weight-related health problems, see your doctor or health care provider. You and your provider can evaluate your health risks and discuss your weight-loss options. \par \par \par Request an Appointment at [facility]\par \par Causes\par \par Although there are genetic, behavioral and hormonal influences on body weight, obesity occurs when you take in more calories than you burn through exercise and normal daily activities. Your body stores these excess calories as fat.\par \par Obesity can sometimes be traced to a medical cause, such as Prader-Willi syndrome, Cushing's syndrome, and other diseases and conditions. However, these disorders are rare and, in general, the principal causes of obesity are:\par •Inactivity. If you're not very active, you don't burn as many calories. With a sedentary lifestyle, you can easily take in more calories every day than you use through exercise and normal daily activities.\par •Unhealthy diet and eating habits. Weight gain is inevitable if you regularly eat more calories than you burn. And most Americans' diets are too high in calories and are full of fast food and high-calorie beverages.\par \par Risk factors\par \par Obesity usually results from a combination of causes and contributing factors, including:\par •Genetics. Your genes may affect the amount of body fat you store, and where that fat is distributed. Genetics may also play a role in how efficiently your body converts food into energy and how your body burns calories during exercise.\par •Family lifestyle. Obesity tends to run in families. If one or both of your parents are obese, your risk of being obese is increased. That's not just because of genetics. Family members tend to share similar eating and activity habits.\par •Inactivity. If you're not very active, you don't burn as many calories. With a sedentary lifestyle, you can easily take in more calories every day than you burn through exercise and routine daily activities. Having medical problems, such as arthritis, can lead to decreased activity, which contributes to weight gain.\par •Unhealthy diet. A diet that's high in calories, lacking in fruits and vegetables, full of fast food, and laden with high-calorie beverages and oversized portions contributes to weight gain.\par •Medical problems. In some people, obesity can be traced to a medical cause, such as Prader-Willi syndrome, Cushing's syndrome and other conditions. Medical problems, such as arthritis, also can lead to decreased activity, which may result in weight gain.\par •Certain medications. Some medications can lead to weight gain if you don't compensate through diet or activity. These medications include some antidepressants, anti-seizure medications, diabetes medications, antipsychotic medications, steroids and beta blockers.\par •Social and economic issues. Research has linked social and economic factors to obesity. Avoiding obesity is difficult if you don't have safe areas to exercise. Similarly, you may not have been taught healthy ways of cooking, or you may not have money to buy healthier foods. In addition, the people you spend time with may influence your weight — you're more likely to become obese if you have obese friends or relatives.\par •Age. Obesity can occur at any age, even in young children. But as you age, hormonal changes and a less active lifestyle increase your risk of obesity. In addition, the amount of muscle in your body tends to decrease with age. This lower muscle mass leads to a decrease in metabolism. These changes also reduce calorie needs, and can make it harder to keep off excess weight. If you don't consciously control what you eat and become more physically active as you age, you'll likely gain weight.\par •Pregnancy. During pregnancy, a woman's weight necessarily increases. Some women find this weight difficult to lose after the baby is born. This weight gain may contribute to the development of obesity in women.\par •Quitting smoking. Quitting smoking is often associated with weight gain. And for some, it can lead to enough weight gain that the person becomes obese. In the long run, however, quitting smoking is still a greater benefit to your health than continuing to smoke.\par •Lack of sleep. Not getting enough sleep or getting too much sleep can cause changes in hormones that increase your appetite. You may also crave foods high in calories and carbohydrates, which can contribute to weight gain.\par \par Even if you have one or more of these risk factors, it doesn't mean that you're destined to become obese. You can counteract most risk factors through diet, physical activity and exercise, and behavior changes.\par \par Complications\par \par If you're obese, you're more likely to develop a number of potentially serious health problems, including:\par •High triglycerides and low high-density lipoprotein (HDL) cholesterol\par •Type 2 diabetes\par •High blood pressure\par •Metabolic syndrome — a combination of high blood sugar, high blood pressure, high triglycerides and low HDL cholesterol\par •Heart disease\par •Stroke\par •Cancer, including cancer of the uterus, cervix, endometrium, ovaries, breast, colon, rectum, esophagus, liver, gallbladder, pancreas, kidney and prostate\par •Breathing disorders, including sleep apnea, a potentially serious sleep disorder in which breathing repeatedly stops and starts\par •Gallbladder disease\par •Gynecological problems, such as infertility and irregular periods\par •Erectile dysfunction and sexual health issues\par •Nonalcoholic fatty liver disease, a condition in which fat builds up in the liver and can cause inflammation or scarring\par •Osteoarthritis\par \par Quality of life\par \par When you're obese, your overall quality of life may be diminished. You may not be able to do things you used to do, such as participating in enjoyable activities. You may avoid public places. Obese people may even encounter discrimination.\par \par Other weight-related issues that may affect your quality of life include:\par •Depression\par •Disability\par •Sexual problems\par •Shame and guilt\par •Social isolation\par •Lower work achievement\par \par Prevention\par \par Whether you're at risk of becoming obese, currently overweight or at a healthy weight, you can take steps to prevent unhealthy weight gain and related health problems. Not surprisingly, the steps to prevent weight gain are the same as the steps to lose weight: daily exercise, a healthy diet, and a long-term commitment to watch what you eat and drink.\par •Exercise regularly. You need to get 150 to 300 minutes of moderate-intensity activity a week to prevent weight gain. Moderately intense physical activities include fast walking and swimming.\par •Follow a healthy eating plan. Focus on low-calorie, nutrient-dense foods, such as fruits, veg\par 3.asthma  Asthma is believed to be caused by inherited (genetic) and environmental factor, but its exact cause is unknown. Asthma may be triggered by allergens, lung infections, or irritants in the air. Asthma triggers are different for each person \par -Inhaler technique reviewed as well as oral hygiene techniques reviewed with patient. Avoidance of cold air, extremes of temperature, rescue inhaler should be used before exercise. Order of medication reviewed with patient. Recommended use of a cool mist humidifier in the bedroom. \par pt could have cough variant asthma \par 4.  rA  Patient will benefit from physical therapy to help with joint mobility and muscle strengthening. Quadriceps strengthening exercises encouraged. Weight loss has been encouraged to reduce load over the joint line. \par Discussed comprehensive approach involving diet and nutrition, regular physical activity, and behavior change , with an emphasis on long-term weight management rather than short term extreme weight reduction. Discussions on relaxation, stress-reducing techniques and importance of good sleep hygiene reviewed. \par \par 5.  sob  is having ct scan and if negative will make an appt with cardiologist  to rule out cardiac  \par 6  fibromyalgia   continue to exercise healty eating  could try gluten free diet . \par 7 vaSCULITIS  ON ELIQUIS  \par

## 2023-05-15 NOTE — HEALTH RISK ASSESSMENT
[Fair] :  ~his/her~ mood as fair [No] : In the past 12 months have you used drugs other than those required for medical reasons? No [No falls in past year] : Patient reported no falls in the past year [0] : 2) Feeling down, depressed, or hopeless: Not at all (0) [PHQ-2 Negative - No further assessment needed] : PHQ-2 Negative - No further assessment needed [HIV test declined] : HIV test declined [Hepatitis C test offered] : Hepatitis C test offered [With Family] : lives with family [# of Members in Household ___] :  household currently consist of [unfilled] member(s) [Unemployed] : unemployed [High School] : high school [] :  [# Of Children ___] : has [unfilled] children [Sexually Active] : sexually active [Feels Safe at Home] : Feels safe at home [Fully functional (bathing, dressing, toileting, transferring, walking, feeding)] : Fully functional (bathing, dressing, toileting, transferring, walking, feeding) [Fully functional (using the telephone, shopping, preparing meals, housekeeping, doing laundry, using] : Fully functional and needs no help or supervision to perform IADLs (using the telephone, shopping, preparing meals, housekeeping, doing laundry, using transportation, managing medications and managing finances) [Smoke Detector] : smoke detector [Carbon Monoxide Detector] : carbon monoxide detector [Safety elements used in home] : safety elements used in home [Seat Belt] :  uses seat belt [Sunscreen] : uses sunscreen [FreeTextEntry1] : see above  [de-identified] : Dr Madrigal [facility]   Dr Qiu  neuro   Dr Gomez ent.   rheum  Annie Linares  [de-identified] : jogging  [de-identified] : none  [[id number]] : 0 [Change in mental status noted] : No change in mental status noted [Language] : denies difficulty with language [Behavior] : denies difficulty with behavior [Learning/Retaining New Information] : denies difficulty learning/retaining new information [Handling Complex Tasks] : denies difficulty handling complex tasks [Reasoning] : denies difficulty with reasoning [Spatial Ability and Orientation] : denies difficulty with spatial ability and orientation [Reports changes in hearing] : Reports no changes in hearing [Reports changes in vision] : Reports no changes in vision [Reports changes in dental health] : Reports no changes in dental health [Guns at Home] : no guns at home [Travel to Developing Areas] : does not  travel to developing areas [TB Exposure] : is not being exposed to tuberculosis [Caregiver Concerns] : does not have caregiver concerns [PapSmearDate] : has appt July  [de-identified] : last eye exam  4/23 [de-identified] : last dental appt  6 mnths ao  [AdvancecareDate] : 05/15/23 [Never] : Never

## 2023-05-15 NOTE — PHYSICAL EXAM
[Well Developed] : well developed [Well Nourished] : not well nourished [Normal Verbal Skills] : the patient had normal verbal communication skills [Conjunctiva] : the conjunctiva were normal in both eyes [PERRL] : pupils were equal in size, round, and reactive to light [EOM Intact] : extraocular movements were intact [Normal Appearance] : was normal in appearance [Neck Supple] : was supple [Enlarged Diffusely] : was not enlarged [JVP Elevated ___cm] : the JVP was not elevated [Rate ___] : at [unfilled] breaths per minute [Normal Rhythm/Effort] : normal respiratory rhythm and effort [Clear Bilaterally] : the lungs were clear to auscultation bilaterally [Normal to Percussion] : the lungs were normal to percussion [5th Left ICS - MCL] : palpated at the 5th LICS in the midclavicular line [Heart Rate ___] : [unfilled] bpm [Rhythm Regular] : regular [Normal Rate] : normal [Normal S1] : normal S1 [Normal S2] : normal S2 [S3] : no S3 [S4] : no S4 [No Murmur] : no murmurs heard [No Pitting Edema] : no pitting edema present [Rt] : no varicose veins of the right leg [Lt] : no varicose veins of the left leg [Right Carotid Bruit] : no bruit heard over the right carotid [Left Carotid Bruit] : no bruit heard over the left carotid [Right Femoral Bruit] : no bruit heard over the right femoral artery [Left Femoral Bruit] : no bruit heard over the left femoral artery [2+] : left 2+ [No Abnormalities] : the abdominal aorta was not enlarged and no bruit was heard [Bruit] : no bruit heard [Examination Of The Breasts] : a normal appearance [No Discharge] : no discharge [Soft, Nontender] : the abdomen was soft and nontender [No Mass] : no masses were palpated [No HSM] : no hepatosplenomegaly noted [Postauricular Lymph Nodes Enlarged Bilaterally] : nodes not enlarged [Preauricular Lymph Nodes Enlarged Bilaterally] : nodes not enlarged [Submandibular Lymph Nodes Enlarged Bilaterally] : nodes not enlarged [Suboccipital Lymph Nodes Enlarged Bilaterally] : nodes not enlarged [Submental Lymph Nodes Enlarged] : nodes not enlarged [Cervical Lymph Nodes Enlarged Posterior Bilaterally] : nodes not enlarged [Cervical Lymph Nodes Enlarged Anterior Bilaterally] : nodes not enlarged [Supraclavicular Lymph Nodes Enlarged Bilaterally] : nodes not enlarged [Axillary Lymph Nodes Enlarged Bilaterally] : nodes not enlarged [Epitrochlear Lymph Nodes Enlarged Bilaterally] : nodes not enlarged [Femoral Lymph Nodes Enlarged Bilaterally] : nodes not enlarged [Inguinal Lymph Nodes Enlarged Bilaterally] : nodes not enlarged [No Lymphangitis] : no lymphangitis observed [Normal Kyphosis] : normal kyphosis [No Visual Abnormalities] : no visible abnormalities [Normal Lordosis] : normal lordosis [No Scoliosis] : no scoliosis [No Tenderness to Palpation] : no spine tenderness on palpation [No Masses] : no masses [Full ROM] : full ROM [No Pain with ROM] : no pain with motion in any direction [Intact] : all reflexes within normal limits bilaterally [Normal Station and Gait] : the gait and station were normal [Normal] : motor strength was normal in all muscle groups [Normal Motor Tone] : the muscle tone was normal [Involuntary Movements] : no involuntary movements were seen [Normal Scalp] : inspection of the scalp showed no abnormalities [Examination Of The Hair] : texture and distribution of hair was normal [Complexion Medium] : medium complexion [Multiple Tattoos] : multiple tattoos observed [Coordination Grossly Intact] : coordination grossly intact [No Focal Deficits] : no focal deficits [Normal Gait] : normal gait [Deep Tendon Reflexes (DTR)] : deep tendon reflexes were 2+ and symmetric [Normal Mental Status] : the patient's orientation, memory, attention, language and fund of knowledge were normal [Appropriate] : appropriate [Impaired judgment] : intact judgment [Impaired Insight] : intact insight [de-identified] : teeth in good repair   tongue normal  [de-identified] : slight ptosis of right eye lid

## 2023-05-15 NOTE — COUNSELING
[Sleep ___ hours/day] : Sleep [unfilled] hours/day [Engage in a relaxing activity] : Engage in a relaxing activity [Plan in advance] : Plan in advance [Potential consequences of obesity discussed] : Potential consequences of obesity discussed [Benefits of weight loss discussed] : Benefits of weight loss discussed [Structured Weight Management Program suggested:] : Structured weight management program suggested [Encouraged to maintain food diary] : Encouraged to maintain food diary [Encouraged to increase physical activity] : Encouraged to increase physical activity [Encouraged to use exercise tracking device] : Encouraged to use exercise tracking device [Weigh Self Weekly] : weigh self weekly [Decrease Portions] : decrease portions [____ min/wk Activity] : [unfilled] min/wk activity [Keep Food Diary] : keep food diary [FreeTextEntry2] : bmi 38  weight  192

## 2023-05-19 PROBLEM — R05.9 COUGH: Status: ACTIVE | Noted: 2019-06-27

## 2023-05-19 NOTE — ASSESSMENT
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

## 2023-05-19 NOTE — HISTORY OF PRESENT ILLNESS
[TextBox_4] : 35F with h/o asthma and RA, previously on MTX and rituximab, here for evaluation of sob.\par Pt reports had covid 1 month ago (pfiser 2nd shot 5/21). She had mild to moderate symptoms and received MAB infusion. Cough has improved but SAMANIEGO has persisted and slowly getting better. NO wheezing, no chest tightness. Has been off MTX since august.\par \par Pt here for f/u\par Reports about 2 months of sob, samaniego and persistent dry cough, no phlegm. No wheezing. Reports waking up at night gasping for air and difficulty sleeping. Has to sleep sitting up. Has been having more frequent night sweats, no fevers or chills. No leg swelling. Using Symbicort daily without relief. No allergy symptoms. Feels her psoriasis and RA is worse in the joints. Currently on Rituximab, has been off MTX for 2 months since kidney infection.\par

## 2023-05-23 LAB
CHOLEST SERPL-MCNC: 203 MG/DL
HDLC SERPL-MCNC: 49 MG/DL
LDLC SERPL CALC-MCNC: 130 MG/DL
NONHDLC SERPL-MCNC: 153 MG/DL
TRIGL SERPL-MCNC: 115 MG/DL

## 2023-05-24 ENCOUNTER — NON-APPOINTMENT (OUTPATIENT)
Age: 38
End: 2023-05-24

## 2023-05-24 ENCOUNTER — TRANSCRIPTION ENCOUNTER (OUTPATIENT)
Age: 38
End: 2023-05-24

## 2023-05-24 LAB
ALBUMIN SERPL ELPH-MCNC: 4.4 G/DL
ALP BLD-CCNC: 193 U/L
ALT SERPL-CCNC: 17 U/L
ANA PAT FLD IF-IMP: NORMAL
ANA SER IF-ACNC: ABNORMAL
ANION GAP SERPL CALC-SCNC: 13 MMOL/L
APTT BLD: 35.4 SEC
AST SERPL-CCNC: 15 U/L
BILIRUB SERPL-MCNC: 0.2 MG/DL
BUN SERPL-MCNC: 11 MG/DL
CALCIUM SERPL-MCNC: 10 MG/DL
CCP AB SER IA-ACNC: <8 UNITS
CHLORIDE SERPL-SCNC: 103 MMOL/L
CO2 SERPL-SCNC: 26 MMOL/L
CREAT SERPL-MCNC: 0.72 MG/DL
EGFR: 110 ML/MIN/1.73M2
ERYTHROCYTE [SEDIMENTATION RATE] IN BLOOD BY WESTERGREN METHOD: 32 MM/HR
FUNGITELL QUANTITATIVE VALUE: <31 PG/ML
GLUCOSE SERPL-MCNC: 68 MG/DL
INR PPP: 1.21 RATIO
LDH SERPL-CCNC: 203 U/L
POTASSIUM SERPL-SCNC: 4.2 MMOL/L
PROT SERPL-MCNC: 7 G/DL
PT BLD: 14.1 SEC
RF+CCP IGG SER-IMP: NEGATIVE
RHEUMATOID FACT SER QL: <10 IU/ML
SODIUM SERPL-SCNC: 141 MMOL/L

## 2023-06-01 ENCOUNTER — APPOINTMENT (OUTPATIENT)
Dept: PULMONOLOGY | Facility: CLINIC | Age: 38
End: 2023-06-01
Payer: MEDICAID

## 2023-06-01 VITALS
SYSTOLIC BLOOD PRESSURE: 117 MMHG | TEMPERATURE: 97.8 F | RESPIRATION RATE: 17 BRPM | OXYGEN SATURATION: 98 % | HEART RATE: 91 BPM | WEIGHT: 194 LBS | HEIGHT: 59 IN | DIASTOLIC BLOOD PRESSURE: 84 MMHG | BODY MASS INDEX: 39.11 KG/M2

## 2023-06-01 DIAGNOSIS — U09.9 POST COVID-19 CONDITION, UNSPECIFIED: ICD-10-CM

## 2023-06-01 DIAGNOSIS — R06.09 OTHER FORMS OF DYSPNEA: ICD-10-CM

## 2023-06-01 DIAGNOSIS — R05.8 OTHER SPECIFIED COUGH: ICD-10-CM

## 2023-06-01 LAB — GALACTOMANNAN AG SERPL QL IA: 0.03 INDEX

## 2023-06-01 PROCEDURE — 99215 OFFICE O/P EST HI 40 MIN: CPT

## 2023-06-01 RX ORDER — ALBUTEROL SULFATE 2.5 MG/3ML
(2.5 MG/3ML) SOLUTION RESPIRATORY (INHALATION)
Qty: 1 | Refills: 0 | Status: ACTIVE | COMMUNITY
Start: 2023-06-01 | End: 1900-01-01

## 2023-06-01 NOTE — PHYSICAL EXAM
[No Acute Distress] : no acute distress [No Deformities] : no deformities [Normal Oropharynx] : normal oropharynx [Normal Appearance] : normal appearance [Supple] : supple [Normal Rate/Rhythm] : normal rate/rhythm [Normal S1, S2] : normal s1, s2 [Clear to Auscultation Bilaterally] : clear to auscultation bilaterally [No Resp Distress] : no resp distress [Benign] : benign [No Clubbing] : no clubbing [No Edema] : no edema [No Focal Deficits] : no focal deficits [No Motor Deficits] : no motor deficits [Normal Affect] : normal affect [Oriented x3] : oriented x3

## 2023-06-05 ENCOUNTER — TRANSCRIPTION ENCOUNTER (OUTPATIENT)
Age: 38
End: 2023-06-05

## 2023-06-09 ENCOUNTER — TRANSCRIPTION ENCOUNTER (OUTPATIENT)
Age: 38
End: 2023-06-09

## 2023-06-12 ENCOUNTER — LABORATORY RESULT (OUTPATIENT)
Age: 38
End: 2023-06-12

## 2023-06-12 ENCOUNTER — APPOINTMENT (OUTPATIENT)
Dept: INTERNAL MEDICINE | Facility: CLINIC | Age: 38
End: 2023-06-12
Payer: MEDICAID

## 2023-06-12 ENCOUNTER — NON-APPOINTMENT (OUTPATIENT)
Age: 38
End: 2023-06-12

## 2023-06-12 ENCOUNTER — APPOINTMENT (OUTPATIENT)
Dept: CARDIOLOGY | Facility: CLINIC | Age: 38
End: 2023-06-12
Payer: MEDICAID

## 2023-06-12 ENCOUNTER — APPOINTMENT (OUTPATIENT)
Dept: PULMONOLOGY | Facility: CLINIC | Age: 38
End: 2023-06-12
Payer: MEDICAID

## 2023-06-12 VITALS
DIASTOLIC BLOOD PRESSURE: 76 MMHG | SYSTOLIC BLOOD PRESSURE: 100 MMHG | OXYGEN SATURATION: 98 % | WEIGHT: 194 LBS | HEART RATE: 76 BPM | HEIGHT: 59 IN | TEMPERATURE: 97.9 F | BODY MASS INDEX: 39.11 KG/M2

## 2023-06-12 VITALS
BODY MASS INDEX: 39.11 KG/M2 | HEIGHT: 59 IN | HEART RATE: 71 BPM | DIASTOLIC BLOOD PRESSURE: 64 MMHG | WEIGHT: 194 LBS | OXYGEN SATURATION: 98 % | TEMPERATURE: 98.6 F | SYSTOLIC BLOOD PRESSURE: 98 MMHG

## 2023-06-12 VITALS
HEART RATE: 71 BPM | OXYGEN SATURATION: 98 % | BODY MASS INDEX: 39.11 KG/M2 | SYSTOLIC BLOOD PRESSURE: 113 MMHG | HEIGHT: 59 IN | WEIGHT: 194 LBS | DIASTOLIC BLOOD PRESSURE: 78 MMHG

## 2023-06-12 DIAGNOSIS — Z13.6 ENCOUNTER FOR SCREENING FOR CARDIOVASCULAR DISORDERS: ICD-10-CM

## 2023-06-12 DIAGNOSIS — R91.8 OTHER NONSPECIFIC ABNORMAL FINDING OF LUNG FIELD: ICD-10-CM

## 2023-06-12 DIAGNOSIS — Z79.899 OTHER LONG TERM (CURRENT) DRUG THERAPY: ICD-10-CM

## 2023-06-12 DIAGNOSIS — Z01.818 ENCOUNTER FOR OTHER PREPROCEDURAL EXAMINATION: ICD-10-CM

## 2023-06-12 DIAGNOSIS — G93.2 BENIGN INTRACRANIAL HYPERTENSION: ICD-10-CM

## 2023-06-12 DIAGNOSIS — M35.9 SYSTEMIC INVOLVEMENT OF CONNECTIVE TISSUE, UNSPECIFIED: ICD-10-CM

## 2023-06-12 DIAGNOSIS — J45.909 UNSPECIFIED ASTHMA, UNCOMPLICATED: ICD-10-CM

## 2023-06-12 DIAGNOSIS — H02.431: ICD-10-CM

## 2023-06-12 PROCEDURE — 93000 ELECTROCARDIOGRAM COMPLETE: CPT

## 2023-06-12 PROCEDURE — 99214 OFFICE O/P EST MOD 30 MIN: CPT | Mod: 25

## 2023-06-12 PROCEDURE — 99215 OFFICE O/P EST HI 40 MIN: CPT

## 2023-06-12 PROCEDURE — 99214 OFFICE O/P EST MOD 30 MIN: CPT

## 2023-06-12 RX ORDER — VERAPAMIL HYDROCHLORIDE 180 MG/1
180 TABLET ORAL
Qty: 30 | Refills: 3 | Status: DISCONTINUED | COMMUNITY
Start: 2023-04-03 | End: 2023-06-12

## 2023-06-13 LAB
ALBUMIN SERPL ELPH-MCNC: 4.5 G/DL
ALP BLD-CCNC: 176 U/L
ALT SERPL-CCNC: 15 U/L
ANION GAP SERPL CALC-SCNC: 16 MMOL/L
APPEARANCE: ABNORMAL
APTT BLD: 35.4 SEC
AST SERPL-CCNC: 11 U/L
BILIRUB SERPL-MCNC: 0.3 MG/DL
BILIRUBIN URINE: NEGATIVE
BLOOD URINE: ABNORMAL
BUN SERPL-MCNC: 16 MG/DL
CALCIUM SERPL-MCNC: 9.6 MG/DL
CHLORIDE SERPL-SCNC: 103 MMOL/L
CO2 SERPL-SCNC: 22 MMOL/L
COLOR: YELLOW
CREAT SERPL-MCNC: 0.67 MG/DL
EGFR: 115 ML/MIN/1.73M2
GLUCOSE QUALITATIVE U: NEGATIVE MG/DL
GLUCOSE SERPL-MCNC: 76 MG/DL
INR PPP: 1.18 RATIO
KETONES URINE: NEGATIVE MG/DL
LEUKOCYTE ESTERASE URINE: ABNORMAL
NITRITE URINE: NEGATIVE
PH URINE: 5.5
POTASSIUM SERPL-SCNC: 4.2 MMOL/L
PROT SERPL-MCNC: 7 G/DL
PROTEIN URINE: NEGATIVE MG/DL
PT BLD: 13.8 SEC
SODIUM SERPL-SCNC: 141 MMOL/L
SPECIFIC GRAVITY URINE: 1.02
UROBILINOGEN URINE: 0.2 MG/DL

## 2023-06-13 NOTE — ASSESSMENT
[High Risk Surgery - Intraperitoneal, Intrathoracic or Supringuinal Vascular Procedures] : High Risk Surgery - Intraperitoneal, Intrathoracic or Supringuinal Vascular Procedures - No (0) [Ischemic Heart Disease] : Ischemic Heart Disease - No (0) [Congestive Heart Failure] : Congestive Heart Failure - No (0) [Prior Cerebrovascular Accident or TIA] : Prior Cerebrovascular Accident or TIA - No (0) [Creatinine >= 2mg/dL (1 Point)] : Creatinine >= 2mg/dL - No (0) [Insulin-dependent Diabetic (1 Point)] : Insulin-dependent Diabetic - No (0) [0] : 0 , RCRI Class: I, Risk of Post-Op Cardiac Complications: 3.9%, 95% CI for Risk Estimate: 2.8% - 5.4% [Modify anticoagulant treatment prior to procedure] : Modify anticoagulant treatment prior to procedure [FreeTextEntry4] : Hugo score with ASA 3. Risk of 0.24% of myocardial infarction or cardiac arrest, intraoperatively or up to 30 days post-op\par RCRI = Pt is having a low risk procedure and is low risks for cardiac adverse outcome and cri is 0.4%.\par The details of the procedure, complications, benefits, and alternatives were explained in detail to the patient by the surgeon. She was instructed on change to anticoagulant regimen by Dr. Antonio and myself.\par  [FreeTextEntry5] : Per Dr. Antonio (below) [FreeTextEntry7] : She will skip fluticasone on the day of the procedure. She will take Symbicort in the morning of procedure as usual [FreeTextEntry2] : Stop Eliquis on 6/18,19. Start Lovenox 80 BID for 6/20,6/21. Do not take any on 6/22(day of procedure)

## 2023-06-13 NOTE — RESULTS/DATA
[Ventricular Rate___] : ventricular rate is [unfilled] beats per minute [IA Interval___] : [unfilled] seconds [QRS Interval___] : QRS interval: [unfilled] seconds [QTc Interval___] : QTc interval: [unfilled] [FreeTextEntry1] : Sinus Rhythm [FreeTextEntry2] : LA Enlargement [de-identified] : W

## 2023-06-13 NOTE — END OF VISIT
[>50% of the face to face encounter time was spent on counseling and/or coordination of care for ___] : Greater than 50% of the face to face encounter time was spent on counseling and/or coordination of care for [unfilled] [FreeTextEntry3] : resident present

## 2023-06-13 NOTE — PHYSICAL EXAM
[Well Developed] : well developed [Normal Verbal Skills] : the patient had normal verbal communication skills [Conjunctiva] : the conjunctiva were normal in both eyes [PERRL] : pupils were equal in size, round, and reactive to light [EOM Intact] : extraocular movements were intact [Rate ___] : at [unfilled] breaths per minute [Normal Rhythm/Effort] : normal respiratory rhythm and effort [Clear Bilaterally] : the lungs were clear to auscultation bilaterally [Normal to Percussion] : the lungs were normal to percussion [5th Left ICS - MCL] : palpated at the 5th LICS in the midclavicular line [Normal Rate] : normal [Heart Rate ___] : [unfilled] bpm [Rhythm Regular] : regular [Normal S1] : normal S1 [Normal S2] : normal S2 [No Murmur] : no murmurs heard [No Pitting Edema] : no pitting edema present [2+] : left 2+ [No Abnormalities] : the abdominal aorta was not enlarged and no bruit was heard [Soft, Nontender] : the abdomen was soft and nontender [No Mass] : no masses were palpated [No HSM] : no hepatosplenomegaly noted [No Lymphangitis] : no lymphangitis observed [Normal Kyphosis] : normal kyphosis [No Visual Abnormalities] : no visible abnormalities [Normal Lordosis] : normal lordosis [No Scoliosis] : no scoliosis [No Tenderness to Palpation] : no spine tenderness on palpation [No Masses] : no masses [Full ROM] : full ROM [No Pain with ROM] : no pain with motion in any direction [Intact] : all reflexes within normal limits bilaterally [Normal Station and Gait] : the gait and station were normal [Normal] : motor strength was normal in all muscle groups [Normal Motor Tone] : the muscle tone was normal [Involuntary Movements] : no involuntary movements were seen [Normal Scalp] : inspection of the scalp showed no abnormalities [Examination Of The Hair] : texture and distribution of hair was normal [Complexion Medium] : medium complexion [Multiple Tattoos] : multiple tattoos observed [Coordination Grossly Intact] : coordination grossly intact [No Focal Deficits] : no focal deficits [Normal Gait] : normal gait [Normal Mental Status] : the patient's orientation, memory, attention, language and fund of knowledge were normal [Appropriate] : appropriate [Well Nourished] : not well nourished [S3] : no S3 [S4] : no S4 [Rt] : no varicose veins of the right leg [Lt] : no varicose veins of the left leg [Right Carotid Bruit] : no bruit heard over the right carotid [Left Carotid Bruit] : no bruit heard over the left carotid [Right Femoral Bruit] : no bruit heard over the right femoral artery [Left Femoral Bruit] : no bruit heard over the left femoral artery [Bruit] : no bruit heard [Postauricular Lymph Nodes Enlarged Bilaterally] : nodes not enlarged [Preauricular Lymph Nodes Enlarged Bilaterally] : nodes not enlarged [Submandibular Lymph Nodes Enlarged Bilaterally] : nodes not enlarged [Suboccipital Lymph Nodes Enlarged Bilaterally] : nodes not enlarged [Submental Lymph Nodes Enlarged] : nodes not enlarged [Cervical Lymph Nodes Enlarged Posterior Bilaterally] : nodes not enlarged [Cervical Lymph Nodes Enlarged Anterior Bilaterally] : nodes not enlarged [Supraclavicular Lymph Nodes Enlarged Bilaterally] : nodes not enlarged [Axillary Lymph Nodes Enlarged Bilaterally] : nodes not enlarged [Epitrochlear Lymph Nodes Enlarged Bilaterally] : nodes not enlarged [Femoral Lymph Nodes Enlarged Bilaterally] : nodes not enlarged [Inguinal Lymph Nodes Enlarged Bilaterally] : nodes not enlarged [Impaired judgment] : intact judgment [Impaired Insight] : intact insight [de-identified] : teeth in good repair, tongue normal  [de-identified] : varicose veins bilaterally [de-identified] : No Homans sign bilaterally [de-identified] : mild R ptosis

## 2023-06-13 NOTE — HISTORY OF PRESENT ILLNESS
[Asthma] : asthma [No Adverse Anesthesia Reaction] : no adverse anesthesia reaction in self or family member [Chronic Anticoagulation] : chronic anticoagulation [(Patient denies any chest pain, claudication, dyspnea on exertion, orthopnea, palpitations or syncope)] : Patient denies any chest pain, claudication, dyspnea on exertion, orthopnea, palpitations or syncope [Aortic Stenosis] : no aortic stenosis [Atrial Fibrillation] : no atrial fibrillation [Coronary Artery Disease] : no coronary artery disease [Recent Myocardial Infarction] : no recent myocardial infarction [Implantable Device/Pacemaker] : no implantable device/pacemaker [COPD] : no COPD [Sleep Apnea] : no sleep apnea [Smoker] : not a smoker [Chronic Kidney Disease] : no chronic kidney disease [Diabetes] : no diabetes [FreeTextEntry1] : Bronchoscopy ronchoscopy with BAL and transbronchial biopsy [FreeTextEntry2] : 6/22/23 [FreeTextEntry3] : Dr. Zachery Guardado [FreeTextEntry4] : Pt is a 37 yr old woman with hx of RA on methotrexate and rituximab (last one year ago), PE on Eliquis 2.5 BID, asthma , b9 intracranial hypertension, , fibromyalgia, herniated lumbar disc paralytic ptosis of right eye lkid , hearing plsos of right eye , memory problmes , heterozygous MTHFR, who comes in for a pre-op visit prior to Bronchoscopy with BAL and transbronchial biopsy on 6/22/23 with Dr. Zachery Guardado.\par (Patient was found to have GGO on Chest CT.)\par Patient denies any headache, fever, chills, generalized weakness, nausea, vomiting, diarrhea, chest pain, shortness of breath, palpitation, dizziness, abdominal pain, joint pain, dysuria, hematuria, or dark/bloody stools.

## 2023-06-13 NOTE — ADDENDUM
[FreeTextEntry1] : i reviewed her labs pt  is elevated since she is on elquis and it can affect the  pt   pt is cleared if pregnancy test is negative

## 2023-06-14 ENCOUNTER — OUTPATIENT (OUTPATIENT)
Dept: OUTPATIENT SERVICES | Facility: HOSPITAL | Age: 38
LOS: 1 days | End: 2023-06-14

## 2023-06-14 ENCOUNTER — TRANSCRIPTION ENCOUNTER (OUTPATIENT)
Age: 38
End: 2023-06-14

## 2023-06-14 VITALS
OXYGEN SATURATION: 97 % | HEIGHT: 59.75 IN | HEART RATE: 75 BPM | DIASTOLIC BLOOD PRESSURE: 67 MMHG | TEMPERATURE: 98 F | RESPIRATION RATE: 16 BRPM | WEIGHT: 190.92 LBS | SYSTOLIC BLOOD PRESSURE: 111 MMHG

## 2023-06-14 DIAGNOSIS — R93.89 ABNORMAL FINDINGS ON DIAGNOSTIC IMAGING OF OTHER SPECIFIED BODY STRUCTURES: ICD-10-CM

## 2023-06-14 DIAGNOSIS — Z98.890 OTHER SPECIFIED POSTPROCEDURAL STATES: Chronic | ICD-10-CM

## 2023-06-14 DIAGNOSIS — Z98.891 HISTORY OF UTERINE SCAR FROM PREVIOUS SURGERY: Chronic | ICD-10-CM

## 2023-06-14 DIAGNOSIS — Z98.51 TUBAL LIGATION STATUS: Chronic | ICD-10-CM

## 2023-06-14 DIAGNOSIS — R91.8 OTHER NONSPECIFIC ABNORMAL FINDING OF LUNG FIELD: ICD-10-CM

## 2023-06-14 DIAGNOSIS — R06.02 SHORTNESS OF BREATH: ICD-10-CM

## 2023-06-14 DIAGNOSIS — M72.2 PLANTAR FASCIAL FIBROMATOSIS: Chronic | ICD-10-CM

## 2023-06-14 DIAGNOSIS — I26.99 OTHER PULMONARY EMBOLISM WITHOUT ACUTE COR PULMONALE: ICD-10-CM

## 2023-06-14 LAB
ANION GAP SERPL CALC-SCNC: 12 MMOL/L — SIGNIFICANT CHANGE UP (ref 7–14)
BUN SERPL-MCNC: 12 MG/DL — SIGNIFICANT CHANGE UP (ref 7–23)
CALCIUM SERPL-MCNC: 9.4 MG/DL — SIGNIFICANT CHANGE UP (ref 8.4–10.5)
CHLORIDE SERPL-SCNC: 104 MMOL/L — SIGNIFICANT CHANGE UP (ref 98–107)
CO2 SERPL-SCNC: 25 MMOL/L — SIGNIFICANT CHANGE UP (ref 22–31)
CREAT SERPL-MCNC: 0.73 MG/DL — SIGNIFICANT CHANGE UP (ref 0.5–1.3)
EGFR: 108 ML/MIN/1.73M2 — SIGNIFICANT CHANGE UP
GLUCOSE SERPL-MCNC: 105 MG/DL — HIGH (ref 70–99)
HCT VFR BLD CALC: 37.2 % — SIGNIFICANT CHANGE UP (ref 34.5–45)
HGB BLD-MCNC: 11.8 G/DL — SIGNIFICANT CHANGE UP (ref 11.5–15.5)
MCHC RBC-ENTMCNC: 27.3 PG — SIGNIFICANT CHANGE UP (ref 27–34)
MCHC RBC-ENTMCNC: 31.7 GM/DL — LOW (ref 32–36)
MCV RBC AUTO: 85.9 FL — SIGNIFICANT CHANGE UP (ref 80–100)
NRBC # BLD: 0 /100 WBCS — SIGNIFICANT CHANGE UP (ref 0–0)
NRBC # FLD: 0 K/UL — SIGNIFICANT CHANGE UP (ref 0–0)
PLATELET # BLD AUTO: 286 K/UL — SIGNIFICANT CHANGE UP (ref 150–400)
POTASSIUM SERPL-MCNC: 3.8 MMOL/L — SIGNIFICANT CHANGE UP (ref 3.5–5.3)
POTASSIUM SERPL-SCNC: 3.8 MMOL/L — SIGNIFICANT CHANGE UP (ref 3.5–5.3)
RBC # BLD: 4.33 M/UL — SIGNIFICANT CHANGE UP (ref 3.8–5.2)
RBC # FLD: 14.6 % — HIGH (ref 10.3–14.5)
SODIUM SERPL-SCNC: 141 MMOL/L — SIGNIFICANT CHANGE UP (ref 135–145)
WBC # BLD: 10.86 K/UL — HIGH (ref 3.8–10.5)
WBC # FLD AUTO: 10.86 K/UL — HIGH (ref 3.8–10.5)

## 2023-06-14 RX ORDER — ENOXAPARIN SODIUM 100 MG/ML
80 INJECTION SUBCUTANEOUS
Qty: 0 | Refills: 0 | DISCHARGE

## 2023-06-14 RX ORDER — APIXABAN 2.5 MG/1
1 TABLET, FILM COATED ORAL
Qty: 0 | Refills: 0 | DISCHARGE

## 2023-06-14 RX ORDER — SODIUM CHLORIDE 9 MG/ML
1000 INJECTION, SOLUTION INTRAVENOUS
Refills: 0 | Status: DISCONTINUED | OUTPATIENT
Start: 2023-06-22 | End: 2023-07-06

## 2023-06-14 NOTE — H&P PST ADULT - PROBLEM SELECTOR PLAN 1
This is a 36 y/o female who is scheduled for flexible bronchoscopy, transbronchial biopsy, bronchoalveolar lavage on 6-22-23  * Given preop instructions

## 2023-06-14 NOTE — H&P PST ADULT - NSICDXPASTMEDICALHX_GEN_ALL_CORE_FT
PAST MEDICAL HISTORY:  Asthma     Fibromyalgia     H/O antiphospholipid syndrome     Lung abnormality     PE (pulmonary thromboembolism)     Premature ovarian failure     Rheumatoid arthritis

## 2023-06-14 NOTE — H&P PST ADULT - PROBLEM SELECTOR PLAN 2
Await prearranged cardiac evaluation with cardiologist due to c/o SOB with ambulation with METS less than 4.  * Await recent ekg and echo from cardiologist  * Instructed to take normal am dose of ventolin, fluticasone, Lovenox, azelastine, and Symbicort the am of surgery

## 2023-06-14 NOTE — H&P PST ADULT - CARDIOVASCULAR
Hospitalist Progress Note      PCP: America Smith MD    Date of Admission: 5/8/2023    Chief Complaint:  no acute events, afebrile, stable HD, on RA    Medications:  Reviewed    Infusion Medications    sodium chloride       Scheduled Medications    sodium chloride flush  5-40 mL IntraVENous 2 times per day    enoxaparin  1 mg/kg SubCUTAneous BID    meropenem  1,000 mg IntraVENous Q8H    aspirin  81 mg Oral Daily    buPROPion  100 mg Oral Daily    carvedilol  6.25 mg Oral BID WC    finasteride  5 mg Oral Daily    isosorbide mononitrate  60 mg Oral Daily    lisinopril  5 mg Oral Daily    montelukast  10 mg Oral Nightly    atorvastatin  10 mg Oral Nightly    ibuprofen  200 mg Oral BID    fluticasone  1 spray Nasal Daily     PRN Meds: sodium chloride flush, sodium chloride, ondansetron **OR** ondansetron, polyethylene glycol, acetaminophen **OR** acetaminophen      Intake/Output Summary (Last 24 hours) at 5/10/2023 1317  Last data filed at 5/9/2023 2235  Gross per 24 hour   Intake 230.65 ml   Output 200 ml   Net 30.65 ml         Exam:    BP (!) 147/71   Pulse 84   Temp 98.1 °F (36.7 °C) (Oral)   Resp 20   Ht 6' (1.829 m)   Wt 228 lb 8 oz (103.6 kg)   SpO2 94%   BMI 30.99 kg/m²     General appearance: alert, cooperative  Lungs: clear to auscultation bilaterally  Heart: S1/S2,RRR  Abdomen: soft, active BS  Extremities: no edema      Labs:   Recent Labs     05/08/23  0015 05/09/23  0507 05/10/23  0412   WBC 31.5* 25.9* 26.0*   HGB 12.8* 13.0* 12.6*   HCT 37.5* 37.4* 36.0*    176 200       Recent Labs     05/08/23  0015 05/08/23  0124 05/09/23  0507 05/10/23  0412   *  --  133* 136   K 3.7  --  3.4 3.3*     --  103 101   CO2 20  --  19* 20   BUN 27*  --  24* 25*   CREATININE 1.03 1.1 0.74 0.73   CALCIUM 8.3*  --  8.1* 8.4*   PHOS  --   --  2.0* 2.1*       Recent Labs     05/08/23  0015   AST 16   ALT 8   BILITOT 1.0*   ALKPHOS 74       Recent Labs     05/08/23  0015   INR 1.2       Recent Labs Hospitalist Progress Note      PCP: America Smith MD    Date of Admission: 5/8/2023    Chief Complaint:  no acute events, afebrile, stable HD, on RA    Medications:  Reviewed    Infusion Medications    sodium chloride       Scheduled Medications    sodium chloride flush  5-40 mL IntraVENous 2 times per day    enoxaparin  1 mg/kg SubCUTAneous BID    meropenem  1,000 mg IntraVENous Q8H    aspirin  81 mg Oral Daily    buPROPion  100 mg Oral Daily    carvedilol  6.25 mg Oral BID WC    finasteride  5 mg Oral Daily    isosorbide mononitrate  60 mg Oral Daily    lisinopril  5 mg Oral Daily    montelukast  10 mg Oral Nightly    atorvastatin  10 mg Oral Nightly    ibuprofen  200 mg Oral BID    fluticasone  1 spray Nasal Daily     PRN Meds: sodium chloride flush, sodium chloride, ondansetron **OR** ondansetron, polyethylene glycol, acetaminophen **OR** acetaminophen      Intake/Output Summary (Last 24 hours) at 5/9/2023 1308  Last data filed at 5/9/2023 4675  Gross per 24 hour   Intake 878.7 ml   Output 600 ml   Net 278.7 ml       Exam:    /69   Pulse 83   Temp 99.7 °F (37.6 °C) (Oral)   Resp 20   Ht 6' (1.829 m)   Wt 228 lb 8 oz (103.6 kg)   SpO2 91%   BMI 30.99 kg/m²     General appearance: alert, cooperative  Lungs: clear to auscultation bilaterally  Heart: S1/S2,RRR  Abdomen: soft, active BS  Extremities: no edema      Labs:   Recent Labs     05/08/23  0015 05/09/23  0507   WBC 31.5* 25.9*   HGB 12.8* 13.0*   HCT 37.5* 37.4*    176     Recent Labs     05/08/23  0015 05/08/23  0124 05/09/23  0507   *  --  133*   K 3.7  --  3.4     --  103   CO2 20  --  19*   BUN 27*  --  24*   CREATININE 1.03 1.1 0.74   CALCIUM 8.3*  --  8.1*   PHOS  --   --  2.0*     Recent Labs     05/08/23  0015   AST 16   ALT 8   BILITOT 1.0*   ALKPHOS 74     Recent Labs     05/08/23  0015   INR 1.2     Recent Labs     05/08/23  0456   TROPONINI <0.010       Urinalysis:      Lab Results   Component Value Date/Time Hospitalist Progress Note      PCP: Deonna Rodríguez MD    Date of Admission: 5/8/2023    Chief Complaint:  no acute events, afebrile, stable HD, on RA    Medications:  Reviewed    Infusion Medications    sodium chloride       Scheduled Medications    potassium chloride  40 mEq Oral Daily    apixaban  5 mg Oral BID    sodium chloride flush  5-40 mL IntraVENous 2 times per day    meropenem  1,000 mg IntraVENous Q8H    aspirin  81 mg Oral Daily    buPROPion  100 mg Oral Daily    carvedilol  6.25 mg Oral BID WC    finasteride  5 mg Oral Daily    isosorbide mononitrate  60 mg Oral Daily    lisinopril  5 mg Oral Daily    montelukast  10 mg Oral Nightly    atorvastatin  10 mg Oral Nightly    ibuprofen  200 mg Oral BID    fluticasone  1 spray Nasal Daily     PRN Meds: sodium chloride flush, sodium chloride, ondansetron **OR** ondansetron, polyethylene glycol, acetaminophen **OR** acetaminophen      Intake/Output Summary (Last 24 hours) at 5/12/2023 1152  Last data filed at 5/12/2023 0325  Gross per 24 hour   Intake --   Output 825 ml   Net -825 ml         Exam:    /67   Pulse 71   Temp 98.6 °F (37 °C) (Oral)   Resp 20   Ht 6' (1.829 m)   Wt 228 lb 8 oz (103.6 kg)   SpO2 95%   BMI 30.99 kg/m²     General appearance: alert, cooperative  Lungs: clear to auscultation bilaterally  Heart: S1/S2,RRR  Abdomen: soft, active BS  Extremities: no edema      Labs:   Recent Labs     05/10/23  0412 05/11/23 0417 05/12/23 0418   WBC 26.0* 25.9* 22.0*   HGB 12.6* 12.1* 11.7*   HCT 36.0* 35.3* 34.0*    240 267       Recent Labs     05/10/23  0412 05/11/23 0417 05/12/23 0418    134* 137   K 3.3* 3.2* 3.5    103 105   CO2 20 21 23   BUN 25* 19 14   CREATININE 0.73 0.69* 0.62*   CALCIUM 8.4* 8.0* 8.1*   PHOS 2.1* 2.0* 2.0*       No results for input(s): AST, ALT, BILIDIR, BILITOT, ALKPHOS in the last 72 hours. No results for input(s): INR in the last 72 hours.     No results for input(s): Amandeep Kothari Hospitalist Progress Note      PCP: Deonna Rodríguez MD    Date of Admission: 5/8/2023    Chief Complaint:  no acute events, afebrile, stable HD, on RA, is sitting up in a chair today, feels a little better    Medications:  Reviewed    Infusion Medications    sodium chloride       Scheduled Medications    sodium chloride flush  5-40 mL IntraVENous 2 times per day    enoxaparin  1 mg/kg SubCUTAneous BID    meropenem  1,000 mg IntraVENous Q8H    aspirin  81 mg Oral Daily    buPROPion  100 mg Oral Daily    carvedilol  6.25 mg Oral BID WC    finasteride  5 mg Oral Daily    isosorbide mononitrate  60 mg Oral Daily    lisinopril  5 mg Oral Daily    montelukast  10 mg Oral Nightly    atorvastatin  10 mg Oral Nightly    ibuprofen  200 mg Oral BID    fluticasone  1 spray Nasal Daily     PRN Meds: sodium chloride flush, sodium chloride, ondansetron **OR** ondansetron, polyethylene glycol, acetaminophen **OR** acetaminophen    No intake or output data in the 24 hours ending 05/11/23 1115      Exam:    BP (!) 149/71   Pulse 79   Temp 98.8 °F (37.1 °C) (Oral)   Resp 20   Ht 6' (1.829 m)   Wt 228 lb 8 oz (103.6 kg)   SpO2 95%   BMI 30.99 kg/m²     General appearance: alert, cooperative  Lungs: clear to auscultation bilaterally  Heart: S1/S2,RRR  Abdomen: soft, active BS  Extremities: no edema      Labs:   Recent Labs     05/09/23  0507 05/10/23  0412 05/11/23 0417   WBC 25.9* 26.0* 25.9*   HGB 13.0* 12.6* 12.1*   HCT 37.4* 36.0* 35.3*    200 240       Recent Labs     05/09/23  0507 05/10/23  0412 05/11/23 0417   * 136 134*   K 3.4 3.3* 3.2*    101 103   CO2 19* 20 21   BUN 24* 25* 19   CREATININE 0.74 0.73 0.69*   CALCIUM 8.1* 8.4* 8.0*   PHOS 2.0* 2.1* 2.0*       No results for input(s): AST, ALT, BILIDIR, BILITOT, ALKPHOS in the last 72 hours. No results for input(s): INR in the last 72 hours. No results for input(s): Amandeep Kothari in the last 72 hours.       Urinalysis:      Lab Results [facility] SURG TREATMENT NOTE     Date: 2023  Patient Name: Mario Roblero        MRN: [id number]  Account: [de-identified]   : 1935  (79 y.o.)  [id number]    Chart Review:    Restrictions  Restrictions/Precautions  Restrictions/Precautions: Fall Risk     Safety:  Safety Devices  Type of Devices: All fall risk precautions in place    Patient's birthday verified: Yes    Subjective:       \"I did therapy. I walked up and down the babin. I sat up in the chair for a little while. \"    Pain at start of treatment: No    Pain at end of treatment: No    Objective:    ADL Status:  ADL  Feeding: Modified independent   UE Dressing: Minimal assistance  UE Dressing Skilled Clinical Factors: assist to tie hospital gown behind neck  LE Dressing: Stand by assistance; Increased time to complete  LE Dressing Skilled Clinical Factors: Patient able to doff/don B non-slip hospital socks with multiple attempts, able to pull up hopital brief as it began to fall off. Therapy key for assistance levels -   Independent/Mod I = Pt. is able to perform task with no assistance but may require a device   Stand by assistance = Pt. does not perform task at an independent level but does not need physical assistance, requires verbal cues  Minimal, Moderate, Maximal Assistance = Pt. requires physical assistance (25%, 50%, 75% assist from helper) for task but is able to actively participate in task   Dependent = Pt. requires total assistance with task and is not able to actively participate with task completion    Orientation Status:  Orientation  Overall Orientation Status: Within Functional Limits    Cognition Status:  Cognition  Overall Cognitive Status: Exceptions  Arousal/Alertness: Appropriate responses to stimuli  Following Commands: Follows one step commands with increased time; Follows one step commands with repetition  Attention Span: Attends with cues to redirect  Memory: Appears intact  Safety [facility]   Facility/Department: [facility] SURG UNIT  Speech Language Pathology  Clinical Bedside Swallow Evaluation    NAME:Darion Moyer  : 1935 (79 y.o.)   [x]   confirmed    MRN: [id number]  ROOM: [place] [id number]  ADMISSION DATE: 2023  PATIENT DIAGNOSIS(ES): PE (pulmonary thromboembolism) ([facility] [address] [I26.99]  Chief Complaint   Patient presents with    Fall     Patient Active Problem List    Diagnosis Date Noted    PE (pulmonary thromboembolism) ([facility] [address] 2023    Chronic cholecystitis with calculus 2015     Past Medical History:   Diagnosis Date    Arthritis     Dysuria-frequency syndrome     Elevated PSA     Heart disease     High blood pressure     History of BPH     History of pneumonia     History of UTI     Sinus trouble      Past Surgical History:   Procedure Laterality Date    BACK SURGERY      back fused    CHOLECYSTECTOMY, OPEN  11/20/15    Caridad Nova    JOINT REPLACEMENT      TURP  16    USING LASER VAPORIZATION     Allergies   Allergen Reactions    Pcn [Penicillins]        DATE ONSET: 2023    Date of Evaluation: 2023   Evaluating Therapist: Bonita Li, SCOUT    Dysphagia Diagnosis  Dysphagia Diagnosis: Mild oral stage dysphagia  Dysphagia Impression : Patient presents with mild oral dysphagia and suspected pharyngeal phase WFL. Pt demonstrated adequate bolus manipulation and mastication. Pt independently used liquid to assist with bolus formation and propulsion with regular solid. Mild lingual residue cleared independently. Hyolaryngeal elevation present. No overt s/s of aspiration occurred with all trials and consistencies. Baseline cough noted. Recommended Diet  Recommendations: Self feed  Diet Solids Recommendation: Regular  Liquid Consistency Recommendation: Thin  Recommended Form of Meds: PO  Compensatory Swallowing Strategies : Small bites/sips;Upright as possible for all oral intake; Alternate solids and liquids      Reason for Referral  Leigh Boards [facility]  Facility/Department: [facility] MED SURG UNIT  Speech Language Pathology   Treatment Note      Gallo Blair  1935  [id number]  [x]   confirmed      Date: 2023    PE (pulmonary thromboembolism) ([address] [I26.99]    Restrictions/Precautions: Fall Risk    Weight - Scale: 228 lb 8 oz (103.6 kg)     ADULT DIET; Regular    SpO2: 95 % (23 0707)  No active isolations      Subjective:  Alert and Cooperative        Interventions used this date:  Therapeutic Meal Monitor      Objective/Assessment:  Patient progressing towards goals:  Goal 1: Pt will tolerate regular diet with thin liquids with no overt s/s of difficulty or aspiration. Pt just received lunch upon SLP arrival.  Pt's daughter in room. Pt reported decreased intake secondary to decreased appetite and having no taste for the foods. Pt consumed 15% of lunch tray- chicken tree pasta and peas with extended mastication time. Pt noted food was difficult for him. Pt's daughter reported she had to cut up the beef stew the other day so it would be easier for pt to chew. Pt stopped eating and then started eating the pudding. Explained easy to chew diet and soft and bite size diet textures to patient and daughter. Rec: downgrade to soft and bite size diet and both in agreement. Pt tolerated pop via straw with no overt s/s of difficulty or aspiration. Change goal to: Pt will tolerate soft and bite size diet with thin liquids with no overt s/s of difficulty or aspiration. Goal 2: Pt will demonstrate recommended swallow strategies for safe and efficient swallow at modified independent level. Pt fed self, independently going at adequate pace and taking appropriate size bites/sips. Rec: diet change to soft and bite size diet with thin liquids  Informed Monica DECKER      Treatment/Activity Tolerance:  Patient tolerated treatment well    Plan:   Alter current POC (see above)    Pain Assessment:  Patient c/o pain: Location and pain Patient assessment completed. Pt is A&O x 4 vital signs are stable. Pt denies pain at this time. Pt experience dyspnea on excretion. . Pt ambulate with assist x1 with a walker. Bed alarm on pr have call light and bedside table within reach. Patient assessment completed. Pt is A&Ox4 vital signs are stable. Pt ambulate with 1 assist and walker. Pt have no pain at this time. Call light and bedside table is within reach. Physical Therapy Med Surg Daily Treatment Note  [facility]/Department[facility]  [facility]       NAME: Carin Peter  : 1935 (21 y.o.)  MRN: [id number]  CODE STATUS: Full Code    Date of Service: 2023    Patient Diagnosis(es): PE (pulmonary thromboembolism) [facility]) [I26.99]   Chief Complaint   Patient presents with    Fall     Patient Active Problem List    Diagnosis Date Noted    Impaired mobility and activities of daily living 2023    Essential hypertension, benign 2023    Sensorineural hearing loss (SNHL) of both ears 2023    PE (pulmonary thromboembolism) ([facility] [address] 2023    Osteoarthritis of knee 2023    Pain in right shoulder 2023    Pain in right knee 2019    Pain in right shoulder 2019    Unilateral primary osteoarthritis, right knee 10/24/2018    Primary osteoarthritis of both hands 2018    Trigger ring finger of left hand 2018    Pseudophakia of both eyes 2017    Chronic cholecystitis with calculus 2015    Vitreous degeneration and detachment of both eyes 2015    Tear film insufficiency, unspecified 2015    Zoster ophthalmicus 2014    Old MI (myocardial infarction) 2012    Coronary atherosclerosis 2012    Hyperlipidemia 2012    Displacement of lumbar intervertebral disc without myelopathy 2008        Past Medical History:   Diagnosis Date    Arthritis     Dysuria-frequency syndrome     Elevated PSA     Heart disease     High blood pressure     History of BPH     History of pneumonia     History of UTI     Sinus trouble      Past Surgical History:   Procedure Laterality Date    BACK SURGERY      back fused    CHOLECYSTECTOMY, OPEN  11/20/15    Carlos Dunnings    JOINT REPLACEMENT      TURP  16    USING LASER VAPORIZATION       Chart Reviewed: Yes  Family / Caregiver Present: No    Restrictions:  Restrictions/Precautions: Fall Risk    SUBJECTIVE:   Subjective: \"I'm Physical Therapy Med Surg Daily Treatment Note  Facility/Department[facility]  [id number]       NAME: Audra Austin  : 1935 (81 y.o.)  MRN: [id number]  CODE STATUS: Full Code    Date of Service: 5/10/2023    Patient Diagnosis(es): PE (pulmonary thromboembolism) [facility]) [I26.99]   Chief Complaint   Patient presents with    Fall     Patient Active Problem List    Diagnosis Date Noted    PE (pulmonary thromboembolism) ([facility] [address] 2023    Chronic cholecystitis with calculus 2015        Past Medical History:   Diagnosis Date    Arthritis     Dysuria-frequency syndrome     Elevated PSA     Heart disease     High blood pressure     History of BPH     History of pneumonia     History of UTI     Sinus trouble      Past Surgical History:   Procedure Laterality Date    BACK SURGERY      back fused    CHOLECYSTECTOMY, OPEN  11/20/15    Marval Boards    JOINT REPLACEMENT      TURP  16    USING LASER VAPORIZATION       Chart Reviewed: Yes  Family / Caregiver Present: No    Restrictions:  Restrictions/Precautions: Fall Risk    SUBJECTIVE:   Subjective: \"I have diarrhea I never have diarrhea. \"    Pain  Pain: Denies pre and post session pain. OBJECTIVE:        Bed mobility  Supine to Sit: Moderate assistance  Sit to Supine: Stand by assistance  Scooting: Stand by assistance  Bed Mobility Comments: hand over hand assist provided. Pt declines log roll techinque due to shoulder issues    Transfers  Sit to Stand: Stand by assistance;Contact guard assistance  Stand to Sit: Stand by assistance;Contact guard assistance  Bed to Chair: Contact guard assistance  Comment: Decreased safety awareness. Cues for correction. continued with slightly impulsive movements    Ambulation  Surface: Level tile  Device: No Device  Assistance: Contact guard assistance;Minimal assistance  Quality of Gait: Staggering at times. Decreased safety awareness. WBOS with shortened step length. Slow pacing.   Distance: 30ft Physical Therapy Med Surg Initial Assessment  Facility/Department: [facility] MED SURG UNIT  Room: [place] [id number]       NAME: Vandana Moreno  : 1935 (6 y.o.)  MRN: [id number]  CODE STATUS: Full Code    Date of Service: 2023    Patient Diagnosis(es): PE (pulmonary thromboembolism) [facility]) [I26.99]   Chief Complaint   Patient presents with    Fall     Patient Active Problem List    Diagnosis Date Noted    PE (pulmonary thromboembolism) ([facility] [address] 2023    Chronic cholecystitis with calculus 2015        Past Medical History:   Diagnosis Date    Arthritis     Dysuria-frequency syndrome     Elevated PSA     Heart disease     High blood pressure     History of BPH     History of pneumonia     History of UTI     Sinus trouble      Past Surgical History:   Procedure Laterality Date    BACK SURGERY      back fused    CHOLECYSTECTOMY, OPEN  11/20/15    Lafaye Citron    JOINT REPLACEMENT      TURP  16    USING LASER VAPORIZATION       Chart Reviewed: Yes  Family / Caregiver Present: No  Diagnosis: PE (pulmonary thromboembolism) ([facility])  General Comment  Comments: Pt resting in bed - agreeable to PT evaluation    Restrictions:  Restrictions/Precautions: Fall Risk     SUBJECTIVE:   Subjective: \"I'm a little wobbly. \"    Pain  Pain: Denies pre and post session pain.     Prior Level of Function:  Social/Functional History  Lives With: Daughter  Type of Home: House  Home Layout: One level  Home Access: Stairs to enter with rails  Entrance Stairs - Number of Steps: 2 , 5  Bathroom Shower/Tub: Walk-in shower  Bathroom Equipment: Grab bars in shower, Shower chair, Hand-held shower  Home Equipment: [employer], rolling, [employer]  ADL Assistance: Independent  Homemaking Assistance: Independent  Homemaking Responsibilities: No  Ambulation Assistance: Independent (occasional cane)  Transfer Assistance: Independent  Active : No  Patient's  Info: daughter  Occupation: Retired    OBJECTIVE: Pulmonary Progress Note    2023 3:01 PM    Subjective:   Admit Date: 2023  PCP: Abner Graves MD    Chief Complaint   Patient presents with    Fall     Interval History: Currently on RA. Leukocytosis is slightly better but continues to be 22. Seen by infectious disease and antibiotic has been de-escalated. Continues to be on anticoagulation. No fever overnight. Echocardiogram was done and no evidence of pulm hypertension. 14 points review of systems has been obtained and negative except to was mentioned in HPI.      Medications:   Scheduled Meds:   cefTRIAXone (ROCEPHIN) IV  1,000 mg IntraVENous Q24H    potassium chloride  40 mEq Oral Daily    apixaban  5 mg Oral BID    sodium chloride flush  5-40 mL IntraVENous 2 times per day    aspirin  81 mg Oral Daily    buPROPion  100 mg Oral Daily    carvedilol  6.25 mg Oral BID WC    finasteride  5 mg Oral Daily    isosorbide mononitrate  60 mg Oral Daily    lisinopril  5 mg Oral Daily    montelukast  10 mg Oral Nightly    atorvastatin  10 mg Oral Nightly    ibuprofen  200 mg Oral BID    fluticasone  1 spray Nasal Daily     Continuous Infusions:   sodium chloride           Objective:   Vitals:   Temp (24hrs), Av.6 °F (37 °C), Min:98.6 °F (37 °C), Max:98.6 °F (37 °C)    /67   Pulse 71   Temp 98.6 °F (37 °C) (Oral)   Resp 20   Ht 6' (1.829 m)   Wt 228 lb 8 oz (103.6 kg)   SpO2 95%   BMI 30.99 kg/m²   I/O:24HR INTAKE/OUTPUT:    Intake/Output Summary (Last 24 hours) at 2023 1501  Last data filed at 2023 0325  Gross per 24 hour   Intake --   Output 825 ml   Net -825 ml        0701 -  0700  In: -   Out: 660 [Urine:825]  CVP:        Physical Exam:  General appearance - alert, ill appearing, and in mild distress  Mental status - alert, oriented to person, place, and time  Eyes - pupils equal and reactive, extraocular eye movements intact  Nose - normal and patent, no erythema, discharge or polyps  Neck - supple, no significant Pulmonary Progress Note    2023 4:31 PM    Subjective:   Admit Date: 2023  PCP: Gabo Bradley MD    Chief Complaint   Patient presents with    Fall     Interval History: Currently on RA. Getting an echocardiogram right now. Daughter is at the bedside. Continues to be on anticoagulation. 14 points review of systems has been obtained and negative except to was mentioned in HPI. Medications:   Scheduled Meds:   potassium chloride  40 mEq Oral Daily    apixaban  5 mg Oral BID    sodium chloride flush  5-40 mL IntraVENous 2 times per day    meropenem  1,000 mg IntraVENous Q8H    aspirin  81 mg Oral Daily    buPROPion  100 mg Oral Daily    carvedilol  6.25 mg Oral BID WC    finasteride  5 mg Oral Daily    isosorbide mononitrate  60 mg Oral Daily    lisinopril  5 mg Oral Daily    montelukast  10 mg Oral Nightly    atorvastatin  10 mg Oral Nightly    ibuprofen  200 mg Oral BID    fluticasone  1 spray Nasal Daily     Continuous Infusions:   sodium chloride           Objective:   Vitals:   Temp (24hrs), Av.8 °F (37.1 °C), Min:98.8 °F (37.1 °C), Max:98.8 °F (37.1 °C)    BP (!) 149/71   Pulse 79   Temp 98.8 °F (37.1 °C) (Oral)   Resp 20   Ht 6' (1.829 m)   Wt 228 lb 8 oz (103.6 kg)   SpO2 95%   BMI 30.99 kg/m²   I/O:24HR INTAKE/OUTPUT:  No intake or output data in the 24 hours ending 23 1631    No intake/output data recorded.   CVP:        Physical Exam:  General appearance - alert, ill appearing, and in mild distress  Mental status - alert, oriented to person, place, and time  Eyes - pupils equal and reactive, extraocular eye movements intact  Nose - normal and patent, no erythema, discharge or polyps  Neck - supple, no significant adenopathy  Chest - Diminished B/L to auscultation, no wheezes, rales or rhonchi, symmetric air entry  Heart - normal rate, regular rhythm, normal S1, S2, no murmurs, rubs, clicks or gallops  Abdomen - soft, nontender, nondistended, no masses or Pulmonary Progress Note    5/10/2023 10:23 AM    Subjective:   Admit Date: 2023  PCP: Kit Haji MD    Chief Complaint   Patient presents with    Fall     Interval History: Currently on RA. Short of breath with minimal activities. Continues to be on anticoagulation. Has some cough but is mainly dry. No fever overnight. Continues to be on antibiotics. 14 points review of systems has been obtained and negative except to was mentioned in HPI.      Medications:   Scheduled Meds:   sodium chloride flush  5-40 mL IntraVENous 2 times per day    enoxaparin  1 mg/kg SubCUTAneous BID    meropenem  1,000 mg IntraVENous Q8H    aspirin  81 mg Oral Daily    buPROPion  100 mg Oral Daily    carvedilol  6.25 mg Oral BID WC    finasteride  5 mg Oral Daily    isosorbide mononitrate  60 mg Oral Daily    lisinopril  5 mg Oral Daily    montelukast  10 mg Oral Nightly    atorvastatin  10 mg Oral Nightly    ibuprofen  200 mg Oral BID    fluticasone  1 spray Nasal Daily     Continuous Infusions:   sodium chloride           Objective:   Vitals:   Temp (24hrs), Av °F (36.7 °C), Min:97.3 °F (36.3 °C), Max:99 °F (37.2 °C)    BP (!) 147/71   Pulse 84   Temp 98.1 °F (36.7 °C) (Oral)   Resp 20   Ht 6' (1.829 m)   Wt 228 lb 8 oz (103.6 kg)   SpO2 94%   BMI 30.99 kg/m²   I/O:24HR INTAKE/OUTPUT:    Intake/Output Summary (Last 24 hours) at 5/10/2023 1023  Last data filed at 2023 2235  Gross per 24 hour   Intake 230.65 ml   Output 200 ml   Net 30.65 ml      0701 - 05/10 0700  In: 271.8 [P.O.:120]  Out: 200 [Urine:200]  CVP:        Physical Exam:  General appearance - alert, ill appearing, and in mild distress  Mental status - alert, oriented to person, place, and time  Eyes - pupils equal and reactive, extraocular eye movements intact  Nose - normal and patent, no erythema, discharge or polyps  Neck - supple, no significant adenopathy  Chest - Diminished B/L to auscultation, no wheezes, rales or rhonchi, symmetric [facility] Occupational Therapy      Date: 2023  Patient Name: Delgado Vieira        MRN: [id number]  Account: [de-identified]   : 1935  (79 y.o.)  [facility]    Awaiting further workup/documentation prior to OT eval    Will attempt again when able.     Electronically signed by Christopher Cormier OT on 2023 at 12:43 PM Shift assessment complete, alert / oriented, calm, cooperative, denies SOB, chest pain, vss, iv dressings changed, am medications given per orders, repositioned for safety,  call light within reach, denies further needs at this time Subjective: The patient complains of severe acute on chronic progressive fatigue and shortness of breath and dyspnea on exertion relieved with O2 and blood thinners partially relieved by rest, PT, OT  and exacerbated by exertion and recent illness. Patient was admitted through the ER at [facility] on May 8, 2023 after several falls at home. He admitted to striking his head during one of the falls. He was brought in via EMS after the family called 911. Upon arrival he was complaining of shortness of breath. He was eventually found to have had a-Right-sided pulmonary emboli. He is now on therapeutic dose of Lovenox. He was admitted to the care of the hospitalist with pulmonology consulting. Pulmonology assessed him and found that he had the right upper lobe pulmonary embolism as well as a right upper lobe cavity pneumonia rule out aspiration. They advised continuing antibiotic Merrem. Titration of O2. I am concerned about patients medical complexities including:  Principal Problem:    PE (pulmonary thromboembolism) ([address]  Active Problems:    Impaired mobility and activities of daily living    Essential hypertension, benign    Displacement of lumbar intervertebral disc without myelopathy    Osteoarthritis of knee    Chronic pain of both shoulders    Spinal stenosis of lumbar region with neurogenic claudication  Resolved Problems:    * No resolved hospital problems. *      .    Reviewed recent nursing note and discussed current status and planned care with acute care providers, \"Pt is A&O x 4 vital signs are stable. Pt denies pain at this time. Pt experience dyspnea on excretion. . Pt ambulate with assist x1 with a walker. Bed alarm on pr have call light and bedside table within reach. \".    ROS x10: The patient also complains of severely impaired mobility and activities of daily living.   Otherwise no new problems with vision, hearing, nose, mouth, throat, dermal, cardiovascular, GI, , 0957   Time Out 1008   Minutes 11          Eval: 11 minutes     Electronically signed by:    Kelvin Finney OT,   5/9/2023, 10:49 AM exam:->fall, weakness What reading provider will be dictating this exam?->CRC FINDINGS: The cardiomediastinal silhouette is within normal limits. Patchy airspace opacity noted in the peripheral right mid lung. No pneumothorax or pleural effusion. No acute displaced fracture. Bilateral glenohumeral joint arthrosis. Right mid lung airspace opacity, which could represent pulmonary contusion in the setting of trauma. Correlate with CT chest.         IMPRESSION AND SUGGESTION:  Right-sided pulmonary embolism evidence of right ventricular strain  Right upper lobe peripheral consolidation pneumonia versus pulmonary infarct. Central lucency, pulmonary cyst versus cavitation    Patient is right upper lobe consolidation could be due to pulmonary infarct versus pneumonia with central lucency could be due to pulmonary cyst versus cavitation. He has leukocytosis and is reasonable to continue current antibiotic with Merrem 1 g every 8 hourly. Tmax is 99.8. He is on anticoagulation therapy for PE with Lovenox 1 mg/kg subcutaneous twice a day. Continue present treatment plan  NOTE: This report was transcribed using voice recognition software. Every effort was made to ensure accuracy; however, inadvertent computerized transcription errors may be present.       Electronically signed by Manju Wang MD, FCCP on 5/9/2023 at 8:25 AM details… regular rate and rhythm/S1 S2 present/no gallops/no rub/no murmur

## 2023-06-14 NOTE — H&P PST ADULT - HISTORY OF PRESENT ILLNESS
This is a 36 y/o female who presents with c/o dry coughing for 3-4 months with SOB and hoarseness. Subsequent CT scan revealed abnormalies in both lungs. Scheduled for flexible bronchoscopy transbronchial biopsy, bronchoalveolar lavage.

## 2023-06-14 NOTE — H&P PST ADULT - NSICDXPASTSURGICALHX_GEN_ALL_CORE_FT
PAST SURGICAL HISTORY:  H/O  section     Plantar fasciitis, left -s/p fasciotomy    S/P D&C (status post dilation and curettage)     S/P tubal ligation

## 2023-06-14 NOTE — H&P PST ADULT - ATTENDING COMMENTS
Patient here for flexible bronchoscopy, lung biopsy, . Risks including but not limited to pneumothorax and bleeding discussed, and possible interventions. Agreeable to proceed.

## 2023-06-15 DIAGNOSIS — N39.0 URINARY TRACT INFECTION, SITE NOT SPECIFIED: ICD-10-CM

## 2023-06-16 ENCOUNTER — LABORATORY RESULT (OUTPATIENT)
Age: 38
End: 2023-06-16

## 2023-06-16 ENCOUNTER — APPOINTMENT (OUTPATIENT)
Dept: RHEUMATOLOGY | Facility: CLINIC | Age: 38
End: 2023-06-16
Payer: MEDICAID

## 2023-06-16 VITALS
TEMPERATURE: 98.1 F | WEIGHT: 192 LBS | SYSTOLIC BLOOD PRESSURE: 104 MMHG | HEIGHT: 59 IN | OXYGEN SATURATION: 98 % | HEART RATE: 88 BPM | DIASTOLIC BLOOD PRESSURE: 70 MMHG | RESPIRATION RATE: 16 BRPM | BODY MASS INDEX: 38.71 KG/M2

## 2023-06-16 PROBLEM — J98.4 OTHER DISORDERS OF LUNG: Chronic | Status: ACTIVE | Noted: 2023-06-14

## 2023-06-16 PROBLEM — E28.39 OTHER PRIMARY OVARIAN FAILURE: Chronic | Status: ACTIVE | Noted: 2023-06-14

## 2023-06-16 PROCEDURE — 99214 OFFICE O/P EST MOD 30 MIN: CPT

## 2023-06-16 NOTE — PHYSICAL EXAM
[General Appearance - Alert] : alert [General Appearance - In No Acute Distress] : in no acute distress [Outer Ear] : the ears and nose were normal in appearance [Oropharynx] : the oropharynx was normal [Neck Appearance] : the appearance of the neck was normal [Neck Cervical Mass (___cm)] : no neck mass was observed [Jugular Venous Distention Increased] : there was no jugular-venous distention [Thyroid Diffuse Enlargement] : the thyroid was not enlarged [Thyroid Nodule] : there were no palpable thyroid nodules [Auscultation Breath Sounds / Voice Sounds] : lungs were clear to auscultation bilaterally [Heart Rate And Rhythm] : heart rate was normal and rhythm regular [Heart Sounds] : normal S1 and S2 [Heart Sounds Gallop] : no gallops [Murmurs] : no murmurs [Heart Sounds Pericardial Friction Rub] : no pericardial rub [Full Pulse] : the pedal pulses are present [Edema] : there was no peripheral edema [Bowel Sounds] : normal bowel sounds [Abdomen Soft] : soft [Abdomen Tenderness] : non-tender [Abdomen Mass (___ Cm)] : no abdominal mass palpated [Cervical Lymph Nodes Enlarged Posterior Bilaterally] : posterior cervical [Cervical Lymph Nodes Enlarged Anterior Bilaterally] : anterior cervical [Supraclavicular Lymph Nodes Enlarged Bilaterally] : supraclavicular [No CVA Tenderness] : no ~M costovertebral angle tenderness [No Spinal Tenderness] : no spinal tenderness [Abnormal Walk] : normal gait [Involuntary Movements] : no involuntary movements were seen [Nail Clubbing] : no clubbing  or cyanosis of the fingernails [Musculoskeletal - Swelling] : no joint swelling seen [Motor Tone] : muscle strength and tone were normal [Skin Color & Pigmentation] : normal skin color and pigmentation [Skin Turgor] : normal skin turgor [] : no rash [Oriented To Time, Place, And Person] : oriented to person, place, and time [Impaired Insight] : insight and judgment were intact [Affect] : the affect was normal [FreeTextEntry1] : all joints with full ROM - no active synovitis - tenderness over the knees - elbows and wrists

## 2023-06-16 NOTE — ASSESSMENT
[FreeTextEntry1] : 32 year-old female with pmh of ALMA ROSA, now with return of her symptoms with polyarthritis, highly positive DEBORAH\par \par 1. RA  with vasculitis - methotrexate 5 tablets -   on methotrexate - joint pain is stable -  ongoing rituximab -  infusion every year - joint pain is stable at 4/0 worse over the elbows and left knee - prep for new rituximab infusion - new diagnosis of ILD - pending bronchoscopy for typing - check CD 19 titer and lupus labs\par 2. chronic back pain - ongoing work-up by PMR x-rays with degenerative changes and mild scoliosis -had nerve block - no pain at this time.  - continue with current regimen\par 3. chronic pain - on medical marijuana - stable\par 4. muscle cramps over the right leg - not active\par 5. PE - likely related to tubal ligation (5 days prior) - chronic eliquis - given hx of clotting in her family\par 6. Right leg paresthesias - EMG done yesterday - mild sensory axonal loss\par 7. Right shoulder pain -with decreased ROM over 6 months worsening - with loss of ROM - could not complete PT due to  pain - new referral given -MRI not done - missed appt -  pain is not severe at this time\par 8.Loss of hearing -under the care of ENT\par 9. diverticulitis - under he care of GI - no flares\par 10. bilateral knee - mechanical can take  tylenol as needed -  cannot take nsaids due to being on eliquis\par 11 cervical pain - hx of herniated discs - send for x-rays and PT - consider MRI after finishing PT\par \par repeat lupus and scleroderma serologies - DEBORAH is 1:320 DFS\par \par I reviewed previous labs results with patients.\par Laboratory tests ordered today\par Diagnosis and Prognosis discussed\par Continue with current medications\par medications refilled\par education provided on\par F/u 3 months\par

## 2023-06-16 NOTE — HISTORY OF PRESENT ILLNESS
[___ Month(s) Ago] : [unfilled] month(s) ago [Joint Swelling] : joint swelling [Morning Stiffness] : morning stiffness [Joint Pain] : joint pain [FreeTextEntry1] : now diagnosed with ILD - pending bronchoscopy 06/22/2023\par last rituximab in 06/2022\par CT scan with new bilateral nodular and patchy opacities likely infectious and or inflammatory\par ongoing chest tightness\par pending PFT after bronchoscopy [TextBox_2] : 2017 [TextBox_40] : methotrexate [TextBox_44] : rituximab

## 2023-06-20 ENCOUNTER — TRANSCRIPTION ENCOUNTER (OUTPATIENT)
Age: 38
End: 2023-06-20

## 2023-06-20 LAB
ANA PAT FLD IF-IMP: NORMAL
ANA SER IF-ACNC: ABNORMAL
C3 SERPL-MCNC: 130 MG/DL
C4 SERPL-MCNC: 57 MG/DL
DSDNA AB SER-ACNC: <12 IU/ML
ENA RNP AB SER IA-ACNC: <0.2 AL
ENA SCL70 IGG SER IA-ACNC: <0.2 AL
ENA SM AB SER IA-ACNC: <0.2 AL
ENA SS-A AB SER IA-ACNC: <0.2 AL
ENA SS-B AB SER IA-ACNC: <0.2 AL

## 2023-06-21 NOTE — ASU PATIENT PROFILE, ADULT - FALL HARM RISK - HARM RISK INTERVENTIONS

## 2023-06-22 ENCOUNTER — OUTPATIENT (OUTPATIENT)
Dept: OUTPATIENT SERVICES | Facility: HOSPITAL | Age: 38
LOS: 1 days | Discharge: HOME | End: 2023-06-22
Payer: MEDICAID

## 2023-06-22 ENCOUNTER — TRANSCRIPTION ENCOUNTER (OUTPATIENT)
Age: 38
End: 2023-06-22

## 2023-06-22 ENCOUNTER — APPOINTMENT (OUTPATIENT)
Dept: PULMONOLOGY | Facility: HOSPITAL | Age: 38
End: 2023-06-22
Payer: MEDICAID

## 2023-06-22 ENCOUNTER — RESULT REVIEW (OUTPATIENT)
Age: 38
End: 2023-06-22

## 2023-06-22 VITALS
RESPIRATION RATE: 16 BRPM | HEIGHT: 59.75 IN | OXYGEN SATURATION: 100 % | TEMPERATURE: 98 F | WEIGHT: 190.92 LBS | DIASTOLIC BLOOD PRESSURE: 66 MMHG | SYSTOLIC BLOOD PRESSURE: 112 MMHG | HEART RATE: 62 BPM

## 2023-06-22 VITALS
TEMPERATURE: 98 F | DIASTOLIC BLOOD PRESSURE: 64 MMHG | OXYGEN SATURATION: 98 % | SYSTOLIC BLOOD PRESSURE: 100 MMHG | HEART RATE: 75 BPM | RESPIRATION RATE: 16 BRPM

## 2023-06-22 DIAGNOSIS — R93.89 ABNORMAL FINDINGS ON DIAGNOSTIC IMAGING OF OTHER SPECIFIED BODY STRUCTURES: ICD-10-CM

## 2023-06-22 DIAGNOSIS — Z98.890 OTHER SPECIFIED POSTPROCEDURAL STATES: Chronic | ICD-10-CM

## 2023-06-22 DIAGNOSIS — Z98.51 TUBAL LIGATION STATUS: Chronic | ICD-10-CM

## 2023-06-22 DIAGNOSIS — M72.2 PLANTAR FASCIAL FIBROMATOSIS: Chronic | ICD-10-CM

## 2023-06-22 DIAGNOSIS — Z98.891 HISTORY OF UTERINE SCAR FROM PREVIOUS SURGERY: Chronic | ICD-10-CM

## 2023-06-22 LAB
B PERT IGG+IGM PNL SER: CLEAR — SIGNIFICANT CHANGE UP
COLOR FLD: SIGNIFICANT CHANGE UP
EOSINOPHIL # FLD: 0 % — SIGNIFICANT CHANGE UP
FOLATE+VIT B12 SERBLD-IMP: 0 % — SIGNIFICANT CHANGE UP
HCG UR QL: NEGATIVE — SIGNIFICANT CHANGE UP
LYMPHOCYTES # FLD: 38 % — SIGNIFICANT CHANGE UP
MESOTHL CELL # FLD: 0 % — SIGNIFICANT CHANGE UP
MONOS+MACROS # FLD: 59 % — SIGNIFICANT CHANGE UP
NEUTROPHILS-BODY FLUID: 3 % — SIGNIFICANT CHANGE UP
OTHER CELLS FLD MANUAL: 0 % — SIGNIFICANT CHANGE UP
RCV VOL RI: SIGNIFICANT CHANGE UP CELLS/UL (ref 0–5)
SPECIMEN SOURCE FLD: SIGNIFICANT CHANGE UP
TOTAL CELLS COUNTED, BODY FLUID: 100 CELLS — SIGNIFICANT CHANGE UP
TOTAL NUCLEATED CELL COUNT, BODY FLUID: SIGNIFICANT CHANGE UP CELLS/UL (ref 0–5)
TUBE TYPE: SIGNIFICANT CHANGE UP

## 2023-06-22 PROCEDURE — 88189 FLOWCYTOMETRY/READ 16 & >: CPT

## 2023-06-22 PROCEDURE — 88112 CYTOPATH CELL ENHANCE TECH: CPT | Mod: 26

## 2023-06-22 PROCEDURE — ZZZZZ: CPT

## 2023-06-22 PROCEDURE — 88305 TISSUE EXAM BY PATHOLOGIST: CPT | Mod: 26

## 2023-06-22 PROCEDURE — 71045 X-RAY EXAM CHEST 1 VIEW: CPT | Mod: 26

## 2023-06-22 RX ORDER — ENOXAPARIN SODIUM 100 MG/ML
80 INJECTION SUBCUTANEOUS
Refills: 0 | DISCHARGE

## 2023-06-22 RX ORDER — FOLIC ACID 0.8 MG
1 TABLET ORAL
Qty: 0 | Refills: 0 | DISCHARGE

## 2023-06-22 RX ORDER — FENTANYL CITRATE 50 UG/ML
25 INJECTION INTRAVENOUS
Refills: 0 | Status: DISCONTINUED | OUTPATIENT
Start: 2023-06-22 | End: 2023-06-22

## 2023-06-22 RX ORDER — BUDESONIDE AND FORMOTEROL FUMARATE DIHYDRATE 160; 4.5 UG/1; UG/1
2 AEROSOL RESPIRATORY (INHALATION)
Qty: 0 | Refills: 0 | DISCHARGE

## 2023-06-22 RX ORDER — RITUXIMAB 10 MG/ML
500 INJECTION, SOLUTION INTRAVENOUS
Qty: 0 | Refills: 0 | DISCHARGE

## 2023-06-22 RX ORDER — ONDANSETRON 8 MG/1
4 TABLET, FILM COATED ORAL ONCE
Refills: 0 | Status: COMPLETED | OUTPATIENT
Start: 2023-06-22 | End: 2023-06-22

## 2023-06-22 RX ORDER — METHOTREXATE 2.5 MG/1
12.5 TABLET ORAL
Qty: 0 | Refills: 0 | DISCHARGE

## 2023-06-22 RX ORDER — FLUTICASONE PROPIONATE 220 MCG
1 AEROSOL WITH ADAPTER (GRAM) INHALATION
Refills: 0 | DISCHARGE

## 2023-06-22 RX ORDER — APIXABAN 2.5 MG/1
1 TABLET, FILM COATED ORAL
Refills: 0 | DISCHARGE

## 2023-06-22 RX ORDER — MEDROXYPROGESTERONE ACETATE 150 MG/ML
1 INJECTION, SUSPENSION, EXTENDED RELEASE INTRAMUSCULAR
Refills: 0 | DISCHARGE

## 2023-06-22 RX ORDER — AZELASTINE 137 UG/1
2 SPRAY, METERED NASAL
Refills: 0 | DISCHARGE

## 2023-06-22 RX ADMIN — ONDANSETRON 4 MILLIGRAM(S): 8 TABLET, FILM COATED ORAL at 15:45

## 2023-06-22 RX ADMIN — SODIUM CHLORIDE 30 MILLILITER(S): 9 INJECTION, SOLUTION INTRAVENOUS at 11:15

## 2023-06-22 NOTE — ASU DISCHARGE PLAN (ADULT/PEDIATRIC) - CALL YOUR DOCTOR IF YOU HAVE ANY OF THE FOLLOWING:
shortness of breath/Bleeding that does not stop/Pain not relieved by Medications/Fever greater than (need to indicate Fahrenheit or Celsius)/Nausea and vomiting that does not stop/Unable to urinate

## 2023-06-22 NOTE — ASU DISCHARGE PLAN (ADULT/PEDIATRIC) - ASU DC SPECIAL INSTRUCTIONSFT
HOLD ALL BLOOD THINNERS FOR TODAY    Patients may experience fever on the night of the procedure. Patient's may cough up specks of blood for 1-2 days after the procedure. If the bleeding is large or persistent please contact your pulmonologist. If your fever is persistent, please contact your pulmonologist. In case of sudden chest pain or trouble breathing, please go to your nearest emergency room and contact your pulmonologist.

## 2023-06-23 LAB
GRAM STN FLD: SIGNIFICANT CHANGE UP
NIGHT BLUE STAIN TISS: SIGNIFICANT CHANGE UP
SPECIMEN SOURCE: SIGNIFICANT CHANGE UP
SPECIMEN SOURCE: SIGNIFICANT CHANGE UP
TM INTERPRETATION: SIGNIFICANT CHANGE UP

## 2023-06-23 PROCEDURE — 31632 BRONCHOSCOPY/LUNG BX ADDL: CPT | Mod: GC

## 2023-06-23 PROCEDURE — 31628 BRONCHOSCOPY/LUNG BX EACH: CPT | Mod: GC

## 2023-06-23 PROCEDURE — 31624 DX BRONCHOSCOPE/LAVAGE: CPT | Mod: GC

## 2023-06-23 PROCEDURE — 31645 BRNCHSC W/THER ASPIR 1ST: CPT | Mod: GC

## 2023-06-23 RX ORDER — APIXABAN 2.5 MG/1
1 TABLET, FILM COATED ORAL
Qty: 30 | Refills: 0
Start: 2023-06-23

## 2023-06-24 LAB
CULTURE RESULTS: SIGNIFICANT CHANGE UP
SPECIMEN SOURCE: SIGNIFICANT CHANGE UP

## 2023-06-25 LAB
FUNGITELL B-D-GLUCAN,  BRONCHIAL LAVAGE: SIGNIFICANT CHANGE UP
P JIROVECII DNA L RESP QL NAA+NON-PROBE: NEGATIVE — SIGNIFICANT CHANGE UP
SPECIMEN SOURCE: SIGNIFICANT CHANGE UP

## 2023-06-26 LAB — NON-GYNECOLOGICAL CYTOLOGY STUDY: SIGNIFICANT CHANGE UP

## 2023-06-27 ENCOUNTER — APPOINTMENT (OUTPATIENT)
Dept: PULMONOLOGY | Facility: CLINIC | Age: 38
End: 2023-06-27

## 2023-06-27 LAB — GALACTOMANNAN AG SERPL-ACNC: 0.08 INDEX — SIGNIFICANT CHANGE UP (ref 0–0.49)

## 2023-06-29 LAB — SURGICAL PATHOLOGY STUDY: SIGNIFICANT CHANGE UP

## 2023-06-30 ENCOUNTER — APPOINTMENT (OUTPATIENT)
Dept: NEUROLOGY | Facility: CLINIC | Age: 38
End: 2023-06-30

## 2023-06-30 ENCOUNTER — TRANSCRIPTION ENCOUNTER (OUTPATIENT)
Age: 38
End: 2023-06-30

## 2023-06-30 PROBLEM — J45.909 ASTHMA: Status: ACTIVE | Noted: 2019-04-04

## 2023-06-30 NOTE — ASSESSMENT
[FreeTextEntry1] : 35F with asthma, RA and now 1 month persistent SAMANIEGO.\par h/o unprovoked PE in setting of Protein S or C deficiency, on Eliquis\par She is on MTX and Rituximab\par No evidence clinically of asthma exacerbation.\par CT chest reviewed, pt awaiting bronch with BAL and TBBx to r/o infection and eval for inflammation.\par RA serologies appear stable\par \par \par

## 2023-06-30 NOTE — HISTORY OF PRESENT ILLNESS
[TextBox_4] : 35F with h/o asthma and RA, previously on MTX and rituximab, here for evaluation of sob.\par Pt reports had covid 2 month ago (pfiser 2nd shot 5/21). She had mild to moderate symptoms and received MAB infusion. Cough has improved but SAMANIEGO has persisted and slowly getting better. NO wheezing, no chest tightness. Has been off MTX since august.\par \par Pt here for f/u\par Reports about 3 months of sob, samaniego and persistent dry cough, no phlegm. No wheezing. Reports waking up at night gasping for air and difficulty sleeping. Has to sleep sitting up. Has been having more frequent night sweats, no fevers or chills. No leg swelling. Using Symbicort daily without relief. No allergy symptoms. Feels her psoriasis and RA is worse in the joints. Currently on Rituximab, has been off MTX for 2 months since kidney infection. Reports overall her condition unchanged since last visit\par

## 2023-07-06 RX ORDER — RITUXIMAB-ABBS 10 MG/ML
500 INJECTION, SOLUTION INTRAVENOUS
Refills: 0 | Status: ACTIVE | OUTPATIENT
Start: 2023-07-06 | End: 1900-01-01

## 2023-07-11 ENCOUNTER — APPOINTMENT (OUTPATIENT)
Dept: PULMONOLOGY | Facility: CLINIC | Age: 38
End: 2023-07-11
Payer: MEDICAID

## 2023-07-11 VITALS
OXYGEN SATURATION: 97 % | HEART RATE: 74 BPM | DIASTOLIC BLOOD PRESSURE: 76 MMHG | BODY MASS INDEX: 38.3 KG/M2 | HEIGHT: 59 IN | SYSTOLIC BLOOD PRESSURE: 112 MMHG | WEIGHT: 190 LBS | TEMPERATURE: 96.5 F

## 2023-07-11 PROBLEM — R05.8 UPPER AIRWAY COUGH SYNDROME: Status: ACTIVE | Noted: 2022-04-06

## 2023-07-11 PROBLEM — U09.9 POST-COVID SYNDROME: Status: ACTIVE | Noted: 2021-10-01

## 2023-07-11 PROBLEM — R06.09 DYSPNEA ON EFFORT: Status: ACTIVE | Noted: 2021-11-01

## 2023-07-11 PROCEDURE — 99214 OFFICE O/P EST MOD 30 MIN: CPT

## 2023-07-11 NOTE — ASSESSMENT
[FreeTextEntry1] : 37F with PMHx of RA on methotrexate and rituximab (last one year ago) , vasculitis, PE on Eliquis (2.5 mg BID), presents to the the clinic for IP evaluation for abnormal CT chest.\par \par #CT chest:\par - patient with onset cough and findings of GGO on CT chest \par - given hx of RA and on immunosuppression, concern for atypical infection vs rheumatologic involvement\par - patient amenable to bronchoscopy with transbronchial biopsy\par \par #Cough:\par - patient reports persistent cough which is worse when sleeping\par - trial of tylenol+codeine for relief\par \par Plan for bronchoscopy with BAL and transbronchial biopsy. Risks and benefits reviewed with the patient including bleeding and pneumothorax. Patient to obtain medical clearance and will schedule procedure. \par \par The risk and benefits of bronchoscopy with transbronchial biopsy including risk of bleeding and  risk pneumothorax was discussed with the patient and they demonstrated understanding. In case of pneumothorax, we discussed the need for in hospital monitoring and chest tube placement. In case of bleeding, we explained that they may require inpatient or ICU admission if persistent. Educational reading material regarding the procedure, risks and benefits provided to the patient in paper format.\par \par

## 2023-07-11 NOTE — REVIEW OF SYSTEMS
[Cough] : cough [Hemoptysis] : hemoptysis [Fever] : no fever [Chills] : no chills [Dry Eyes] : no dry eyes [Eye Irritation] : no eye irritation [Sputum] : no sputum [GERD] : no gerd [Diarrhea] : no diarrhea [Nocturia] : no nocturia

## 2023-07-11 NOTE — HISTORY OF PRESENT ILLNESS
[TextBox_4] : Interventional Pulmonology Consultation/Visit Note\par \par 37F with PMHx of RA on methotrexate and rituximab (last one year ago) , vasculitis, PE on Eliquis (2.5 mg BID), presents to the the clinic for IP evaluation.\par \par Patient reports shortness of breath with and without activity. She reports she has coughing fits and reports she cannot intake air. SHe states the cough also keep her up at night. She reports coughing for the past few months, non productive with hemoptysis twice, last one week ago.No fevers but reports night sweats. Reports hoarseness of voice and loss of voice. \par \par She underwent at CT chest in May 2023 which showed new bilateral ground glass opacities.\par \par Patient currently takes MTX for RA and was taking rituxan q6 months, however last dose was 1 year ago. \par \par Social History:\par Non smoker\par Hospitalization for PE\par Not working\par No exposures \par Pets: Dogs (>5 years); no birds\par \par \par

## 2023-07-13 ENCOUNTER — OUTPATIENT (OUTPATIENT)
Dept: OUTPATIENT SERVICES | Facility: HOSPITAL | Age: 38
LOS: 1 days | End: 2023-07-13
Payer: MEDICAID

## 2023-07-13 DIAGNOSIS — Z98.891 HISTORY OF UTERINE SCAR FROM PREVIOUS SURGERY: Chronic | ICD-10-CM

## 2023-07-13 DIAGNOSIS — M72.2 PLANTAR FASCIAL FIBROMATOSIS: Chronic | ICD-10-CM

## 2023-07-13 DIAGNOSIS — Z98.890 OTHER SPECIFIED POSTPROCEDURAL STATES: Chronic | ICD-10-CM

## 2023-07-13 DIAGNOSIS — R06.02 SHORTNESS OF BREATH: ICD-10-CM

## 2023-07-13 DIAGNOSIS — Z98.51 TUBAL LIGATION STATUS: Chronic | ICD-10-CM

## 2023-07-13 PROCEDURE — 93306 TTE W/DOPPLER COMPLETE: CPT | Mod: 26

## 2023-07-13 PROCEDURE — 93306 TTE W/DOPPLER COMPLETE: CPT

## 2023-07-13 RX ORDER — RITUXIMAB-ABBS 10 MG/ML
500 INJECTION, SOLUTION INTRAVENOUS
Qty: 1 | Refills: 0 | Status: DISCONTINUED | OUTPATIENT
Start: 2022-05-04 | End: 2023-07-13

## 2023-07-13 RX ORDER — ACETAMINOPHEN 500 MG/1
500 TABLET ORAL
Qty: 0 | Refills: 0 | Status: COMPLETED | OUTPATIENT
Start: 2023-07-13 | End: 1900-01-01

## 2023-07-13 RX ORDER — METHYLPREDNISOLONE 125 MG/2ML
125 INJECTION, POWDER, LYOPHILIZED, FOR SOLUTION INTRAMUSCULAR; INTRAVENOUS
Qty: 0 | Refills: 0 | Status: COMPLETED | OUTPATIENT
Start: 2023-07-13 | End: 1900-01-01

## 2023-07-15 ENCOUNTER — NON-APPOINTMENT (OUTPATIENT)
Age: 38
End: 2023-07-15

## 2023-07-19 ENCOUNTER — TRANSCRIPTION ENCOUNTER (OUTPATIENT)
Age: 38
End: 2023-07-19

## 2023-07-20 ENCOUNTER — TRANSCRIPTION ENCOUNTER (OUTPATIENT)
Age: 38
End: 2023-07-20

## 2023-07-20 ENCOUNTER — NON-APPOINTMENT (OUTPATIENT)
Age: 38
End: 2023-07-20

## 2023-07-22 LAB
CULTURE RESULTS: SIGNIFICANT CHANGE UP
SPECIMEN SOURCE: SIGNIFICANT CHANGE UP

## 2023-07-24 ENCOUNTER — TRANSCRIPTION ENCOUNTER (OUTPATIENT)
Age: 38
End: 2023-07-24

## 2023-08-01 ENCOUNTER — APPOINTMENT (OUTPATIENT)
Dept: ORTHOPEDIC SURGERY | Facility: CLINIC | Age: 38
End: 2023-08-01
Payer: MEDICAID

## 2023-08-01 VITALS
DIASTOLIC BLOOD PRESSURE: 78 MMHG | WEIGHT: 195 LBS | HEIGHT: 59 IN | SYSTOLIC BLOOD PRESSURE: 118 MMHG | HEART RATE: 76 BPM | BODY MASS INDEX: 39.31 KG/M2

## 2023-08-01 DIAGNOSIS — M25.571 PAIN IN RIGHT ANKLE AND JOINTS OF RIGHT FOOT: ICD-10-CM

## 2023-08-01 DIAGNOSIS — G89.29 PAIN IN RIGHT ANKLE AND JOINTS OF RIGHT FOOT: ICD-10-CM

## 2023-08-01 DIAGNOSIS — S93.401A SPRAIN OF UNSPECIFIED LIGAMENT OF RIGHT ANKLE, INITIAL ENCOUNTER: ICD-10-CM

## 2023-08-01 PROCEDURE — 73610 X-RAY EXAM OF ANKLE: CPT | Mod: RT

## 2023-08-01 PROCEDURE — 99213 OFFICE O/P EST LOW 20 MIN: CPT | Mod: 25

## 2023-08-01 NOTE — PHYSICAL EXAM
[de-identified] : Constitutional: Well-nourished, well-developed, No acute distress  Respiratory:  Good respiratory effort, no SOB  Psychiatric: Pleasant and normal affect, alert and oriented x3   Musculoskeletal:  Right Ankle:  + Mild swelling of the anterolateral ankle and foot, no marked deformities  or malalignment  Skin: Mild swelling about lateral ankle NONTENDER: medial malleolus, lateral malleolus, tibialis posterior tendon, achilles tendon, no marked thickening of tendon, PTFL, anterior tibiofibular ligament (high ankle), sinus tarsus Tender to palpation about deltoid ligaments Motor: 5/5 EHL/FHL/TA/GS Sensory: SILT s/s/sp/dp/t distributions  ROM:  Mild limitation of PF and DF as well as Eversion and Inversion due to pain and swelling Mild pain with resisted eversion and DF.  painless resisted  plantar flexion, and inversion.   Anterior Drawer: positive without laxity Talar Tilt: positive without laxity Squeeze Test: negative  Left Ankle: Skin: warm, dry, no swelling or edema no TTP about ankle Motor: 5/5 EHL/FHL/TA/GS Sensory: SILT s/s/sp/dp/t distributions  ROM:  Dorsiflexion: 30 Plantarflexion: 30 Eversion: no pain Inversion: no pain  Anterior Drawer: negative Talar Tilt:negative Squeeze Test: negative [de-identified] : 3 views of the right ankle obtained today and interpreted by me including AP, lateral, mortise views demonstrate no acute fracture or dislocation.

## 2023-08-01 NOTE — DISCUSSION/SUMMARY
[de-identified] : Discussion was had with the patient regarding Her diagnosis and treatment plan.  She has a low ankle sprain of the right ankle, which involves the lateral ankle ligaments and possibly the medial deltoid ligament.  Discussed that typically these can be treated without surgery in most cases.  Nonoperative management would include the use of a lace up ankle brace for immobilization for 3 weeks while she is walking.  I recommend ice and elevation of the ankle while not mobilizing.  Overall I would like her to be mobile and not bedrest.  I will also prescribe her physical therapy to help with stretching and strengthening of the ankle.  I will see her back in 3 weeks for repeat evaluation.

## 2023-08-01 NOTE — HISTORY OF PRESENT ILLNESS
[de-identified] : OSIEL AKBAR  is a 37 year old F who presents with right ankle pain.  She says that on 7/1/2023 she was at a water park for her child's birthday.  She says that she came down a water slide and thinks that she may have hurt her right ankle.  She did not notice it at the time and was able to continue with her day, but at the end of the day felt some pain in her right ankle.  She says that she did notice some swelling as well.  She says that she did not seek attention at that time and was able to walk and do all of her normal activities.  The pain has lingered and she feels unstable on the ankle as well.  She says that the pain is about the 3 out of 10.  She says that walking for long periods of time increases the pain and the swelling.  Overall she has some mild swelling, but it is as bad as it used to be.  She did use heat with some relief of the pain as well.

## 2023-08-03 RX ORDER — RITUXIMAB-ABBS 10 MG/ML
500 INJECTION, SOLUTION INTRAVENOUS
Refills: 0 | Status: ACTIVE | OUTPATIENT
Start: 2023-08-03 | End: 1900-01-01

## 2023-08-09 LAB
CULTURE RESULTS: SIGNIFICANT CHANGE UP
SPECIMEN SOURCE: SIGNIFICANT CHANGE UP

## 2023-08-11 NOTE — HISTORY OF PRESENT ILLNESS
[TextBox_4] : 35F with h/o asthma and RA, previously on MTX and rituximab, here for evaluation of sob.\par Pt reports had covid 2 month ago (Pf[employer] 2nd shot 5/21). She had mild to moderate symptoms and received MAB infusion. Cough has improved but SAMANIEGO has persisted and slowly getting better. NO wheezing, no chest tightness. Has been off MTX since august.\par \par Pt here for f/u\par Reports about 3 months of sob, samaniego and persistent dry cough, no phlegm. No wheezing. Reports waking up at night gasping for air and difficulty sleeping. Has to sleep sitting up. Has been having more frequent night sweats, no fevers or chills. No leg swelling. Using Symbicort daily without relief. No allergy symptoms. Feels her psoriasis and RA is worse in the joints. Currently starting back on Rituximab, has been off MTX for 2 months since kidney infection. Reports overall her condition unchanged since last visit. underwent bronchoscopy and here to discuss results.\par

## 2023-08-11 NOTE — ASSESSMENT
[FreeTextEntry1] : 35F with asthma, RA and now several months persistent SAMANIEGO. 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 No evidence clinically of asthma exacerbation. CT chest reviewed,  Bronch results reviewed - TBBx with nonspecific lymphocyte predominant inflammation RA serologies appear stable  Discussed results at length.  Pt planned to restart her infusions and we will repeat PFTs and CT chest 6-8 weeks after initiation of therapy and reassess.  ADDENDUM:8/11/23 Pt clinically worse, with increased shortness of breath. Will send for 3 month interval CT scan at this time to reassess

## 2023-08-13 ENCOUNTER — TRANSCRIPTION ENCOUNTER (OUTPATIENT)
Age: 38
End: 2023-08-13

## 2023-08-14 DIAGNOSIS — R06.00 DYSPNEA, UNSPECIFIED: ICD-10-CM

## 2023-08-16 ENCOUNTER — TRANSCRIPTION ENCOUNTER (OUTPATIENT)
Age: 38
End: 2023-08-16

## 2023-08-16 ENCOUNTER — APPOINTMENT (OUTPATIENT)
Dept: OBGYN | Facility: CLINIC | Age: 38
End: 2023-08-16

## 2023-08-18 ENCOUNTER — TRANSCRIPTION ENCOUNTER (OUTPATIENT)
Age: 38
End: 2023-08-18

## 2023-08-18 ENCOUNTER — APPOINTMENT (OUTPATIENT)
Dept: RHEUMATOLOGY | Facility: CLINIC | Age: 38
End: 2023-08-18
Payer: MEDICAID

## 2023-08-18 ENCOUNTER — MED ADMIN CHARGE (OUTPATIENT)
Age: 38
End: 2023-08-18

## 2023-08-18 VITALS
OXYGEN SATURATION: 96 % | DIASTOLIC BLOOD PRESSURE: 69 MMHG | TEMPERATURE: 98.1 F | SYSTOLIC BLOOD PRESSURE: 104 MMHG | HEART RATE: 66 BPM | RESPIRATION RATE: 16 BRPM

## 2023-08-18 VITALS
OXYGEN SATURATION: 97 % | HEART RATE: 88 BPM | TEMPERATURE: 98.1 F | DIASTOLIC BLOOD PRESSURE: 74 MMHG | SYSTOLIC BLOOD PRESSURE: 111 MMHG | RESPIRATION RATE: 16 BRPM

## 2023-08-18 VITALS
RESPIRATION RATE: 16 BRPM | TEMPERATURE: 98.3 F | SYSTOLIC BLOOD PRESSURE: 112 MMHG | DIASTOLIC BLOOD PRESSURE: 76 MMHG | HEART RATE: 73 BPM | OXYGEN SATURATION: 96 %

## 2023-08-18 PROCEDURE — 96413 CHEMO IV INFUSION 1 HR: CPT

## 2023-08-18 PROCEDURE — 96374 THER/PROPH/DIAG INJ IV PUSH: CPT | Mod: 59

## 2023-08-18 PROCEDURE — 96415 CHEMO IV INFUSION ADDL HR: CPT

## 2023-08-18 PROCEDURE — 96375 TX/PRO/DX INJ NEW DRUG ADDON: CPT

## 2023-08-18 RX ORDER — RITUXIMAB-ABBS 10 MG/ML
500 INJECTION, SOLUTION INTRAVENOUS
Qty: 0 | Refills: 0 | Status: COMPLETED
Start: 2023-08-03

## 2023-08-18 RX ORDER — DIPHENHYDRAMINE HYDROCHLORIDE 50 MG/ML
50 INJECTION, SOLUTION INTRAMUSCULAR; INTRAVENOUS
Qty: 1 | Refills: 0 | Status: COMPLETED
Start: 2023-08-18

## 2023-08-18 RX ORDER — ACETAMINOPHEN 500 MG/1
500 TABLET ORAL
Qty: 0 | Refills: 0 | Status: COMPLETED
Start: 2023-07-13

## 2023-08-18 RX ORDER — DIPHENHYDRAMINE HYDROCHLORIDE 50 MG/ML
50 INJECTION, SOLUTION INTRAMUSCULAR; INTRAVENOUS
Refills: 0 | Status: COMPLETED | OUTPATIENT
Start: 2023-08-18 | End: 1900-01-01

## 2023-08-18 RX ORDER — METHYLPREDNISOLONE 125 MG/2ML
125 INJECTION, POWDER, LYOPHILIZED, FOR SOLUTION INTRAMUSCULAR; INTRAVENOUS
Qty: 0 | Refills: 0 | Status: COMPLETED
Start: 2023-07-13

## 2023-08-18 RX ADMIN — DIPHENHYDRAMINE HYDROCHLORIDE 0 MG/ML: 50 INJECTION, SOLUTION INTRAMUSCULAR; INTRAVENOUS at 00:00

## 2023-08-18 NOTE — HISTORY OF PRESENT ILLNESS
[7] : 7 [Denies] : Denies [No] : No [Yes] : Yes [Declined] : Declined [Left upper extremity] : Left upper extremity [24g] : 24g [Medication Name: ___] : Medication Name: [unfilled] [Start Time: ___] : Medication Start Time: [unfilled] [End Time: ___] : Medication End Time: [unfilled] [IV discontinued. Intact. No signs or symptoms of IV complications noted. Time: ___] : IV discontinued. Intact. No signs or symptoms of IV complications noted. Time: [unfilled] [Patient  instructed to seek medical attention with signs and symptoms of adverse effects] : Patient  instructed to seek medical attention with signs and symptoms of adverse effects [Patient left unit in no acute distress] : Patient left unit in no acute distress [Medications administered as ordered and tolerated well.] : Medications administered as ordered and tolerated well. [de-identified] : bilateral knees, ankle, and feet [de-identified] : Stiffness [de-identified] : No labs [de-identified] : Cephalic vein 24g [de-identified] : Patient presents here for Truxima infusion dose 1:2, ambulatory in NAD. Patient with hx of RA and was treated with RTX in the past with no AEs or SEs. Patient reports 3 months history of SOB and SAMANIEGO, and denies any cough or phlegm. Denies wheezing. Pending PFT in 2 weeks. Continues to report pain and stiffness in the feet, ankles, and hands. Denies any fever, infection, or recent abx use. Patient premedications given as per order.  Infusion titrated as per protocol. Vitals signs cycled and were stable throughout the infusion. Patient compliant of itchness around the IV site due to the tegaderm, resolved after removing it. In conclusion, the patient left the unit in NAD.

## 2023-08-19 ENCOUNTER — NON-APPOINTMENT (OUTPATIENT)
Age: 38
End: 2023-08-19

## 2023-08-22 ENCOUNTER — TRANSCRIPTION ENCOUNTER (OUTPATIENT)
Age: 38
End: 2023-08-22

## 2023-08-24 ENCOUNTER — APPOINTMENT (OUTPATIENT)
Dept: PULMONOLOGY | Facility: CLINIC | Age: 38
End: 2023-08-24
Payer: MEDICAID

## 2023-08-24 VITALS
WEIGHT: 191 LBS | BODY MASS INDEX: 38.51 KG/M2 | RESPIRATION RATE: 16 BRPM | SYSTOLIC BLOOD PRESSURE: 113 MMHG | TEMPERATURE: 98 F | HEIGHT: 59 IN | OXYGEN SATURATION: 98 % | DIASTOLIC BLOOD PRESSURE: 73 MMHG | HEART RATE: 81 BPM

## 2023-08-24 DIAGNOSIS — R76.8 OTHER SPECIFIED ABNORMAL IMMUNOLOGICAL FINDINGS IN SERUM: ICD-10-CM

## 2023-08-24 PROCEDURE — 99214 OFFICE O/P EST MOD 30 MIN: CPT

## 2023-08-24 NOTE — HISTORY OF PRESENT ILLNESS
[TextBox_4] : 35F with h/o asthma and RA, previously on MTX and rituximab, here for evaluation of sob. Pt reports had covid 2 month ago (Pfizer 2nd shot 5/21). She had mild to moderate symptoms and received MAB infusion. Cough has improved but SAMANIEGO has persisted and slowly getting better. NO wheezing, no chest tightness. Has been off MTX since august.  Pt here for f/u Reports about 3 months of sob, samaniego and persistent dry cough, no phlegm. No wheezing. Reports waking up at night gasping for air and difficulty sleeping. Has to sleep sitting up. Has been having more frequent night sweats, no fevers or chills. No leg swelling. Using Symbicort daily without relief. No allergy symptoms. Feels her psoriasis and RA is worse in the joints. Currently starting back on Rituximab, has been off MTX for 2 months since kidney infection. Reports overall her condition unchanged since last visit. underwent bronchoscopy and here to discuss results.  8/24/23 Pt reports ongoing SAMANIEGO which she feels is getting worse, no cough but reports persistent night sweats. Weight and appetite Ok. No other rheum related symptoms, received Rituximab infusion 2 weeks ago. No wheezing or tightness. Continues on blood thinners and reports this does not feel similar to her prior PE at all.

## 2023-08-24 NOTE — ASSESSMENT
[FreeTextEntry1] : 35F with asthma, RA and now many months persistent SAMANIEGO and worsening h/o unprovoked PE in setting of Protein S or C deficiency, on Eliquis She was on MTX and Rituximab, took a break for several months, now started back of Ritux 2 weeks ago No evidence clinically of asthma exacerbation. CT chest reviewed,  Bronch results reviewed - TBBx with nonspecific lymphocyte predominant inflammation RA serologies appear stable  Discussed results at length.  scheduled for full PFT  Pt clinically worse, with increased shortness of breath. Will send for 3 month interval CT scan at this time to reassess

## 2023-08-25 ENCOUNTER — APPOINTMENT (OUTPATIENT)
Dept: RHEUMATOLOGY | Facility: CLINIC | Age: 38
End: 2023-08-25
Payer: MEDICAID

## 2023-08-25 VITALS
DIASTOLIC BLOOD PRESSURE: 75 MMHG | TEMPERATURE: 98 F | OXYGEN SATURATION: 98 % | HEART RATE: 76 BPM | RESPIRATION RATE: 16 BRPM | SYSTOLIC BLOOD PRESSURE: 116 MMHG | BODY MASS INDEX: 37.7 KG/M2 | WEIGHT: 187 LBS | HEIGHT: 59 IN

## 2023-08-25 DIAGNOSIS — R06.00 DYSPNEA, UNSPECIFIED: ICD-10-CM

## 2023-08-25 PROCEDURE — 99214 OFFICE O/P EST MOD 30 MIN: CPT

## 2023-08-25 RX ORDER — NORTRIPTYLINE HYDROCHLORIDE 10 MG/1
10 CAPSULE ORAL
Qty: 60 | Refills: 3 | Status: DISCONTINUED | COMMUNITY
Start: 2023-04-03 | End: 2023-08-25

## 2023-08-25 RX ORDER — ENOXAPARIN SODIUM 80 MG/.8ML
80 INJECTION, SOLUTION SUBCUTANEOUS TWICE DAILY
Qty: 6 | Refills: 1 | Status: DISCONTINUED | COMMUNITY
Start: 2023-01-23 | End: 2023-08-25

## 2023-08-25 RX ORDER — CIPROFLOXACIN HYDROCHLORIDE 250 MG/1
250 TABLET, FILM COATED ORAL
Qty: 14 | Refills: 0 | Status: DISCONTINUED | COMMUNITY
Start: 2023-06-15 | End: 2023-08-25

## 2023-08-25 RX ORDER — BACILLUS COAGULANS/INULIN 1B-250 MG
1-250 CAPSULE ORAL
Qty: 90 | Refills: 2 | Status: DISCONTINUED | COMMUNITY
Start: 2022-07-20 | End: 2023-08-25

## 2023-08-25 NOTE — HISTORY OF PRESENT ILLNESS
[___ Month(s) Ago] : [unfilled] month(s) ago [FreeTextEntry1] : had bronchoscopy done - infection origin has been r/o ongoing breathing issues - worsening - pending new PFT and CT scan. had rituximab infusion about 1 week ago. - 1st dose - next dose schedule for 09/01/2023 ongoing mild joint pain over hands and feet [Joint Swelling] : joint swelling [Morning Stiffness] : morning stiffness [Joint Pain] : joint pain [TextBox_2] : 2017 [TextBox_40] : methotrexate [TextBox_44] : rituximab

## 2023-08-25 NOTE — ASSESSMENT
[FreeTextEntry1] : 32 year-old female with pmh of ALMA ROSA, now with return of her symptoms with polyarthritis, highly positive DEBORAH  1. RA with vasculitis, now  ILD - methotrexate 5 tablets -   on methotrexate - joint pain is stable - prep for new rituximab infusion - new diagnosis of ILD - bronchoscopy done with no infection - s/o 1 dose of rituximab. - pending new PFT and ct scan - joint pain is stable 2. chronic back pain - ongoing work-up by PMR x-rays with degenerative changes and mild scoliosis -had nerve block - no pain at this time.  - continue with current regimen 3. chronic pain - on medical marijuana - stable 4. muscle cramps over the right leg - not active 5. PE - likely related to tubal ligation (5 days prior) - chronic eliquis - given hx of clotting in her family 6. Right leg paresthesias - EMG done yesterday - mild sensory axonal loss 7. Right shoulder pain -with decreased ROM over 6 months worsening - with loss of ROM - could not complete PT due to  pain - new referral given -MRI not done - missed appt -  pain is not severe at this time 8.Loss of hearing -under the care of ENT 9. diverticulitis - under he care of GI - no flares 10. bilateral knee - mechanical can take  tylenol as needed -  cannot take nsaids due to being on eliquis 11 cervical pain - hx of herniated discs - send for x-rays and PT - consider MRI after finishing PT   I reviewed previous labs results with patients. Diagnosis and Prognosis discussed Continue with current medications medications refilled education provided on F/u 3 months

## 2023-08-25 NOTE — PHYSICAL EXAM
[General Appearance - Alert] : alert [General Appearance - In No Acute Distress] : in no acute distress [Outer Ear] : the ears and nose were normal in appearance [Oropharynx] : the oropharynx was normal [Neck Appearance] : the appearance of the neck was normal [Neck Cervical Mass (___cm)] : no neck mass was observed [Jugular Venous Distention Increased] : there was no jugular-venous distention [Thyroid Diffuse Enlargement] : the thyroid was not enlarged [Thyroid Nodule] : there were no palpable thyroid nodules [Auscultation Breath Sounds / Voice Sounds] : lungs were clear to auscultation bilaterally [Heart Rate And Rhythm] : heart rate was normal and rhythm regular [Heart Sounds] : normal S1 and S2 [Heart Sounds Gallop] : no gallops [Murmurs] : no murmurs [Heart Sounds Pericardial Friction Rub] : no pericardial rub [Full Pulse] : the pedal pulses are present [Edema] : there was no peripheral edema [Bowel Sounds] : normal bowel sounds [Abdomen Soft] : soft [Abdomen Tenderness] : non-tender [Abdomen Mass (___ Cm)] : no abdominal mass palpated [Cervical Lymph Nodes Enlarged Posterior Bilaterally] : posterior cervical [Cervical Lymph Nodes Enlarged Anterior Bilaterally] : anterior cervical [Supraclavicular Lymph Nodes Enlarged Bilaterally] : supraclavicular [No CVA Tenderness] : no ~M costovertebral angle tenderness [No Spinal Tenderness] : no spinal tenderness [Abnormal Walk] : normal gait [Nail Clubbing] : no clubbing  or cyanosis of the fingernails [Involuntary Movements] : no involuntary movements were seen [Musculoskeletal - Swelling] : no joint swelling seen [Motor Tone] : muscle strength and tone were normal [FreeTextEntry1] : all joints with full ROM - no active synovitis - tenderness over the knees - elbows and wrists [Skin Color & Pigmentation] : normal skin color and pigmentation [Skin Turgor] : normal skin turgor [] : no rash [Oriented To Time, Place, And Person] : oriented to person, place, and time [Impaired Insight] : insight and judgment were intact [Affect] : the affect was normal

## 2023-08-29 ENCOUNTER — APPOINTMENT (OUTPATIENT)
Dept: PULMONOLOGY | Facility: CLINIC | Age: 38
End: 2023-08-29
Payer: MEDICAID

## 2023-08-29 VITALS
SYSTOLIC BLOOD PRESSURE: 110 MMHG | DIASTOLIC BLOOD PRESSURE: 75 MMHG | OXYGEN SATURATION: 98 % | HEIGHT: 59 IN | RESPIRATION RATE: 16 BRPM | WEIGHT: 192 LBS | BODY MASS INDEX: 38.71 KG/M2 | TEMPERATURE: 97.9 F | HEART RATE: 81 BPM

## 2023-08-29 DIAGNOSIS — R93.89 ABNORMAL FINDINGS ON DIAGNOSTIC IMAGING OF OTHER SPECIFIED BODY STRUCTURES: ICD-10-CM

## 2023-08-29 PROCEDURE — 94010 BREATHING CAPACITY TEST: CPT

## 2023-08-29 PROCEDURE — 94729 DIFFUSING CAPACITY: CPT

## 2023-08-29 PROCEDURE — 99214 OFFICE O/P EST MOD 30 MIN: CPT | Mod: 25

## 2023-08-29 PROCEDURE — ZZZZZ: CPT

## 2023-08-29 PROCEDURE — 94726 PLETHYSMOGRAPHY LUNG VOLUMES: CPT

## 2023-08-29 NOTE — PHYSICAL EXAM
[No Acute Distress] : no acute distress [Normal Oropharynx] : normal oropharynx [Normal Appearance] : normal appearance [No Neck Mass] : no neck mass [Normal Rate/Rhythm] : normal rate/rhythm [Normal S1, S2] : normal s1, s2 [No Murmurs] : no murmurs [No Resp Distress] : no resp distress [No Abnormalities] : no abnormalities [Benign] : benign [No Clubbing] : no clubbing [No Cyanosis] : no cyanosis [No Edema] : no edema [FROM] : FROM [Normal Color/ Pigmentation] : normal color/ pigmentation [No Focal Deficits] : no focal deficits [Oriented x3] : oriented x3 [Normal Affect] : normal affect [TextBox_68] : mild fine crackles heard on RLL and LLL [TextBox_99] : right middle back TTP

## 2023-08-29 NOTE — HISTORY OF PRESENT ILLNESS
[TextBox_4] : 35F with h/o asthma and RA, previously on MTX and rituximab, here for evaluation of sob. Pt reports had covid 2 month ago ([employer] 2nd shot 5/21). She had mild to moderate symptoms and received MAB infusion. Cough has improved but SAMANIEGO has persisted and slowly getting better. NO wheezing, no chest tightness. Has been off MTX since august.  Pt here for f/u Reports about 3 months of sob, samaniego and persistent dry cough, no phlegm. No wheezing. Reports waking up at night gasping for air and difficulty sleeping. Has to sleep sitting up. Has been having more frequent night sweats, no fevers or chills. No leg swelling. Using Symbicort daily without relief. No allergy symptoms. Feels her psoriasis and RA is worse in the joints. Currently starting back on Rituximab, has been off MTX for 2 months since kidney infection. Reports overall her condition unchanged since last visit. underwent bronchoscopy and here to discuss results.  8/24/23 Pt reports ongoing SAMANIEGO which she feels is getting worse, no cough but reports persistent night sweats. Weight and appetite Ok. No other rheum related symptoms, received Rituximab infusion 2 weeks ago. No wheezing or tightness. Continues on blood thinners and reports this does not feel similar to her prior PE at all.  8/25/23 Pt reports no change in her respiratory symptoms, still feels SAMANIEGO as well as at rest. She reports a pleuritic right sided chest pain, worse on deep inspiration, however also TTP.  She is still reporting persistent night sweats. Endorses decreased appetite, however no change in weight. Pt still continuing with Rituximab and MTX.

## 2023-08-29 NOTE — ASSESSMENT
[FreeTextEntry1] : 35F with asthma, RA and now many months persistent SAMANIEGO and worsening h/o unprovoked PE in setting of Protein S or C deficiency, on Eliquis She was on MTX and Rituximab, took a break for several months, now started back of Ritux 3 weeks ago No evidence clinically of asthma exacerbation. Bronch results reviewed - TBBx with nonspecific lymphocyte predominant inflammation Pt reporting no change in respiratory symptoms, still has SAMANIEGO as well as at rest, persistent night sweats, and right sided pleuritic chest pain, however does not appear in acute distress PFT showing mild restrictive pattern Pt approved for repeat 3 month CT chest

## 2023-09-01 ENCOUNTER — MED ADMIN CHARGE (OUTPATIENT)
Age: 38
End: 2023-09-01

## 2023-09-01 ENCOUNTER — APPOINTMENT (OUTPATIENT)
Dept: RHEUMATOLOGY | Facility: CLINIC | Age: 38
End: 2023-09-01
Payer: MEDICAID

## 2023-09-01 VITALS
RESPIRATION RATE: 16 BRPM | TEMPERATURE: 98.4 F | SYSTOLIC BLOOD PRESSURE: 115 MMHG | HEART RATE: 82 BPM | DIASTOLIC BLOOD PRESSURE: 76 MMHG | OXYGEN SATURATION: 96 %

## 2023-09-01 VITALS
HEART RATE: 68 BPM | OXYGEN SATURATION: 95 % | DIASTOLIC BLOOD PRESSURE: 61 MMHG | SYSTOLIC BLOOD PRESSURE: 93 MMHG | TEMPERATURE: 98.8 F | RESPIRATION RATE: 16 BRPM

## 2023-09-01 VITALS
HEART RATE: 82 BPM | RESPIRATION RATE: 16 BRPM | SYSTOLIC BLOOD PRESSURE: 108 MMHG | DIASTOLIC BLOOD PRESSURE: 73 MMHG | TEMPERATURE: 98.2 F | OXYGEN SATURATION: 96 %

## 2023-09-01 PROCEDURE — 96375 TX/PRO/DX INJ NEW DRUG ADDON: CPT | Mod: 59

## 2023-09-01 PROCEDURE — 96415 CHEMO IV INFUSION ADDL HR: CPT

## 2023-09-01 PROCEDURE — 96413 CHEMO IV INFUSION 1 HR: CPT

## 2023-09-01 PROCEDURE — 96374 THER/PROPH/DIAG INJ IV PUSH: CPT | Mod: 59

## 2023-09-01 RX ORDER — ACETAMINOPHEN 500 MG/1
500 TABLET ORAL
Qty: 0 | Refills: 0 | Status: COMPLETED
Start: 2023-07-13

## 2023-09-01 RX ORDER — METHYLPREDNISOLONE 125 MG/2ML
125 INJECTION, POWDER, LYOPHILIZED, FOR SOLUTION INTRAMUSCULAR; INTRAVENOUS
Qty: 0 | Refills: 0 | Status: COMPLETED
Start: 2023-07-13

## 2023-09-01 RX ORDER — RITUXIMAB-ABBS 10 MG/ML
500 INJECTION, SOLUTION INTRAVENOUS
Qty: 0 | Refills: 0 | Status: COMPLETED
Start: 2023-07-06

## 2023-09-01 RX ADMIN — DIPHENHYDRAMINE HYDROCHLORIDE 0 MG/ML: 50 INJECTION, SOLUTION INTRAMUSCULAR; INTRAVENOUS at 00:00

## 2023-09-01 NOTE — HISTORY OF PRESENT ILLNESS
[4] : 4 [Denies] : Denies [No] : No [Yes] : Yes [Declined] : Declined [Right upper extremity] : Right upper extremity [24g] : 24g [Medication Name: ___] : Medication Name: [unfilled] [Start Time: ___] : Medication Start Time: [unfilled] [End Time: ___] : Medication End Time: [unfilled] [IV discontinued. Intact. No signs or symptoms of IV complications noted. Time: ___] : IV discontinued. Intact. No signs or symptoms of IV complications noted. Time: [unfilled] [Patient  instructed to seek medical attention with signs and symptoms of adverse effects] : Patient  instructed to seek medical attention with signs and symptoms of adverse effects [Patient left unit in no acute distress] : Patient left unit in no acute distress [Medications administered as ordered and tolerated well.] : Medications administered as ordered and tolerated well. [de-identified] : hands, ankles and toes [de-identified] : Stiffness [de-identified] : Cephalic vein [de-identified] : No labs [de-identified] : Patient presents here for Truxima infusion dose 2:2, ambulatory in NAD. Patient reports having last infusion tolerated well and maite any AEs. Continues to report pain and stiffness to hands, ankles and toes as rated above. Denies any fever, infection, or recent abx use. Patient pre-medicated as per order.  Infusion titrated as per protocol. Vitals signs cycled and were stable throughout the infusion. In conclusion, the patient left the unit in NAD.

## 2023-09-05 RX ORDER — DIPHENHYDRAMINE HYDROCHLORIDE 50 MG/ML
50 INJECTION, SOLUTION INTRAMUSCULAR; INTRAVENOUS
Qty: 1 | Refills: 0 | Status: COMPLETED | OUTPATIENT
Start: 2023-09-01

## 2023-09-14 ENCOUNTER — APPOINTMENT (OUTPATIENT)
Dept: INTERNAL MEDICINE | Facility: CLINIC | Age: 38
End: 2023-09-14

## 2023-09-19 ENCOUNTER — TRANSCRIPTION ENCOUNTER (OUTPATIENT)
Age: 38
End: 2023-09-19

## 2023-09-19 RX ORDER — APIXABAN 2.5 MG/1
2.5 TABLET, FILM COATED ORAL
Qty: 180 | Refills: 3 | Status: ACTIVE | COMMUNITY
Start: 2019-06-13 | End: 1900-01-01

## 2023-09-20 ENCOUNTER — APPOINTMENT (OUTPATIENT)
Dept: OTOLARYNGOLOGY | Facility: CLINIC | Age: 38
End: 2023-09-20

## 2023-09-25 PROBLEM — R93.89 ABNORMAL CHEST CT: Status: ACTIVE | Noted: 2023-05-19

## 2023-09-26 ENCOUNTER — APPOINTMENT (OUTPATIENT)
Dept: PULMONOLOGY | Facility: CLINIC | Age: 38
End: 2023-09-26

## 2023-09-28 ENCOUNTER — APPOINTMENT (OUTPATIENT)
Dept: CT IMAGING | Facility: HOSPITAL | Age: 38
End: 2023-09-28
Payer: MEDICAID

## 2023-09-28 ENCOUNTER — OUTPATIENT (OUTPATIENT)
Dept: OUTPATIENT SERVICES | Facility: HOSPITAL | Age: 38
LOS: 1 days | End: 2023-09-28
Payer: MEDICAID

## 2023-09-28 DIAGNOSIS — M72.2 PLANTAR FASCIAL FIBROMATOSIS: Chronic | ICD-10-CM

## 2023-09-28 DIAGNOSIS — Z98.51 TUBAL LIGATION STATUS: Chronic | ICD-10-CM

## 2023-09-28 DIAGNOSIS — R93.89 ABNORMAL FINDINGS ON DIAGNOSTIC IMAGING OF OTHER SPECIFIED BODY STRUCTURES: ICD-10-CM

## 2023-09-28 DIAGNOSIS — M35.9 SYSTEMIC INVOLVEMENT OF CONNECTIVE TISSUE, UNSPECIFIED: ICD-10-CM

## 2023-09-28 DIAGNOSIS — M06.9 RHEUMATOID ARTHRITIS, UNSPECIFIED: ICD-10-CM

## 2023-09-28 DIAGNOSIS — Z98.891 HISTORY OF UTERINE SCAR FROM PREVIOUS SURGERY: Chronic | ICD-10-CM

## 2023-09-28 DIAGNOSIS — Z98.890 OTHER SPECIFIED POSTPROCEDURAL STATES: Chronic | ICD-10-CM

## 2023-09-28 DIAGNOSIS — J84.9 INTERSTITIAL PULMONARY DISEASE, UNSPECIFIED: ICD-10-CM

## 2023-09-28 PROCEDURE — 71250 CT THORAX DX C-: CPT | Mod: 26

## 2023-09-28 PROCEDURE — 71250 CT THORAX DX C-: CPT

## 2023-10-09 ENCOUNTER — TRANSCRIPTION ENCOUNTER (OUTPATIENT)
Age: 38
End: 2023-10-09

## 2023-10-11 ENCOUNTER — NON-APPOINTMENT (OUTPATIENT)
Age: 38
End: 2023-10-11

## 2023-10-11 ENCOUNTER — TRANSCRIPTION ENCOUNTER (OUTPATIENT)
Age: 38
End: 2023-10-11

## 2023-10-12 ENCOUNTER — TRANSCRIPTION ENCOUNTER (OUTPATIENT)
Age: 38
End: 2023-10-12

## 2023-10-12 ENCOUNTER — EMERGENCY (EMERGENCY)
Facility: HOSPITAL | Age: 38
LOS: 1 days | Discharge: HOME | End: 2023-10-12
Attending: EMERGENCY MEDICINE
Payer: MEDICAID

## 2023-10-12 VITALS
RESPIRATION RATE: 18 BRPM | HEART RATE: 65 BPM | DIASTOLIC BLOOD PRESSURE: 80 MMHG | WEIGHT: 185.19 LBS | SYSTOLIC BLOOD PRESSURE: 118 MMHG | TEMPERATURE: 99 F | HEIGHT: 59 IN | OXYGEN SATURATION: 98 %

## 2023-10-12 DIAGNOSIS — Z98.890 OTHER SPECIFIED POSTPROCEDURAL STATES: Chronic | ICD-10-CM

## 2023-10-12 DIAGNOSIS — Z98.891 HISTORY OF UTERINE SCAR FROM PREVIOUS SURGERY: Chronic | ICD-10-CM

## 2023-10-12 DIAGNOSIS — Z98.51 TUBAL LIGATION STATUS: Chronic | ICD-10-CM

## 2023-10-12 DIAGNOSIS — M72.2 PLANTAR FASCIAL FIBROMATOSIS: Chronic | ICD-10-CM

## 2023-10-12 PROCEDURE — 99284 EMERGENCY DEPT VISIT MOD MDM: CPT | Mod: 25

## 2023-10-12 PROCEDURE — G1004: CPT

## 2023-10-12 PROCEDURE — 99284 EMERGENCY DEPT VISIT MOD MDM: CPT

## 2023-10-12 PROCEDURE — 72125 CT NECK SPINE W/O DYE: CPT | Mod: ME

## 2023-10-12 PROCEDURE — 72125 CT NECK SPINE W/O DYE: CPT | Mod: 26,ME

## 2023-10-12 NOTE — ED ADULT TRIAGE NOTE - CHIEF COMPLAINT QUOTE
BIBA, sending from urgent care, c/o neck pain, right arm and leg pain x one week, different sensation with right arm and left arm, denies slurred speech or facial droop BIBA, sending from urgent care, c/o neck pain, right arm and leg pain with tingling sensation x one week, different sensation with right arm and left arm, denies slurred speech or facial droop

## 2023-10-12 NOTE — ED PROVIDER NOTE - GASTROINTESTINAL, MLM
Abdomen soft, non-tender, no guarding. Hemigard Postcare Instructions: The HEMIGARD strips are to remain completely dry for at least 5-7 days.

## 2023-10-12 NOTE — ED PROVIDER NOTE - PATIENT PORTAL LINK FT
You can access the FollowMyHealth Patient Portal offered by [facility] by registering at the following website: [email or web address]. By joining [employer]’s FollowMyHealth portal, you will also be able to view your health information using other applications (apps) compatible with our system.

## 2023-10-12 NOTE — ED PROVIDER NOTE - NSICDXPASTMEDICALHX_GEN_ALL_CORE_FT
PAST MEDICAL HISTORY:  Asthma     Fibromyalgia     H/O antiphospholipid syndrome     Lung abnormality     PE (pulmonary thromboembolism)     Premature ovarian failure     Rheumatoid arthritis       Herniated cervical disc

## 2023-10-12 NOTE — ED ADULT NURSE NOTE - CHIEF COMPLAINT QUOTE
BIBA, sending from urgent care, c/o neck pain, right arm and leg pain with tingling sensation x one week, different sensation with right arm and left arm, denies slurred speech or facial droop

## 2023-10-12 NOTE — ED ADULT TRIAGE NOTE - HOW PATIENT ADDRESSED, PROFILE
Rosalina Mercedes Flap Text: The defect edges were debeveled with a #15 scalpel blade.  Given the location of the defect, shape of the defect and the proximity to free margins a Mercedes flap was deemed most appropriate.  Using a sterile surgical marker, an appropriate advancement flap was drawn incorporating the defect and placing the expected incisions within the relaxed skin tension lines where possible. The area thus outlined was incised deep to adipose tissue with a #15 scalpel blade.  The skin margins were undermined to an appropriate distance in all directions utilizing iris scissors.

## 2023-10-12 NOTE — ED PROVIDER NOTE - OBJECTIVE STATEMENT
37 year old female with history of cervical disc herniation since 2017 (has been in care of neurologist for the same) presents to ED complaining of recurrent episodes of right arm and right leg tingling and numbness. She relates that in the past week she has been having increased symptoms of numbness in the right arm and leg. Denies weakness or loss of bowel or bladder control. Ambulates independently without assistance. Patient states she is under care of neurologist Dr Oscar and will see her next week, but she is concerned about the increased frequency of her symptoms so she came into the ED today.  Allergies: penicillin (anaphylaxis), amoxicillin (hives)

## 2023-10-12 NOTE — ED PROVIDER NOTE - CLINICAL SUMMARY MEDICAL DECISION MAKING FREE TEXT BOX
Cervical radiculopathy. Will CT neck and reevaluate. Cervical radiculopathy. Will CT neck and reevaluate.pt to follow up arranged by [facility] coordinator. with neurology

## 2023-10-12 NOTE — ED ADULT NURSE NOTE - NSFALLUNIVINTERV_ED_ALL_ED
Bed/Stretcher in lowest position, wheels locked, appropriate side rails in place/Call bell, personal items and telephone in reach/Instruct patient to call for assistance before getting out of bed/chair/stretcher/Non-slip footwear applied when patient is off stretcher/[place] to call system/Physically safe environment - no spills, clutter or unnecessary equipment/Purposeful proactive rounding/Room/bathroom lighting operational, light cord in reach

## 2023-10-12 NOTE — ED ADULT NURSE REASSESSMENT NOTE - NS ED NURSE REASSESS COMMENT FT1
RN discussed with MD Robbins d/t pt s/s. RN discussed with MD Robbins d/t pt s/s, as per MD Robbins not to call stroke.

## 2023-10-16 ENCOUNTER — APPOINTMENT (OUTPATIENT)
Dept: PULMONOLOGY | Facility: CLINIC | Age: 38
End: 2023-10-16

## 2023-10-18 ENCOUNTER — APPOINTMENT (OUTPATIENT)
Dept: NEUROLOGY | Facility: CLINIC | Age: 38
End: 2023-10-18
Payer: MEDICAID

## 2023-10-18 VITALS
BODY MASS INDEX: 38.1 KG/M2 | HEIGHT: 59 IN | SYSTOLIC BLOOD PRESSURE: 114 MMHG | DIASTOLIC BLOOD PRESSURE: 80 MMHG | WEIGHT: 189 LBS | OXYGEN SATURATION: 96 % | HEART RATE: 92 BPM | TEMPERATURE: 98 F

## 2023-10-18 DIAGNOSIS — M54.12 RADICULOPATHY, CERVICAL REGION: ICD-10-CM

## 2023-10-18 DIAGNOSIS — Z09 ENCOUNTER FOR FOLLOW-UP EXAMINATION AFTER COMPLETED TREATMENT FOR CONDITIONS OTHER THAN MALIGNANT NEOPLASM: ICD-10-CM

## 2023-10-18 PROBLEM — M50.20 OTHER CERVICAL DISC DISPLACEMENT, UNSPECIFIED CERVICAL REGION: Chronic | Status: ACTIVE | Noted: 2023-10-12

## 2023-10-18 PROCEDURE — 99215 OFFICE O/P EST HI 40 MIN: CPT

## 2023-10-25 ENCOUNTER — TRANSCRIPTION ENCOUNTER (OUTPATIENT)
Age: 38
End: 2023-10-25

## 2023-10-25 LAB
ALBUMIN SERPL ELPH-MCNC: 4.6 G/DL
ALP BLD-CCNC: 208 U/L
ALT SERPL-CCNC: 13 U/L
ANION GAP SERPL CALC-SCNC: 10 MMOL/L
AST SERPL-CCNC: 11 U/L
BILIRUB SERPL-MCNC: 0.3 MG/DL
BUN SERPL-MCNC: 12 MG/DL
CALCIUM SERPL-MCNC: 9.6 MG/DL
CHLORIDE SERPL-SCNC: 103 MMOL/L
CO2 SERPL-SCNC: 27 MMOL/L
CREAT SERPL-MCNC: 0.73 MG/DL
CRP SERPL-MCNC: 7 MG/L
EGFR: 108 ML/MIN/1.73M2
ERYTHROCYTE [SEDIMENTATION RATE] IN BLOOD BY WESTERGREN METHOD: 33 MM/HR
GLUCOSE SERPL-MCNC: 85 MG/DL
POTASSIUM SERPL-SCNC: 4.3 MMOL/L
PROT SERPL-MCNC: 7.3 G/DL
SODIUM SERPL-SCNC: 141 MMOL/L

## 2023-10-26 ENCOUNTER — APPOINTMENT (OUTPATIENT)
Dept: MRI IMAGING | Facility: HOSPITAL | Age: 38
End: 2023-10-26
Payer: MEDICAID

## 2023-10-26 ENCOUNTER — TRANSCRIPTION ENCOUNTER (OUTPATIENT)
Age: 38
End: 2023-10-26

## 2023-10-26 ENCOUNTER — OUTPATIENT (OUTPATIENT)
Dept: OUTPATIENT SERVICES | Facility: HOSPITAL | Age: 38
LOS: 1 days | End: 2023-10-26
Payer: MEDICAID

## 2023-10-26 DIAGNOSIS — Z98.891 HISTORY OF UTERINE SCAR FROM PREVIOUS SURGERY: Chronic | ICD-10-CM

## 2023-10-26 DIAGNOSIS — M72.2 PLANTAR FASCIAL FIBROMATOSIS: Chronic | ICD-10-CM

## 2023-10-26 DIAGNOSIS — M54.2 CERVICALGIA: ICD-10-CM

## 2023-10-26 DIAGNOSIS — M47.12 OTHER SPONDYLOSIS WITH MYELOPATHY, CERVICAL REGION: ICD-10-CM

## 2023-10-26 DIAGNOSIS — Z98.51 TUBAL LIGATION STATUS: Chronic | ICD-10-CM

## 2023-10-26 DIAGNOSIS — Z98.890 OTHER SPECIFIED POSTPROCEDURAL STATES: Chronic | ICD-10-CM

## 2023-10-26 PROCEDURE — 72141 MRI NECK SPINE W/O DYE: CPT

## 2023-10-26 PROCEDURE — 72141 MRI NECK SPINE W/O DYE: CPT | Mod: 26

## 2023-10-27 ENCOUNTER — TRANSCRIPTION ENCOUNTER (OUTPATIENT)
Age: 38
End: 2023-10-27

## 2023-10-30 ENCOUNTER — TRANSCRIPTION ENCOUNTER (OUTPATIENT)
Age: 38
End: 2023-10-30

## 2023-10-30 ENCOUNTER — APPOINTMENT (OUTPATIENT)
Dept: INTERNAL MEDICINE | Facility: CLINIC | Age: 38
End: 2023-10-30

## 2023-10-31 ENCOUNTER — TRANSCRIPTION ENCOUNTER (OUTPATIENT)
Age: 38
End: 2023-10-31

## 2023-11-01 ENCOUNTER — APPOINTMENT (OUTPATIENT)
Dept: PHYSICAL MEDICINE AND REHAB | Facility: CLINIC | Age: 38
End: 2023-11-01

## 2023-11-04 ENCOUNTER — NON-APPOINTMENT (OUTPATIENT)
Age: 38
End: 2023-11-04

## 2023-11-06 ENCOUNTER — RX RENEWAL (OUTPATIENT)
Age: 38
End: 2023-11-06

## 2023-11-06 ENCOUNTER — NON-APPOINTMENT (OUTPATIENT)
Age: 38
End: 2023-11-06

## 2023-11-07 ENCOUNTER — APPOINTMENT (OUTPATIENT)
Dept: NEUROSURGERY | Facility: CLINIC | Age: 38
End: 2023-11-07
Payer: MEDICAID

## 2023-11-07 VITALS
BODY MASS INDEX: 38.51 KG/M2 | HEART RATE: 84 BPM | OXYGEN SATURATION: 99 % | WEIGHT: 191 LBS | DIASTOLIC BLOOD PRESSURE: 76 MMHG | TEMPERATURE: 98.5 F | HEIGHT: 59 IN | SYSTOLIC BLOOD PRESSURE: 122 MMHG

## 2023-11-07 PROCEDURE — 99203 OFFICE O/P NEW LOW 30 MIN: CPT

## 2023-11-09 ENCOUNTER — APPOINTMENT (OUTPATIENT)
Dept: RADIOLOGY | Facility: CLINIC | Age: 38
End: 2023-11-09
Payer: MEDICAID

## 2023-11-09 PROCEDURE — 72052 X-RAY EXAM NECK SPINE 6/>VWS: CPT

## 2023-11-09 NOTE — REVIEW OF SYSTEMS
Detail Level: Zone Initiate Treatment: tranexamic acid 650 mg tablet \\nQuantity: 30.0 Tablet  Days Supply: 30\\nSig: Take one tablet by mouth once daily. [Cough] : cough [SOB on Exertion] : sob on exertion [Negative] : Endocrine [Sputum] : no sputum Render In Strict Bullet Format?: No Continue Regimen: 1. Nutrafol Women’s or Vivascal PRO vitamins: take as directed on box\\n2. Dove shampoo and conditioner \\n3. Crave detangling brush ([employer]) Modify Regimen: Do not style or brush hair when wet and most fragile. If needed, use root cover up and do not dye hair more than once every 6-8 weeks Discontinue Regimen: Discontinue or limit straightening hair or using hot tools.

## 2023-11-13 ENCOUNTER — NON-APPOINTMENT (OUTPATIENT)
Age: 38
End: 2023-11-13

## 2023-11-20 ENCOUNTER — TRANSCRIPTION ENCOUNTER (OUTPATIENT)
Age: 38
End: 2023-11-20

## 2023-11-21 ENCOUNTER — TRANSCRIPTION ENCOUNTER (OUTPATIENT)
Age: 38
End: 2023-11-21

## 2023-11-22 ENCOUNTER — TRANSCRIPTION ENCOUNTER (OUTPATIENT)
Age: 38
End: 2023-11-22

## 2023-11-27 ENCOUNTER — TRANSCRIPTION ENCOUNTER (OUTPATIENT)
Age: 38
End: 2023-11-27

## 2023-12-05 ENCOUNTER — APPOINTMENT (OUTPATIENT)
Dept: PULMONOLOGY | Facility: CLINIC | Age: 38
End: 2023-12-05
Payer: MEDICAID

## 2023-12-05 VITALS
SYSTOLIC BLOOD PRESSURE: 113 MMHG | HEIGHT: 59 IN | RESPIRATION RATE: 16 BRPM | TEMPERATURE: 98.6 F | WEIGHT: 193 LBS | HEART RATE: 106 BPM | BODY MASS INDEX: 38.91 KG/M2 | DIASTOLIC BLOOD PRESSURE: 77 MMHG | OXYGEN SATURATION: 98 %

## 2023-12-05 DIAGNOSIS — J98.4 OTHER DISORDERS OF LUNG: ICD-10-CM

## 2023-12-05 DIAGNOSIS — R06.02 SHORTNESS OF BREATH: ICD-10-CM

## 2023-12-05 DIAGNOSIS — R74.8 ABNORMAL LEVELS OF OTHER SERUM ENZYMES: ICD-10-CM

## 2023-12-05 PROCEDURE — 99214 OFFICE O/P EST MOD 30 MIN: CPT

## 2023-12-08 ENCOUNTER — APPOINTMENT (OUTPATIENT)
Dept: RHEUMATOLOGY | Facility: CLINIC | Age: 38
End: 2023-12-08
Payer: MEDICAID

## 2023-12-08 VITALS
TEMPERATURE: 98.1 F | HEIGHT: 59 IN | HEART RATE: 81 BPM | BODY MASS INDEX: 39.51 KG/M2 | SYSTOLIC BLOOD PRESSURE: 110 MMHG | RESPIRATION RATE: 16 BRPM | WEIGHT: 196 LBS | DIASTOLIC BLOOD PRESSURE: 77 MMHG | OXYGEN SATURATION: 98 %

## 2023-12-08 DIAGNOSIS — B37.0 CANDIDAL STOMATITIS: ICD-10-CM

## 2023-12-08 PROCEDURE — 99215 OFFICE O/P EST HI 40 MIN: CPT

## 2023-12-08 RX ORDER — PREDNISONE 10 MG/1
10 TABLET ORAL
Qty: 30 | Refills: 1 | Status: ACTIVE | COMMUNITY
Start: 2023-12-08 | End: 1900-01-01

## 2023-12-11 ENCOUNTER — NON-APPOINTMENT (OUTPATIENT)
Age: 38
End: 2023-12-11

## 2023-12-12 ENCOUNTER — TRANSCRIPTION ENCOUNTER (OUTPATIENT)
Age: 38
End: 2023-12-12

## 2023-12-12 ENCOUNTER — APPOINTMENT (OUTPATIENT)
Dept: PULMONOLOGY | Facility: CLINIC | Age: 38
End: 2023-12-12

## 2023-12-13 ENCOUNTER — TRANSCRIPTION ENCOUNTER (OUTPATIENT)
Age: 38
End: 2023-12-13

## 2023-12-13 ENCOUNTER — APPOINTMENT (OUTPATIENT)
Dept: AFTER HOURS CARE | Facility: EMERGENCY ROOM | Age: 38
End: 2023-12-13
Payer: MEDICAID

## 2023-12-13 VITALS — WEIGHT: 200 LBS | BODY MASS INDEX: 40.4 KG/M2

## 2023-12-13 PROCEDURE — 99205 OFFICE O/P NEW HI 60 MIN: CPT | Mod: 95

## 2023-12-13 NOTE — PHYSICAL EXAM
[TextEntry] : PLEASE SEE HPI FOR ADDITIONAL RELEVANT PHYSICAL EXAM FINDINGS GENERAL: no acute distress, nontoxic HEAD: normocephalic EYES: no scleral icterus NECK: trachea midline, Full ROM RESP: no respiratory distress ABD: nondistended MSK: no gross deformity NEURO: alert & fully oriented SKIN: no rash PSYCH: cooperative, good insight, appropriate, fluent speech
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
## 2023-12-13 NOTE — PLAN
[FreeTextEntry1] : - Confirmed patient is not eligible for Paxlovid Y  < [email or web address]>  - Drug-Drug interactions were checked via [employer] Covid 19 Interactions  < [email or web address]  - Based on high-risk status (liver disease or pregnancy) in regards to Remdesivir infusion, confirmed with patient that they have had a conversation with their referring provider regarding the risks and benefits of Remdesivir Infusion. All questions answered.  - Patient oriented to Remdesivir treatment as well as the need to draw and follow up labs. Initial verbal consent for treatment was obtained.  Patient will sign written consent and receive Remdesivir (Veklury) Patient Information Sheet < [email or web address]> when RN is in home to administer infusion.  - Patient informed they will be scheduled for 3 days of infusion in the home and will be contacted to confirm appointment date and time.  - Visit Completed. Confirmed patient is eligible and orders placed for Remdesivir

## 2023-12-14 ENCOUNTER — LABORATORY RESULT (OUTPATIENT)
Age: 38
End: 2023-12-14

## 2023-12-14 ENCOUNTER — APPOINTMENT (OUTPATIENT)
Dept: CARE COORDINATION | Facility: HOME HEALTH | Age: 38
End: 2023-12-14
Payer: MEDICAID

## 2023-12-14 PROCEDURE — 96365 THER/PROPH/DIAG IV INF INIT: CPT

## 2023-12-14 NOTE — DISCUSSION/SUMMARY
[FreeTextEntry1] : Patient stable for day 1 Remdesivir treatment.  Consent signed and ordered lab work drawn.   PIV placed and flushed with 5 cc's ns.    1245  Start of infusion  108/83  98  98%  98.0  1300  100/62  66  98%  98.2  1315  Infusion complete  103/79  76  99%  98.2  1415 Observation Complete  112/79  77  99%  98.0  PIV left in place for day 2 treatment.  Discussed in detail s/s to watch for in a delayed reaction.  List of phone numbers given to patient in the event of delayed reaction.  Patient aware to call 911 if SOB, chest pain or bleeding occurs.  Labs dropped of at lab core.

## 2023-12-14 NOTE — REASON FOR VISIT
[Confirmed Patient identity(Name, , Weight) and absence of prior allergies to Remdesivir. Confirmed prior verbal consent] : Confirmed Patient identity(Name, , Weight) and absence of prior allergies to Remdesivir. Confirmed prior verbal consent obtained by NETS provider and signed written copy obtained. Written consent for Remdesivir Treatment obtained and Remdesivir Patient Information given to patient and/or caregiver. [Day 1] : Day 1: Patient assessed, including vital signs. Peripheral IV placed and, Day 1 specimens collected and labelled. Patient infused Remdesivir over 30-minute period, and vital signs checked during and post-infusion as per ordering physician protocol. [Patient determined to be clinically stable, with no acute respiratory distress, for infusion on this day.] : Patient determined to be clinically stable, with no acute respiratory distress. Patient tolerated infusion on this day and given post infusion information including number to call with any concerning symptoms. [] : I was available to the registered nurse in person or via audio/visual technology during the time of the service performed by the registered nurse.

## 2023-12-15 ENCOUNTER — TRANSCRIPTION ENCOUNTER (OUTPATIENT)
Age: 38
End: 2023-12-15

## 2023-12-15 ENCOUNTER — APPOINTMENT (OUTPATIENT)
Dept: CARE COORDINATION | Facility: HOME HEALTH | Age: 38
End: 2023-12-15
Payer: MEDICAID

## 2023-12-15 PROCEDURE — 96365 THER/PROPH/DIAG IV INF INIT: CPT

## 2023-12-15 NOTE — DISCUSSION/SUMMARY
[FreeTextEntry1] : On arrival VS @930 /80 HR 75 RR 16 97%o2  96.3 F 15 min VS /80 HR 75 RR16 97%o2  97.3F 30 min VS BP 98/80 HR 78 RR 16 99%o2  97.3F 60 Min post infusion /80 HR 79. RR 16 99%o2 97.2 F

## 2023-12-15 NOTE — REASON FOR VISIT
[Confirmed Patient identity(Name, , Weight) and absence of prior allergies to Remdesivir. Confirmed prior verbal consent] : Confirmed Patient identity(Name, , Weight) and absence of prior allergies to Remdesivir. Confirmed prior verbal consent obtained by NETS provider and signed written copy obtained. Written consent for Remdesivir Treatment obtained and Remdesivir Patient Information given to patient and/or caregiver. [Day 2] : Day 2: Patient assessed, including vital signs. Peripheral IV placed. Patient infused Remdesivir over 30-minute period, and vital signs checked during and post-infusion as per ordering physician protocol. [Patient determined to be clinically stable, with no acute respiratory distress, for infusion on this day.] : Patient determined to be clinically stable, with no acute respiratory distress. Patient tolerated infusion on this day and given post infusion information including number to call with any concerning symptoms. [] : I was available to the registered nurse in person or via audio/visual technology during the time of the service performed by the registered nurse.

## 2023-12-16 ENCOUNTER — APPOINTMENT (OUTPATIENT)
Dept: CARE COORDINATION | Facility: HOME HEALTH | Age: 38
End: 2023-12-16
Payer: MEDICAID

## 2023-12-16 DIAGNOSIS — U07.1 COVID-19: ICD-10-CM

## 2023-12-16 PROCEDURE — 96365 THER/PROPH/DIAG IV INF INIT: CPT

## 2023-12-18 ENCOUNTER — TRANSCRIPTION ENCOUNTER (OUTPATIENT)
Age: 38
End: 2023-12-18

## 2023-12-18 PROBLEM — U07.1 COVID-19: Status: ACTIVE | Noted: 2023-12-13

## 2023-12-18 NOTE — DISCUSSION/SUMMARY
[FreeTextEntry1] : 10;20 /80, HR 64, RR 18, O2 sat 98%, T 98.4 10:35 BP 99/78, HR 72, RR 18, O2 sat 98%, T 97.5 10:50 BP 99/82, HR 73, RR 18, O2 sat 98%, T 97.4 11:50 /87, HR 81, RR 18, O2 sat 98%, T 97.3 Patient tolerated Remdesivir infusion well, PIV removed, site is dry.

## 2024-01-04 ENCOUNTER — APPOINTMENT (OUTPATIENT)
Dept: PULMONOLOGY | Facility: CLINIC | Age: 39
End: 2024-01-04

## 2024-01-09 ENCOUNTER — TRANSCRIPTION ENCOUNTER (OUTPATIENT)
Age: 39
End: 2024-01-09

## 2024-01-11 ENCOUNTER — APPOINTMENT (OUTPATIENT)
Dept: PULMONOLOGY | Facility: CLINIC | Age: 39
End: 2024-01-11
Payer: MEDICAID

## 2024-01-11 VITALS
TEMPERATURE: 97.3 F | RESPIRATION RATE: 16 BRPM | HEART RATE: 102 BPM | OXYGEN SATURATION: 98 % | DIASTOLIC BLOOD PRESSURE: 78 MMHG | HEIGHT: 59 IN | BODY MASS INDEX: 40.92 KG/M2 | WEIGHT: 203 LBS | SYSTOLIC BLOOD PRESSURE: 106 MMHG

## 2024-01-11 PROCEDURE — 94010 BREATHING CAPACITY TEST: CPT

## 2024-01-11 PROCEDURE — 94726 PLETHYSMOGRAPHY LUNG VOLUMES: CPT

## 2024-01-11 PROCEDURE — 94729 DIFFUSING CAPACITY: CPT

## 2024-01-24 ENCOUNTER — APPOINTMENT (OUTPATIENT)
Dept: NEUROLOGY | Facility: CLINIC | Age: 39
End: 2024-01-24

## 2024-01-26 PROBLEM — J98.4 RESTRICTIVE LUNG DISEASE: Status: ACTIVE | Noted: 2023-05-08

## 2024-01-26 PROBLEM — R74.8 ALKALINE PHOSPHATASE ELEVATION: Status: ACTIVE | Noted: 2017-09-18

## 2024-01-26 PROBLEM — R06.02 SOBOE (SHORTNESS OF BREATH ON EXERTION): Status: ACTIVE | Noted: 2023-05-08

## 2024-01-26 NOTE — PHYSICAL EXAM
[No Acute Distress] : no acute distress [Normal Oropharynx] : normal oropharynx [No Neck Mass] : no neck mass [Normal Appearance] : normal appearance [Normal Rate/Rhythm] : normal rate/rhythm [Normal S1, S2] : normal s1, s2 [No Murmurs] : no murmurs [No Resp Distress] : no resp distress [No Abnormalities] : no abnormalities [Benign] : benign [No Clubbing] : no clubbing [No Cyanosis] : no cyanosis [No Edema] : no edema [FROM] : FROM [Normal Color/ Pigmentation] : normal color/ pigmentation [No Focal Deficits] : no focal deficits [Oriented x3] : oriented x3 [Normal Affect] : normal affect [TextBox_68] : mild fine crackles heard on RLL and LLL [TextBox_99] : right middle back TTP

## 2024-01-26 NOTE — HISTORY OF PRESENT ILLNESS
[TextBox_4] : 35F with h/o asthma and RA, previously on MTX and rituximab, here for evaluation of sob. Pt reports had covid 2 month ago ([employer] 2nd shot 5/21). She had mild to moderate symptoms and received MAB infusion. Cough has improved but SAMANIEGO has persisted and slowly getting better. NO wheezing, no chest tightness. Has been off MTX since august.  Pt here for f/u Reports about 3 months of sob, samaniego and persistent dry cough, no phlegm. No wheezing. Reports waking up at night gasping for air and difficulty sleeping. Has to sleep sitting up. Has been having more frequent night sweats, no fevers or chills. No leg swelling. Using Symbicort daily without relief. No allergy symptoms. Feels her psoriasis and RA is worse in the joints. Currently starting back on Rituximab, has been off MTX for 2 months since kidney infection. Reports overall her condition unchanged since last visit. underwent bronchoscopy and here to discuss results.  8/24/23 Pt reports ongoing SAMANIEGO which she feels is getting worse, no cough but reports persistent night sweats. Weight and appetite Ok. No other rheum related symptoms, received Rituximab infusion 2 weeks ago. No wheezing or tightness. Continues on blood thinners and reports this does not feel similar to her prior PE at all.  8/29/23 Pt reports no change in her respiratory symptoms, still feels SAMANIEGO as well as at rest. She reports a pleuritic right sided chest pain, worse on deep inspiration, however also TTP.  She is still reporting persistent night sweats. Endorses decreased appetite, however no change in weight. Pt still continuing with Rituximab and MTX.   12/5/23 Pt here for follow up she continues to report similar symptoms of intermittent sob and samaniego. she is under treatment from rheum with good overall control she has no cough and no phlegm. her last CT was improved

## 2024-01-26 NOTE — ASSESSMENT
[FreeTextEntry1] : 35F with asthma, RA and now many months persistent SAMANIEGO and worsening h/o unprovoked PE in setting of Protein S or C deficiency, on Eliquis She was on MTX and Rituximab, took a break for several months, now started back of Ritux  No evidence clinically of asthma exacerbation. Bronch results reviewed - TBBx with nonspecific lymphocyte predominant inflammation Pt reporting no change in respiratory symptoms, still has SAMANIEGO as well as at rest, persistent night sweats, and right sided pleuritic chest pain, however does not appear in acute distress PFT showing mild restrictive pattern CT with resolution of disease plan to f/u with PFTs intermittently

## 2024-01-31 ENCOUNTER — TRANSCRIPTION ENCOUNTER (OUTPATIENT)
Age: 39
End: 2024-01-31

## 2024-02-01 ENCOUNTER — TRANSCRIPTION ENCOUNTER (OUTPATIENT)
Age: 39
End: 2024-02-01

## 2024-02-08 ENCOUNTER — APPOINTMENT (OUTPATIENT)
Dept: PULMONOLOGY | Facility: CLINIC | Age: 39
End: 2024-02-08
Payer: MEDICAID

## 2024-02-08 VITALS
DIASTOLIC BLOOD PRESSURE: 68 MMHG | SYSTOLIC BLOOD PRESSURE: 116 MMHG | BODY MASS INDEX: 40.32 KG/M2 | HEIGHT: 59 IN | WEIGHT: 200 LBS | HEART RATE: 88 BPM | RESPIRATION RATE: 16 BRPM | OXYGEN SATURATION: 98 % | TEMPERATURE: 98.2 F

## 2024-02-08 PROCEDURE — 99214 OFFICE O/P EST MOD 30 MIN: CPT

## 2024-02-13 NOTE — ASSESSMENT
[FreeTextEntry1] : 35F with asthma, RA and now many months persistent SAMANIEGO and worsening h/o unprovoked PE in setting of Protein S or C deficiency, on Eliquis She was on MTX and Rituximab, took a break for several months, now started back of Ritux  No evidence clinically of asthma exacerbation. Bronch results reviewed - TBBx with nonspecific lymphocyte predominant inflammation Pt reporting no change in respiratory symptoms, still has SAMANIEGO as well as at rest, persistent night sweats, and right sided pleuritic chest pain, however does not appear in acute distress CT with resolution of disease PFT normal, unchanged. Echo without evidence of pulm HTN  Will send pt for CPET as I cannot explain her persistent SAMANIEGO via obvious pulmonary pathology.

## 2024-03-05 ENCOUNTER — TRANSCRIPTION ENCOUNTER (OUTPATIENT)
Age: 39
End: 2024-03-05

## 2024-03-06 ENCOUNTER — TRANSCRIPTION ENCOUNTER (OUTPATIENT)
Age: 39
End: 2024-03-06

## 2024-03-08 ENCOUNTER — APPOINTMENT (OUTPATIENT)
Dept: RHEUMATOLOGY | Facility: CLINIC | Age: 39
End: 2024-03-08

## 2024-03-11 ENCOUNTER — NON-APPOINTMENT (OUTPATIENT)
Age: 39
End: 2024-03-11

## 2024-03-12 NOTE — HISTORY OF PRESENT ILLNESS
Chief Complaint   Patient presents with    Office Visit    Eye Exam       HPI    Eye exam - the patient notes his vision seems to be a little blurry, more intermediate distance. Like when at the gym trying to read the t.v. He notes some floaters, but denies any changes. He does note glare/starbursts at night when driving.   He wonders if the new nasal spray that is out there for dry eye works?    EYE MEDS:  Systane as needed for dryness              ASSESSMENT & PLAN:  Other long term (current) drug therapy  Would get baseline testing  Started around 2021  For RA    HVF 10-2 and OCT mac for plaquenil on tech schedule     Vitreous degeneration of both eyes  Chronic PVD. Patient instructed to call if any worsening in floaters, if experiencing new flashes of light, worsening of existing flashes of light, or any loss in vision.      Presbyopia  Using OTC readers.     Pseudophakia  This condition will be monitored.      Dry eyes, bilateral  I recommend starting artificial tears, which can be purchased over the counter, up to four times a day.       Dermatochalasis of both upper eyelids  Patient wants surgery. Will refer.     FOLLOW UP:  Return for within 3 mo HVF 10-2 and OCT mac plaquenil; 1 year HVF 10-2 & CEE + OCT mac plaquenil.     Past medical, social, and family medical histories were reviewed and can be found in Epic.    Medications, problem list, and review of systems were reviewed and can be found in Epic.     ALLERGIES:   Allergen Reactions    Hydrocodone-Acetaminophen Other (See Comments)     Strange dreams        PRIMARY PHYSICIAN:    Adria De Luna MD     A1C:    Hemoglobin A1C (%)   Date Value   10/11/2021 5.0        [FreeTextEntry1] : rheumatoid arthritis knee pain cough  [de-identified] : Pt has been seen by ortho and had mri of knee and has arthritis and had injection on right knee was given.  She has fu with her rheumatologist and is on rituxan and methotrexate .  She is on elquis for her right lower lobe segmental PE .  She is less sob and has fu with pulmonary in a few weeks in Sept.  She is trying to walk more and eat healthy .  She was placed on methotrexate and I would like her to have us of abd .  She states since having rituxan she is feeling much better and has less pain.   Pt states a few days ago she had sore throat , fever low grade  and congestion. She no longer has sore throat but has dry cough keeping her up at night and congestion.  No fever or chills now .

## 2024-03-13 ENCOUNTER — APPOINTMENT (OUTPATIENT)
Dept: RHEUMATOLOGY | Facility: CLINIC | Age: 39
End: 2024-03-13
Payer: MEDICAID

## 2024-03-13 VITALS
RESPIRATION RATE: 17 BRPM | DIASTOLIC BLOOD PRESSURE: 80 MMHG | BODY MASS INDEX: 40.32 KG/M2 | WEIGHT: 200 LBS | TEMPERATURE: 98.2 F | OXYGEN SATURATION: 99 % | SYSTOLIC BLOOD PRESSURE: 110 MMHG | HEART RATE: 86 BPM | HEIGHT: 59 IN

## 2024-03-13 DIAGNOSIS — M47.22 OTHER SPONDYLOSIS WITH MYELOPATHY, CERVICAL REGION: ICD-10-CM

## 2024-03-13 DIAGNOSIS — M06.9 RHEUMATOID ARTHRITIS, UNSPECIFIED: ICD-10-CM

## 2024-03-13 DIAGNOSIS — M47.12 OTHER SPONDYLOSIS WITH MYELOPATHY, CERVICAL REGION: ICD-10-CM

## 2024-03-13 DIAGNOSIS — Z15.89 GENETIC SUSCEPTIBILITY TO OTHER DISEASE: ICD-10-CM

## 2024-03-13 PROCEDURE — 99215 OFFICE O/P EST HI 40 MIN: CPT | Mod: 25

## 2024-03-13 PROCEDURE — 99417 PROLNG OP E/M EACH 15 MIN: CPT | Mod: 25

## 2024-03-13 RX ORDER — NYSTATIN 100000 [USP'U]/ML
100000 SUSPENSION ORAL
Qty: 1 | Refills: 2 | Status: DISCONTINUED | COMMUNITY
Start: 2023-12-08 | End: 2024-03-13

## 2024-03-13 RX ORDER — DIPHENHYDRAMINE HCL 25 MG/1
25 TABLET ORAL
Qty: 0 | Refills: 0 | Status: DISCONTINUED | OUTPATIENT
Start: 2023-07-13 | End: 2024-03-13

## 2024-03-13 RX ORDER — BUDESONIDE AND FORMOTEROL FUMARATE DIHYDRATE 160; 4.5 UG/1; UG/1
160-4.5 AEROSOL RESPIRATORY (INHALATION)
Qty: 1 | Refills: 6 | Status: DISCONTINUED | COMMUNITY
Start: 2023-05-08 | End: 2024-03-13

## 2024-03-13 RX ORDER — SODIUM CHLORIDE 0.65 %
0.65 AEROSOL, SPRAY (ML) NASAL
Qty: 45 | Refills: 0 | Status: DISCONTINUED | COMMUNITY
Start: 2022-03-16 | End: 2024-03-13

## 2024-03-13 RX ORDER — PROMETHAZINE HYDROCHLORIDE 6.25 MG/5ML
6.25 SOLUTION ORAL
Qty: 1 | Refills: 3 | Status: DISCONTINUED | COMMUNITY
Start: 2023-12-08 | End: 2024-03-13

## 2024-03-13 RX ORDER — POLYETHYLENE GLYCOL 400 AND PROPYLENE GLYCOL 4; 3 MG/ML; MG/ML
0.4-0.3 SOLUTION/ DROPS OPHTHALMIC
Qty: 15 | Refills: 0 | Status: DISCONTINUED | COMMUNITY
Start: 2022-04-29 | End: 2024-03-13

## 2024-03-13 RX ORDER — ACETAMINOPHEN AND CODEINE PHOSPHATE 300; 15 MG/1; MG/1
300-15 TABLET ORAL
Qty: 7 | Refills: 0 | Status: DISCONTINUED | COMMUNITY
Start: 2023-06-01 | End: 2024-03-13

## 2024-03-13 RX ORDER — TOPIRAMATE 25 MG/1
25 TABLET, FILM COATED ORAL TWICE DAILY
Qty: 120 | Refills: 10 | Status: DISCONTINUED | COMMUNITY
Start: 2023-02-17 | End: 2024-03-13

## 2024-03-15 PROBLEM — M47.12 CERVICAL SPONDYLOSIS WITH MYELOPATHY AND RADICULOPATHY: Status: ACTIVE | Noted: 2023-10-18

## 2024-03-15 PROBLEM — Z15.89 HETEROZYGOUS MTHFR MUTATION A1298C: Status: ACTIVE | Noted: 2019-07-02

## 2024-03-15 PROBLEM — M06.9 RHEUMATOID ARTHRITIS: Status: ACTIVE | Noted: 2023-05-08

## 2024-03-18 ENCOUNTER — TRANSCRIPTION ENCOUNTER (OUTPATIENT)
Age: 39
End: 2024-03-18

## 2024-03-24 NOTE — ED PROVIDER NOTE - CADM POA URETHRAL CATHETER
"SLP Eval) Yes, treatment indicated   SLP [id number] 17   SLP Discharge Planning   SLP No

## 2024-04-03 LAB
MISCELLANEOUS TEST: NORMAL
PROC NAME: NORMAL

## 2024-04-05 ENCOUNTER — LABORATORY RESULT (OUTPATIENT)
Age: 39
End: 2024-04-05

## 2024-04-05 ENCOUNTER — APPOINTMENT (OUTPATIENT)
Dept: RHEUMATOLOGY | Facility: CLINIC | Age: 39
End: 2024-04-05
Payer: MEDICAID

## 2024-04-05 VITALS
SYSTOLIC BLOOD PRESSURE: 103 MMHG | HEART RATE: 101 BPM | DIASTOLIC BLOOD PRESSURE: 66 MMHG | OXYGEN SATURATION: 99 % | BODY MASS INDEX: 40.92 KG/M2 | TEMPERATURE: 98.3 F | HEIGHT: 59 IN | WEIGHT: 203 LBS | RESPIRATION RATE: 16 BRPM

## 2024-04-05 PROCEDURE — 99214 OFFICE O/P EST MOD 30 MIN: CPT

## 2024-04-05 PROCEDURE — G2211 COMPLEX E/M VISIT ADD ON: CPT | Mod: NC,1L

## 2024-04-05 RX ORDER — REMDESIVIR 100 MG/1
100 INJECTION, POWDER, LYOPHILIZED, FOR SOLUTION INTRAVENOUS
Qty: 1 | Refills: 0 | Status: DISCONTINUED | COMMUNITY
Start: 2023-12-13 | End: 2024-04-05

## 2024-04-07 NOTE — ASSESSMENT
[FreeTextEntry1] : 1. RA with vasculitis, now ILD - methotrexate 5 tablets - s/p rituximab in 09 - gct scn done in 10/2023 wqith resolved ILD - no longer coughing since stopping methotrexate - has increase in joint pain - send for rituximab - last infusion in 09/2023 - check for Cd 19% - if low consider another biologic since ILD is clear  2. chronic back pain - ongoing work-up by PMR x-rays with degenerative changes and mild scoliosis -had nerve block - no pain at this time. - continue with the current regimen 3. chronic pain - on medical marijuana - stable 4. muscle cramps over the right leg - not active 5. PE - likely related to tubal ligation (5 days prior) - chronic eliquis - given hx of clotting in her family 6. Right leg paresthesias - EMG done yesterday - mild sensory axonal loss 7. Right shoulder pain -with decreased ROM over 6 months worsening - with loss of ROM - could not complete PT due to pain - new referral is given -MRI not done - missed appt - pain is not severe at this time 8. Loss of hearing -under the care of ENT 9. diverticulitis - under he care of GI - no flares 10. bilateral knee - mechanical can take tylenol as needed - cannot take nsaids due to being on eliquis 11 cervical pain - hx of herniated discs - under the care of neuro - mild DDD  12. thrush - resolved 13. cough - resolved 14. elevated AKP -around 200's   I reviewed previous labs results with patients. labs today Diagnosis and Prognosis discussed Continue with current medications medications refilled F/u 3 months.

## 2024-04-07 NOTE — HISTORY OF PRESENT ILLNESS
[Currently Experiencing] : currently [Arthralgias] : arthralgias [Joint Swelling] : joint swelling [Morning Stiffness] : morning stiffness [Joint Pain] : joint pain [___ Month(s) Ago] : [unfilled] month(s) ago [TextBox_2] : 2017 [TextBox_40] : methotrexate [TextBox_44] : rituximab [FreeTextEntry1] : ongoing cough and SOB - since mid-October - has difficulty breathing at times. voice is also hoarse - dry cough has tried Robitussin with no improvement. last CT scan done in 10/04 - was normal - last rituximab in 09/2023

## 2024-04-07 NOTE — PHYSICAL EXAM
[General Appearance - Alert] : alert [General Appearance - In No Acute Distress] : in no acute distress [Outer Ear] : the ears and nose were normal in appearance [Oropharynx] : the oropharynx was normal [Neck Appearance] : the appearance of the neck was normal [Neck Cervical Mass (___cm)] : no neck mass was observed [Jugular Venous Distention Increased] : there was no jugular-venous distention [Thyroid Diffuse Enlargement] : the thyroid was not enlarged [Thyroid Nodule] : there were no palpable thyroid nodules [Auscultation Breath Sounds / Voice Sounds] : lungs were clear to auscultation bilaterally [Heart Rate And Rhythm] : heart rate was normal and rhythm regular [Heart Sounds] : normal S1 and S2 [Murmurs] : no murmurs [Heart Sounds Gallop] : no gallops [Heart Sounds Pericardial Friction Rub] : no pericardial rub [Full Pulse] : the pedal pulses are present [Edema] : there was no peripheral edema [Bowel Sounds] : normal bowel sounds [Abdomen Soft] : soft [Abdomen Tenderness] : non-tender [Abdomen Mass (___ Cm)] : no abdominal mass palpated [Cervical Lymph Nodes Enlarged Posterior Bilaterally] : posterior cervical [Cervical Lymph Nodes Enlarged Anterior Bilaterally] : anterior cervical [Supraclavicular Lymph Nodes Enlarged Bilaterally] : supraclavicular [No CVA Tenderness] : no ~M costovertebral angle tenderness [No Spinal Tenderness] : no spinal tenderness [Abnormal Walk] : normal gait [Nail Clubbing] : no clubbing  or cyanosis of the fingernails [Involuntary Movements] : no involuntary movements were seen [Musculoskeletal - Swelling] : no joint swelling seen [Motor Tone] : muscle strength and tone were normal [Skin Color & Pigmentation] : normal skin color and pigmentation [Skin Turgor] : normal skin turgor [] : no rash [Oriented To Time, Place, And Person] : oriented to person, place, and time [Impaired Insight] : insight and judgment were intact [Affect] : the affect was normal [FreeTextEntry1] : all joints with full ROM - no active synovitis - tenderness over the knees - elbows and wrists

## 2024-04-09 ENCOUNTER — TRANSCRIPTION ENCOUNTER (OUTPATIENT)
Age: 39
End: 2024-04-09

## 2024-04-10 ENCOUNTER — TRANSCRIPTION ENCOUNTER (OUTPATIENT)
Age: 39
End: 2024-04-10

## 2024-04-10 LAB
ALBUMIN SERPL ELPH-MCNC: 4.3 G/DL
ALP BLD-CCNC: 218 U/L
ALT SERPL-CCNC: 15 U/L
ANION GAP SERPL CALC-SCNC: 14 MMOL/L
AST SERPL-CCNC: 17 U/L
BASOPHILS # BLD AUTO: 0.08 K/UL
BASOPHILS NFR BLD AUTO: 0.6 %
BILIRUB SERPL-MCNC: 0.3 MG/DL
BUN SERPL-MCNC: 7 MG/DL
CALCIUM SERPL-MCNC: 9.4 MG/DL
CHLORIDE SERPL-SCNC: 102 MMOL/L
CO2 SERPL-SCNC: 25 MMOL/L
CREAT SERPL-MCNC: 0.65 MG/DL
CRP SERPL-MCNC: 40 MG/L
EGFR: 115 ML/MIN/1.73M2
EOSINOPHIL # BLD AUTO: 0.11 K/UL
EOSINOPHIL NFR BLD AUTO: 0.8 %
ERYTHROCYTE [SEDIMENTATION RATE] IN BLOOD BY WESTERGREN METHOD: 50 MM/HR
GLUCOSE SERPL-MCNC: 94 MG/DL
HBV CORE IGG+IGM SER QL: NONREACTIVE
HBV SURFACE AB SER QL: REACTIVE
HBV SURFACE AG SER QL: NONREACTIVE
HCT VFR BLD CALC: 38.9 %
HCV AB SER QL: NONREACTIVE
HCV S/CO RATIO: 0.08 S/CO
HGB BLD-MCNC: 12.9 G/DL
IMM GRANULOCYTES NFR BLD AUTO: 0.4 %
LYMPHOCYTES # BLD AUTO: 1.79 K/UL
LYMPHOCYTES NFR BLD AUTO: 13 %
M TB IFN-G BLD-IMP: NEGATIVE
MAN DIFF?: NORMAL
MCHC RBC-ENTMCNC: 28.9 PG
MCHC RBC-ENTMCNC: 33.2 GM/DL
MCV RBC AUTO: 87 FL
MONOCYTES # BLD AUTO: 0.7 K/UL
MONOCYTES NFR BLD AUTO: 5.1 %
NEUTROPHILS # BLD AUTO: 10.98 K/UL
NEUTROPHILS NFR BLD AUTO: 80.1 %
PLATELET # BLD AUTO: 306 K/UL
POTASSIUM SERPL-SCNC: 4.7 MMOL/L
PROT SERPL-MCNC: 7 G/DL
QUANTIFERON TB PLUS MITOGEN MINUS NIL: >10 IU/ML
QUANTIFERON TB PLUS NIL: 0.04 IU/ML
QUANTIFERON TB PLUS TB1 MINUS NIL: 0 IU/ML
QUANTIFERON TB PLUS TB2 MINUS NIL: 0 IU/ML
RBC # BLD: 4.47 M/UL
RBC # FLD: 13.3 %
SODIUM SERPL-SCNC: 140 MMOL/L
WBC # FLD AUTO: 13.72 K/UL

## 2024-04-10 RX ORDER — GOLIMUMAB 50 MG/4ML
50 SOLUTION INTRAVENOUS
Refills: 0 | Status: ACTIVE | OUTPATIENT
Start: 2024-04-10

## 2024-04-17 ENCOUNTER — TRANSCRIPTION ENCOUNTER (OUTPATIENT)
Age: 39
End: 2024-04-17

## 2024-04-19 ENCOUNTER — TRANSCRIPTION ENCOUNTER (OUTPATIENT)
Age: 39
End: 2024-04-19

## 2024-05-02 RX ORDER — GOLIMUMAB 50 MG/4ML
50 SOLUTION INTRAVENOUS
Qty: 5 | Refills: 0 | Status: ACTIVE | COMMUNITY
Start: 2024-05-02 | End: 1900-01-01

## 2024-05-08 ENCOUNTER — APPOINTMENT (OUTPATIENT)
Dept: PULMONOLOGY | Facility: CLINIC | Age: 39
End: 2024-05-08
Payer: MEDICAID

## 2024-05-08 PROCEDURE — 94621 CARDIOPULM EXERCISE TESTING: CPT

## 2024-05-08 PROCEDURE — 94375 RESPIRATORY FLOW VOLUME LOOP: CPT

## 2024-05-15 ENCOUNTER — TRANSCRIPTION ENCOUNTER (OUTPATIENT)
Age: 39
End: 2024-05-15

## 2024-05-16 ENCOUNTER — TRANSCRIPTION ENCOUNTER (OUTPATIENT)
Age: 39
End: 2024-05-16

## 2024-05-17 ENCOUNTER — TRANSCRIPTION ENCOUNTER (OUTPATIENT)
Age: 39
End: 2024-05-17

## 2024-05-19 ENCOUNTER — TRANSCRIPTION ENCOUNTER (OUTPATIENT)
Age: 39
End: 2024-05-19

## 2024-05-29 ENCOUNTER — NON-APPOINTMENT (OUTPATIENT)
Age: 39
End: 2024-05-29

## 2024-05-29 ENCOUNTER — APPOINTMENT (OUTPATIENT)
Dept: RHEUMATOLOGY | Facility: CLINIC | Age: 39
End: 2024-05-29
Payer: MEDICAID

## 2024-05-29 VITALS
SYSTOLIC BLOOD PRESSURE: 124 MMHG | TEMPERATURE: 98.3 F | HEART RATE: 81 BPM | OXYGEN SATURATION: 98 % | DIASTOLIC BLOOD PRESSURE: 76 MMHG | HEIGHT: 59 IN

## 2024-05-29 DIAGNOSIS — M79.7 FIBROMYALGIA: ICD-10-CM

## 2024-05-29 DIAGNOSIS — M51.36 OTHER INTERVERTEBRAL DISC DEGENERATION, LUMBAR REGION: ICD-10-CM

## 2024-05-29 DIAGNOSIS — J84.9 INTERSTITIAL PULMONARY DISEASE, UNSPECIFIED: ICD-10-CM

## 2024-05-29 DIAGNOSIS — Z79.899 OTHER LONG TERM (CURRENT) DRUG THERAPY: ICD-10-CM

## 2024-05-29 DIAGNOSIS — M79.18 MYALGIA, OTHER SITE: ICD-10-CM

## 2024-05-29 DIAGNOSIS — Q79.62 HYPERMOBILE EHLERS-DANLOS SYNDROME: ICD-10-CM

## 2024-05-29 DIAGNOSIS — G89.29 OTHER CHRONIC PAIN: ICD-10-CM

## 2024-05-29 PROCEDURE — 99417 PROLNG OP E/M EACH 15 MIN: CPT

## 2024-05-29 PROCEDURE — 99215 OFFICE O/P EST HI 40 MIN: CPT

## 2024-06-03 PROBLEM — G89.29 ENCOUNTER FOR CHRONIC PAIN MANAGEMENT: Status: ACTIVE | Noted: 2024-06-03

## 2024-06-03 PROBLEM — J84.9 ILD (INTERSTITIAL LUNG DISEASE): Status: ACTIVE | Noted: 2023-08-11

## 2024-06-03 PROBLEM — Z79.899 HIGH RISK MEDICATION USE: Status: ACTIVE | Noted: 2023-01-06

## 2024-06-03 PROBLEM — M51.36 DISC DEGENERATION, LUMBAR: Status: ACTIVE | Noted: 2021-04-02

## 2024-06-03 PROBLEM — M79.18 MYOFASCIAL PAIN: Status: ACTIVE | Noted: 2021-03-19

## 2024-06-17 ENCOUNTER — APPOINTMENT (OUTPATIENT)
Dept: CARDIOLOGY | Facility: CLINIC | Age: 39
End: 2024-06-17

## 2024-06-21 ENCOUNTER — APPOINTMENT (OUTPATIENT)
Dept: RHEUMATOLOGY | Facility: CLINIC | Age: 39
End: 2024-06-21
Payer: MEDICAID

## 2024-06-21 ENCOUNTER — LABORATORY RESULT (OUTPATIENT)
Age: 39
End: 2024-06-21

## 2024-06-21 VITALS
RESPIRATION RATE: 16 BRPM | WEIGHT: 210 LBS | TEMPERATURE: 97.6 F | DIASTOLIC BLOOD PRESSURE: 80 MMHG | HEART RATE: 82 BPM | BODY MASS INDEX: 42.33 KG/M2 | HEIGHT: 59 IN | OXYGEN SATURATION: 96 % | SYSTOLIC BLOOD PRESSURE: 124 MMHG

## 2024-06-21 DIAGNOSIS — M06.9 RHEUMATOID ARTHRITIS, UNSPECIFIED: ICD-10-CM

## 2024-06-21 PROCEDURE — 99214 OFFICE O/P EST MOD 30 MIN: CPT

## 2024-06-21 PROCEDURE — G2211 COMPLEX E/M VISIT ADD ON: CPT | Mod: NC

## 2024-06-21 RX ORDER — METHOTREXATE 2.5 MG/1
2.5 TABLET ORAL
Qty: 16 | Refills: 2 | Status: ACTIVE | COMMUNITY
Start: 2024-06-21 | End: 1900-01-01

## 2024-06-21 RX ORDER — METHOTREXATE 2.5 MG/1
2.5 TABLET ORAL
Qty: 72 | Refills: 3 | Status: DISCONTINUED | COMMUNITY
Start: 2019-04-05 | End: 2024-06-21

## 2024-06-21 NOTE — HISTORY OF PRESENT ILLNESS
[___ Month(s) Ago] : [unfilled] month(s) ago [Joint Swelling] : joint swelling [Morning Stiffness] : morning stiffness [Joint Pain] : joint pain [Currently Experiencing] : currently [Arthralgias] : arthralgias [FreeTextEntry1] : now on simponi s/p 2 infusion with no improvement next infusion on 08/02/2024 last infusion on 06/05/2024 with ongoing stiffness and pain - no improvement yet rituximab on hold due to low B cell count  [TextBox_2] : 2017 [TextBox_40] : methotrexate [TextBox_44] : rituximab

## 2024-06-21 NOTE — PHYSICAL EXAM
[General Appearance - Alert] : alert [General Appearance - In No Acute Distress] : in no acute distress [Outer Ear] : the ears and nose were normal in appearance [Oropharynx] : the oropharynx was normal [Neck Appearance] : the appearance of the neck was normal [Neck Cervical Mass (___cm)] : no neck mass was observed [Jugular Venous Distention Increased] : there was no jugular-venous distention [Thyroid Diffuse Enlargement] : the thyroid was not enlarged [Thyroid Nodule] : there were no palpable thyroid nodules [Auscultation Breath Sounds / Voice Sounds] : lungs were clear to auscultation bilaterally [Heart Rate And Rhythm] : heart rate was normal and rhythm regular [Heart Sounds] : normal S1 and S2 [Heart Sounds Gallop] : no gallops [Murmurs] : no murmurs [Heart Sounds Pericardial Friction Rub] : no pericardial rub [Full Pulse] : the pedal pulses are present [Edema] : there was no peripheral edema [Bowel Sounds] : normal bowel sounds [Abdomen Soft] : soft [Abdomen Tenderness] : non-tender [Abdomen Mass (___ Cm)] : no abdominal mass palpated [Cervical Lymph Nodes Enlarged Posterior Bilaterally] : posterior cervical [Cervical Lymph Nodes Enlarged Anterior Bilaterally] : anterior cervical [Supraclavicular Lymph Nodes Enlarged Bilaterally] : supraclavicular [No CVA Tenderness] : no ~M costovertebral angle tenderness [No Spinal Tenderness] : no spinal tenderness [Skin Color & Pigmentation] : normal skin color and pigmentation [Skin Turgor] : normal skin turgor [] : no rash [Oriented To Time, Place, And Person] : oriented to person, place, and time [Impaired Insight] : insight and judgment were intact [Affect] : the affect was normal [Abnormal Walk] : normal gait [Involuntary Movements] : no involuntary movements were seen [Nail Clubbing] : no clubbing  or cyanosis of the fingernails [Musculoskeletal - Swelling] : no joint swelling seen [Motor Tone] : muscle strength and tone were normal [FreeTextEntry1] : all joints with full ROM - no active synovitis - tenderness over the knees - elbows and wrists

## 2024-06-21 NOTE — ASSESSMENT
[FreeTextEntry1] : 1. RA with vasculitis, now ILD - methotrexate 5 tablets - s/p rituximab in 09 - gct scn done in 10/2023 with resolved ILD - no longer coughing since stopping methotrexate -on simponi now due to low B cells on previous labs - s/p 2 infusion with no improvement - repeat labs today - re-start methotrexate.  if coughs re-start will stop again 2. chronic back pain - ongoing work-up by PMR x-rays with degenerative changes and mild scoliosis -had nerve block - no pain at this time. - continue with the current regimen 3. chronic pain - on medical marijuana - stable 4. muscle cramps over the right leg - not active 5. PE - likely related to tubal ligation (5 days prior) - chronic eliquis - given hx of clotting in her family 6. Right leg paresthesias - EMG done yesterday - mild sensory axonal loss 7. Right shoulder pain -with decreased ROM over 6 months worsening - with loss of ROM -worsening pain - send for new US 8. Loss of hearing -under the care of ENT 9. diverticulitis - under he care of GI - no flares 10. bilateral knee - mechanical can take tylenol as needed - cannot take nsaids due to being on eliquis 11 cervical pain - hx of herniated discs - under the care of neuro - mild DDD  12. thrush - resolved 13. cough - resolved 14. elevated AKP -around 200's   I reviewed previous labs results with patients. labs today Diagnosis and Prognosis discussed Continue with current medications medications refilled F/u 3 months.

## 2024-06-25 ENCOUNTER — TRANSCRIPTION ENCOUNTER (OUTPATIENT)
Age: 39
End: 2024-06-25

## 2024-06-25 ENCOUNTER — NON-APPOINTMENT (OUTPATIENT)
Age: 39
End: 2024-06-25

## 2024-06-25 LAB
ALBUMIN SERPL ELPH-MCNC: 4.2 G/DL
ALP BLD-CCNC: 187 U/L
ALT SERPL-CCNC: 17 U/L
ANION GAP SERPL CALC-SCNC: 15 MMOL/L
AST SERPL-CCNC: 13 U/L
BASOPHILS # BLD AUTO: 0.05 K/UL
BASOPHILS NFR BLD AUTO: 0.6 %
BILIRUB SERPL-MCNC: 0.3 MG/DL
BUN SERPL-MCNC: 16 MG/DL
CALCIUM SERPL-MCNC: 10 MG/DL
CHLORIDE SERPL-SCNC: 105 MMOL/L
CO2 SERPL-SCNC: 23 MMOL/L
CREAT SERPL-MCNC: 0.77 MG/DL
CRP SERPL-MCNC: 9 MG/L
EGFR: 101 ML/MIN/1.73M2
EOSINOPHIL # BLD AUTO: 0.1 K/UL
EOSINOPHIL NFR BLD AUTO: 1.2 %
ERYTHROCYTE [SEDIMENTATION RATE] IN BLOOD BY WESTERGREN METHOD: 23 MM/HR
GLUCOSE SERPL-MCNC: 124 MG/DL
HCT VFR BLD CALC: 41.8 %
HGB BLD-MCNC: 13.1 G/DL
IMM GRANULOCYTES NFR BLD AUTO: 0.2 %
LYMPHOCYTES # BLD AUTO: 2.42 K/UL
LYMPHOCYTES NFR BLD AUTO: 28.4 %
MAN DIFF?: NORMAL
MCHC RBC-ENTMCNC: 27.5 PG
MCHC RBC-ENTMCNC: 31.3 GM/DL
MCV RBC AUTO: 87.8 FL
MONOCYTES # BLD AUTO: 0.53 K/UL
MONOCYTES NFR BLD AUTO: 6.2 %
NEUTROPHILS # BLD AUTO: 5.41 K/UL
NEUTROPHILS NFR BLD AUTO: 63.4 %
PLATELET # BLD AUTO: 256 K/UL
POTASSIUM SERPL-SCNC: 4.2 MMOL/L
PROT SERPL-MCNC: 7 G/DL
RBC # BLD: 4.76 M/UL
RBC # FLD: 13.8 %
SODIUM SERPL-SCNC: 142 MMOL/L
WBC # FLD AUTO: 8.53 K/UL

## 2024-07-08 ENCOUNTER — APPOINTMENT (OUTPATIENT)
Dept: PULMONOLOGY | Facility: CLINIC | Age: 39
End: 2024-07-08

## 2024-07-18 ENCOUNTER — APPOINTMENT (OUTPATIENT)
Dept: ULTRASOUND IMAGING | Facility: CLINIC | Age: 39
End: 2024-07-18

## 2024-07-18 ENCOUNTER — TRANSCRIPTION ENCOUNTER (OUTPATIENT)
Age: 39
End: 2024-07-18

## 2024-07-18 PROCEDURE — 76881 US COMPL JOINT R-T W/IMG: CPT | Mod: RT

## 2024-07-24 ENCOUNTER — APPOINTMENT (OUTPATIENT)
Dept: OTOLARYNGOLOGY | Facility: CLINIC | Age: 39
End: 2024-07-24
Payer: MEDICAID

## 2024-07-24 VITALS
OXYGEN SATURATION: 100 % | DIASTOLIC BLOOD PRESSURE: 85 MMHG | BODY MASS INDEX: 42.33 KG/M2 | WEIGHT: 210 LBS | SYSTOLIC BLOOD PRESSURE: 121 MMHG | HEIGHT: 59 IN | HEART RATE: 71 BPM

## 2024-07-24 DIAGNOSIS — Q79.60 EHLERS-DANLOS SYNDROME, UNSP: ICD-10-CM

## 2024-07-24 DIAGNOSIS — G90.A POSTURAL ORTHOSTATIC TACHYCARDIA SYNDROME [POTS]: ICD-10-CM

## 2024-07-24 PROCEDURE — 99214 OFFICE O/P EST MOD 30 MIN: CPT | Mod: 25

## 2024-07-24 PROCEDURE — 31231 NASAL ENDOSCOPY DX: CPT

## 2024-07-24 RX ORDER — FLUTICASONE PROPIONATE 50 UG/1
50 SPRAY, METERED NASAL
Qty: 1 | Refills: 1 | Status: ACTIVE | COMMUNITY
Start: 2024-07-24 | End: 1900-01-01

## 2024-07-24 RX ORDER — AZELASTINE HYDROCHLORIDE 137 UG/1
0.1 SPRAY, METERED NASAL TWICE DAILY
Qty: 1 | Refills: 3 | Status: ACTIVE | COMMUNITY
Start: 2024-07-24 | End: 1900-01-01

## 2024-07-26 NOTE — CONSULT LETTER
[Dear  ___] : Dear  [unfilled], [Courtesy Letter:] : I had the pleasure of seeing your patient, [unfilled], in my office today. [Please see my note below.] : Please see my note below. [Consult Closing:] : Thank you very much for allowing me to participate in the care of this patient.  If you have any questions, please do not hesitate to contact me. [Sincerely,] : Sincerely, [FreeTextEntry2] : Dr. Annie Linares [FreeTextEntry3] : Ashok Davison MD, PhD\par  Chief, Division of Laryngology\par  Department of Otolaryngology\par  [facility]\par  Pediatric Otolaryngology, [facility]\par   of Otolaryngology\par  [facility] [employer] [facility]\par  \par  \par

## 2024-07-26 NOTE — CONSULT LETTER
[Dear  ___] : Dear  [unfilled], [Courtesy Letter:] : I had the pleasure of seeing your patient, [unfilled], in my office today. [Please see my note below.] : Please see my note below. [Consult Closing:] : Thank you very much for allowing me to participate in the care of this patient.  If you have any questions, please do not hesitate to contact me. [Sincerely,] : Sincerely, [FreeTextEntry2] : Dr. Annie Linares [FreeTextEntry3] : Ashok Davison MD, PhD\par  Chief, Division of Laryngology\par  Department of Otolaryngology\par  [facility]\par  Pediatric Otolaryngology, [facility]\par   of Otolaryngology\par  [facility] [employer] [facility]\par  \par  \par

## 2024-07-26 NOTE — PHYSICAL EXAM
[Midline] : trachea located in midline position [Laryngoscopy Performed] : laryngoscopy was performed, see procedure section for findings [Normal] : no rashes [de-identified] : mildly raspy and breathy

## 2024-07-26 NOTE — PHYSICAL EXAM
[Midline] : trachea located in midline position [Laryngoscopy Performed] : laryngoscopy was performed, see procedure section for findings [Normal] : no rashes [de-identified] : mildly raspy and breathy

## 2024-07-26 NOTE — ADDENDUM
[FreeTextEntry1] : Documented by Jorge Luis Jose acting as scribe for Dr. Davison on 07/24/2024. All Medical record entries made by the Scribe were at my, Dr. Davison, direction and personally dictated by me on 07/24/2024. I have reviewed the chart and agree that the record accurately reflects my personal performance of the history, physical exam, assessment and plan. I have also personally directed, reviewed, and agreed with the discharge instructions.

## 2024-07-26 NOTE — HISTORY OF PRESENT ILLNESS
[de-identified] : 38 year old woman presents for follow up for right side hearing loss. History of RA, PE, improved dysphagia and dysphonia.  Last clinic visit 3/22/2023 States has no improvement in right side hearing loss. Reports intermittent pain near right ear area. States she has dizziness and headaches but is unsure if they are related to hearing. Using Azelastine and Flonase BID. Patient denies otalgia, otorrhea, tinnitus, recent fevers or ear infections. Last audio 7/6/22.  States voice has been okay, intermittently hoarse. Denies complete loss of voice, pain or SOB while speaking.  Eating and drinking without difficulty. States taste and smell is good. Patient denies throat pain, globus sensation, dysphagia, odynophagia, dyspnea, hemoptysis, recent fevers or infections. 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 Johnny Perez (Rheum)

## 2024-07-27 ENCOUNTER — NON-APPOINTMENT (OUTPATIENT)
Age: 39
End: 2024-07-27

## 2024-07-29 ENCOUNTER — TRANSCRIPTION ENCOUNTER (OUTPATIENT)
Age: 39
End: 2024-07-29

## 2024-07-29 DIAGNOSIS — R59.1 GENERALIZED ENLARGED LYMPH NODES: ICD-10-CM

## 2024-07-30 ENCOUNTER — APPOINTMENT (OUTPATIENT)
Dept: ULTRASOUND IMAGING | Facility: CLINIC | Age: 39
End: 2024-07-30
Payer: MEDICAID

## 2024-07-30 ENCOUNTER — NON-APPOINTMENT (OUTPATIENT)
Age: 39
End: 2024-07-30

## 2024-07-31 ENCOUNTER — TRANSCRIPTION ENCOUNTER (OUTPATIENT)
Age: 39
End: 2024-07-31

## 2024-07-31 ENCOUNTER — APPOINTMENT (OUTPATIENT)
Dept: OBGYN | Facility: CLINIC | Age: 39
End: 2024-07-31
Payer: MEDICAID

## 2024-07-31 VITALS — DIASTOLIC BLOOD PRESSURE: 85 MMHG | BODY MASS INDEX: 42.42 KG/M2 | WEIGHT: 210 LBS | SYSTOLIC BLOOD PRESSURE: 123 MMHG

## 2024-07-31 DIAGNOSIS — R23.2 FLUSHING: ICD-10-CM

## 2024-07-31 DIAGNOSIS — Z01.419 ENCOUNTER FOR GYNECOLOGICAL EXAMINATION (GENERAL) (ROUTINE) W/OUT ABNORMAL FINDINGS: ICD-10-CM

## 2024-07-31 PROCEDURE — 99395 PREV VISIT EST AGE 18-39: CPT

## 2024-07-31 PROCEDURE — 76536 US EXAM OF HEAD AND NECK: CPT

## 2024-07-31 RX ORDER — PAROXETINE 7.5 MG/1
7.5 CAPSULE ORAL DAILY
Qty: 30 | Refills: 6 | Status: ACTIVE | COMMUNITY
Start: 2024-07-31 | End: 1900-01-01

## 2024-07-31 NOTE — PLAN
[FreeTextEntry1] : Pap obtained self breast exams, safe sex,diet and exercise discussed mammograms annually starting this year Brisdelle trial RTO prn and annually for exams

## 2024-07-31 NOTE — COUNSELING
[Nutrition/ Exercise/ Weight Management] : nutrition, exercise, weight management [Body Image] : body image [Breast Self Exam] : breast self exam [Bladder Hygiene] : bladder hygiene [Confidentiality] : confidentiality [STD (testing, results, tx)] : STD (testing, results, tx)

## 2024-08-01 ENCOUNTER — TRANSCRIPTION ENCOUNTER (OUTPATIENT)
Age: 39
End: 2024-08-01

## 2024-08-02 ENCOUNTER — TRANSCRIPTION ENCOUNTER (OUTPATIENT)
Age: 39
End: 2024-08-02

## 2024-08-05 ENCOUNTER — TRANSCRIPTION ENCOUNTER (OUTPATIENT)
Age: 39
End: 2024-08-05

## 2024-08-06 ENCOUNTER — TRANSCRIPTION ENCOUNTER (OUTPATIENT)
Age: 39
End: 2024-08-06

## 2024-08-06 ENCOUNTER — RESULT REVIEW (OUTPATIENT)
Age: 39
End: 2024-08-06

## 2024-08-07 ENCOUNTER — OUTPATIENT (OUTPATIENT)
Dept: OUTPATIENT SERVICES | Facility: HOSPITAL | Age: 39
LOS: 1 days | End: 2024-08-07
Payer: MEDICAID

## 2024-08-07 ENCOUNTER — APPOINTMENT (OUTPATIENT)
Dept: ULTRASOUND IMAGING | Facility: IMAGING CENTER | Age: 39
End: 2024-08-07

## 2024-08-07 ENCOUNTER — RESULT REVIEW (OUTPATIENT)
Age: 39
End: 2024-08-07

## 2024-08-07 DIAGNOSIS — M72.2 PLANTAR FASCIAL FIBROMATOSIS: Chronic | ICD-10-CM

## 2024-08-07 DIAGNOSIS — R59.1 GENERALIZED ENLARGED LYMPH NODES: ICD-10-CM

## 2024-08-07 DIAGNOSIS — Z98.890 OTHER SPECIFIED POSTPROCEDURAL STATES: Chronic | ICD-10-CM

## 2024-08-07 DIAGNOSIS — Z98.891 HISTORY OF UTERINE SCAR FROM PREVIOUS SURGERY: Chronic | ICD-10-CM

## 2024-08-07 DIAGNOSIS — Z98.51 TUBAL LIGATION STATUS: Chronic | ICD-10-CM

## 2024-08-07 LAB
C TRACH RRNA SPEC QL NAA+PROBE: NOT DETECTED
CYTOLOGY CVX/VAG DOC THIN PREP: NORMAL
N GONORRHOEA RRNA SPEC QL NAA+PROBE: NOT DETECTED
SOURCE TP AMPLIFICATION: NORMAL
T VAGINALIS RRNA SPEC QL NAA+PROBE: NOT DETECTED

## 2024-08-07 PROCEDURE — 88184 FLOWCYTOMETRY/ TC 1 MARKER: CPT

## 2024-08-07 PROCEDURE — 10005 FNA BX W/US GDN 1ST LES: CPT

## 2024-08-07 PROCEDURE — 88189 FLOWCYTOMETRY/READ 16 & >: CPT

## 2024-08-07 PROCEDURE — 88173 CYTOPATH EVAL FNA REPORT: CPT

## 2024-08-07 PROCEDURE — 88173 CYTOPATH EVAL FNA REPORT: CPT | Mod: 26

## 2024-08-07 PROCEDURE — 87205 SMEAR GRAM STAIN: CPT

## 2024-08-07 PROCEDURE — 88172 CYTP DX EVAL FNA 1ST EA SITE: CPT

## 2024-08-07 PROCEDURE — 88305 TISSUE EXAM BY PATHOLOGIST: CPT

## 2024-08-07 PROCEDURE — 88185 FLOWCYTOMETRY/TC ADD-ON: CPT

## 2024-08-07 PROCEDURE — 88305 TISSUE EXAM BY PATHOLOGIST: CPT | Mod: 26

## 2024-08-09 NOTE — CURRENT MEDS
[Takes medication as prescribed] : takes [None] : Patient does not have any barriers to medication adherence Please excuse from work 8/9/24-8/11/24

## 2024-08-13 ENCOUNTER — TRANSCRIPTION ENCOUNTER (OUTPATIENT)
Age: 39
End: 2024-08-13

## 2024-08-15 ENCOUNTER — TRANSCRIPTION ENCOUNTER (OUTPATIENT)
Age: 39
End: 2024-08-15

## 2024-08-26 ENCOUNTER — APPOINTMENT (OUTPATIENT)
Dept: CARDIOLOGY | Facility: CLINIC | Age: 39
End: 2024-08-26
Payer: MEDICAID

## 2024-08-26 ENCOUNTER — NON-APPOINTMENT (OUTPATIENT)
Age: 39
End: 2024-08-26

## 2024-08-26 VITALS
HEART RATE: 74 BPM | TEMPERATURE: 97.9 F | BODY MASS INDEX: 42.21 KG/M2 | WEIGHT: 209 LBS | SYSTOLIC BLOOD PRESSURE: 122 MMHG | OXYGEN SATURATION: 98 % | DIASTOLIC BLOOD PRESSURE: 81 MMHG

## 2024-08-26 DIAGNOSIS — Z13.6 ENCOUNTER FOR SCREENING FOR CARDIOVASCULAR DISORDERS: ICD-10-CM

## 2024-08-26 DIAGNOSIS — Q79.62 HYPERMOBILE EHLERS-DANLOS SYNDROME: ICD-10-CM

## 2024-08-26 DIAGNOSIS — R06.02 SHORTNESS OF BREATH: ICD-10-CM

## 2024-08-26 DIAGNOSIS — Q79.60 EHLERS-DANLOS SYNDROME, UNSP: ICD-10-CM

## 2024-08-26 DIAGNOSIS — G90.A POSTURAL ORTHOSTATIC TACHYCARDIA SYNDROME [POTS]: ICD-10-CM

## 2024-08-26 PROCEDURE — G2211 COMPLEX E/M VISIT ADD ON: CPT | Mod: NC

## 2024-08-26 PROCEDURE — 99214 OFFICE O/P EST MOD 30 MIN: CPT | Mod: 25

## 2024-08-26 PROCEDURE — 93000 ELECTROCARDIOGRAM COMPLETE: CPT

## 2024-09-05 ENCOUNTER — TRANSCRIPTION ENCOUNTER (OUTPATIENT)
Age: 39
End: 2024-09-05

## 2024-09-13 ENCOUNTER — APPOINTMENT (OUTPATIENT)
Dept: CT IMAGING | Facility: CLINIC | Age: 39
End: 2024-09-13

## 2024-09-13 PROCEDURE — 75571 CT HRT W/O DYE W/CA TEST: CPT

## 2024-09-18 RX ORDER — GOLIMUMAB 50 MG/4ML
50 SOLUTION INTRAVENOUS
Qty: 4 | Refills: 12 | Status: ACTIVE | COMMUNITY
Start: 2024-09-18 | End: 1900-01-01

## 2024-09-27 ENCOUNTER — TRANSCRIPTION ENCOUNTER (OUTPATIENT)
Age: 39
End: 2024-09-27

## 2024-10-01 ENCOUNTER — TRANSCRIPTION ENCOUNTER (OUTPATIENT)
Age: 39
End: 2024-10-01

## 2024-10-01 ENCOUNTER — APPOINTMENT (OUTPATIENT)
Dept: NEUROLOGY | Facility: CLINIC | Age: 39
End: 2024-10-01
Payer: MEDICAID

## 2024-10-01 ENCOUNTER — RX RENEWAL (OUTPATIENT)
Age: 39
End: 2024-10-01

## 2024-10-01 VITALS
WEIGHT: 210 LBS | TEMPERATURE: 97.7 F | SYSTOLIC BLOOD PRESSURE: 128 MMHG | OXYGEN SATURATION: 97 % | BODY MASS INDEX: 42.33 KG/M2 | HEART RATE: 93 BPM | DIASTOLIC BLOOD PRESSURE: 90 MMHG | HEIGHT: 59 IN

## 2024-10-01 DIAGNOSIS — G43.119 MIGRAINE WITH AURA, INTRACTABLE, W/OUT STATUS MIGRAINOSUS: ICD-10-CM

## 2024-10-01 DIAGNOSIS — G43.719 CHRONIC MIGRAINE W/OUT AURA, INTRACTABLE, W/OUT STATUS MIGRAINOSUS: ICD-10-CM

## 2024-10-01 PROCEDURE — 99215 OFFICE O/P EST HI 40 MIN: CPT

## 2024-10-01 RX ORDER — GALCANEZUMAB 120 MG/ML
120 INJECTION, SOLUTION SUBCUTANEOUS
Qty: 2 | Refills: 0 | Status: ACTIVE | COMMUNITY
Start: 2024-10-01 | End: 1900-01-01

## 2024-10-01 RX ORDER — GALCANEZUMAB 120 MG/ML
120 INJECTION, SOLUTION SUBCUTANEOUS
Qty: 1 | Refills: 11 | Status: ACTIVE | COMMUNITY
Start: 2024-10-01 | End: 1900-01-01

## 2024-10-01 NOTE — DISCUSSION/SUMMARY
[FreeTextEntry1] : I have reviewed her past MRI scans and her lab results including those from rheumatology. I discussed migraine manifesting as neck pain:  neck pain is a common symptom of migraines, and it can be part of the migraine's symptomatology:   Prevalence: Between 73% and 90% of people with migraines also experience neck pain. Severity: People with chronic migraines often experience more severe neck pain than those with episodic migraines.   Timing: Neck pain can occur during the prodrome, or the hours before a migraine, or during the actual migraine attack.   Cause: The exact cause of the link between neck pain and migraines is not fully understood, but pain sensitivity may play a role.   Treatment: Treating the migraine can help relieve both the head and neck pain. Treatments include:   Pain-relieving medications like non-steroidal anti-inflammatory drugs (NSAIDs), triptans, and ergotamine   Preventive medications like beta-blockers, tricyclic antidepressants, and valproate   Alternative treatments like acupuncture, biofeedback, massage therapy, and cognitive behavior therapy   Other considerations: Neck pain can also be a trigger for migraines.  Migraine is a pain.  While several over-the-counter and prescription medications can treat migraine, not all are good at easing symptoms like head pain, nausea, vomiting, and sensitivity to light that can last a few hours or a few days.  For the 36 million Americans who have migraine, treating the symptoms is one part of the cheng: Another critical goal is preventing the painful headaches, and there hasnt been a medication for that. Until now.  In 2018, the FDA approved the first calcitonin gene-related peptide (CGRP) inhibitors. These new drugs are designed to prevent episodic migraine (up to 14 headache days per month), chronic migraine (15 headache days per month or more), and medication overuse headaches. CGRP inhibitors were the first class of drugs developed to prevent migraines with or without visual auras. Experts recommend their use for those with 6 or more migraine days per month, or when older preventive migraine treatments fail or are not tolerated by the migraine sufferer.       n 2021 a subset of CGRP inhibitors were approved to treat acute migraine.      What Is CGRP?      CGRP is the acronym for calcitonin gene-related peptide. Its involved in a lot of body processes: It regulates blood pressure, helps with tissue repair and wound healing, and contributes to inflammation.      When CGRP is released in the brain, it affects the trigeminal nerve, which is responsible for communicating pain and sensitivities to touch and temperature. CGRP also causes inflammation and pain that happens during a migraine; it makes headache pain worse and causes headaches to last longer. It appears that those who are diagnosed with migraine have more CGRP in their blood.      What Are CGRP Inhibitors?      CGRP inhibitors are drugs that block CGRP from binding to CGRP receptors, a key contributor to the trigeminal nerve pain and inflammation of migraine. Some are FDA-approved for preventing migraine, others for treating acute migraine, and one is approved for both acute and preventive therapy.   The two classes of these drugs are monoclonal antibodies and small molecule CGRP antagonists. The CGRP monoclonal antibodies are large molecule drugs that are given as self-injections or IVs. The shots are given with an automatic pen (similar to the injection pens used for insulin) every month or quarterly (four times per year), depending on the drug. The small molecule CGRP antagonists are taken by mouth in pill form. I

## 2024-10-01 NOTE — PHYSICAL EXAM
[Person] : oriented to person [Place] : oriented to place [Time] : oriented to time [Concentration Intact] : normal concentrating ability [Visual Intact] : visual attention was ~T not ~L decreased [Naming Objects] : no difficulty naming common objects [Repeating Phrases] : no difficulty repeating a phrase [Writing A Sentence] : no difficulty writing a sentence [Fluency] : fluency intact [Comprehension] : comprehension intact [Reading] : reading intact [Past History] : adequate knowledge of personal past history [Cranial Nerves Optic (II)] : visual acuity intact bilaterally,  visual fields full to confrontation, pupils equal round and reactive to light [Cranial Nerves Oculomotor (III)] : extraocular motion intact [Cranial Nerves Trigeminal (V)] : facial sensation intact symmetrically [Cranial Nerves Facial (VII)] : face symmetrical [Cranial Nerves Vestibulocochlear (VIII)] : hearing was intact bilaterally [Cranial Nerves Glossopharyngeal (IX)] : tongue and palate midline [Cranial Nerves Accessory (XI - Cranial And Spinal)] : head turning and shoulder shrug symmetric [Cranial Nerves Hypoglossal (XII)] : there was no tongue deviation with protrusion [Motor Tone] : muscle tone was normal in all four extremities [Motor Strength] : muscle strength was normal in all four extremities [No Muscle Atrophy] : normal bulk in all four extremities [Sensation Tactile Decrease] : light touch was intact [Abnormal Walk] : normal gait [Balance] : balance was intact [2+] : Ankle jerk left 2+ [Sclera] : the sclera and conjunctiva were normal [PERRL With Normal Accommodation] : pupils were equal in size, round, reactive to light, with normal accommodation [Extraocular Movements] : extraocular movements were intact [Outer Ear] : the ears and nose were normal in appearance [Oropharynx] : the oropharynx was normal [Past-pointing] : there was no past-pointing [Tremor] : no tremor present [Plantar Reflex Right Only] : normal on the right [Plantar Reflex Left Only] : normal on the left [FreeTextEntry1] : Left occipital neuralgia

## 2024-10-01 NOTE — HISTORY OF PRESENT ILLNESS
[FreeTextEntry1] : Diagnosis juvenile inflammatory arthritis age 16 years that was in remission after prednisone.  She was seen in rheumatology in 2018, and was treated for fibromyalgia with good response to Savella in 2018 but had a flareup of arthritis in October 2018 and was given the first injection of sarilumab (IL-6 receptor monoclonal antibody) on October 26, 2018.   She suffered a right lower lobe pulmonary embolus in June 2019 and was on apixaban for 6 months.  She had been on methotrexate at the time.  Later, in 2019 she was on methotrexate and rituximab.  Her first visit for neurological consultation in October 2020 occurred because of numbness in her right lower extremity with paresthesias.  A month later when she was seen for follow-up she complained of stiffness in the neck that ran down the spine into the lower back worse in bed awakening her from sleep.  This had not restricted her usual activities of caring for her 3 children at home and her homemaker chores.  MRI of the lumbar spine showed a right paramedian disc herniation at L4-5 level compressing the descending right L5 nerve in the lateral recess causing mild spinal canal stenosis.  An MRI scan of the brain at the time was normal.  The EMG and nerve conduction velocity examination of the lower extremities showed low amplitude sensory action potential but it was not clear at the time whether this was an artifact due to thick subcutaneous tissues or sensory axonal loss.  In 2020 to the upper extremities nerve conduction velocity and EMG  was  normal.  In June 2022 she complained of right arm numbness weakness. She messaged in October 2023 that she was diagnosed with cervical disc herniation in 2013 and that the symptoms of crackling and grinding sound anytime with head movement side to side or up and down at the base of the skull have recurred.  In the previous month she reported moments when her legs had gone numb and felt very heavy as if at bed weight was attached to her hips she also had lightheadedness and vertigo.  Topiramate she claims made her dizzy and Depakote did not help.  She has been compliant with methotrexate and Truxima for juvenile inflammatory arthritis.  10/1/2024: Today she reports that she has been diagnosed with Pau-Danlos syndrome and POTS which could be responsible for some of her symptoms.  Her main symptom today is neck pain that sometimes produces a headache.  Unable to tolerate topiramate and Depakote for headache and vertigo with lightheadedness.  She is now receiving treatment for juvenile rheumatoid arthritis.

## 2024-10-01 NOTE — ASSESSMENT
[FreeTextEntry1] : Migraine causing neck pain and dizziness.  Because of failed topiramate and Depakote as well as potential interactions of other medications with her current medicines, recommend Emgality.  She will review the documents regarding neck pain due to migraine and Emgality and call back to start the medication

## 2024-10-02 ENCOUNTER — TRANSCRIPTION ENCOUNTER (OUTPATIENT)
Age: 39
End: 2024-10-02

## 2024-10-02 ENCOUNTER — APPOINTMENT (OUTPATIENT)
Dept: RHEUMATOLOGY | Facility: CLINIC | Age: 39
End: 2024-10-02

## 2024-10-02 DIAGNOSIS — Q79.62 HYPERMOBILE EHLERS-DANLOS SYNDROME: ICD-10-CM

## 2024-10-02 DIAGNOSIS — G90.A POSTURAL ORTHOSTATIC TACHYCARDIA SYNDROME [POTS]: ICD-10-CM

## 2024-10-02 DIAGNOSIS — G89.29 OTHER CHRONIC PAIN: ICD-10-CM

## 2024-10-02 PROCEDURE — 99215 OFFICE O/P EST HI 40 MIN: CPT | Mod: 95

## 2024-10-03 ENCOUNTER — TRANSCRIPTION ENCOUNTER (OUTPATIENT)
Age: 39
End: 2024-10-03

## 2024-10-04 ENCOUNTER — APPOINTMENT (OUTPATIENT)
Dept: RHEUMATOLOGY | Facility: CLINIC | Age: 39
End: 2024-10-04
Payer: MEDICAID

## 2024-10-04 VITALS
HEIGHT: 59 IN | SYSTOLIC BLOOD PRESSURE: 148 MMHG | RESPIRATION RATE: 16 BRPM | OXYGEN SATURATION: 99 % | WEIGHT: 207 LBS | TEMPERATURE: 98 F | DIASTOLIC BLOOD PRESSURE: 97 MMHG | HEART RATE: 108 BPM | BODY MASS INDEX: 41.73 KG/M2

## 2024-10-04 DIAGNOSIS — M06.9 RHEUMATOID ARTHRITIS, UNSPECIFIED: ICD-10-CM

## 2024-10-04 PROCEDURE — G2211 COMPLEX E/M VISIT ADD ON: CPT | Mod: NC

## 2024-10-04 PROCEDURE — 99214 OFFICE O/P EST MOD 30 MIN: CPT

## 2024-10-04 NOTE — PHYSICAL EXAM
[General Appearance - Alert] : alert [General Appearance - In No Acute Distress] : in no acute distress [Outer Ear] : the ears and nose were normal in appearance [Oropharynx] : the oropharynx was normal [Neck Appearance] : the appearance of the neck was normal [Neck Cervical Mass (___cm)] : no neck mass was observed [Jugular Venous Distention Increased] : there was no jugular-venous distention [Thyroid Diffuse Enlargement] : the thyroid was not enlarged [Thyroid Nodule] : there were no palpable thyroid nodules [Auscultation Breath Sounds / Voice Sounds] : lungs were clear to auscultation bilaterally [Heart Rate And Rhythm] : heart rate was normal and rhythm regular [Heart Sounds] : normal S1 and S2 [Heart Sounds Gallop] : no gallops [Murmurs] : no murmurs [Heart Sounds Pericardial Friction Rub] : no pericardial rub [Full Pulse] : the pedal pulses are present [Edema] : there was no peripheral edema [Bowel Sounds] : normal bowel sounds [Abdomen Soft] : soft [Abdomen Tenderness] : non-tender [Abdomen Mass (___ Cm)] : no abdominal mass palpated [Cervical Lymph Nodes Enlarged Posterior Bilaterally] : posterior cervical [Cervical Lymph Nodes Enlarged Anterior Bilaterally] : anterior cervical [Supraclavicular Lymph Nodes Enlarged Bilaterally] : supraclavicular [No CVA Tenderness] : no ~M costovertebral angle tenderness [No Spinal Tenderness] : no spinal tenderness [Skin Color & Pigmentation] : normal skin color and pigmentation [Skin Turgor] : normal skin turgor [] : no rash [Oriented To Time, Place, And Person] : oriented to person, place, and time [Impaired Insight] : insight and judgment were intact [Affect] : the affect was normal [Abnormal Walk] : normal gait [Nail Clubbing] : no clubbing  or cyanosis of the fingernails [Involuntary Movements] : no involuntary movements were seen [Musculoskeletal - Swelling] : no joint swelling seen [Motor Tone] : muscle strength and tone were normal [FreeTextEntry1] : all joints with full ROM - no active synovitis - tenderness over the knees - elbows and wrists

## 2024-10-04 NOTE — ASSESSMENT
[FreeTextEntry1] : 1. RA with vasculitis, now ILD - methotrexate 5 tablets - s/p rituximab in 09 - gct scan done in 10/2023 with resolved ILD - no longer coughing since stopping methotrexate -on simponi now due to low B cells on previous labs - s/p 2 infusion with no improvement - repeat labs today - re-start methotrexate - no coughing - stable disease - doing well with no side effects 2. chronic back pain - ongoing work-up by PMR x-rays with degenerative changes and mild scoliosis -had nerve block - no pain at this time. - continue with the current regimen 3. chronic pain - on medical marijuana - stable 4. muscle cramps over the right leg - not active 5. PE - likely related to tubal ligation (5 days prior) - chronic eliquis - given hx of clotting in her family 6. Right leg paresthesias - EMG done yesterday - mild sensory axonal loss 7. Right shoulder pain -with decreased ROM over 6 months worsening - with loss of ROM -worsening pain - send for new US 8. Loss of hearing -under the care of ENT 9. diverticulitis - under he care of GI - no flares 10. bilateral knee - mechanical can take tylenol as needed - cannot take nsaids due to being on eliquis 11 cervical pain - hx of herniated discs - under the care of neuro - mild DDD  12. thrush - resolved 13. cough - resolved 14. elevated AKP -around 200's   I reviewed previous labs results with patients. labs today Diagnosis and Prognosis discussed Continue with current medications medications refilled F/u 3 months.

## 2024-10-04 NOTE — HISTORY OF PRESENT ILLNESS
[___ Month(s) Ago] : [unfilled] month(s) ago [Joint Swelling] : joint swelling [Morning Stiffness] : morning stiffness [Joint Pain] : joint pain [Currently Experiencing] : currently [Arthralgias] : arthralgias [FreeTextEntry1] : now on simponi with improvement on her joint pain and stiffness on methotrexate `0 mg weekly - and medical marijuana started to the gym - twice a week  minimal joint pain  [TextBox_2] : 2017 [TextBox_40] : methotrexate [TextBox_44] : rituximab

## 2024-10-16 ENCOUNTER — TRANSCRIPTION ENCOUNTER (OUTPATIENT)
Age: 39
End: 2024-10-16

## 2024-10-17 ENCOUNTER — NON-APPOINTMENT (OUTPATIENT)
Age: 39
End: 2024-10-17

## 2024-10-18 ENCOUNTER — TRANSCRIPTION ENCOUNTER (OUTPATIENT)
Age: 39
End: 2024-10-18

## 2024-10-18 DIAGNOSIS — R74.8 ABNORMAL LEVELS OF OTHER SERUM ENZYMES: ICD-10-CM

## 2024-10-21 PROBLEM — M51.369 DISC DEGENERATION, LUMBAR: Status: ACTIVE | Noted: 2021-04-02

## 2024-10-22 ENCOUNTER — TRANSCRIPTION ENCOUNTER (OUTPATIENT)
Age: 39
End: 2024-10-22

## 2024-10-25 ENCOUNTER — TRANSCRIPTION ENCOUNTER (OUTPATIENT)
Age: 39
End: 2024-10-25

## 2024-10-25 LAB
ALP BLD-CCNC: 200 IU/L
ALP BONE CFR SERPL: 42 %
ALP INTEST CFR SERPL: 2 %
ALP LIVER CFR SERPL: 56 %

## 2024-10-27 ENCOUNTER — EMERGENCY (EMERGENCY)
Facility: HOSPITAL | Age: 39
LOS: 1 days | Discharge: HOME | End: 2024-10-27
Attending: EMERGENCY MEDICINE
Payer: MEDICAID

## 2024-10-27 VITALS
HEIGHT: 59 IN | WEIGHT: 205.69 LBS | SYSTOLIC BLOOD PRESSURE: 124 MMHG | HEART RATE: 103 BPM | OXYGEN SATURATION: 100 % | DIASTOLIC BLOOD PRESSURE: 86 MMHG | TEMPERATURE: 100 F | RESPIRATION RATE: 18 BRPM

## 2024-10-27 DIAGNOSIS — M72.2 PLANTAR FASCIAL FIBROMATOSIS: Chronic | ICD-10-CM

## 2024-10-27 DIAGNOSIS — Z98.891 HISTORY OF UTERINE SCAR FROM PREVIOUS SURGERY: Chronic | ICD-10-CM

## 2024-10-27 DIAGNOSIS — Z98.890 OTHER SPECIFIED POSTPROCEDURAL STATES: Chronic | ICD-10-CM

## 2024-10-27 DIAGNOSIS — Z98.51 TUBAL LIGATION STATUS: Chronic | ICD-10-CM

## 2024-10-27 LAB
ALBUMIN SERPL ELPH-MCNC: 3.8 G/DL — SIGNIFICANT CHANGE UP (ref 3.5–5)
ALP SERPL-CCNC: 198 U/L — HIGH (ref 40–120)
ALT FLD-CCNC: 39 U/L DA — SIGNIFICANT CHANGE UP (ref 10–60)
ANION GAP SERPL CALC-SCNC: 6 MMOL/L — SIGNIFICANT CHANGE UP (ref 5–17)
AST SERPL-CCNC: 30 U/L — SIGNIFICANT CHANGE UP (ref 10–40)
BASOPHILS # BLD AUTO: 0.04 K/UL — SIGNIFICANT CHANGE UP (ref 0–0.2)
BASOPHILS NFR BLD AUTO: 0.3 % — SIGNIFICANT CHANGE UP (ref 0–2)
BILIRUB SERPL-MCNC: 0.4 MG/DL — SIGNIFICANT CHANGE UP (ref 0.2–1.2)
BUN SERPL-MCNC: 7 MG/DL — SIGNIFICANT CHANGE UP (ref 7–18)
CALCIUM SERPL-MCNC: 9.4 MG/DL — SIGNIFICANT CHANGE UP (ref 8.4–10.5)
CHLORIDE SERPL-SCNC: 109 MMOL/L — HIGH (ref 96–108)
CO2 SERPL-SCNC: 25 MMOL/L — SIGNIFICANT CHANGE UP (ref 22–31)
CREAT SERPL-MCNC: 0.75 MG/DL — SIGNIFICANT CHANGE UP (ref 0.5–1.3)
EGFR: 103 ML/MIN/1.73M2 — SIGNIFICANT CHANGE UP
EOSINOPHIL # BLD AUTO: 0.05 K/UL — SIGNIFICANT CHANGE UP (ref 0–0.5)
EOSINOPHIL NFR BLD AUTO: 0.3 % — SIGNIFICANT CHANGE UP (ref 0–6)
GLUCOSE SERPL-MCNC: 111 MG/DL — HIGH (ref 70–99)
HCT VFR BLD CALC: 39.8 % — SIGNIFICANT CHANGE UP (ref 34.5–45)
HGB BLD-MCNC: 13.5 G/DL — SIGNIFICANT CHANGE UP (ref 11.5–15.5)
IMM GRANULOCYTES NFR BLD AUTO: 0.2 % — SIGNIFICANT CHANGE UP (ref 0–0.9)
LYMPHOCYTES # BLD AUTO: 1.84 K/UL — SIGNIFICANT CHANGE UP (ref 1–3.3)
LYMPHOCYTES # BLD AUTO: 11.7 % — LOW (ref 13–44)
MCHC RBC-ENTMCNC: 28.6 PG — SIGNIFICANT CHANGE UP (ref 27–34)
MCHC RBC-ENTMCNC: 33.9 GM/DL — SIGNIFICANT CHANGE UP (ref 32–36)
MCV RBC AUTO: 84.3 FL — SIGNIFICANT CHANGE UP (ref 80–100)
MONOCYTES # BLD AUTO: 0.76 K/UL — SIGNIFICANT CHANGE UP (ref 0–0.9)
MONOCYTES NFR BLD AUTO: 4.8 % — SIGNIFICANT CHANGE UP (ref 2–14)
NEUTROPHILS # BLD AUTO: 13.07 K/UL — HIGH (ref 1.8–7.4)
NEUTROPHILS NFR BLD AUTO: 82.7 % — HIGH (ref 43–77)
NRBC # BLD: 0 /100 WBCS — SIGNIFICANT CHANGE UP (ref 0–0)
PLATELET # BLD AUTO: 273 K/UL — SIGNIFICANT CHANGE UP (ref 150–400)
POTASSIUM SERPL-MCNC: 4.2 MMOL/L — SIGNIFICANT CHANGE UP (ref 3.5–5.3)
POTASSIUM SERPL-SCNC: 4.2 MMOL/L — SIGNIFICANT CHANGE UP (ref 3.5–5.3)
PROT SERPL-MCNC: 7.6 G/DL — SIGNIFICANT CHANGE UP (ref 6–8.3)
RBC # BLD: 4.72 M/UL — SIGNIFICANT CHANGE UP (ref 3.8–5.2)
RBC # FLD: 14.5 % — SIGNIFICANT CHANGE UP (ref 10.3–14.5)
SODIUM SERPL-SCNC: 140 MMOL/L — SIGNIFICANT CHANGE UP (ref 135–145)
WBC # BLD: 15.79 K/UL — HIGH (ref 3.8–10.5)
WBC # FLD AUTO: 15.79 K/UL — HIGH (ref 3.8–10.5)

## 2024-10-27 PROCEDURE — 99284 EMERGENCY DEPT VISIT MOD MDM: CPT

## 2024-10-27 RX ORDER — KETOROLAC TROMETHAMINE 10 MG/1
15 TABLET, FILM COATED ORAL ONCE
Refills: 0 | Status: DISCONTINUED | OUTPATIENT
Start: 2024-10-27 | End: 2024-10-27

## 2024-10-27 RX ORDER — SODIUM CHLORIDE 0.9 % (FLUSH) 0.9 %
1000 SYRINGE (ML) INJECTION ONCE
Refills: 0 | Status: COMPLETED | OUTPATIENT
Start: 2024-10-27 | End: 2024-10-27

## 2024-10-27 RX ADMIN — KETOROLAC TROMETHAMINE 15 MILLIGRAM(S): 10 TABLET, FILM COATED ORAL at 20:33

## 2024-10-27 RX ADMIN — KETOROLAC TROMETHAMINE 15 MILLIGRAM(S): 10 TABLET, FILM COATED ORAL at 21:00

## 2024-10-27 NOTE — ED ADULT NURSE NOTE - OBJECTIVE STATEMENT
Patient is 38y/o female presenting to the ED c/o Pt reports that  she has Right FLANK PAIN  x3 weeks (states  that CONSTANT  BURNING SENSATION IN THE FLANK AREA ) . H/O  blood clotting disease  on ELIQUIS . had Tylenol for fever at 16.30 pm today

## 2024-10-27 NOTE — ED ADULT TRIAGE NOTE - CHIEF COMPLAINT QUOTE
Pt reports that  she has Right FLANK PAIN  x3 weeks (states  that CONSTANT  BURNING SENSATION IN THE FLANK AREA ) . H/O  blood clotting disease  on ELIQUIS . had Tylenol for fever at 16.30 pm today

## 2024-10-27 NOTE — ED PROVIDER NOTE - PHYSICAL EXAMINATION
General: Patient well appearing, vital signs within normal limits  HEENT: MMM, trachea midline  Respiratory: No respiratory distress  GI: Soft, nontender  :  Point tenderness to palpation to the right lower posterior ribs  Neuro: Moves all extremities  Skin: No rashes or lesions

## 2024-10-27 NOTE — ED PROVIDER NOTE - NSFOLLOWUPINSTRUCTIONS_ED_ALL_ED_FT
Please follow up with your PMD or Medicine Clinic in 2-3 days.  Follow up with Gastroenterology in 1 week.  Return to the ER for worsening or concerning symptoms.  Your results for testing will be available in the Follow My Health application which can be downloaded to your phone or checked online on a computer.  [email or web address]  Drink plenty of fluids or an oral rehydration solution like Pedialyte, Gatorade, or Powerade.  Keep your diet simple until symptoms improve. Introduce foods as tolerated such as bread, toast, plain rice, boiled potatoes, boiled chicken, bananas, apple sauce, etc. Avoid dairy, spicy, greasy, or fatty foods. Avoid alcohol or tobacco.  Take Acetaminophen (Tylenol) 650mg every 6 hours as needed for pain or fever.  Take Ketorolac (Toradol) 10mg every 8 hours as needed for pain with food.  Take Cyclobenzaprine (Flexeril) 10mg every 8 hours as needed for pain/muscle spasm. This medication can be sedating. Do not mix sedatives, drink alcohol, or operative car/heavy machinery when using this medication.  Take Metronidazole (Flagyl) three times a day for 10 days.  Take Ciprofloxacin (Cipro) twice a day for 10 days.    - - - - - - - - - - - - -  Diverticulitis  Body outline showing the stomach and intestines, with a close-up of diverticula on the large intestine.  Diverticulitis is when small pouches in your colon get infected or swollen. This causes pain in your belly (abdomen) and watery poop (diarrhea).    The small pouches are called diverticula. They may form if you have a condition called diverticulosis.    What are the causes?  You may get this condition if poop (stool) gets trapped in the pouches in your colon. The poop lets germs (bacteria) grow. This causes an infection.    What increases the risk?  You are more likely to get this condition if you have small pouches in your colon. You are also more likely to get it if:  You are overweight or very overweight (obese).  You do not exercise enough.  You drink alcohol.  You smoke.  You eat a lot of red meat, like beef, pork, or lamb.  You do not eat enough fiber.  You are older than 40 years of age.  What are the signs or symptoms?  Pain in your belly. Pain is often on the left side, but it may be felt in other spots too.  Fever and chills.  Feeling like you may vomit.  Vomiting.  Having cramps.  Feeling full.  Changes in how often you poop.  Blood in your poop.  How is this treated?  Most cases are treated at home. You may be told to:  Take over-the-counter pain medicines.  Only eat and drink clear liquids.  Take antibiotics.  Rest.  Very bad cases may need to be treated at a hospital. Treatment may include:  Not eating or drinking.  Taking pain medicines.  Getting antibiotics through an IV tube.  Getting fluid and food through an IV tube.  Having surgery.  When you are feeling better, you may need to have a test to look at your colon (colonoscopy).    Follow these instructions at home:  Medicines    Take over-the-counter and prescription medicines only as told by your doctor. These include:  Fiber pills.  Probiotics.  Medicines to make your poop soft (stool softeners).  If you were prescribed antibiotics, take them as told by your doctor. Do not stop taking them even if you start to feel better.  Ask your doctor if you should avoid driving or using machines while you are taking your medicine.  Eating and drinking    Pear, berries, artichoke, and beans.  Follow the diet told by your doctor. You may need to only eat and drink liquids.  When you feel better, you may be able to eat more foods. You may also be told to eat a lot of fiber. Fiber helps you poop. Foods with fiber include berries, beans, lentils, and green vegetables.  Try not to eat red meat.  General instructions    Do not smoke or use any products that contain nicotine or tobacco. If you need help quitting, ask your doctor.  Exercise 3 or more times a week. Try to go for 30 minutes each time. Exercise enough to sweat and make your heart beat faster.  Contact a doctor if:  Your pain gets worse.  You are not pooping like normal.  Your symptoms do not get better.  Your symptoms get worse very fast.  You have a fever.  You vomit more than one time.  You have poop that is:  Bloody.  Black.  Tarry.  This information is not intended to replace advice given to you by your health care provider. Make sure you discuss any questions you have with your health care provider.

## 2024-10-27 NOTE — ED PROVIDER NOTE - PROGRESS NOTE
Gen:  Awake, alert, NAD, WDWN, NCAT, non-toxic appearing.   Eyes:  PERRL, EOMI, no icterus, normal lids/lashes, no nystagmus, L eye conjunctival injection, no FB on lid inversion, lids/lashes normal, visual acuity: 20/25 b/l/L/R  ENT:  External inspection normal, pink/moist membranes. TM's & canals normal b/l. Pharynx normal, no pharyngeal erythema/exudate, no drooling, no lip/tongue/palate/posterior pharynx edema, midline uvula, no oropharyngeal ulcerations/lesions.  CV:  S1S2, regular rate and rhythm, no murmur/gallops/rubs, no JVD, 2+ pulses b/l, no edema/cords/homans, warm/well-perfused.  Resp:  Normal respiratory rate/effort, no respiratory distress, normal voice, speaking full sentences, lungs clear to auscultation b/l, no wheezing/rales/rhonchi, no retractions, no stridor.  Abd:  Soft abdomen, no tender/distended/guarding/rebound, no pulsatile mass, no CVA tender.   Musculoskeletal:  N/V intact, FROM all 4 extremities, normal motor tone, stable gait.   Neck:  FROM neck, supple, trachea midline, no meningismus.   Skin:  Color normal for race, warm and dry, no rash.  Neuro:  Oriented x3, CN 2-12 intact (grossly), normal motor (grossly), normal sensory (grossly), normal gait. GCS 15  Psych:  Attention normal. Affect normal. Behavior normal. Judgment normal. Improved.

## 2024-10-27 NOTE — ED PROVIDER NOTE - PATIENT PORTAL LINK FT
You can access the FollowMyHealth Patient Portal offered by [facility] by registering at the following website: [email or web address]. By joining [facility]’s FollowMyHealth portal, you will also be able to view your health information using other applications (apps) compatible with our system.

## 2024-10-27 NOTE — ED PROVIDER NOTE - CLINICAL SUMMARY MEDICAL DECISION MAKING FREE TEXT BOX
39-year-old female who presents with  3 weeks of intermittent right flank pain worsening over the last several days.  Denies fevers, dysuria, hematuria, nausea or vomiting.  Laboratory significant for leukocytosis, WBC 15.7.  patient has point tenderness to the right lower and posterior ribs and this is likely musculoskeletal.   However with the elevated white blood cell count and delay in obtaining urine, CT abdomen/pelvis was obtained and is pending at time of care signout.  Additionally, patient urinalysis is pending.  She is receiving her second liter of fluid.

## 2024-10-27 NOTE — ED PROVIDER NOTE - PROGRESS NOTE DETAILS
Received from Dr Beni tomlinson UA & CT results pending.  CT concerning for acute diverticulitis.  No evidence of abscess, perforation, or sepsis.   Analgesia & Abx given.  Results reviewed.  Patient well appearing.  Tolerating PO intake.  Patient advised regarding symptomatic/supportive care, importance of outpatient follow up, and symptoms to prompt ED return.

## 2024-10-27 NOTE — ED PROVIDER NOTE - OBJECTIVE STATEMENT
39-year-old female who presents with 3 weeks of intermittent right flank pain that has been worsening over the last 2 days.  She denies any hematuria, dysuria, fevers, nausea/vomiting, cough. 39-year-old female with PMH of PE (currently on Eliquis), asthma, and RA (on Methotrexate) who presents with 3 weeks of intermittent right flank pain that has been worsening over the last 2 days.  She denies any hematuria, dysuria, fevers, nausea/vomiting, cough, shortness of breath.

## 2024-10-27 NOTE — ED PROVIDER NOTE - NSFOLLOWUPCLINICS_GEN_ALL_ED_FT
[facility]  Gastroenterology  [address] [place] [address]  Phone: [phone]  Fax: [phone]  Follow Up Time: 4-6 Days    [facility]  Internal Medicine  [address] [place] [address]  Phone: [phone]  Fax: [phone]  Follow Up Time: 1-3 Days

## 2024-10-27 NOTE — ED PROVIDER NOTE - CONDITION AT DISCHARGE:
Initial / Assessment/Plan of Care Note     Baseline Assessment  65 year old admitted 8/13/2024 as Inpatient with a diagnosis of sepsis.   Prior to admission patient was living with Other facility residents and residing at [facility].  Patient does not  have a Power of  for Healthcare.   Patient’s Primary Care Provider is Vishal Chapman MD.     Progress Note  Pt admitted from [facility] w/L sided weakness, L facial droop, hypotension requiring vasopressor support, but found to be positive for Klebsiella bacteremia. ID following, vancomycin and meropenem IV. Hx of MSSA bacteremia 2/2 to L2-3 epidural abscess s/p L2-S1 laminectomy on 6/10/24 s/p cefazolin x 8 weeks through 8/4/24.    Met w/pt who confirmed return to [facility] as he was not ready to return home alone independently which is his baseline.    Plan  Patient/Family Discharge Goal: Return [facility]   Is patient/family goal achievable: Yes   / - Recommendations for Discharge: Return [facility]      Barriers to Discharge  Identified Barriers to Discharge/Transition Planning: clinical status    Refer to / Flowsheet for objective data.     Medical History  Past Medical History:   Diagnosis Date    Diabetes mellitus  (CMD)     Essential (primary) hypertension      Prior to Admission Status  Functional Status  Ambulation: Independent/Self  Bathing: Facility Staff  Dressing: Facility Staff  Toileting: Facility staff  Meal Preparation: Facility Staff  Medication Preparation: Pharmacy Setup  Medication Administration: Facility Staff Administered    Agency/Support  Type of Services Prior to Hospitalization: OT, PT   Support Systems: Siblings   Home Devices/Equipment: Sensory assist device   Mobility Assist Devices: None  Sensory Support Devices: Eyeglasses    Insurance  Primary: [employer] MEDICARE  Secondary:  N/A    Disposition Recommendations:  SW/CM recommendation for discharge: Return [facility]             Improved

## 2024-10-28 ENCOUNTER — APPOINTMENT (OUTPATIENT)
Dept: CT IMAGING | Facility: CLINIC | Age: 39
End: 2024-10-28

## 2024-10-28 VITALS
HEART RATE: 89 BPM | RESPIRATION RATE: 18 BRPM | DIASTOLIC BLOOD PRESSURE: 77 MMHG | SYSTOLIC BLOOD PRESSURE: 115 MMHG | OXYGEN SATURATION: 100 % | TEMPERATURE: 98 F

## 2024-10-28 LAB
APPEARANCE UR: ABNORMAL
BACTERIA # UR AUTO: ABNORMAL /HPF
BILIRUB UR-MCNC: NEGATIVE — SIGNIFICANT CHANGE UP
COLOR SPEC: YELLOW — SIGNIFICANT CHANGE UP
COMMENT - URINE: SIGNIFICANT CHANGE UP
DIFF PNL FLD: NEGATIVE — SIGNIFICANT CHANGE UP
EPI CELLS # UR: PRESENT
GLUCOSE UR QL: NEGATIVE MG/DL — SIGNIFICANT CHANGE UP
KETONES UR-MCNC: ABNORMAL MG/DL
LEUKOCYTE ESTERASE UR-ACNC: ABNORMAL
NITRITE UR-MCNC: NEGATIVE — SIGNIFICANT CHANGE UP
PH UR: 5.5 — SIGNIFICANT CHANGE UP (ref 5–8)
PROT UR-MCNC: ABNORMAL MG/DL
RBC CASTS # UR COMP ASSIST: 0 /HPF — SIGNIFICANT CHANGE UP (ref 0–4)
SP GR SPEC: 1.03 — HIGH (ref 1–1.03)
UROBILINOGEN FLD QL: 1 MG/DL — SIGNIFICANT CHANGE UP (ref 0.2–1)
WBC UR QL: 4 /HPF — SIGNIFICANT CHANGE UP (ref 0–5)

## 2024-10-28 PROCEDURE — 84702 CHORIONIC GONADOTROPIN TEST: CPT

## 2024-10-28 PROCEDURE — 85025 COMPLETE CBC W/AUTO DIFF WBC: CPT

## 2024-10-28 PROCEDURE — 80053 COMPREHEN METABOLIC PANEL: CPT

## 2024-10-28 PROCEDURE — 99284 EMERGENCY DEPT VISIT MOD MDM: CPT | Mod: 25

## 2024-10-28 PROCEDURE — 96374 THER/PROPH/DIAG INJ IV PUSH: CPT

## 2024-10-28 PROCEDURE — 81001 URINALYSIS AUTO W/SCOPE: CPT

## 2024-10-28 PROCEDURE — 36415 COLL VENOUS BLD VENIPUNCTURE: CPT

## 2024-10-28 PROCEDURE — 74176 CT ABD & PELVIS W/O CONTRAST: CPT | Mod: MC

## 2024-10-28 PROCEDURE — 74176 CT ABD & PELVIS W/O CONTRAST: CPT | Mod: 26,MC

## 2024-10-28 RX ORDER — KETOROLAC TROMETHAMINE 10 MG/1
1 TABLET, FILM COATED ORAL
Qty: 15 | Refills: 0
Start: 2024-10-28 | End: 2024-11-01

## 2024-10-28 RX ORDER — CYCLOBENZAPRINE HCL 10 MG
1 TABLET ORAL
Qty: 20 | Refills: 0
Start: 2024-10-28 | End: 2024-11-01

## 2024-10-28 RX ADMIN — Medication 500 MILLIGRAM(S): at 02:19

## 2024-10-28 RX ADMIN — Medication 1000 MILLILITER(S): at 00:02

## 2024-10-30 ENCOUNTER — APPOINTMENT (OUTPATIENT)
Dept: OTOLARYNGOLOGY | Facility: CLINIC | Age: 39
End: 2024-10-30

## 2024-10-30 VITALS
WEIGHT: 207 LBS | BODY MASS INDEX: 41.73 KG/M2 | DIASTOLIC BLOOD PRESSURE: 82 MMHG | HEIGHT: 59 IN | SYSTOLIC BLOOD PRESSURE: 116 MMHG

## 2024-10-30 DIAGNOSIS — B37.0 CANDIDAL STOMATITIS: ICD-10-CM

## 2024-10-30 PROCEDURE — 31579 LARYNGOSCOPY TELESCOPIC: CPT

## 2024-10-30 PROCEDURE — 99214 OFFICE O/P EST MOD 30 MIN: CPT | Mod: 25

## 2024-10-30 RX ORDER — NYSTATIN 100000 [USP'U]/ML
100000 SUSPENSION ORAL 3 TIMES DAILY
Qty: 1 | Refills: 1 | Status: ACTIVE | COMMUNITY
Start: 2024-10-30 | End: 1900-01-01

## 2024-11-01 ENCOUNTER — APPOINTMENT (OUTPATIENT)
Dept: PULMONOLOGY | Facility: CLINIC | Age: 39
End: 2024-11-01
Payer: MEDICAID

## 2024-11-01 VITALS
OXYGEN SATURATION: 96 % | TEMPERATURE: 98 F | SYSTOLIC BLOOD PRESSURE: 116 MMHG | RESPIRATION RATE: 16 BRPM | HEART RATE: 98 BPM | WEIGHT: 199 LBS | BODY MASS INDEX: 40.12 KG/M2 | HEIGHT: 59 IN | DIASTOLIC BLOOD PRESSURE: 78 MMHG

## 2024-11-01 DIAGNOSIS — M06.9 RHEUMATOID ARTHRITIS, UNSPECIFIED: ICD-10-CM

## 2024-11-01 DIAGNOSIS — R06.09 OTHER FORMS OF DYSPNEA: ICD-10-CM

## 2024-11-01 DIAGNOSIS — Z87.09 PERSONAL HISTORY OF OTHER DISEASES OF THE RESPIRATORY SYSTEM: ICD-10-CM

## 2024-11-01 PROCEDURE — 99214 OFFICE O/P EST MOD 30 MIN: CPT

## 2024-11-18 ENCOUNTER — APPOINTMENT (OUTPATIENT)
Dept: ENDOCRINOLOGY | Facility: CLINIC | Age: 39
End: 2024-11-18

## 2024-11-20 ENCOUNTER — RX RENEWAL (OUTPATIENT)
Age: 39
End: 2024-11-20

## 2024-11-21 ENCOUNTER — TRANSCRIPTION ENCOUNTER (OUTPATIENT)
Age: 39
End: 2024-11-21

## 2024-11-22 ENCOUNTER — NON-APPOINTMENT (OUTPATIENT)
Age: 39
End: 2024-11-22

## 2024-11-26 ENCOUNTER — TRANSCRIPTION ENCOUNTER (OUTPATIENT)
Age: 39
End: 2024-11-26

## 2024-11-26 DIAGNOSIS — Z13.83 ENCOUNTER FOR SCREENING FOR RESPIRATORY DISORDER NEC: ICD-10-CM

## 2024-11-27 ENCOUNTER — TRANSCRIPTION ENCOUNTER (OUTPATIENT)
Age: 39
End: 2024-11-27

## 2024-11-27 ENCOUNTER — NON-APPOINTMENT (OUTPATIENT)
Age: 39
End: 2024-11-27

## 2024-11-28 ENCOUNTER — RX RENEWAL (OUTPATIENT)
Age: 39
End: 2024-11-28

## 2024-11-28 RX ORDER — AZELASTINE HYDROCHLORIDE 137 UG/1
137 SPRAY, METERED NASAL
Qty: 1 | Refills: 2 | Status: ACTIVE | COMMUNITY
Start: 2024-11-28 | End: 1900-01-01

## 2024-11-29 ENCOUNTER — APPOINTMENT (OUTPATIENT)
Dept: CT IMAGING | Facility: CLINIC | Age: 39
End: 2024-11-29
Payer: MEDICAID

## 2024-11-29 ENCOUNTER — TRANSCRIPTION ENCOUNTER (OUTPATIENT)
Age: 39
End: 2024-11-29

## 2024-11-29 PROCEDURE — 71250 CT THORAX DX C-: CPT

## 2024-12-03 ENCOUNTER — TRANSCRIPTION ENCOUNTER (OUTPATIENT)
Age: 39
End: 2024-12-03

## 2024-12-03 ENCOUNTER — APPOINTMENT (OUTPATIENT)
Dept: PULMONOLOGY | Facility: CLINIC | Age: 39
End: 2024-12-03
Payer: MEDICAID

## 2024-12-03 VITALS
DIASTOLIC BLOOD PRESSURE: 80 MMHG | BODY MASS INDEX: 40.32 KG/M2 | WEIGHT: 200 LBS | OXYGEN SATURATION: 98 % | HEART RATE: 90 BPM | SYSTOLIC BLOOD PRESSURE: 115 MMHG | HEIGHT: 59 IN

## 2024-12-03 DIAGNOSIS — J44.9 CHRONIC OBSTRUCTIVE PULMONARY DISEASE, UNSPECIFIED: ICD-10-CM

## 2024-12-03 DIAGNOSIS — R06.09 OTHER FORMS OF DYSPNEA: ICD-10-CM

## 2024-12-03 DIAGNOSIS — M06.9 RHEUMATOID ARTHRITIS, UNSPECIFIED: ICD-10-CM

## 2024-12-03 PROCEDURE — 94729 DIFFUSING CAPACITY: CPT

## 2024-12-03 PROCEDURE — 99214 OFFICE O/P EST MOD 30 MIN: CPT | Mod: 25

## 2024-12-03 PROCEDURE — ZZZZZ: CPT

## 2024-12-03 PROCEDURE — 94726 PLETHYSMOGRAPHY LUNG VOLUMES: CPT

## 2024-12-03 PROCEDURE — 94060 EVALUATION OF WHEEZING: CPT

## 2024-12-03 RX ORDER — FLUTICASONE FUROATE AND VILANTEROL TRIFENATATE 100; 25 UG/1; UG/1
100-25 POWDER RESPIRATORY (INHALATION)
Qty: 3 | Refills: 3 | Status: ACTIVE | COMMUNITY
Start: 2024-12-03 | End: 1900-01-01

## 2024-12-05 ENCOUNTER — NON-APPOINTMENT (OUTPATIENT)
Age: 39
End: 2024-12-05

## 2024-12-08 ENCOUNTER — NON-APPOINTMENT (OUTPATIENT)
Age: 39
End: 2024-12-08

## 2024-12-11 ENCOUNTER — APPOINTMENT (OUTPATIENT)
Dept: HEPATOLOGY | Facility: CLINIC | Age: 39
End: 2024-12-11

## 2024-12-11 VITALS
WEIGHT: 200 LBS | OXYGEN SATURATION: 98 % | HEART RATE: 92 BPM | SYSTOLIC BLOOD PRESSURE: 124 MMHG | HEIGHT: 59 IN | DIASTOLIC BLOOD PRESSURE: 81 MMHG | RESPIRATION RATE: 16 BRPM | BODY MASS INDEX: 40.32 KG/M2 | TEMPERATURE: 98.1 F

## 2024-12-11 DIAGNOSIS — E66.01 MORBID (SEVERE) OBESITY DUE TO EXCESS CALORIES: ICD-10-CM

## 2024-12-11 PROCEDURE — 99204 OFFICE O/P NEW MOD 45 MIN: CPT

## 2024-12-12 LAB
A1AT SERPL-MCNC: 153 MG/DL
ALBUMIN SERPL ELPH-MCNC: 4.2 G/DL
ALP BLD-CCNC: 197 U/L
ALT SERPL-CCNC: 15 U/L
ANION GAP SERPL CALC-SCNC: 12 MMOL/L
APTT BLD: 33.9 SEC
AST SERPL-CCNC: 12 U/L
BILIRUB DIRECT SERPL-MCNC: 0.1 MG/DL
BILIRUB INDIRECT SERPL-MCNC: NORMAL MG/DL
BILIRUB SERPL-MCNC: 0.2 MG/DL
BUN SERPL-MCNC: 12 MG/DL
CALCIUM SERPL-MCNC: 10 MG/DL
CERULOPLASMIN SERPL-MCNC: 29 MG/DL
CHLORIDE SERPL-SCNC: 105 MMOL/L
CO2 SERPL-SCNC: 26 MMOL/L
CREAT SERPL-MCNC: 0.73 MG/DL
DEPRECATED KAPPA LC FREE/LAMBDA SER: 0.95 RATIO
EGFR: 107 ML/MIN/1.73M2
FERRITIN SERPL-MCNC: 64 NG/ML
GGT SERPL-CCNC: 33 U/L
GLUCOSE SERPL-MCNC: 87 MG/DL
HCT VFR BLD CALC: 39.9 %
HEPATITIS A IGG ANTIBODY: NONREACTIVE
HGB BLD-MCNC: 12.5 G/DL
IGA SER QL IEP: 78 MG/DL
IGG SER QL IEP: 773 MG/DL
IGG4 SER-MCNC: 8.4 MG/DL
IGM SER QL IEP: 131 MG/DL
INR PPP: 1.12 RATIO
IRON SATN MFR SERPL: 13 %
IRON SERPL-MCNC: 48 UG/DL
KAPPA LC CSF-MCNC: 1.86 MG/DL
KAPPA LC SERPL-MCNC: 1.76 MG/DL
MCHC RBC-ENTMCNC: 27.7 PG
MCHC RBC-ENTMCNC: 31.3 G/DL
MCV RBC AUTO: 88.3 FL
MITOCHONDRIA AB SER IF-ACNC: NORMAL
PLATELET # BLD AUTO: 304 K/UL
POTASSIUM SERPL-SCNC: 3.9 MMOL/L
PROT SERPL-MCNC: 6.6 G/DL
PT BLD: 13.2 SEC
RBC # BLD: 4.52 M/UL
RBC # FLD: 15.1 %
SMOOTH MUSCLE AB SER QL IF: NORMAL
SODIUM SERPL-SCNC: 143 MMOL/L
TIBC SERPL-MCNC: 367 UG/DL
UIBC SERPL-MCNC: 318 UG/DL
WBC # FLD AUTO: 10.24 K/UL

## 2024-12-13 LAB — ACE BLD-CCNC: 46 U/L

## 2024-12-14 LAB — LKM AB SER QL IF: <20.1 UNITS

## 2024-12-15 ENCOUNTER — NON-APPOINTMENT (OUTPATIENT)
Age: 39
End: 2024-12-15

## 2024-12-17 RX ORDER — CIPROFLOXACIN AND DEXAMETHASONE 3; 1 MG/ML; MG/ML
0.3-0.1 SUSPENSION/ DROPS AURICULAR (OTIC) TWICE DAILY
Qty: 1 | Refills: 2 | Status: ACTIVE | COMMUNITY
Start: 2024-12-17 | End: 1900-01-01

## 2024-12-18 ENCOUNTER — RESULT REVIEW (OUTPATIENT)
Age: 39
End: 2024-12-18

## 2024-12-18 ENCOUNTER — APPOINTMENT (OUTPATIENT)
Dept: ULTRASOUND IMAGING | Facility: CLINIC | Age: 39
End: 2024-12-18

## 2024-12-18 LAB — ALP BONE SERPL-MCNC: 30.9 UG/L

## 2024-12-18 PROCEDURE — 93975 VASCULAR STUDY: CPT

## 2024-12-18 PROCEDURE — 76700 US EXAM ABDOM COMPLETE: CPT | Mod: 59

## 2024-12-20 LAB
ANTI - GP210 ANTIBODY, IGG: 1.4 UNITS
ANTI - SP100 ANTIBODY, IGG: 3.7 UNITS

## 2024-12-23 ENCOUNTER — NON-APPOINTMENT (OUTPATIENT)
Age: 39
End: 2024-12-23

## 2024-12-27 ENCOUNTER — APPOINTMENT (OUTPATIENT)
Dept: HEPATOLOGY | Facility: CLINIC | Age: 39
End: 2024-12-27

## 2024-12-30 ENCOUNTER — APPOINTMENT (OUTPATIENT)
Dept: MRI IMAGING | Facility: IMAGING CENTER | Age: 39
End: 2024-12-30

## 2024-12-30 ENCOUNTER — NON-APPOINTMENT (OUTPATIENT)
Age: 39
End: 2024-12-30

## 2024-12-30 ENCOUNTER — OUTPATIENT (OUTPATIENT)
Dept: OUTPATIENT SERVICES | Facility: HOSPITAL | Age: 39
LOS: 1 days | End: 2024-12-30

## 2024-12-30 DIAGNOSIS — Z98.890 OTHER SPECIFIED POSTPROCEDURAL STATES: Chronic | ICD-10-CM

## 2024-12-30 DIAGNOSIS — Z00.8 ENCOUNTER FOR OTHER GENERAL EXAMINATION: ICD-10-CM

## 2024-12-30 DIAGNOSIS — M72.2 PLANTAR FASCIAL FIBROMATOSIS: Chronic | ICD-10-CM

## 2024-12-30 DIAGNOSIS — Z98.891 HISTORY OF UTERINE SCAR FROM PREVIOUS SURGERY: Chronic | ICD-10-CM

## 2024-12-30 DIAGNOSIS — Z98.51 TUBAL LIGATION STATUS: Chronic | ICD-10-CM

## 2025-01-01 ENCOUNTER — EMERGENCY (EMERGENCY)
Facility: HOSPITAL | Age: 40
LOS: 1 days | Discharge: HOME | End: 2025-01-01
Attending: STUDENT IN AN ORGANIZED HEALTH CARE EDUCATION/TRAINING PROGRAM
Payer: MEDICAID

## 2025-01-01 VITALS
OXYGEN SATURATION: 99 % | RESPIRATION RATE: 16 BRPM | SYSTOLIC BLOOD PRESSURE: 126 MMHG | DIASTOLIC BLOOD PRESSURE: 84 MMHG | HEART RATE: 82 BPM | TEMPERATURE: 98 F

## 2025-01-01 VITALS
SYSTOLIC BLOOD PRESSURE: 124 MMHG | OXYGEN SATURATION: 97 % | RESPIRATION RATE: 17 BRPM | HEIGHT: 62 IN | WEIGHT: 192.02 LBS | DIASTOLIC BLOOD PRESSURE: 91 MMHG | TEMPERATURE: 99 F | HEART RATE: 95 BPM

## 2025-01-01 DIAGNOSIS — M72.2 PLANTAR FASCIAL FIBROMATOSIS: Chronic | ICD-10-CM

## 2025-01-01 DIAGNOSIS — Z98.51 TUBAL LIGATION STATUS: Chronic | ICD-10-CM

## 2025-01-01 DIAGNOSIS — Z98.890 OTHER SPECIFIED POSTPROCEDURAL STATES: Chronic | ICD-10-CM

## 2025-01-01 DIAGNOSIS — Z98.891 HISTORY OF UTERINE SCAR FROM PREVIOUS SURGERY: Chronic | ICD-10-CM

## 2025-01-01 LAB
ALBUMIN SERPL ELPH-MCNC: 3.5 G/DL — SIGNIFICANT CHANGE UP (ref 3.5–5)
ALP SERPL-CCNC: 205 U/L — HIGH (ref 40–120)
ALT FLD-CCNC: 23 U/L DA — SIGNIFICANT CHANGE UP (ref 10–60)
ANION GAP SERPL CALC-SCNC: 7 MMOL/L — SIGNIFICANT CHANGE UP (ref 5–17)
AST SERPL-CCNC: 15 U/L — SIGNIFICANT CHANGE UP (ref 10–40)
BASOPHILS # BLD AUTO: 0.04 K/UL — SIGNIFICANT CHANGE UP (ref 0–0.2)
BASOPHILS NFR BLD AUTO: 0.2 % — SIGNIFICANT CHANGE UP (ref 0–2)
BILIRUB SERPL-MCNC: 0.5 MG/DL — SIGNIFICANT CHANGE UP (ref 0.2–1.2)
BUN SERPL-MCNC: 9 MG/DL — SIGNIFICANT CHANGE UP (ref 7–18)
CALCIUM SERPL-MCNC: 9.2 MG/DL — SIGNIFICANT CHANGE UP (ref 8.4–10.5)
CHLORIDE SERPL-SCNC: 106 MMOL/L — SIGNIFICANT CHANGE UP (ref 96–108)
CO2 SERPL-SCNC: 25 MMOL/L — SIGNIFICANT CHANGE UP (ref 22–31)
CREAT SERPL-MCNC: 0.67 MG/DL — SIGNIFICANT CHANGE UP (ref 0.5–1.3)
EGFR: 113 ML/MIN/1.73M2 — SIGNIFICANT CHANGE UP
EGFR: 113 ML/MIN/1.73M2 — SIGNIFICANT CHANGE UP
EOSINOPHIL # BLD AUTO: 0.12 K/UL — SIGNIFICANT CHANGE UP (ref 0–0.5)
EOSINOPHIL NFR BLD AUTO: 0.7 % — SIGNIFICANT CHANGE UP (ref 0–6)
FLUAV AG NPH QL: SIGNIFICANT CHANGE UP
FLUBV AG NPH QL: SIGNIFICANT CHANGE UP
GLUCOSE SERPL-MCNC: 94 MG/DL — SIGNIFICANT CHANGE UP (ref 70–99)
HCT VFR BLD CALC: 39.3 % — SIGNIFICANT CHANGE UP (ref 34.5–45)
HGB BLD-MCNC: 12.9 G/DL — SIGNIFICANT CHANGE UP (ref 11.5–15.5)
IMM GRANULOCYTES NFR BLD AUTO: 0.5 % — SIGNIFICANT CHANGE UP (ref 0–0.9)
LYMPHOCYTES # BLD AUTO: 14.5 % — SIGNIFICANT CHANGE UP (ref 13–44)
LYMPHOCYTES # BLD AUTO: 2.44 K/UL — SIGNIFICANT CHANGE UP (ref 1–3.3)
MAGNESIUM SERPL-MCNC: 2.4 MG/DL — SIGNIFICANT CHANGE UP (ref 1.6–2.6)
MCHC RBC-ENTMCNC: 27.7 PG — SIGNIFICANT CHANGE UP (ref 27–34)
MCHC RBC-ENTMCNC: 32.8 G/DL — SIGNIFICANT CHANGE UP (ref 32–36)
MCV RBC AUTO: 84.5 FL — SIGNIFICANT CHANGE UP (ref 80–100)
MONOCYTES # BLD AUTO: 0.91 K/UL — HIGH (ref 0–0.9)
MONOCYTES NFR BLD AUTO: 5.4 % — SIGNIFICANT CHANGE UP (ref 2–14)
NEUTROPHILS # BLD AUTO: 13.23 K/UL — HIGH (ref 1.8–7.4)
NEUTROPHILS NFR BLD AUTO: 78.7 % — HIGH (ref 43–77)
NRBC # BLD: 0 /100 WBCS — SIGNIFICANT CHANGE UP (ref 0–0)
NRBC BLD-RTO: 0 /100 WBCS — SIGNIFICANT CHANGE UP (ref 0–0)
PLATELET # BLD AUTO: 329 K/UL — SIGNIFICANT CHANGE UP (ref 150–400)
POTASSIUM SERPL-MCNC: 3.6 MMOL/L — SIGNIFICANT CHANGE UP (ref 3.5–5.3)
POTASSIUM SERPL-SCNC: 3.6 MMOL/L — SIGNIFICANT CHANGE UP (ref 3.5–5.3)
PROT SERPL-MCNC: 8.1 G/DL — SIGNIFICANT CHANGE UP (ref 6–8.3)
RBC # BLD: 4.65 M/UL — SIGNIFICANT CHANGE UP (ref 3.8–5.2)
RBC # FLD: 14.4 % — SIGNIFICANT CHANGE UP (ref 10.3–14.5)
RSV RNA NPH QL NAA+NON-PROBE: SIGNIFICANT CHANGE UP
SARS-COV-2 RNA SPEC QL NAA+PROBE: SIGNIFICANT CHANGE UP
SODIUM SERPL-SCNC: 138 MMOL/L — SIGNIFICANT CHANGE UP (ref 135–145)
TROPONIN I, HIGH SENSITIVITY RESULT: 3.5 NG/L — SIGNIFICANT CHANGE UP
WBC # BLD: 16.82 K/UL — HIGH (ref 3.8–10.5)
WBC # FLD AUTO: 16.82 K/UL — HIGH (ref 3.8–10.5)

## 2025-01-01 RX ORDER — LEVOFLOXACIN 25 MG/ML
1 SOLUTION ORAL
Qty: 7 | Refills: 0
Start: 2025-01-01 | End: 2025-01-07

## 2025-01-01 RX ORDER — ACETAMINOPHEN 500 MG/5ML
1000 LIQUID (ML) ORAL ONCE
Refills: 0 | Status: COMPLETED | OUTPATIENT
Start: 2025-01-01 | End: 2025-01-01

## 2025-01-01 RX ADMIN — Medication 400 MILLIGRAM(S): at 16:26

## 2025-01-01 RX ADMIN — Medication 1000 MILLIGRAM(S): at 16:56

## 2025-01-01 RX ADMIN — Medication 1000 MILLIGRAM(S): at 16:30

## 2025-01-02 ENCOUNTER — TRANSCRIPTION ENCOUNTER (OUTPATIENT)
Age: 40
End: 2025-01-02

## 2025-01-02 LAB
EBV DNA SERPL NAA+PROBE-ACNC: SIGNIFICANT CHANGE UP IU/ML
EBVPCR LOG: SIGNIFICANT CHANGE UP LOG10IU/ML
S PYO DNA THROAT QL NAA+PROBE: SIGNIFICANT CHANGE UP

## 2025-01-04 ENCOUNTER — TRANSCRIPTION ENCOUNTER (OUTPATIENT)
Age: 40
End: 2025-01-04

## 2025-01-04 LAB — C DIPHTHERIAE AB SER-ACNC: 1.01 IU/ML — SIGNIFICANT CHANGE UP

## 2025-01-07 ENCOUNTER — APPOINTMENT (OUTPATIENT)
Dept: INTERNAL MEDICINE | Facility: CLINIC | Age: 40
End: 2025-01-07
Payer: MEDICAID

## 2025-01-07 ENCOUNTER — TRANSCRIPTION ENCOUNTER (OUTPATIENT)
Age: 40
End: 2025-01-07

## 2025-01-07 ENCOUNTER — APPOINTMENT (OUTPATIENT)
Dept: OTOLARYNGOLOGY | Facility: CLINIC | Age: 40
End: 2025-01-07
Payer: MEDICAID

## 2025-01-07 ENCOUNTER — NON-APPOINTMENT (OUTPATIENT)
Age: 40
End: 2025-01-07

## 2025-01-07 VITALS
BODY MASS INDEX: 38.71 KG/M2 | SYSTOLIC BLOOD PRESSURE: 120 MMHG | HEART RATE: 83 BPM | HEIGHT: 59 IN | WEIGHT: 192 LBS | DIASTOLIC BLOOD PRESSURE: 73 MMHG | OXYGEN SATURATION: 98 % | TEMPERATURE: 98.3 F

## 2025-01-07 DIAGNOSIS — Z15.89 GENETIC SUSCEPTIBILITY TO OTHER DISEASE: ICD-10-CM

## 2025-01-07 DIAGNOSIS — Z00.00 ENCOUNTER FOR GENERAL ADULT MEDICAL EXAMINATION W/OUT ABNORMAL FINDINGS: ICD-10-CM

## 2025-01-07 DIAGNOSIS — Z79.631 LONG TERM (CURRENT) USE OF ANTIMETABOLITE AGENT: ICD-10-CM

## 2025-01-07 DIAGNOSIS — E61.1 IRON DEFICIENCY: ICD-10-CM

## 2025-01-07 DIAGNOSIS — I77.6 ARTERITIS, UNSPECIFIED: ICD-10-CM

## 2025-01-07 DIAGNOSIS — M06.9 RHEUMATOID ARTHRITIS, UNSPECIFIED: ICD-10-CM

## 2025-01-07 DIAGNOSIS — Q79.60 EHLERS-DANLOS SYNDROME, UNSP: ICD-10-CM

## 2025-01-07 DIAGNOSIS — J44.9 CHRONIC OBSTRUCTIVE PULMONARY DISEASE, UNSPECIFIED: ICD-10-CM

## 2025-01-07 DIAGNOSIS — M35.9 SYSTEMIC INVOLVEMENT OF CONNECTIVE TISSUE, UNSPECIFIED: ICD-10-CM

## 2025-01-07 PROCEDURE — 99396 PREV VISIT EST AGE 40-64: CPT

## 2025-01-07 PROCEDURE — 99213 OFFICE O/P EST LOW 20 MIN: CPT | Mod: 25

## 2025-01-08 ENCOUNTER — APPOINTMENT (OUTPATIENT)
Dept: OTOLARYNGOLOGY | Facility: CLINIC | Age: 40
End: 2025-01-08
Payer: MEDICAID

## 2025-01-08 VITALS — BODY MASS INDEX: 38.71 KG/M2 | HEIGHT: 59 IN | WEIGHT: 192 LBS

## 2025-01-08 DIAGNOSIS — Z01.10 ENCOUNTER FOR EXAMINATION OF EARS AND HEARING W/OUT ABNORMAL FINDINGS: ICD-10-CM

## 2025-01-08 DIAGNOSIS — K21.9 GASTRO-ESOPHAGEAL REFLUX DISEASE W/OUT ESOPHAGITIS: ICD-10-CM

## 2025-01-08 DIAGNOSIS — H61.22 IMPACTED CERUMEN, LEFT EAR: ICD-10-CM

## 2025-01-08 DIAGNOSIS — H83.3X2 NOISE EFFECTS ON LEFT INNER EAR: ICD-10-CM

## 2025-01-08 DIAGNOSIS — H90.3 SENSORINEURAL HEARING LOSS, BILATERAL: ICD-10-CM

## 2025-01-08 DIAGNOSIS — H69.93 UNSPECIFIED EUSTACHIAN TUBE DISORDER, BILATERAL: ICD-10-CM

## 2025-01-08 DIAGNOSIS — Z09 ENCOUNTER FOR FOLLOW-UP EXAMINATION AFTER COMPLETED TREATMENT FOR CONDITIONS OTHER THAN MALIGNANT NEOPLASM: ICD-10-CM

## 2025-01-08 DIAGNOSIS — M54.12 RADICULOPATHY, CERVICAL REGION: ICD-10-CM

## 2025-01-08 PROCEDURE — 99204 OFFICE O/P NEW MOD 45 MIN: CPT | Mod: 25

## 2025-01-08 PROCEDURE — 92567 TYMPANOMETRY: CPT

## 2025-01-08 PROCEDURE — 92588 EVOKED AUDITORY TST COMPLETE: CPT

## 2025-01-08 PROCEDURE — 92557 COMPREHENSIVE HEARING TEST: CPT

## 2025-01-08 RX ORDER — METHYLPREDNISOLONE 4 MG/1
4 TABLET ORAL
Qty: 21 | Refills: 1 | Status: ACTIVE | COMMUNITY
Start: 2025-01-08 | End: 1900-01-01

## 2025-01-08 RX ORDER — ACETYLCYSTEINE 600 MG
600 CAPSULE ORAL
Qty: 90 | Refills: 2 | Status: ACTIVE | COMMUNITY
Start: 2025-01-08 | End: 1900-01-01

## 2025-01-08 RX ORDER — FAMOTIDINE 20 MG/1
20 TABLET, FILM COATED ORAL
Qty: 30 | Refills: 3 | Status: ACTIVE | COMMUNITY
Start: 2025-01-08 | End: 1900-01-01

## 2025-01-09 ENCOUNTER — LABORATORY RESULT (OUTPATIENT)
Age: 40
End: 2025-01-09

## 2025-01-09 ENCOUNTER — APPOINTMENT (OUTPATIENT)
Dept: HEPATOLOGY | Facility: CLINIC | Age: 40
End: 2025-01-09
Payer: MEDICAID

## 2025-01-09 DIAGNOSIS — B37.31 ACUTE CANDIDIASIS OF VULVA AND VAGINA: ICD-10-CM

## 2025-01-09 DIAGNOSIS — K74.00 HEPATIC FIBROSIS, UNSPECIFIED: ICD-10-CM

## 2025-01-09 DIAGNOSIS — E55.9 VITAMIN D DEFICIENCY, UNSPECIFIED: ICD-10-CM

## 2025-01-09 DIAGNOSIS — R74.8 ABNORMAL LEVELS OF OTHER SERUM ENZYMES: ICD-10-CM

## 2025-01-09 DIAGNOSIS — K76.0 FATTY (CHANGE OF) LIVER, NOT ELSEWHERE CLASSIFIED: ICD-10-CM

## 2025-01-09 LAB
25(OH)D3 SERPL-MCNC: 21.7 NG/ML
ALBUMIN SERPL ELPH-MCNC: 4.3 G/DL
ALP BLD-CCNC: 182 U/L
ALT SERPL-CCNC: 17 U/L
ANION GAP SERPL CALC-SCNC: 14 MMOL/L
APPEARANCE: ABNORMAL
AST SERPL-CCNC: 16 U/L
BILIRUB DIRECT SERPL-MCNC: 0.1 MG/DL
BILIRUB INDIRECT SERPL-MCNC: 0.4 MG/DL
BILIRUB SERPL-MCNC: 0.5 MG/DL
BILIRUBIN URINE: NEGATIVE
BLOOD URINE: NEGATIVE
BUN SERPL-MCNC: 13 MG/DL
CALCIUM SERPL-MCNC: 9.2 MG/DL
CHLORIDE SERPL-SCNC: 101 MMOL/L
CHOLEST SERPL-MCNC: 192 MG/DL
CO2 SERPL-SCNC: 26 MMOL/L
COLOR: YELLOW
CREAT SERPL-MCNC: 0.83 MG/DL
EGFR: 91 ML/MIN/1.73M2
FERRITIN SERPL-MCNC: 125 NG/ML
GLUCOSE QUALITATIVE U: NEGATIVE MG/DL
GLUCOSE SERPL-MCNC: 81 MG/DL
HCG SERPL-MCNC: 7 MIU/ML
HDLC SERPL-MCNC: 47 MG/DL
IRON SATN MFR SERPL: 27 %
IRON SERPL-MCNC: 97 UG/DL
KETONES URINE: NEGATIVE MG/DL
LDLC SERPL CALC-MCNC: 129 MG/DL
LEUKOCYTE ESTERASE URINE: ABNORMAL
NITRITE URINE: NEGATIVE
NONHDLC SERPL-MCNC: 146 MG/DL
PH URINE: 5.5
POTASSIUM SERPL-SCNC: 4.4 MMOL/L
PROT SERPL-MCNC: 6.8 G/DL
PROTEIN URINE: NEGATIVE MG/DL
SODIUM SERPL-SCNC: 141 MMOL/L
SPECIFIC GRAVITY URINE: 1.02
TIBC SERPL-MCNC: 358 UG/DL
TRIGL SERPL-MCNC: 92 MG/DL
UIBC SERPL-MCNC: 261 UG/DL
UROBILINOGEN URINE: 0.2 MG/DL

## 2025-01-09 PROCEDURE — 76981 USE PARENCHYMA: CPT

## 2025-01-09 RX ORDER — VITAMIN K2 90 MCG
125 MCG CAPSULE ORAL
Qty: 1 | Refills: 1 | Status: ACTIVE | COMMUNITY
Start: 2025-01-09 | End: 1900-01-01

## 2025-01-09 RX ORDER — TERCONAZOLE 8 MG/G
0.8 CREAM VAGINAL DAILY
Qty: 1 | Refills: 2 | Status: ACTIVE | COMMUNITY
Start: 2025-01-09 | End: 1900-01-01

## 2025-01-12 LAB
BASOPHILS # BLD AUTO: 0.06 K/UL
BASOPHILS NFR BLD AUTO: 0.7 %
EOSINOPHIL # BLD AUTO: 0.15 K/UL
EOSINOPHIL NFR BLD AUTO: 1.7 %
HCT VFR BLD CALC: 40.6 %
HGB BLD-MCNC: 12.6 G/DL
IMM GRANULOCYTES NFR BLD AUTO: 0.2 %
LYMPHOCYTES # BLD AUTO: 2.54 K/UL
LYMPHOCYTES NFR BLD AUTO: 28.8 %
MAN DIFF?: NORMAL
MCHC RBC-ENTMCNC: 27.6 PG
MCHC RBC-ENTMCNC: 31 G/DL
MCV RBC AUTO: 88.8 FL
MONOCYTES # BLD AUTO: 0.47 K/UL
MONOCYTES NFR BLD AUTO: 5.3 %
NEUTROPHILS # BLD AUTO: 5.59 K/UL
NEUTROPHILS NFR BLD AUTO: 63.3 %
PLATELET # BLD AUTO: 298 K/UL
RBC # BLD: 4.57 M/UL
RBC # FLD: 14.8 %
WBC # FLD AUTO: 8.83 K/UL

## 2025-01-13 ENCOUNTER — TRANSCRIPTION ENCOUNTER (OUTPATIENT)
Age: 40
End: 2025-01-13

## 2025-01-13 PROBLEM — K76.0 HEPATIC STEATOSIS: Status: ACTIVE | Noted: 2025-01-13

## 2025-01-13 PROBLEM — K74.00 HEPATIC FIBROSIS: Status: ACTIVE | Noted: 2025-01-13

## 2025-01-15 ENCOUNTER — APPOINTMENT (OUTPATIENT)
Dept: OBGYN | Facility: CLINIC | Age: 40
End: 2025-01-15
Payer: MEDICAID

## 2025-01-15 ENCOUNTER — NON-APPOINTMENT (OUTPATIENT)
Age: 40
End: 2025-01-15

## 2025-01-15 ENCOUNTER — LABORATORY RESULT (OUTPATIENT)
Age: 40
End: 2025-01-15

## 2025-01-15 VITALS
SYSTOLIC BLOOD PRESSURE: 124 MMHG | HEART RATE: 82 BPM | DIASTOLIC BLOOD PRESSURE: 81 MMHG | OXYGEN SATURATION: 99 % | WEIGHT: 194 LBS | BODY MASS INDEX: 39.18 KG/M2

## 2025-01-15 DIAGNOSIS — R79.89 OTHER SPECIFIED ABNORMAL FINDINGS OF BLOOD CHEMISTRY: ICD-10-CM

## 2025-01-15 LAB
M TB IFN-G BLD-IMP: NEGATIVE
QUANTIFERON TB PLUS MITOGEN MINUS NIL: 5.24 IU/ML
QUANTIFERON TB PLUS NIL: 0.02 IU/ML
QUANTIFERON TB PLUS TB1 MINUS NIL: 0 IU/ML
QUANTIFERON TB PLUS TB2 MINUS NIL: 0 IU/ML

## 2025-01-15 PROCEDURE — 99212 OFFICE O/P EST SF 10 MIN: CPT

## 2025-01-17 ENCOUNTER — TRANSCRIPTION ENCOUNTER (OUTPATIENT)
Age: 40
End: 2025-01-17

## 2025-01-17 ENCOUNTER — OUTPATIENT (OUTPATIENT)
Dept: OUTPATIENT SERVICES | Facility: HOSPITAL | Age: 40
LOS: 1 days | End: 2025-01-17
Payer: MEDICAID

## 2025-01-17 ENCOUNTER — APPOINTMENT (OUTPATIENT)
Dept: MRI IMAGING | Facility: IMAGING CENTER | Age: 40
End: 2025-01-17

## 2025-01-17 ENCOUNTER — RESULT REVIEW (OUTPATIENT)
Age: 40
End: 2025-01-17

## 2025-01-17 DIAGNOSIS — Z98.890 OTHER SPECIFIED POSTPROCEDURAL STATES: Chronic | ICD-10-CM

## 2025-01-17 DIAGNOSIS — Z98.51 TUBAL LIGATION STATUS: Chronic | ICD-10-CM

## 2025-01-17 DIAGNOSIS — E66.01 MORBID (SEVERE) OBESITY DUE TO EXCESS CALORIES: ICD-10-CM

## 2025-01-17 DIAGNOSIS — Z00.8 ENCOUNTER FOR OTHER GENERAL EXAMINATION: ICD-10-CM

## 2025-01-17 DIAGNOSIS — R74.8 ABNORMAL LEVELS OF OTHER SERUM ENZYMES: ICD-10-CM

## 2025-01-17 DIAGNOSIS — M72.2 PLANTAR FASCIAL FIBROMATOSIS: Chronic | ICD-10-CM

## 2025-01-17 PROCEDURE — 76391 MR ELASTOGRAPHY: CPT

## 2025-01-17 PROCEDURE — 74183 MRI ABD W/O CNTR FLWD CNTR: CPT

## 2025-01-17 PROCEDURE — A9585: CPT

## 2025-01-17 PROCEDURE — 74183 MRI ABD W/O CNTR FLWD CNTR: CPT | Mod: 26

## 2025-01-17 PROCEDURE — 76391 MR ELASTOGRAPHY: CPT | Mod: 26

## 2025-01-23 ENCOUNTER — APPOINTMENT (OUTPATIENT)
Dept: OTOLARYNGOLOGY | Facility: CLINIC | Age: 40
End: 2025-01-23
Payer: MEDICAID

## 2025-01-23 DIAGNOSIS — H93.242: ICD-10-CM

## 2025-01-23 DIAGNOSIS — H90.3 SENSORINEURAL HEARING LOSS, BILATERAL: ICD-10-CM

## 2025-01-23 PROCEDURE — 92588 EVOKED AUDITORY TST COMPLETE: CPT

## 2025-01-23 PROCEDURE — 99213 OFFICE O/P EST LOW 20 MIN: CPT | Mod: 25

## 2025-01-23 PROCEDURE — 92557 COMPREHENSIVE HEARING TEST: CPT

## 2025-01-23 PROCEDURE — 92567 TYMPANOMETRY: CPT

## 2025-01-28 ENCOUNTER — NON-APPOINTMENT (OUTPATIENT)
Age: 40
End: 2025-01-28

## 2025-01-31 ENCOUNTER — NON-APPOINTMENT (OUTPATIENT)
Age: 40
End: 2025-01-31

## 2025-02-04 ENCOUNTER — APPOINTMENT (OUTPATIENT)
Dept: HEPATOLOGY | Facility: CLINIC | Age: 40
End: 2025-02-04
Payer: MEDICAID

## 2025-02-04 VITALS
OXYGEN SATURATION: 96 % | SYSTOLIC BLOOD PRESSURE: 116 MMHG | HEART RATE: 87 BPM | TEMPERATURE: 98 F | DIASTOLIC BLOOD PRESSURE: 79 MMHG | HEIGHT: 59 IN | BODY MASS INDEX: 37.7 KG/M2 | WEIGHT: 187 LBS | RESPIRATION RATE: 16 BRPM

## 2025-02-04 DIAGNOSIS — R76.8 OTHER SPECIFIED ABNORMAL IMMUNOLOGICAL FINDINGS IN SERUM: ICD-10-CM

## 2025-02-04 DIAGNOSIS — R74.8 ABNORMAL LEVELS OF OTHER SERUM ENZYMES: ICD-10-CM

## 2025-02-04 DIAGNOSIS — K76.0 FATTY (CHANGE OF) LIVER, NOT ELSEWHERE CLASSIFIED: ICD-10-CM

## 2025-02-04 DIAGNOSIS — Z79.899 OTHER LONG TERM (CURRENT) DRUG THERAPY: ICD-10-CM

## 2025-02-04 PROCEDURE — 99214 OFFICE O/P EST MOD 30 MIN: CPT

## 2025-02-06 ENCOUNTER — APPOINTMENT (OUTPATIENT)
Age: 40
End: 2025-02-06
Payer: MEDICAID

## 2025-02-06 PROCEDURE — D0330 PANORAMIC RADIOGRAPHIC IMAGE: CPT

## 2025-02-06 PROCEDURE — SCREENING: CUSTOM

## 2025-02-14 ENCOUNTER — TRANSCRIPTION ENCOUNTER (OUTPATIENT)
Age: 40
End: 2025-02-14

## 2025-02-19 ENCOUNTER — TRANSCRIPTION ENCOUNTER (OUTPATIENT)
Age: 40
End: 2025-02-19

## 2025-02-19 DIAGNOSIS — N39.0 URINARY TRACT INFECTION, SITE NOT SPECIFIED: ICD-10-CM

## 2025-02-19 RX ORDER — SULFAMETHOXAZOLE AND TRIMETHOPRIM 800; 160 MG/1; MG/1
800-160 TABLET ORAL TWICE DAILY
Qty: 14 | Refills: 0 | Status: ACTIVE | COMMUNITY
Start: 2025-02-19 | End: 1900-01-01

## 2025-02-20 ENCOUNTER — TRANSCRIPTION ENCOUNTER (OUTPATIENT)
Age: 40
End: 2025-02-20

## 2025-02-21 ENCOUNTER — APPOINTMENT (OUTPATIENT)
Dept: RHEUMATOLOGY | Facility: CLINIC | Age: 40
End: 2025-02-21
Payer: MEDICAID

## 2025-02-21 VITALS
WEIGHT: 193 LBS | TEMPERATURE: 97.3 F | DIASTOLIC BLOOD PRESSURE: 78 MMHG | OXYGEN SATURATION: 96 % | RESPIRATION RATE: 16 BRPM | HEIGHT: 59 IN | SYSTOLIC BLOOD PRESSURE: 116 MMHG | BODY MASS INDEX: 38.91 KG/M2 | HEART RATE: 80 BPM

## 2025-02-21 DIAGNOSIS — M06.9 RHEUMATOID ARTHRITIS, UNSPECIFIED: ICD-10-CM

## 2025-02-21 PROCEDURE — G2211 COMPLEX E/M VISIT ADD ON: CPT | Mod: NC

## 2025-02-21 PROCEDURE — 99214 OFFICE O/P EST MOD 30 MIN: CPT

## 2025-02-24 ENCOUNTER — TRANSCRIPTION ENCOUNTER (OUTPATIENT)
Age: 40
End: 2025-02-24

## 2025-02-27 ENCOUNTER — NON-APPOINTMENT (OUTPATIENT)
Age: 40
End: 2025-02-27

## 2025-02-27 ENCOUNTER — LABORATORY RESULT (OUTPATIENT)
Age: 40
End: 2025-02-27

## 2025-02-27 ENCOUNTER — APPOINTMENT (OUTPATIENT)
Dept: PULMONOLOGY | Facility: CLINIC | Age: 40
End: 2025-02-27
Payer: MEDICAID

## 2025-02-27 VITALS
HEART RATE: 72 BPM | WEIGHT: 191 LBS | TEMPERATURE: 98.1 F | SYSTOLIC BLOOD PRESSURE: 120 MMHG | RESPIRATION RATE: 16 BRPM | OXYGEN SATURATION: 96 % | DIASTOLIC BLOOD PRESSURE: 84 MMHG | BODY MASS INDEX: 38.51 KG/M2 | HEIGHT: 59 IN

## 2025-02-27 DIAGNOSIS — R05.9 COUGH, UNSPECIFIED: ICD-10-CM

## 2025-02-27 LAB
BASOPHILS # BLD AUTO: 0.04 K/UL
BASOPHILS NFR BLD AUTO: 0.4 %
EOSINOPHIL # BLD AUTO: 0.18 K/UL
EOSINOPHIL NFR BLD AUTO: 1.9 %
HCT VFR BLD CALC: 41.9 %
HGB BLD-MCNC: 13 G/DL
IMM GRANULOCYTES NFR BLD AUTO: 0.7 %
LYMPHOCYTES # BLD AUTO: 2.86 K/UL
LYMPHOCYTES NFR BLD AUTO: 30.2 %
MAN DIFF?: NORMAL
MCHC RBC-ENTMCNC: 27.4 PG
MCHC RBC-ENTMCNC: 31 G/DL
MCV RBC AUTO: 88.4 FL
MONOCYTES # BLD AUTO: 0.57 K/UL
MONOCYTES NFR BLD AUTO: 6 %
NEUTROPHILS # BLD AUTO: 5.75 K/UL
NEUTROPHILS NFR BLD AUTO: 60.8 %
PLATELET # BLD AUTO: 286 K/UL
RBC # BLD: 4.74 M/UL
RBC # FLD: 15.6 %
WBC # FLD AUTO: 9.47 K/UL

## 2025-02-27 PROCEDURE — 99215 OFFICE O/P EST HI 40 MIN: CPT

## 2025-03-03 ENCOUNTER — APPOINTMENT (OUTPATIENT)
Dept: CARDIOLOGY | Facility: CLINIC | Age: 40
End: 2025-03-03

## 2025-03-03 ENCOUNTER — NON-APPOINTMENT (OUTPATIENT)
Age: 40
End: 2025-03-03

## 2025-03-03 DIAGNOSIS — G90.A POSTURAL ORTHOSTATIC TACHYCARDIA SYNDROME [POTS]: ICD-10-CM

## 2025-03-03 DIAGNOSIS — R06.09 OTHER FORMS OF DYSPNEA: ICD-10-CM

## 2025-03-03 DIAGNOSIS — Z13.6 ENCOUNTER FOR SCREENING FOR CARDIOVASCULAR DISORDERS: ICD-10-CM

## 2025-03-03 DIAGNOSIS — Q79.62 HYPERMOBILE EHLERS-DANLOS SYNDROME: ICD-10-CM

## 2025-03-03 LAB
A ALTERNATA IGE QN: <0.1 KUA/L
A FUMIGATUS IGE QN: <0.1 KUA/L
C ALBICANS IGE QN: <0.1 KUA/L
C HERBARUM IGE QN: <0.1 KUA/L
CAT DANDER IGE QN: <0.1 KUA/L
COMMON RAGWEED IGE QN: <0.1 KUA/L
D FARINAE IGE QN: 1.39 KUA/L
D PTERONYSS IGE QN: 1.05 KUA/L
DEPRECATED A ALTERNATA IGE RAST QL: 0
DEPRECATED A FUMIGATUS IGE RAST QL: 0
DEPRECATED C ALBICANS IGE RAST QL: 0
DEPRECATED C HERBARUM IGE RAST QL: 0
DEPRECATED CAT DANDER IGE RAST QL: 0
DEPRECATED COMMON RAGWEED IGE RAST QL: 0
DEPRECATED D FARINAE IGE RAST QL: 2
DEPRECATED D PTERONYSS IGE RAST QL: 2
DEPRECATED DOG DANDER IGE RAST QL: 0
DEPRECATED M RACEMOSUS IGE RAST QL: 0
DEPRECATED ROACH IGE RAST QL: 0
DEPRECATED TIMOTHY IGE RAST QL: 0
DEPRECATED WHITE OAK IGE RAST QL: 0
DOG DANDER IGE QN: <0.1 KUA/L
M RACEMOSUS IGE QN: <0.1 KUA/L
ROACH IGE QN: <0.1 KUA/L
TIMOTHY IGE QN: <0.1 KUA/L
WHITE OAK IGE QN: <0.1 KUA/L

## 2025-03-04 NOTE — ED PROVIDER NOTE - MUSCULOSKELETAL NEGATIVE STATEMENT, MLM
Occupational Therapy Evaluated and Treat no back pain, no gout, no musculoskeletal pain, no neck pain, and no weakness.

## 2025-03-10 ENCOUNTER — APPOINTMENT (OUTPATIENT)
Dept: GYNECOLOGIC ONCOLOGY | Facility: CLINIC | Age: 40
End: 2025-03-10
Payer: MEDICAID

## 2025-03-10 VITALS
HEART RATE: 72 BPM | RESPIRATION RATE: 16 BRPM | WEIGHT: 199 LBS | HEIGHT: 59 IN | TEMPERATURE: 98 F | SYSTOLIC BLOOD PRESSURE: 117 MMHG | DIASTOLIC BLOOD PRESSURE: 77 MMHG | BODY MASS INDEX: 40.12 KG/M2 | OXYGEN SATURATION: 99 %

## 2025-03-10 DIAGNOSIS — I01.1 ACUTE RHEUMATIC ENDOCARDITIS: ICD-10-CM

## 2025-03-10 DIAGNOSIS — R79.89 OTHER SPECIFIED ABNORMAL FINDINGS OF BLOOD CHEMISTRY: ICD-10-CM

## 2025-03-10 DIAGNOSIS — Z80.41 FAMILY HISTORY OF MALIGNANT NEOPLASM OF OVARY: ICD-10-CM

## 2025-03-10 DIAGNOSIS — Z80.9 FAMILY HISTORY OF MALIGNANT NEOPLASM, UNSPECIFIED: ICD-10-CM

## 2025-03-10 DIAGNOSIS — Z80.3 FAMILY HISTORY OF MALIGNANT NEOPLASM OF BREAST: ICD-10-CM

## 2025-03-10 PROCEDURE — 99205 OFFICE O/P NEW HI 60 MIN: CPT

## 2025-03-10 PROCEDURE — 99459 PELVIC EXAMINATION: CPT

## 2025-03-10 RX ORDER — PREDNISONE 10 MG/1
10 TABLET ORAL
Refills: 0 | Status: ACTIVE | COMMUNITY

## 2025-03-11 LAB — HCG-TM SERPL-MCNC: 5 MIU/ML

## 2025-03-12 ENCOUNTER — APPOINTMENT (OUTPATIENT)
Dept: ULTRASOUND IMAGING | Facility: CLINIC | Age: 40
End: 2025-03-12
Payer: MEDICAID

## 2025-03-12 PROCEDURE — 76830 TRANSVAGINAL US NON-OB: CPT

## 2025-03-12 PROCEDURE — 76856 US EXAM PELVIC COMPLETE: CPT

## 2025-03-17 ENCOUNTER — APPOINTMENT (OUTPATIENT)
Dept: PODIATRY | Facility: CLINIC | Age: 40
End: 2025-03-17
Payer: MEDICAID

## 2025-03-17 ENCOUNTER — APPOINTMENT (OUTPATIENT)
Dept: OTOLARYNGOLOGY | Facility: CLINIC | Age: 40
End: 2025-03-17

## 2025-03-17 DIAGNOSIS — M77.41 METATARSALGIA, RIGHT FOOT: ICD-10-CM

## 2025-03-17 DIAGNOSIS — M06.9 RHEUMATOID ARTHRITIS, UNSPECIFIED: ICD-10-CM

## 2025-03-17 DIAGNOSIS — M72.2 PLANTAR FASCIAL FIBROMATOSIS: ICD-10-CM

## 2025-03-17 DIAGNOSIS — J84.9 INTERSTITIAL PULMONARY DISEASE, UNSPECIFIED: ICD-10-CM

## 2025-03-17 DIAGNOSIS — G57.51 TARSAL TUNNEL SYNDROME, RIGHT LOWER LIMB: ICD-10-CM

## 2025-03-17 DIAGNOSIS — M79.671 PAIN IN RIGHT FOOT: ICD-10-CM

## 2025-03-17 DIAGNOSIS — M25.571 PAIN IN RIGHT ANKLE AND JOINTS OF RIGHT FOOT: ICD-10-CM

## 2025-03-17 PROCEDURE — 73600 X-RAY EXAM OF ANKLE: CPT | Mod: RT

## 2025-03-17 PROCEDURE — 73630 X-RAY EXAM OF FOOT: CPT | Mod: RT

## 2025-03-17 PROCEDURE — 99204 OFFICE O/P NEW MOD 45 MIN: CPT | Mod: 25

## 2025-03-17 RX ORDER — GOLIMUMAB 50 MG/4ML
50 SOLUTION INTRAVENOUS
Refills: 0 | Status: ACTIVE | COMMUNITY

## 2025-03-17 RX ORDER — MELOXICAM 7.5 MG/1
7.5 TABLET ORAL DAILY
Qty: 7 | Refills: 0 | Status: ACTIVE | COMMUNITY
Start: 2025-03-17 | End: 1900-01-01

## 2025-03-20 PROBLEM — M25.571 ACUTE RIGHT ANKLE PAIN: Status: ACTIVE | Noted: 2023-08-01

## 2025-03-20 PROBLEM — I26.99 PULMONARY EMBOLISM ON RIGHT: Status: ACTIVE | Noted: 2019-06-13

## 2025-03-20 PROBLEM — M79.671 RIGHT FOOT PAIN: Status: ACTIVE | Noted: 2017-12-18

## 2025-03-21 ENCOUNTER — APPOINTMENT (OUTPATIENT)
Dept: HEPATOLOGY | Facility: CLINIC | Age: 40
End: 2025-03-21
Payer: MEDICAID

## 2025-03-21 VITALS
BODY MASS INDEX: 39.31 KG/M2 | HEIGHT: 59 IN | DIASTOLIC BLOOD PRESSURE: 82 MMHG | SYSTOLIC BLOOD PRESSURE: 139 MMHG | WEIGHT: 195 LBS | HEART RATE: 83 BPM | TEMPERATURE: 98 F | RESPIRATION RATE: 16 BRPM | OXYGEN SATURATION: 95 %

## 2025-03-21 DIAGNOSIS — R53.82 CHRONIC FATIGUE, UNSPECIFIED: ICD-10-CM

## 2025-03-21 DIAGNOSIS — R76.8 OTHER SPECIFIED ABNORMAL IMMUNOLOGICAL FINDINGS IN SERUM: ICD-10-CM

## 2025-03-21 DIAGNOSIS — R74.8 ABNORMAL LEVELS OF OTHER SERUM ENZYMES: ICD-10-CM

## 2025-03-21 PROBLEM — G57.51 TARSAL TUNNEL SYNDROME OF RIGHT SIDE: Status: ACTIVE | Noted: 2025-03-20

## 2025-03-21 PROBLEM — M77.41 METATARSALGIA OF RIGHT FOOT: Status: ACTIVE | Noted: 2025-03-20

## 2025-03-21 LAB
ALBUMIN SERPL ELPH-MCNC: 4.4 G/DL
ALP BLD-CCNC: 198 U/L
ALT SERPL-CCNC: 19 U/L
ANION GAP SERPL CALC-SCNC: 11 MMOL/L
AST SERPL-CCNC: 21 U/L
BILIRUB DIRECT SERPL-MCNC: 0.1 MG/DL
BILIRUB INDIRECT SERPL-MCNC: 0.2 MG/DL
BILIRUB SERPL-MCNC: 0.3 MG/DL
BUN SERPL-MCNC: 9 MG/DL
CALCIUM SERPL-MCNC: 9.8 MG/DL
CHLORIDE SERPL-SCNC: 104 MMOL/L
CO2 SERPL-SCNC: 26 MMOL/L
CREAT SERPL-MCNC: 0.63 MG/DL
EGFRCR SERPLBLD CKD-EPI 2021: 115 ML/MIN/1.73M2
GLUCOSE SERPL-MCNC: 77 MG/DL
POTASSIUM SERPL-SCNC: 4.7 MMOL/L
PROT SERPL-MCNC: 7.2 G/DL
SODIUM SERPL-SCNC: 141 MMOL/L

## 2025-03-21 PROCEDURE — 99214 OFFICE O/P EST MOD 30 MIN: CPT

## 2025-03-21 RX ORDER — URSODIOL 500 MG/1
500 TABLET ORAL TWICE DAILY
Qty: 60 | Refills: 1 | Status: ACTIVE | COMMUNITY
Start: 2025-03-21 | End: 1900-01-01

## 2025-03-22 LAB
APTT BLD: 34.3 SEC
HCT VFR BLD CALC: 42.6 %
HGB BLD-MCNC: 13.5 G/DL
INR PPP: 1.05 RATIO
MCHC RBC-ENTMCNC: 27.5 PG
MCHC RBC-ENTMCNC: 31.7 G/DL
MCV RBC AUTO: 86.8 FL
PLATELET # BLD AUTO: 260 K/UL
PT BLD: 12.5 SEC
RBC # BLD: 4.91 M/UL
RBC # FLD: 15 %
WBC # FLD AUTO: 10.87 K/UL

## 2025-03-24 LAB — MITOCHONDRIAL (M2) AB, SERUM: <20 UNITS

## 2025-03-26 ENCOUNTER — RX RENEWAL (OUTPATIENT)
Age: 40
End: 2025-03-26

## 2025-03-27 ENCOUNTER — RX RENEWAL (OUTPATIENT)
Age: 40
End: 2025-03-27

## 2025-03-31 ENCOUNTER — APPOINTMENT (OUTPATIENT)
Dept: CARDIOLOGY | Facility: CLINIC | Age: 40
End: 2025-03-31

## 2025-03-31 DIAGNOSIS — I26.99 OTHER PULMONARY EMBOLISM W/OUT ACUTE COR PULMONALE: ICD-10-CM

## 2025-03-31 DIAGNOSIS — Q79.62 HYPERMOBILE EHLERS-DANLOS SYNDROME: ICD-10-CM

## 2025-03-31 DIAGNOSIS — G90.A POSTURAL ORTHOSTATIC TACHYCARDIA SYNDROME [POTS]: ICD-10-CM

## 2025-03-31 DIAGNOSIS — Z13.6 ENCOUNTER FOR SCREENING FOR CARDIOVASCULAR DISORDERS: ICD-10-CM

## 2025-03-31 DIAGNOSIS — G93.2 BENIGN INTRACRANIAL HYPERTENSION: ICD-10-CM

## 2025-03-31 DIAGNOSIS — Z15.89 GENETIC SUSCEPTIBILITY TO OTHER DISEASE: ICD-10-CM

## 2025-04-01 ENCOUNTER — TRANSCRIPTION ENCOUNTER (OUTPATIENT)
Age: 40
End: 2025-04-01

## 2025-04-01 ENCOUNTER — INPATIENT (INPATIENT)
Facility: HOSPITAL | Age: 40
LOS: 2 days | Discharge: HOME | DRG: 195 | End: 2025-04-04
Attending: INTERNAL MEDICINE | Admitting: INTERNAL MEDICINE
Payer: MEDICAID

## 2025-04-01 ENCOUNTER — NON-APPOINTMENT (OUTPATIENT)
Age: 40
End: 2025-04-01

## 2025-04-01 VITALS
DIASTOLIC BLOOD PRESSURE: 93 MMHG | WEIGHT: 192.02 LBS | SYSTOLIC BLOOD PRESSURE: 140 MMHG | OXYGEN SATURATION: 94 % | RESPIRATION RATE: 18 BRPM | HEIGHT: 59 IN | HEART RATE: 112 BPM | TEMPERATURE: 99 F

## 2025-04-01 DIAGNOSIS — I26.99 OTHER PULMONARY EMBOLISM WITHOUT ACUTE COR PULMONALE: ICD-10-CM

## 2025-04-01 DIAGNOSIS — M72.2 PLANTAR FASCIAL FIBROMATOSIS: Chronic | ICD-10-CM

## 2025-04-01 DIAGNOSIS — Z98.891 HISTORY OF UTERINE SCAR FROM PREVIOUS SURGERY: Chronic | ICD-10-CM

## 2025-04-01 DIAGNOSIS — J18.9 PNEUMONIA, UNSPECIFIED ORGANISM: ICD-10-CM

## 2025-04-01 DIAGNOSIS — Z98.51 TUBAL LIGATION STATUS: Chronic | ICD-10-CM

## 2025-04-01 DIAGNOSIS — Z98.890 OTHER SPECIFIED POSTPROCEDURAL STATES: Chronic | ICD-10-CM

## 2025-04-01 DIAGNOSIS — A41.9 SEPSIS, UNSPECIFIED ORGANISM: ICD-10-CM

## 2025-04-01 DIAGNOSIS — Z29.9 ENCOUNTER FOR PROPHYLACTIC MEASURES, UNSPECIFIED: ICD-10-CM

## 2025-04-01 DIAGNOSIS — M06.9 RHEUMATOID ARTHRITIS, UNSPECIFIED: ICD-10-CM

## 2025-04-01 DIAGNOSIS — J84.9 INTERSTITIAL PULMONARY DISEASE, UNSPECIFIED: ICD-10-CM

## 2025-04-01 LAB
ALBUMIN SERPL ELPH-MCNC: 3.9 G/DL — SIGNIFICANT CHANGE UP (ref 3.5–5)
ALP SERPL-CCNC: 170 U/L — HIGH (ref 40–120)
ALT FLD-CCNC: 24 U/L DA — SIGNIFICANT CHANGE UP (ref 10–60)
ANION GAP SERPL CALC-SCNC: 5 MMOL/L — SIGNIFICANT CHANGE UP (ref 5–17)
APPEARANCE UR: CLEAR — SIGNIFICANT CHANGE UP
APTT BLD: 31.7 SEC — SIGNIFICANT CHANGE UP (ref 24.5–35.6)
AST SERPL-CCNC: 21 U/L — SIGNIFICANT CHANGE UP (ref 10–40)
BASE EXCESS BLDV CALC-SCNC: 4.5 MMOL/L — SIGNIFICANT CHANGE UP
BASOPHILS # BLD AUTO: 0.02 K/UL — SIGNIFICANT CHANGE UP (ref 0–0.2)
BASOPHILS NFR BLD AUTO: 0.2 % — SIGNIFICANT CHANGE UP (ref 0–2)
BILIRUB SERPL-MCNC: 0.3 MG/DL — SIGNIFICANT CHANGE UP (ref 0.2–1.2)
BILIRUB UR-MCNC: NEGATIVE — SIGNIFICANT CHANGE UP
BUN SERPL-MCNC: 9 MG/DL — SIGNIFICANT CHANGE UP (ref 7–18)
CALCIUM SERPL-MCNC: 9.2 MG/DL — SIGNIFICANT CHANGE UP (ref 8.4–10.5)
CHLORIDE SERPL-SCNC: 102 MMOL/L — SIGNIFICANT CHANGE UP (ref 96–108)
CO2 SERPL-SCNC: 27 MMOL/L — SIGNIFICANT CHANGE UP (ref 22–31)
COLOR SPEC: YELLOW — SIGNIFICANT CHANGE UP
CREAT SERPL-MCNC: 0.7 MG/DL — SIGNIFICANT CHANGE UP (ref 0.5–1.3)
DIFF PNL FLD: NEGATIVE — SIGNIFICANT CHANGE UP
EGFR: 112 ML/MIN/1.73M2 — SIGNIFICANT CHANGE UP
EGFR: 112 ML/MIN/1.73M2 — SIGNIFICANT CHANGE UP
EOSINOPHIL # BLD AUTO: 0.01 K/UL — SIGNIFICANT CHANGE UP (ref 0–0.5)
EOSINOPHIL NFR BLD AUTO: 0.1 % — SIGNIFICANT CHANGE UP (ref 0–6)
FLUAV AG NPH QL: SIGNIFICANT CHANGE UP
FLUBV AG NPH QL: SIGNIFICANT CHANGE UP
GLUCOSE SERPL-MCNC: 102 MG/DL — HIGH (ref 70–99)
GLUCOSE UR QL: NEGATIVE MG/DL — SIGNIFICANT CHANGE UP
HCG SERPL-ACNC: 6 MIU/ML — HIGH
HCO3 BLDV-SCNC: 30 MMOL/L — HIGH (ref 22–29)
HCT VFR BLD CALC: 44.7 % — SIGNIFICANT CHANGE UP (ref 34.5–45)
HGB BLD-MCNC: 15 G/DL — SIGNIFICANT CHANGE UP (ref 11.5–15.5)
IMM GRANULOCYTES NFR BLD AUTO: 0.3 % — SIGNIFICANT CHANGE UP (ref 0–0.9)
INR BLD: 1.24 RATIO — HIGH (ref 0.85–1.16)
KETONES UR-MCNC: 40 MG/DL
LACTATE SERPL-SCNC: 1.3 MMOL/L — SIGNIFICANT CHANGE UP (ref 0.7–2)
LEUKOCYTE ESTERASE UR-ACNC: NEGATIVE — SIGNIFICANT CHANGE UP
LYMPHOCYTES # BLD AUTO: 2.28 K/UL — SIGNIFICANT CHANGE UP (ref 1–3.3)
LYMPHOCYTES # BLD AUTO: 20.6 % — SIGNIFICANT CHANGE UP (ref 13–44)
MAGNESIUM SERPL-MCNC: 1.9 MG/DL — SIGNIFICANT CHANGE UP (ref 1.6–2.6)
MCHC RBC-ENTMCNC: 27.6 PG — SIGNIFICANT CHANGE UP (ref 27–34)
MCHC RBC-ENTMCNC: 33.6 G/DL — SIGNIFICANT CHANGE UP (ref 32–36)
MCV RBC AUTO: 82.3 FL — SIGNIFICANT CHANGE UP (ref 80–100)
MONOCYTES # BLD AUTO: 0.59 K/UL — SIGNIFICANT CHANGE UP (ref 0–0.9)
MONOCYTES NFR BLD AUTO: 5.3 % — SIGNIFICANT CHANGE UP (ref 2–14)
NEUTROPHILS # BLD AUTO: 8.16 K/UL — HIGH (ref 1.8–7.4)
NEUTROPHILS NFR BLD AUTO: 73.5 % — SIGNIFICANT CHANGE UP (ref 43–77)
NITRITE UR-MCNC: NEGATIVE — SIGNIFICANT CHANGE UP
NRBC BLD AUTO-RTO: 0 /100 WBCS — SIGNIFICANT CHANGE UP (ref 0–0)
NT-PROBNP SERPL-SCNC: 9 PG/ML — SIGNIFICANT CHANGE UP (ref 0–125)
PCO2 BLDV: 46 MMHG — HIGH (ref 39–42)
PH BLDV: 7.42 — SIGNIFICANT CHANGE UP (ref 7.32–7.43)
PH UR: 5.5 — SIGNIFICANT CHANGE UP (ref 5–8)
PHOSPHATE SERPL-MCNC: 2.7 MG/DL — SIGNIFICANT CHANGE UP (ref 2.5–4.5)
PLATELET # BLD AUTO: 236 K/UL — SIGNIFICANT CHANGE UP (ref 150–400)
PO2 BLDV: 20 MMHG — SIGNIFICANT CHANGE UP
POTASSIUM SERPL-MCNC: 3.6 MMOL/L — SIGNIFICANT CHANGE UP (ref 3.5–5.3)
POTASSIUM SERPL-SCNC: 3.6 MMOL/L — SIGNIFICANT CHANGE UP (ref 3.5–5.3)
PROT SERPL-MCNC: 8.4 G/DL — HIGH (ref 6–8.3)
PROT UR-MCNC: NEGATIVE MG/DL — SIGNIFICANT CHANGE UP
PROTHROM AB SERPL-ACNC: 14.4 SEC — HIGH (ref 9.9–13.4)
RBC # BLD: 5.43 M/UL — HIGH (ref 3.8–5.2)
RBC # FLD: 14.4 % — SIGNIFICANT CHANGE UP (ref 10.3–14.5)
RSV RNA NPH QL NAA+NON-PROBE: SIGNIFICANT CHANGE UP
SAO2 % BLDV: 25.2 % — SIGNIFICANT CHANGE UP
SARS-COV-2 RNA SPEC QL NAA+PROBE: SIGNIFICANT CHANGE UP
SODIUM SERPL-SCNC: 134 MMOL/L — LOW (ref 135–145)
SOURCE RESPIRATORY: SIGNIFICANT CHANGE UP
SP GR SPEC: 1.01 — SIGNIFICANT CHANGE UP (ref 1–1.03)
TROPONIN I, HIGH SENSITIVITY RESULT: <3 NG/L — SIGNIFICANT CHANGE UP
UROBILINOGEN FLD QL: 0.2 MG/DL — SIGNIFICANT CHANGE UP (ref 0.2–1)
WBC # BLD: 11.09 K/UL — HIGH (ref 3.8–10.5)
WBC # FLD AUTO: 11.09 K/UL — HIGH (ref 3.8–10.5)

## 2025-04-01 PROCEDURE — 71275 CT ANGIOGRAPHY CHEST: CPT | Mod: 26

## 2025-04-01 PROCEDURE — 99285 EMERGENCY DEPT VISIT HI MDM: CPT

## 2025-04-01 RX ORDER — MAGNESIUM, ALUMINUM HYDROXIDE 200-200 MG
30 TABLET,CHEWABLE ORAL EVERY 4 HOURS
Refills: 0 | Status: DISCONTINUED | OUTPATIENT
Start: 2025-04-01 | End: 2025-04-04

## 2025-04-01 RX ORDER — SODIUM CHLORIDE 9 G/1000ML
1000 INJECTION, SOLUTION INTRAVENOUS ONCE
Refills: 0 | Status: COMPLETED | OUTPATIENT
Start: 2025-04-01 | End: 2025-04-01

## 2025-04-01 RX ORDER — ONDANSETRON HCL/PF 4 MG/2 ML
4 VIAL (ML) INJECTION EVERY 8 HOURS
Refills: 0 | Status: DISCONTINUED | OUTPATIENT
Start: 2025-04-01 | End: 2025-04-04

## 2025-04-01 RX ORDER — IPRATROPIUM BROMIDE AND ALBUTEROL SULFATE .5; 2.5 MG/3ML; MG/3ML
3 SOLUTION RESPIRATORY (INHALATION) ONCE
Refills: 0 | Status: COMPLETED | OUTPATIENT
Start: 2025-04-01 | End: 2025-04-01

## 2025-04-01 RX ORDER — IPRATROPIUM BROMIDE AND ALBUTEROL SULFATE .5; 2.5 MG/3ML; MG/3ML
3 SOLUTION RESPIRATORY (INHALATION) EVERY 6 HOURS
Refills: 0 | Status: DISCONTINUED | OUTPATIENT
Start: 2025-04-01 | End: 2025-04-04

## 2025-04-01 RX ORDER — ACETAMINOPHEN 500 MG/5ML
650 LIQUID (ML) ORAL EVERY 6 HOURS
Refills: 0 | Status: DISCONTINUED | OUTPATIENT
Start: 2025-04-01 | End: 2025-04-04

## 2025-04-01 RX ORDER — BENZONATATE 100 MG
100 CAPSULE ORAL THREE TIMES A DAY
Refills: 0 | Status: DISCONTINUED | OUTPATIENT
Start: 2025-04-01 | End: 2025-04-04

## 2025-04-01 RX ORDER — ACETAMINOPHEN 500 MG/5ML
1000 LIQUID (ML) ORAL ONCE
Refills: 0 | Status: COMPLETED | OUTPATIENT
Start: 2025-04-01 | End: 2025-04-01

## 2025-04-01 RX ORDER — APIXABAN 2.5 MG/1
2.5 TABLET, FILM COATED ORAL EVERY 12 HOURS
Refills: 0 | Status: DISCONTINUED | OUTPATIENT
Start: 2025-04-01 | End: 2025-04-04

## 2025-04-01 RX ORDER — GOLIMUMAB 50 MG/.5ML
1 INJECTION, SOLUTION SUBCUTANEOUS
Refills: 0 | DISCHARGE

## 2025-04-01 RX ORDER — MELATONIN 5 MG
3 TABLET ORAL AT BEDTIME
Refills: 0 | Status: DISCONTINUED | OUTPATIENT
Start: 2025-04-01 | End: 2025-04-04

## 2025-04-01 RX ORDER — FOLIC ACID 1 MG/1
1 TABLET ORAL DAILY
Refills: 0 | Status: DISCONTINUED | OUTPATIENT
Start: 2025-04-01 | End: 2025-04-04

## 2025-04-01 RX ORDER — METHYLPREDNISOLONE ACETATE 80 MG/ML
125 INJECTION, SUSPENSION INTRA-ARTICULAR; INTRALESIONAL; INTRAMUSCULAR; SOFT TISSUE ONCE
Refills: 0 | Status: COMPLETED | OUTPATIENT
Start: 2025-04-01 | End: 2025-04-01

## 2025-04-01 RX ORDER — FLUTICASONE FUROATE AND VILANTEROL TRIFENATATE 100; 25 UG/1; UG/1
1 POWDER RESPIRATORY (INHALATION)
Refills: 0 | DISCHARGE

## 2025-04-01 RX ADMIN — METHYLPREDNISOLONE ACETATE 125 MILLIGRAM(S): 80 INJECTION, SUSPENSION INTRA-ARTICULAR; INTRALESIONAL; INTRAMUSCULAR; SOFT TISSUE at 17:50

## 2025-04-01 RX ADMIN — SODIUM CHLORIDE 1000 MILLILITER(S): 9 INJECTION, SOLUTION INTRAVENOUS at 14:52

## 2025-04-01 RX ADMIN — Medication 1000 MILLILITER(S): at 19:28

## 2025-04-01 RX ADMIN — IPRATROPIUM BROMIDE AND ALBUTEROL SULFATE 3 MILLILITER(S): .5; 2.5 SOLUTION RESPIRATORY (INHALATION) at 14:52

## 2025-04-01 RX ADMIN — Medication 400 MILLIGRAM(S): at 14:52

## 2025-04-01 RX ADMIN — APIXABAN 2.5 MILLIGRAM(S): 2.5 TABLET, FILM COATED ORAL at 22:30

## 2025-04-01 NOTE — H&P ADULT - PATIENT'S GENDER IDENTITY
[Home] : at home, [unfilled] , at the time of the visit. [Other Location: e.g. Home (Enter Location, City,State)___] : at [unfilled] [Verbal consent obtained from patient] : the patient, [unfilled] [FreeTextEntry1] : follow up post hospitalization  [de-identified] : pt hospitalized for CHF Female

## 2025-04-01 NOTE — ED PROVIDER NOTE - CLINICAL SUMMARY MEDICAL DECISION MAKING FREE TEXT BOX
Graciela: 39-year-old female with past medical history fibromyalgia, asthma, rheumatoid arthritis, thyroiditis, Sjogren syndrome, interstitial lung disease (on methotrexate and Simponi infusion, last infusion 10 days ago), pulmonary embolism on Eliquis presents with fever and cough x 4 days.  Patient reports fevers Tmax 101F, chest discomfort, dry cough, shortness of breath, hoarseness, nausea, and vomiting over the past 4 days.  Denies any nasal congestion, sore throat, sputum production, abdominal pain, diarrhea, dysuria, numbness, focal weakness, leg swelling, rash.  Patient states she was seen urgent care today, tested negative for flu/COVID, advised to go to emergency room for further evaluation.  Physical exam per above. Patient with wheezing and crackles bilaterally, no hypoxia or respiratory distress. Will obtain labs r/o flu r/o COVID, imaging r/o PE r/o PNA, provide supportive treatment with dispo pending workup.

## 2025-04-01 NOTE — ED PROVIDER NOTE - PHYSICAL EXAMINATION
CONSTITUTIONAL: non-toxic, well appearing  SKIN: no rash, no petechiae.  EYES: pink conjunctiva, anicteric  ENT: tongue and uvular midline, no exudates, mildly dry mucous membranes  NECK: Supple; no meningismus, no JVD  CARD: RRR, no murmurs, equal radial pulses bilaterally 2+  RESP: Scattered crackles and wheezing, no respiratory distress  ABD: Soft, non-tender, non-distended, no peritoneal signs  EXT: Normal ROM x4. No edema.   NEURO: Alert, oriented. Neuro exam nonfocal.  PSYCH: Cooperative, appropriate.

## 2025-04-01 NOTE — ED ADULT NURSE NOTE - OBJECTIVE STATEMENT
Pt presents for several days of cough, fever, malaise and generalized chest pressure. Pt states seen at urgent care and sent here to rule out PNA.

## 2025-04-01 NOTE — ED PROVIDER NOTE - PROGRESS NOTE DETAILS
Received from Dr Robbins w CT results pending.  CT showing b/l lower lobe PNA. O2 90% on RA improved w NC.  No respiratory distress.  Abx given  Patient requires inpatient admission for further care & stabilization. Care signed out to inpatient team.

## 2025-04-01 NOTE — ED ADULT TRIAGE NOTE - CHIEF COMPLAINT QUOTE
Nonproductive cough ,mid sternal pressure since yesterday ,fever ,body ache x 4 days ,referred from Urgent care for low o2 sat-90%

## 2025-04-01 NOTE — ED PROVIDER NOTE - OBJECTIVE STATEMENT
39-year-old female with past medical history fibromyalgia, asthma, rheumatoid arthritis, thyroiditis, Sjogren syndrome, interstitial lung disease (on methotrexate and Simponi infusion, last infusion 10 days ago), pulmonary embolism on Eliquis presents with fever and cough x 4 days.  Patient reports fevers Tmax 101F, chest discomfort, dry cough, shortness of breath, hoarseness, nausea, and vomiting over the past 4 days.  Denies any nasal congestion, sore throat, sputum production, abdominal pain, diarrhea, dysuria, numbness, focal weakness, leg swelling, rash.  Patient states she was seen urgent care today, tested negative for flu/COVID, advised to go to emergency room for further evaluation.  Denies additional complaints.

## 2025-04-01 NOTE — ED PROVIDER NOTE - NSICDXPASTMEDICALHX_GEN_ALL_CORE_FT
PAST MEDICAL HISTORY:  Asthma     Fibromyalgia     H/O antiphospholipid syndrome     Herniated cervical disc     Lung abnormality     PE (pulmonary thromboembolism)     Premature ovarian failure     Rheumatoid arthritis

## 2025-04-01 NOTE — H&P ADULT - NSICDXPASTMEDICALHX_GEN_ALL_CORE_FT
PAST MEDICAL HISTORY:  Asthma     Pau-Danlos disease     Fibromyalgia     H/O antiphospholipid syndrome     Herniated cervical disc     Lung abnormality     PE (pulmonary thromboembolism)     Premature ovarian failure     Rheumatoid arthritis

## 2025-04-01 NOTE — H&P ADULT - ASSESSMENT
39y/F, PMH of  fibromyalgia, asthma, rheumatoid arthritis, thyroiditis, Sjogren syndrome, interstitial lung disease (on methotrexate and Simponi infusion, last infusion 10 days ago), pulmonary embolism on Eliquis presents with fever and cough x 4 days 39y/F, PMH of  fibromyalgia, asthma, rheumatoid arthritis, thyroiditis, Sjogren syndrome, interstitial lung disease (on methotrexate and Simponi infusion, last infusion 10 days ago), pulmonary embolism on Eliquis presents with fever and cough x 4 days. CT chest: Bilateral lower lung predominant numerous nodular opacities, likely pneumonia with endobronchial spread. Admitted for sepsis 2/2 pneumonia.   39y/F, PMH of  fibromyalgia, asthma, rheumatoid arthritis, thyroiditis, Sjogren syndrome, interstitial lung disease (on methotrexate and Simponi infusion, last infusion 10 days ago), pulmonary embolism on Eliquis presents with fever and cough x 4 days. Meets sepsis criteria and hypoxic to 90 on RA.  CT chest: Bilateral lower lung predominant numerous nodular opacities, likely pneumonia. Admitted for sepsis with AHRF 2/2 pneumonia.   39y/F, PMH of  Ehler-Danlos syndrome, Fibromyalgia, Rheumatoid arthritis, Sjogren syndrome, ILD (on methotrexate and Simponi infusion, last infusion 10 days ago), pulmonary embolism on Eliquis presents with fever and cough x 4 days. Meets sepsis criteria and hypoxic to 90 on RA.  CT chest: Bilateral lower lung predominant numerous nodular opacities, likely pneumonia. Admitted for sepsis with AHRF 2/2 pneumonia.

## 2025-04-01 NOTE — H&P ADULT - NSHPPHYSICALEXAM_GEN_ALL_CORE
T(C): 36.8 (04-01-25 @ 19:16), Max: 37.2 (04-01-25 @ 13:47)  HR: 80 (04-01-25 @ 19:16) (80 - 112)  BP: 115/79 (04-01-25 @ 19:16) (114/68 - 140/93)  RR: 18 (04-01-25 @ 19:16) (18 - 20)  SpO2: 96% (04-01-25 @ 19:16) (90% - 96%)      GENERAL: NAD, speaks in full sentences, no signs of respiratory distress  HEAD:  Atraumatic, Normocephalic  EYES: EOMI, PERRLA, conjunctiva and sclera clear  NECK: Supple, No JVD  CHEST/LUNG: Clear to auscultation bilaterally; No wheeze; No crackles; No accessory muscles used  HEART: Regular rate and rhythm; No murmurs;   ABDOMEN: Soft, Nontender, Nondistended; Bowel sounds present; No guarding  EXTREMITIES:  2+ Peripheral Pulses, No cyanosis or edema  PSYCH: AAOx3  NEUROLOGY: non-focal  SKIN: No rashes or lesions T(C): 36.8 (04-01-25 @ 19:16), Max: 37.2 (04-01-25 @ 13:47)  HR: 80 (04-01-25 @ 19:16) (80 - 112)  BP: 115/79 (04-01-25 @ 19:16) (114/68 - 140/93)  RR: 18 (04-01-25 @ 19:16) (18 - 20)  SpO2: 96% (04-01-25 @ 19:16) (90% - 96%)      GENERAL: NAD, speaks in full sentences, no signs of respiratory distress  HEAD:  Atraumatic, Normocephalic  EYES: EOMI, PERRLA, conjunctiva and sclera clear  NECK: Supple, No JVD  CHEST/LUNG: Clear to auscultation bilaterally; No wheeze; No crackles  HEART: Regular rate and rhythm; No murmurs;   ABDOMEN: Soft, Nontender, Nondistended; Bowel sounds present; No guarding  EXTREMITIES:  2+ Peripheral Pulses, No cyanosis or edema  PSYCH: AAOx3  NEUROLOGY: No motor or sensory deficit  SKIN: No rashes or lesions

## 2025-04-01 NOTE — H&P ADULT - HISTORY OF PRESENT ILLNESS
39y/F, PMH of  fibromyalgia, asthma, rheumatoid arthritis, thyroiditis, Sjogren syndrome, interstitial lung disease (on methotrexate and Simponi infusion, last infusion 10 days ago), pulmonary embolism on Eliquis presents with fever and cough x 4 days.  39y/F, PMH of Ehler-Danlos syndrome, Fibromyalgia, Rheumatoid arthritis, Sjogren syndrome, ILD (on methotrexate and Simponi infusion, last infusion 10 days ago), pulmonary embolism on Eliquis presents with 4 days of  fever, max temp was 101 associated with malaise,  39y/F, PMH of Ehler-Danlos syndrome, Fibromyalgia, Rheumatoid arthritis, Sjogren syndrome, ILD (on methotrexate and Simponi infusion, last infusion 10 days ago), pulmonary embolism on Eliquis presents with 4 days of  fever, max temp was 101 associated with dry cough and malaise. States she checked her sats at home and was 90% on RA. Patient went to urgent care today, she was wheezing and hypoxic to 90% on RA at rest and was told to go to ED. Endorses chest tightness more on the right side, not present at the time of exam. Denies any SOB. Denies headache, abd pain, nausea and vomiting, urinary symptoms, diarrhea or constipation. Denies any sick contact or recent travel.

## 2025-04-01 NOTE — H&P ADULT - PROBLEM SELECTOR PLAN 1
p/w HR >90, WBC 11k, lactate 1.6    meets sepsis criteria  received ___ bolus  sepsis 2/2  UA    -started  -f/u lactate  -f/u blood cultures, urine cultures  -ID p/w fever, dry cough and malaise for 4 days  HR >90, WBC 11k, lactate 1.6, hypoxia to 90% on RA  meets sepsis criteria  received 2L bolus  CT chest: b/l LL pneumonia  with endobronchial spread  sepsis 2/2 pneumonia  UA negative  s/p levaquin in ed    -started Levaquin (penicillin allergy)  -f/u atypical pneumonia, procalcitonin  -f/u blood cultures  -wean off O2 as tolerated  -incentive spirometry  -ID consult in am

## 2025-04-01 NOTE — H&P ADULT - PROBLEM SELECTOR PLAN 4
on MTX every week, simponi infusion every 8 week (last infusion 03/21) and folic acid  also states she is on Prednisone 20 as needed (does not take it )  c/w folic acid

## 2025-04-01 NOTE — ED ADULT TRIAGE NOTE - NS ED TRIAGE AVPU SCALE
Alert-The patient is alert, awake and responds to voice. The patient is oriented to time, place, and person. The triage nurse is able to obtain subjective information. Detail Level: Detailed Depth Of Biopsy: dermis Was A Bandage Applied: Yes Size Of Lesion In Cm: 0.6 X Size Of Lesion In Cm: 0 Biopsy Type: H and E Biopsy Method: Dermablade Anesthesia Type: 1% lidocaine with 1:100,000 epinephrine and a 1:12 solution of 8.4% sodium bicarbonate Hemostasis: Pressure Wound Care: Petrolatum Dressing: bandage Destruction After The Procedure: No Type Of Destruction Used: Curettage Curettage Text: The wound bed was treated with curettage after the biopsy was performed. Cryotherapy Text: The wound bed was treated with cryotherapy after the biopsy was performed. Electrodesiccation Text: The wound bed was treated with electrodesiccation after the biopsy was performed. Electrodesiccation And Curettage Text: The wound bed was treated with electrodesiccation and curettage after the biopsy was performed. Silver Nitrate Text: The wound bed was treated with silver nitrate after the biopsy was performed. Lab: [id number] Lab Facility:  Consent: Written consent was obtained and risks were reviewed including but not limited to scarring, infection, bleeding, scabbing, incomplete removal, nerve damage and allergy to anesthesia. Post-Care Instructions: I reviewed with the patient in detail post-care instructions. Patient is to keep the biopsy site dry overnight, and then apply bacitracin twice daily until healed. Patient may apply hydrogen peroxide soaks to remove any crusting. Notification Instructions: Patient will be notified of biopsy results. However, patient instructed to call the office if not contacted within 2 weeks. Billing Type: Third-Party Bill Information: Selecting Yes will display possible errors in your note based on the variables you have selected. This validation is only offered as a suggestion for you. PLEASE NOTE THAT THE VALIDATION TEXT WILL BE REMOVED WHEN YOU FINALIZE YOUR NOTE. IF YOU WANT TO FAX A PRELIMINARY NOTE YOU WILL NEED TO TOGGLE THIS TO 'NO' IF YOU DO NOT WANT IT IN YOUR FAXED NOTE. Size Of Lesion In Cm: 0.5

## 2025-04-02 ENCOUNTER — APPOINTMENT (OUTPATIENT)
Dept: NEUROLOGY | Facility: CLINIC | Age: 40
End: 2025-04-02

## 2025-04-02 ENCOUNTER — TRANSCRIPTION ENCOUNTER (OUTPATIENT)
Age: 40
End: 2025-04-02

## 2025-04-02 LAB
ALBUMIN SERPL ELPH-MCNC: 3.3 G/DL — LOW (ref 3.5–5)
ALP SERPL-CCNC: 150 U/L — HIGH (ref 40–120)
ALT FLD-CCNC: 20 U/L DA — SIGNIFICANT CHANGE UP (ref 10–60)
ANION GAP SERPL CALC-SCNC: 8 MMOL/L — SIGNIFICANT CHANGE UP (ref 5–17)
AST SERPL-CCNC: 17 U/L — SIGNIFICANT CHANGE UP (ref 10–40)
BASOPHILS # BLD AUTO: 0 K/UL — SIGNIFICANT CHANGE UP (ref 0–0.2)
BASOPHILS NFR BLD AUTO: 0 % — SIGNIFICANT CHANGE UP (ref 0–2)
BILIRUB SERPL-MCNC: 0.2 MG/DL — SIGNIFICANT CHANGE UP (ref 0.2–1.2)
BUN SERPL-MCNC: 10 MG/DL — SIGNIFICANT CHANGE UP (ref 7–18)
CALCIUM SERPL-MCNC: 8.7 MG/DL — SIGNIFICANT CHANGE UP (ref 8.4–10.5)
CHLORIDE SERPL-SCNC: 105 MMOL/L — SIGNIFICANT CHANGE UP (ref 96–108)
CO2 SERPL-SCNC: 23 MMOL/L — SIGNIFICANT CHANGE UP (ref 22–31)
CREAT SERPL-MCNC: 0.59 MG/DL — SIGNIFICANT CHANGE UP (ref 0.5–1.3)
EGFR: 117 ML/MIN/1.73M2 — SIGNIFICANT CHANGE UP
EGFR: 117 ML/MIN/1.73M2 — SIGNIFICANT CHANGE UP
EOSINOPHIL # BLD AUTO: 0 K/UL — SIGNIFICANT CHANGE UP (ref 0–0.5)
EOSINOPHIL NFR BLD AUTO: 0 % — SIGNIFICANT CHANGE UP (ref 0–6)
GLUCOSE SERPL-MCNC: 126 MG/DL — HIGH (ref 70–99)
HCT VFR BLD CALC: 39.7 % — SIGNIFICANT CHANGE UP (ref 34.5–45)
HGB BLD-MCNC: 13.2 G/DL — SIGNIFICANT CHANGE UP (ref 11.5–15.5)
IMM GRANULOCYTES NFR BLD AUTO: 0.5 % — SIGNIFICANT CHANGE UP (ref 0–0.9)
LYMPHOCYTES # BLD AUTO: 1.46 K/UL — SIGNIFICANT CHANGE UP (ref 1–3.3)
LYMPHOCYTES # BLD AUTO: 22.1 % — SIGNIFICANT CHANGE UP (ref 13–44)
MAGNESIUM SERPL-MCNC: 2 MG/DL — SIGNIFICANT CHANGE UP (ref 1.6–2.6)
MCHC RBC-ENTMCNC: 27.3 PG — SIGNIFICANT CHANGE UP (ref 27–34)
MCHC RBC-ENTMCNC: 33.2 G/DL — SIGNIFICANT CHANGE UP (ref 32–36)
MCV RBC AUTO: 82.2 FL — SIGNIFICANT CHANGE UP (ref 80–100)
MONOCYTES # BLD AUTO: 0.33 K/UL — SIGNIFICANT CHANGE UP (ref 0–0.9)
MONOCYTES NFR BLD AUTO: 5 % — SIGNIFICANT CHANGE UP (ref 2–14)
NEUTROPHILS # BLD AUTO: 4.79 K/UL — SIGNIFICANT CHANGE UP (ref 1.8–7.4)
NEUTROPHILS NFR BLD AUTO: 72.4 % — SIGNIFICANT CHANGE UP (ref 43–77)
NRBC BLD AUTO-RTO: 0 /100 WBCS — SIGNIFICANT CHANGE UP (ref 0–0)
PHOSPHATE SERPL-MCNC: 2.6 MG/DL — SIGNIFICANT CHANGE UP (ref 2.5–4.5)
PLATELET # BLD AUTO: 247 K/UL — SIGNIFICANT CHANGE UP (ref 150–400)
POTASSIUM SERPL-MCNC: 3.9 MMOL/L — SIGNIFICANT CHANGE UP (ref 3.5–5.3)
POTASSIUM SERPL-SCNC: 3.9 MMOL/L — SIGNIFICANT CHANGE UP (ref 3.5–5.3)
PROCALCITONIN SERPL-MCNC: 0.03 NG/ML — SIGNIFICANT CHANGE UP (ref 0.02–0.1)
PROT SERPL-MCNC: 7.1 G/DL — SIGNIFICANT CHANGE UP (ref 6–8.3)
RBC # BLD: 4.83 M/UL — SIGNIFICANT CHANGE UP (ref 3.8–5.2)
RBC # FLD: 14.4 % — SIGNIFICANT CHANGE UP (ref 10.3–14.5)
SODIUM SERPL-SCNC: 136 MMOL/L — SIGNIFICANT CHANGE UP (ref 135–145)
WBC # BLD: 6.61 K/UL — SIGNIFICANT CHANGE UP (ref 3.8–10.5)
WBC # FLD AUTO: 6.61 K/UL — SIGNIFICANT CHANGE UP (ref 3.8–10.5)

## 2025-04-02 RX ORDER — INFLUENZA A VIRUS A/IDAHO/07/2018 (H1N1) ANTIGEN (MDCK CELL DERIVED, PROPIOLACTONE INACTIVATED, INFLUENZA A VIRUS A/INDIANA/08/2018 (H3N2) ANTIGEN (MDCK CELL DERIVED, PROPIOLACTONE INACTIVATED), INFLUENZA B VIRUS B/SINGAPORE/INFTT-16-0610/2016 ANTIGEN (MDCK CELL DERIVED, PROPIOLACTONE INACTIVATED), INFLUENZA B VIRUS B/IOWA/06/2017 ANTIGEN (MDCK CELL DERIVED, PROPIOLACTONE INACTIVATED) 15; 15; 15; 15 UG/.5ML; UG/.5ML; UG/.5ML; UG/.5ML
0.5 INJECTION, SUSPENSION INTRAMUSCULAR ONCE
Refills: 0 | Status: DISCONTINUED | OUTPATIENT
Start: 2025-04-02 | End: 2025-04-04

## 2025-04-02 RX ADMIN — APIXABAN 2.5 MILLIGRAM(S): 2.5 TABLET, FILM COATED ORAL at 17:54

## 2025-04-02 RX ADMIN — APIXABAN 2.5 MILLIGRAM(S): 2.5 TABLET, FILM COATED ORAL at 06:58

## 2025-04-02 RX ADMIN — Medication 100 MILLIGRAM(S): at 06:58

## 2025-04-02 RX ADMIN — IPRATROPIUM BROMIDE AND ALBUTEROL SULFATE 3 MILLILITER(S): .5; 2.5 SOLUTION RESPIRATORY (INHALATION) at 20:12

## 2025-04-02 RX ADMIN — Medication 100 MILLIGRAM(S): at 22:03

## 2025-04-02 RX ADMIN — Medication 1 DOSE(S): at 22:05

## 2025-04-02 RX ADMIN — Medication 100 MILLIGRAM(S): at 13:24

## 2025-04-02 RX ADMIN — FOLIC ACID 1 MILLIGRAM(S): 1 TABLET ORAL at 11:33

## 2025-04-02 RX ADMIN — IPRATROPIUM BROMIDE AND ALBUTEROL SULFATE 3 MILLILITER(S): .5; 2.5 SOLUTION RESPIRATORY (INHALATION) at 15:24

## 2025-04-02 RX ADMIN — Medication 1 DOSE(S): at 12:26

## 2025-04-02 RX ADMIN — IPRATROPIUM BROMIDE AND ALBUTEROL SULFATE 3 MILLILITER(S): .5; 2.5 SOLUTION RESPIRATORY (INHALATION) at 08:43

## 2025-04-02 NOTE — PROGRESS NOTE ADULT - SUBJECTIVE AND OBJECTIVE BOX
NP Note discussed with  Primary Attending    Patient is a 39y old  Female who presents with a chief complaint of Sepsis 2/2 pneumonia (01 Apr 2025 21:09).  Seen and examined at bedside, still needs O2 N/C 3L, intermittent cough however states she is feeling better with no further fevers.  Continued plan of care discussed with pt. who understands and agrees with plan.      INTERVAL HPI/OVERNIGHT EVENTS: no new complaints    MEDICATIONS  (STANDING):  albuterol/ipratropium for Nebulization 3 milliLiter(s) Nebulizer every 6 hours  apixaban 2.5 milliGRAM(s) Oral every 12 hours  benzonatate 100 milliGRAM(s) Oral three times a day  fluticasone propionate/ salmeterol 250-50 MICROgram(s) Diskus 1 Dose(s) Inhalation two times a day  folic acid 1 milliGRAM(s) Oral daily  influenza   Vaccine 0.5 milliLiter(s) IntraMuscular once  levoFLOXacin IVPB 750 milliGRAM(s) IV Intermittent every 24 hours    MEDICATIONS  (PRN):  acetaminophen     Tablet .. 650 milliGRAM(s) Oral every 6 hours PRN Temp greater or equal to 38C (100.4F), Mild Pain (1 - 3)  aluminum hydroxide/magnesium hydroxide/simethicone Suspension 30 milliLiter(s) Oral every 4 hours PRN Dyspepsia  melatonin 3 milliGRAM(s) Oral at bedtime PRN Insomnia  ondansetron Injectable 4 milliGRAM(s) IV Push every 8 hours PRN Nausea and/or Vomiting      __________________________________________________  REVIEW OF SYSTEMS:  "I'm feeling better today"    CONSTITUTIONAL: No fever,   EYES: no acute visual disturbances  NECK: No pain or stiffness  RESPIRATORY: No cough; No shortness of breath  CARDIOVASCULAR: No chest pain, no palpitations  GASTROINTESTINAL: No pain. No nausea or vomiting; No diarrhea   NEUROLOGICAL: No headache or numbness, no tremors  MUSCULOSKELETAL: No joint pain, no muscle pain  GENITOURINARY: no dysuria, no frequency, no hesitancy  PSYCHIATRY: no depression , no anxiety  ALL OTHER  ROS negative        Vital Signs Last 24 Hrs  T(C): 36.3 (02 Apr 2025 05:05), Max: 37.2 (01 Apr 2025 13:47)  T(F): 97.4 (02 Apr 2025 05:05), Max: 98.9 (01 Apr 2025 13:47)  HR: 71 (02 Apr 2025 05:05) (71 - 112)  BP: 114/70 (02 Apr 2025 05:05) (114/68 - 140/93)  BP(mean): --  RR: 18 (02 Apr 2025 05:05) (18 - 20)  SpO2: 93% (02 Apr 2025 05:05) (90% - 96%)    Parameters below as of 02 Apr 2025 05:05  Patient On (Oxygen Delivery Method): nasal cannula  O2 Flow (L/min): 3      ________________________________________________  PHYSICAL EXAM:  Well developed  GENERAL: NAD  HEENT: Normocephalic;  conjunctivae and sclerae clear; moist mucous membranes;   NECK : supple  CHEST/LUNG: Clear to auscultation bilaterally with good air entry   HEART: S1 S2  regular; no murmurs, gallops or rubs  ABDOMEN: Soft, Nontender, Nondistended; Bowel sounds present  EXTREMITIES: no cyanosis; no edema; no calf tenderness  SKIN: warm and dry; no rash  NERVOUS SYSTEM:  Awake and alert; Oriented  to place, person and time ; no new deficits    _________________________________________________  LABS:                        13.2   6.61  )-----------( 247      ( 02 Apr 2025 06:15 )             39.7     04-02    136  |  105  |  10  ----------------------------<  126[H]  3.9   |  23  |  0.59    Ca    8.7      02 Apr 2025 06:15  Phos  2.6     04-02  Mg     2.0     04-02    TPro  7.1  /  Alb  3.3[L]  /  TBili  0.2  /  DBili  x   /  AST  17  /  ALT  20  /  AlkPhos  150[H]  04-02    PT/INR - ( 01 Apr 2025 14:37 )   PT: 14.4 sec;   INR: 1.24 ratio         PTT - ( 01 Apr 2025 14:37 )  PTT:31.7 sec  Urinalysis Basic - ( 02 Apr 2025 06:15 )    Color: x / Appearance: x / SG: x / pH: x  Gluc: 126 mg/dL / Ketone: x  / Bili: x / Urobili: x   Blood: x / Protein: x / Nitrite: x   Leuk Esterase: x / RBC: x / WBC x   Sq Epi: x / Non Sq Epi: x / Bacteria: x      CAPILLARY BLOOD GLUCOSE    RADIOLOGY & ADDITIONAL TESTS:      < from: CT Angio Chest PE Protocol w/ IV Cont (04.01.25 @ 17:03) >    ACC: [id number] EXAM:  CT ANGIO CHEST PULM ART [facility]   ORDERED BY:   EMBER FERNANDEZ     PROCEDURE DATE:  04/01/2025          INTERPRETATION:  INDICATION: Chest pain, shortness of breath.    CT angiogram of the chest was performed following intravenous   administration of 50 cc of Omnipaque-350. 50 cc of contrast was   discarded. MIP images are submitted.    COMPARISON: Chest CT of January 1, 2025.    No pulmonary arterial emboli.    LYMPH NODES/MEDIASTINUM: No lymphadenopathy.    HEART: Normal heart size. No pericardial effusion.    UPPER ABDOMEN: Unremarkable.    LUNGS/PLEURA: No pleural effusions.    Respiratory motion artifact.    Bilateral mid to lower lung numerous ill-defined nodular opacities, most   numerous within the lower lobes, many of which have a groundglass or   clustered appearance predominantly new since January 1, 2025.   Consolidation within the medial segment of the right middle lobe mildly   progressed since January 1, 2025 suggestive of atelectasis possibly with   superimposed infection.    CHEST WALL: No significant bony abnormality.    IMPRESSION:    No pulmonary arterial emboli.    Bilateral lower lung predominant numerous nodular opacities predominantly   new since January 1, 2025 likely pneumonia with endobronchial spread. A 3   month follow-up noncontrast chest CT is recommended to ensure resolution.    --- End of Report ---    < end of copied text >      Imaging Personally Reviewed:  YES/NO    Consultant(s) Notes Reviewed:   YES/ No    Care Discussed with Consultants :     Plan of care was discussed with patient and /or primary care giver; all questions and concerns were addressed and care was aligned with patient's wishes.

## 2025-04-02 NOTE — PHARMACOTHERAPY INTERVENTION NOTE - COMMENTS
IV Levofloxacin 750mg entered for PNA (the pt has PNC allergy-unknown type) in 38y/o F, as pregnancy test came positive and FQ is not recommended agent to use, the recommendation is to substitute by Azithromycin or Ceftriaxone (low potential to PNC cross-sensitivity if CAP, if HAP tx required, to get ID consult
IV Levofloxacin 750mg for PNA, day #2, recommended po switch  starting tomorrow as pt qualifies
IV Levofloxacin 750mg for PNA entered for 365 days, recommended to limit to 5 days and  perform po switch (enter PO startin tomorrow for 3 more days)

## 2025-04-02 NOTE — PATIENT PROFILE ADULT - FALL HARM RISK - UNIVERSAL INTERVENTIONS
Bed in lowest position, wheels locked, appropriate side rails in place/Call bell, personal items and telephone in reach/Instruct patient to call for assistance before getting out of bed or chair/Non-slip footwear when patient is out of bed/[place] to call system/Physically safe environment - no spills, clutter or unnecessary equipment/Purposeful Proactive Rounding/Room/bathroom lighting operational, light cord in reach

## 2025-04-02 NOTE — PROGRESS NOTE ADULT - ASSESSMENT
39y/F, PMH of  Ehler-Danlos syndrome, Fibromyalgia, Rheumatoid arthritis, Sjogren syndrome, ILD (on methotrexate and Simponi infusion, last infusion 10 days ago), pulmonary embolism on Eliquis presents with fever and cough x 4 days. Meets sepsis criteria and hypoxic to 90 on RA.  CT chest: Bilateral lower lung predominant numerous nodular opacities, likely pneumonia. Admitted for sepsis with AHRF 2/2 pneumonia.  Pt. was started on Levaquin (PCN All),  bronchodilation and pulmonary toileting.  ID Dr. Jacob consulted.

## 2025-04-02 NOTE — CONSULT NOTE ADULT - SUBJECTIVE AND OBJECTIVE BOX
HPI:  39y/F, PMH of Ehler-Danlos syndrome, Fibromyalgia, Rheumatoid arthritis, Sjogren syndrome, ILD (on methotrexate and Simponi infusion, last infusion 10 days ago), pulmonary embolism on Eliquis presents with 4 days of  fever, max temp was 101 associated with dry cough and malaise. States she checked her sats at home and was 90% on RA. Patient went to urgent care today, she was wheezing and hypoxic to 90% on RA at rest and was told to go to ED. Endorses chest tightness more on the right side, not present at the time of exam. Denies any SOB. Denies headache, abd pain, nausea and vomiting, urinary symptoms, diarrhea or constipation. Denies any sick contact or recent travel.    (2025 21:09)      PAST MEDICAL & SURGICAL HISTORY:  Rheumatoid arthritis      Fibromyalgia      Asthma      PE (pulmonary thromboembolism)      H/O antiphospholipid syndrome      Premature ovarian failure      Lung abnormality      Herniated cervical disc      Pau-Danlos disease      H/O  section      Plantar fasciitis, left  -s/p fasciotomy      S/P tubal ligation      S/P D&C (status post dilation and curettage)          penicillin (Anaphylaxis)  amoxicillin (Hives)      Meds:  acetaminophen     Tablet .. 650 milliGRAM(s) Oral every 6 hours PRN  albuterol/ipratropium for Nebulization 3 milliLiter(s) Nebulizer every 6 hours  aluminum hydroxide/magnesium hydroxide/simethicone Suspension 30 milliLiter(s) Oral every 4 hours PRN  apixaban 2.5 milliGRAM(s) Oral every 12 hours  benzonatate 100 milliGRAM(s) Oral three times a day  fluticasone propionate/ salmeterol 250-50 MICROgram(s) Diskus 1 Dose(s) Inhalation two times a day  folic acid 1 milliGRAM(s) Oral daily  influenza   Vaccine 0.5 milliLiter(s) IntraMuscular once  levoFLOXacin IVPB 750 milliGRAM(s) IV Intermittent every 24 hours  melatonin 3 milliGRAM(s) Oral at bedtime PRN  ondansetron Injectable 4 milliGRAM(s) IV Push every 8 hours PRN      SOCIAL HISTORY:  Smoker:  YES / NO        PACK YEARS:                         WHEN QUIT?  ETOH use:  YES / NO               FREQUENCY / QUANTITY:  Ilicit Drug use:  YES / NO  Occupation:  Assisted device use (Cane / Walker):  Live with:    FAMILY HISTORY:  FH: hypertension    Family history of renal cancer (Sibling)        VITALS:  Vital Signs Last 24 Hrs  T(C): 36.4 (2025 13:54), Max: 37.1 (2025 18:45)  T(F): 97.5 (2025 13:54), Max: 98.7 (2025 18:45)  HR: 85 (:54) (71 - 91)  BP: 129/80 (:54) (114/70 - 129/80)  BP(mean): --  RR: 18 (:54) (18 - 20)  SpO2: 96% (:54) (93% - 96%)    Parameters below as of 2025 13:54  Patient On (Oxygen Delivery Method): nasal cannula  O2 Flow (L/min): 3      LABS/DIAGNOSTIC TESTS:                          13.2   6.61  )-----------( 247      ( 2025 06:15 )             39.7     WBC Count: 6.61 K/uL ( @ 06:15)  WBC Count: 11.09 K/uL ( @ 14:37)      -    136  |  105  |  10  ----------------------------<  126[H]  3.9   |  23  |  0.59    Ca    8.7      2025 06:15  Phos  2.6     04-02  Mg     2.0     -    TPro  7.1  /  Alb  3.3[L]  /  TBili  0.2  /  DBili  x   /  AST  17  /  ALT  20  /  AlkPhos  150[H]  -      Urinalysis with Rflx Culture (25 @ 16:40)   Urine Appearance: Clear  Color: Yellow  Specific Gravity: 1.014  pH Urine: 5.5  Protein, Urine: Negative mg/dL  Glucose Qualitative, Urine: Negative mg/dL  Ketone - Urine: 40 mg/dL  Blood, Urine: Negative  Bilirubin: Negative  Urobilinogen: 0.2 mg/dL  Leukocyte Esterase Concentration: Negative  Nitrite: Negative          LIVER FUNCTIONS - ( 2025 06:15 )  Alb: 3.3 g/dL / Pro: 7.1 g/dL / ALK PHOS: 150 U/L / ALT: 20 U/L DA / AST: 17 U/L / GGT: x             PT/INR - ( 2025 14:37 )   PT: 14.4 sec;   INR: 1.24 ratio         PTT - ( 2025 14:37 )  PTT:31.7 sec    LACTATE:    ABG -     CULTURES:       RADIOLOGY:< from: CT Angio Chest PE Protocol w/ IV Cont (25 @ 17:03) >  ACC: [id number] EXAM:  CT ANGIO CHEST PULM ART [facility]   ORDERED BY:   EMBER FERNANDEZ     PROCEDURE DATE:  2025          INTERPRETATION:  INDICATION: Chest pain, shortness of breath.    CT angiogram of the chest was performed following intravenous   administration of 50 cc of Omnipaque-350. 50 cc of contrast was   discarded. MIP images are submitted.    COMPARISON: Chest CT of 2025.    No pulmonary arterial emboli.    LYMPH NODES/MEDIASTINUM: No lymphadenopathy.    HEART: Normal heart size. No pericardial effusion.    UPPER ABDOMEN: Unremarkable.    LUNGS/PLEURA: No pleural effusions.    Respiratory motion artifact.    Bilateral mid to lower lung numerous ill-defined nodular opacities, most   numerous within the lower lobes, many of which have a groundglass or   clustered appearance predominantly new since 2025.   Consolidation within the medial segment of the right middle lobe mildly   progressed since 2025 suggestive of atelectasis possibly with   superimposed infection.    CHEST WALL: No significant bony abnormality.    IMPRESSION:    No pulmonary arterial emboli.    Bilateral lower lung predominant numerous nodular opacities predominantly   new since 2025 likely pneumonia with endobronchial spread. A 3   month follow-up noncontrast chest CT is recommended to ensure resolution.    --- End of Report ---            LOKI MCKEON MD; Attending Radiologist  This document has been electronically signed. 2025  5:44PM    < end of copied text >        ROS  [  ] UNABLE TO ELICIT               HPI:  39y/F, PMH of Ehler-Danlos syndrome, Fibromyalgia, Rheumatoid arthritis, Sjogren syndrome, ILD (on methotrexate and Simponi infusion, last infusion 10 days ago), pulmonary embolism on Eliquis presents with 4 days of  fever, max temp was 101 associated with dry cough and malaise. States she checked her sats at home and was 90% on RA. Patient went to urgent care today, she was wheezing and hypoxic to 90% on RA at rest and was told to go to ED. Endorses chest tightness more on the right side, not present at the time of exam. Denies any SOB. Denies headache, abd pain, nausea and vomiting, urinary symptoms, diarrhea or constipation. Denies any sick contact or recent travel.    (2025 21:09)        History as above, asked to see this patient who presents from home with fevers, chills and malaise and a dry cough and mild hypoxia at home all starting about 4 days prior to admission and became worse prompting her to come here for treatment. She was found to have nodular densities in both lungs along with some ground glass infiltrates. She is currently off her oxygen and her pulse ox is 93%. She has a slightly elevated HCG level but has been this way for months and has been worked up for it in the outpatient. She is currently on Levaquin for her Pneumonia.        PAST MEDICAL & SURGICAL HISTORY:  Rheumatoid arthritis      Fibromyalgia      Asthma      PE (pulmonary thromboembolism)      H/O antiphospholipid syndrome      Premature ovarian failure      Lung abnormality      Herniated cervical disc      Pau-Danlos disease      H/O  section      Plantar fasciitis, left  -s/p fasciotomy      S/P tubal ligation      S/P D&C (status post dilation and curettage)          penicillin (Anaphylaxis)  amoxicillin (Hives)      Meds:  acetaminophen     Tablet .. 650 milliGRAM(s) Oral every 6 hours PRN  albuterol/ipratropium for Nebulization 3 milliLiter(s) Nebulizer every 6 hours  aluminum hydroxide/magnesium hydroxide/simethicone Suspension 30 milliLiter(s) Oral every 4 hours PRN  apixaban 2.5 milliGRAM(s) Oral every 12 hours  benzonatate 100 milliGRAM(s) Oral three times a day  fluticasone propionate/ salmeterol 250-50 MICROgram(s) Diskus 1 Dose(s) Inhalation two times a day  folic acid 1 milliGRAM(s) Oral daily  influenza   Vaccine 0.5 milliLiter(s) IntraMuscular once  levoFLOXacin IVPB 750 milliGRAM(s) IV Intermittent every 24 hours  melatonin 3 milliGRAM(s) Oral at bedtime PRN  ondansetron Injectable 4 milliGRAM(s) IV Push every 8 hours PRN      SOCIAL HISTORY:  Smoker:  no  ETOH use:  no  Ilicit Drug use:  no    FAMILY HISTORY:  FH: hypertension    Family history of renal cancer (Sibling)        VITALS:  Vital Signs Last 24 Hrs  T(C): 36.4 (2025 13:54), Max: 37.1 (2025 18:45)  T(F): 97.5 (2025 13:54), Max: 98.7 (2025 18:45)  HR: 85 (2025 13:54) (71 - 91)  BP: 129/80 (2025 13:54) (114/70 - 129/80)  BP(mean): --  RR: 18 (2025 13:54) (18 - 20)  SpO2: 96% (2025 13:54) (93% - 96%)    Parameters below as of 2025 13:54  Patient On (Oxygen Delivery Method): nasal cannula  O2 Flow (L/min): 3      LABS/DIAGNOSTIC TESTS:                          13.2   6.61  )-----------( 247      ( 2025 06:15 )             39.7     WBC Count: 6.61 K/uL ( @ 06:15)  WBC Count: 11.09 K/uL ( @ 14:37)          136  |  105  |  10  ----------------------------<  126[H]  3.9   |  23  |  0.59    Ca    8.7      2025 06:15  Phos  2.6     04-  Mg     2.0     -    TPro  7.1  /  Alb  3.3[L]  /  TBili  0.2  /  DBili  x   /  AST  17  /  ALT  20  /  AlkPhos  150[H]  -      Urinalysis with Rflx Culture (25 @ 16:40)   Urine Appearance: Clear  Color: Yellow  Specific Gravity: 1.014  pH Urine: 5.5  Protein, Urine: Negative mg/dL  Glucose Qualitative, Urine: Negative mg/dL  Ketone - Urine: 40 mg/dL  Blood, Urine: Negative  Bilirubin: Negative  Urobilinogen: 0.2 mg/dL  Leukocyte Esterase Concentration: Negative  Nitrite: Negative          LIVER FUNCTIONS - ( 2025 06:15 )  Alb: 3.3 g/dL / Pro: 7.1 g/dL / ALK PHOS: 150 U/L / ALT: 20 U/L DA / AST: 17 U/L / GGT: x             PT/INR - ( 2025 14:37 )   PT: 14.4 sec;   INR: 1.24 ratio         PTT - ( 2025 14:37 )  PTT:31.7 sec    LACTATE:    ABG -     CULTURES:       RADIOLOGY:< from: CT Angio Chest PE Protocol w/ IV Cont (25 @ 17:03) >  ACC: [id number] EXAM:  CT ANGIO CHEST PULM ART [facility]   ORDERED BY:   EMBER FERNANDEZ     PROCEDURE DATE:  2025          INTERPRETATION:  INDICATION: Chest pain, shortness of breath.    CT angiogram of the chest was performed following intravenous   administration of 50 cc of Omnipaque-350. 50 cc of contrast was   discarded. MIP images are submitted.    COMPARISON: Chest CT of 2025.    No pulmonary arterial emboli.    LYMPH NODES/MEDIASTINUM: No lymphadenopathy.    HEART: Normal heart size. No pericardial effusion.    UPPER ABDOMEN: Unremarkable.    LUNGS/PLEURA: No pleural effusions.    Respiratory motion artifact.    Bilateral mid to lower lung numerous ill-defined nodular opacities, most   numerous within the lower lobes, many of which have a groundglass or   clustered appearance predominantly new since 2025.   Consolidation within the medial segment of the right middle lobe mildly   progressed since 2025 suggestive of atelectasis possibly with   superimposed infection.    CHEST WALL: No significant bony abnormality.    IMPRESSION:    No pulmonary arterial emboli.    Bilateral lower lung predominant numerous nodular opacities predominantly   new since 2025 likely pneumonia with endobronchial spread. A 3   month follow-up noncontrast chest CT is recommended to ensure resolution.    --- End of Report ---            LOKI MCKEON MD; Attending Radiologist  This document has been electronically signed. 2025  5:44PM    < end of copied text >        ROS  [  ] UNABLE TO ELICIT

## 2025-04-02 NOTE — PROGRESS NOTE ADULT - SUBJECTIVE AND OBJECTIVE BOX
pt seen nad examine  d    INTERVAL HPI/OVERNIGHT EVENTS: no new complaints    MEDICATIONS  (STANDING):  albuterol/ipratropium for Nebulization 3 milliLiter(s) Nebulizer every 6 hours  apixaban 2.5 milliGRAM(s) Oral every 12 hours  benzonatate 100 milliGRAM(s) Oral three times a day  fluticasone propionate/ salmeterol 250-50 MICROgram(s) Diskus 1 Dose(s) Inhalation two times a day  folic acid 1 milliGRAM(s) Oral daily  influenza   Vaccine 0.5 milliLiter(s) IntraMuscular once  levoFLOXacin IVPB 750 milliGRAM(s) IV Intermittent every 24 hours    MEDICATIONS  (PRN):  acetaminophen     Tablet .. 650 milliGRAM(s) Oral every 6 hours PRN Temp greater or equal to 38C (100.4F), Mild Pain (1 - 3)  aluminum hydroxide/magnesium hydroxide/simethicone Suspension 30 milliLiter(s) Oral every 4 hours PRN Dyspepsia  melatonin 3 milliGRAM(s) Oral at bedtime PRN Insomnia  ondansetron Injectable 4 milliGRAM(s) IV Push every 8 hours PRN Nausea and/or Vomiting      __________________________________________________  REVIEW OF SYSTEMS:  "I'm feeling better today"    CONSTITUTIONAL: No fever,   EYES: no acute visual disturbances  NECK: No pain or stiffness  RESPIRATORY: No cough; No shortness of breath  CARDIOVASCULAR: No chest pain, no palpitations  GASTROINTESTINAL: No pain. No nausea or vomiting; No diarrhea   NEUROLOGICAL: No headache or numbness, no tremors  MUSCULOSKELETAL: No joint pain, no muscle pain  GENITOURINARY: no dysuria, no frequency, no hesitancy  PSYCHIATRY: no depression , no anxiety  ALL OTHER  ROS negative        Vital Signs Last 24 Hrs  T(C): 36.3 (02 Apr 2025 05:05), Max: 37.2 (01 Apr 2025 13:47)  T(F): 97.4 (02 Apr 2025 05:05), Max: 98.9 (01 Apr 2025 13:47)  HR: 71 (02 Apr 2025 05:05) (71 - 112)  BP: 114/70 (02 Apr 2025 05:05) (114/68 - 140/93)  BP(mean): --  RR: 18 (02 Apr 2025 05:05) (18 - 20)  SpO2: 93% (02 Apr 2025 05:05) (90% - 96%)    Parameters below as of 02 Apr 2025 05:05  Patient On (Oxygen Delivery Method): nasal cannula  O2 Flow (L/min): 3      ________________________________________________  PHYSICAL EXAM:  Well developed  GENERAL: NAD  HEENT: Normocephalic;  conjunctivae and sclerae clear; moist mucous membranes;   NECK : supple  CHEST/LUNG: dec breath sounds at bases  HEART: S1 S2  +  ABDOMEN: Soft, Nontender, Nondistended; Bowel sounds present  EXTREMITIES: no cyanosis; no edema; no calf tenderness  SKIN: warm and dry; no rash  NERVOUS SYSTEM:  Awake and alert;   _________________________________________________  LABS:                        13.2   6.61  )-----------( 247      ( 02 Apr 2025 06:15 )             39.7     04-02    136  |  105  |  10  ----------------------------<  126[H]  3.9   |  23  |  0.59    Ca    8.7      02 Apr 2025 06:15  Phos  2.6     04-02  Mg     2.0     04-02    TPro  7.1  /  Alb  3.3[L]  /  TBili  0.2  /  DBili  x   /  AST  17  /  ALT  20  /  AlkPhos  150[H]  04-02    PT/INR - ( 01 Apr 2025 14:37 )   PT: 14.4 sec;   INR: 1.24 ratio         PTT - ( 01 Apr 2025 14:37 )  PTT:31.7 sec  Urinalysis Basic - ( 02 Apr 2025 06:15 )    Color: x / Appearance: x / SG: x / pH: x  Gluc: 126 mg/dL / Ketone: x  / Bili: x / Urobili: x   Blood: x / Protein: x / Nitrite: x   Leuk Esterase: x / RBC: x / WBC x   Sq Epi: x / Non Sq Epi: x / Bacteria: x      CAPILLARY BLOOD GLUCOSE    RADIOLOGY & ADDITIONAL TESTS:      < from: CT Angio Chest PE Protocol w/ IV Cont (04.01.25 @ 17:03) >    ACC: [id number] EXAM:  CT ANGIO CHEST PULM ART [facility]   ORDERED BY:   EMBER FERNANDEZ     PROCEDURE DATE:  04/01/2025          INTERPRETATION:  INDICATION: Chest pain, shortness of breath.    CT angiogram of the chest was performed following intravenous   administration of 50 cc of Omnipaque-350. 50 cc of contrast was   discarded. MIP images are submitted.    COMPARISON: Chest CT of January 1, 2025.    No pulmonary arterial emboli.    LYMPH NODES/MEDIASTINUM: No lymphadenopathy.    HEART: Normal heart size. No pericardial effusion.    UPPER ABDOMEN: Unremarkable.    LUNGS/PLEURA: No pleural effusions.    Respiratory motion artifact.    Bilateral mid to lower lung numerous ill-defined nodular opacities, most   numerous within the lower lobes, many of which have a groundglass or   clustered appearance predominantly new since January 1, 2025.   Consolidation within the medial segment of the right middle lobe mildly   progressed since January 1, 2025 suggestive of atelectasis possibly with   superimposed infection.    CHEST WALL: No significant bony abnormality.    IMPRESSION:    No pulmonary arterial emboli.    Bilateral lower lung predominant numerous nodular opacities predominantly   new since January 1, 2025 likely pneumonia with endobronchial spread. A 3   month follow-up noncontrast chest CT is recommended to ensure resolution.    --- End of Report ---    < end of copied text >      Imaging Personally Reviewed:  YES/NO    Consultant(s) Notes Reviewed:   YES/ No    Care Discussed with Consultants :     Plan of care was discussed with patient and /or primary care giver; all questions and concerns were addressed and care was aligned with patient's wishes.

## 2025-04-03 ENCOUNTER — TRANSCRIPTION ENCOUNTER (OUTPATIENT)
Age: 40
End: 2025-04-03

## 2025-04-03 DIAGNOSIS — Z75.8 OTHER PROBLEMS RELATED TO MEDICAL FACILITIES AND OTHER HEALTH CARE: ICD-10-CM

## 2025-04-03 LAB
ANION GAP SERPL CALC-SCNC: 7 MMOL/L — SIGNIFICANT CHANGE UP (ref 5–17)
BUN SERPL-MCNC: 14 MG/DL — SIGNIFICANT CHANGE UP (ref 7–18)
CALCIUM SERPL-MCNC: 8.4 MG/DL — SIGNIFICANT CHANGE UP (ref 8.4–10.5)
CHLORIDE SERPL-SCNC: 109 MMOL/L — HIGH (ref 96–108)
CO2 SERPL-SCNC: 25 MMOL/L — SIGNIFICANT CHANGE UP (ref 22–31)
CREAT SERPL-MCNC: 0.65 MG/DL — SIGNIFICANT CHANGE UP (ref 0.5–1.3)
EGFR: 114 ML/MIN/1.73M2 — SIGNIFICANT CHANGE UP
EGFR: 114 ML/MIN/1.73M2 — SIGNIFICANT CHANGE UP
GLUCOSE SERPL-MCNC: 109 MG/DL — HIGH (ref 70–99)
HCT VFR BLD CALC: 37 % — SIGNIFICANT CHANGE UP (ref 34.5–45)
HGB BLD-MCNC: 12 G/DL — SIGNIFICANT CHANGE UP (ref 11.5–15.5)
M PNEUMO IGM SER-ACNC: 0.47 INDEX — SIGNIFICANT CHANGE UP (ref 0–0.9)
MCHC RBC-ENTMCNC: 26.9 PG — LOW (ref 27–34)
MCHC RBC-ENTMCNC: 32.4 G/DL — SIGNIFICANT CHANGE UP (ref 32–36)
MCV RBC AUTO: 83 FL — SIGNIFICANT CHANGE UP (ref 80–100)
MRSA PCR RESULT.: SIGNIFICANT CHANGE UP
MYCOPLASMA AG SPEC QL: NEGATIVE — SIGNIFICANT CHANGE UP
NRBC BLD AUTO-RTO: 0 /100 WBCS — SIGNIFICANT CHANGE UP (ref 0–0)
PLATELET # BLD AUTO: 222 K/UL — SIGNIFICANT CHANGE UP (ref 150–400)
POTASSIUM SERPL-MCNC: 3.2 MMOL/L — LOW (ref 3.5–5.3)
POTASSIUM SERPL-SCNC: 3.2 MMOL/L — LOW (ref 3.5–5.3)
RBC # BLD: 4.46 M/UL — SIGNIFICANT CHANGE UP (ref 3.8–5.2)
RBC # FLD: 14.9 % — HIGH (ref 10.3–14.5)
S AUREUS DNA NOSE QL NAA+PROBE: SIGNIFICANT CHANGE UP
SODIUM SERPL-SCNC: 141 MMOL/L — SIGNIFICANT CHANGE UP (ref 135–145)
WBC # BLD: 7.65 K/UL — SIGNIFICANT CHANGE UP (ref 3.8–10.5)
WBC # FLD AUTO: 7.65 K/UL — SIGNIFICANT CHANGE UP (ref 3.8–10.5)

## 2025-04-03 RX ADMIN — APIXABAN 2.5 MILLIGRAM(S): 2.5 TABLET, FILM COATED ORAL at 05:11

## 2025-04-03 RX ADMIN — IPRATROPIUM BROMIDE AND ALBUTEROL SULFATE 3 MILLILITER(S): .5; 2.5 SOLUTION RESPIRATORY (INHALATION) at 02:02

## 2025-04-03 RX ADMIN — Medication 40 MILLIEQUIVALENT(S): at 12:01

## 2025-04-03 RX ADMIN — Medication 100 MILLIGRAM(S): at 13:36

## 2025-04-03 RX ADMIN — Medication 1 DOSE(S): at 21:17

## 2025-04-03 RX ADMIN — IPRATROPIUM BROMIDE AND ALBUTEROL SULFATE 3 MILLILITER(S): .5; 2.5 SOLUTION RESPIRATORY (INHALATION) at 20:12

## 2025-04-03 RX ADMIN — APIXABAN 2.5 MILLIGRAM(S): 2.5 TABLET, FILM COATED ORAL at 18:26

## 2025-04-03 RX ADMIN — Medication 100 MILLIGRAM(S): at 21:17

## 2025-04-03 RX ADMIN — Medication 100 MILLIGRAM(S): at 05:11

## 2025-04-03 RX ADMIN — IPRATROPIUM BROMIDE AND ALBUTEROL SULFATE 3 MILLILITER(S): .5; 2.5 SOLUTION RESPIRATORY (INHALATION) at 09:15

## 2025-04-03 RX ADMIN — FOLIC ACID 1 MILLIGRAM(S): 1 TABLET ORAL at 11:57

## 2025-04-03 RX ADMIN — Medication 1 DOSE(S): at 11:57

## 2025-04-03 NOTE — PROGRESS NOTE ADULT - ASSESSMENT
38y/o F, PMHx of  Ehler-Danlos syndrome, Fibromyalgia, Rheumatoid arthritis, Sjogren syndrome, ILD (on methotrexate and Simponi infusion, last infusion 10 days ago), pulmonary embolism on Eliquis presents with fever and cough x 4 days. Admitted for sepsis with AHRF 2/2 pneumonia.

## 2025-04-03 NOTE — PROGRESS NOTE ADULT - PROBLEM SELECTOR PLAN 3
- has h/o ILD on breo- ellipta  - continue advair (therapeutic interchange)  - continue duoneb q6
has h/o ILD on breo- ellipta  -started advair (therapeutic interchange)  -started duoneb q6
has h/o ILD on breo- ellipta  -started advair (therapeutic interchange)  -started duoneb q6
- has h/o ILD on breo- ellipta  - continue advair (therapeutic interchange)  - continue duoneb q6

## 2025-04-03 NOTE — PROGRESS NOTE ADULT - ASSESSMENT
Pneumonia  Fevers - resolved  Leukocytosis ( mild ) - normalized      Plan - Cont Levaquin 750mgs iv q24hrs.  If doing well will plan to DC home tomorrow or the day after on PO Levaquin 750mg qd till 4/8/25.

## 2025-04-03 NOTE — PROGRESS NOTE ADULT - SUBJECTIVE AND OBJECTIVE BOX
39y Female    Meds:  levoFLOXacin IVPB 750 milliGRAM(s) IV Intermittent every 24 hours    Allergies    penicillin (Anaphylaxis)  amoxicillin (Hives)    Intolerances        VITALS:  Vital Signs Last 24 Hrs  T(C): 36.8 (03 Apr 2025 13:49), Max: 36.8 (03 Apr 2025 13:49)  T(F): 98.3 (03 Apr 2025 13:49), Max: 98.3 (03 Apr 2025 13:49)  HR: 82 (03 Apr 2025 13:49) (70 - 82)  BP: 127/83 (03 Apr 2025 13:49) (113/74 - 127/83)  BP(mean): --  RR: 20 (03 Apr 2025 13:49) (17 - 22)  SpO2: 97% (03 Apr 2025 13:49) (96% - 100%)    Parameters below as of 03 Apr 2025 13:49  Patient On (Oxygen Delivery Method): room air        LABS/DIAGNOSTIC TESTS:                          12.0   7.65  )-----------( 222      ( 03 Apr 2025 05:09 )             37.0         04-03    141  |  109[H]  |  14  ----------------------------<  109[H]  3.2[L]   |  25  |  0.65    Ca    8.4      03 Apr 2025 05:09  Phos  2.6     04-02  Mg     2.0     04-02    TPro  7.1  /  Alb  3.3[L]  /  TBili  0.2  /  DBili  x   /  AST  17  /  ALT  20  /  AlkPhos  150[H]  04-02      LIVER FUNCTIONS - ( 02 Apr 2025 06:15 )  Alb: 3.3 g/dL / Pro: 7.1 g/dL / ALK PHOS: 150 U/L / ALT: 20 U/L DA / AST: 17 U/L / GGT: x             CULTURES: Blood Blood-Peripheral  04-01 @ 14:42   No growth at 24 hours  --  --      Blood Blood-Peripheral  04-01 @ 14:37   No growth at 24 hours  --  --            RADIOLOGY:      ROS:  [  ] UNABLE TO ELICIT 39y Female who is doing a little better today , she a little less coughing and is able to produce some sputum, she has no SOB and no chest pain, no nausea , vomiting or diarrhea. She has no fevers or chills and her WBC count is WNL.    Meds:  levoFLOXacin IVPB 750 milliGRAM(s) IV Intermittent every 24 hours    Allergies    penicillin (Anaphylaxis)  amoxicillin (Hives)    Intolerances        VITALS:  Vital Signs Last 24 Hrs  T(C): 36.8 (03 Apr 2025 13:49), Max: 36.8 (03 Apr 2025 13:49)  T(F): 98.3 (03 Apr 2025 13:49), Max: 98.3 (03 Apr 2025 13:49)  HR: 82 (03 Apr 2025 13:49) (70 - 82)  BP: 127/83 (03 Apr 2025 13:49) (113/74 - 127/83)  BP(mean): --  RR: 20 (03 Apr 2025 13:49) (17 - 22)  SpO2: 97% (03 Apr 2025 13:49) (96% - 100%)    Parameters below as of 03 Apr 2025 13:49  Patient On (Oxygen Delivery Method): room air        LABS/DIAGNOSTIC TESTS:                          12.0   7.65  )-----------( 222      ( 03 Apr 2025 05:09 )             37.0         04-03    141  |  109[H]  |  14  ----------------------------<  109[H]  3.2[L]   |  25  |  0.65    Ca    8.4      03 Apr 2025 05:09  Phos  2.6     04-02  Mg     2.0     04-02    TPro  7.1  /  Alb  3.3[L]  /  TBili  0.2  /  DBili  x   /  AST  17  /  ALT  20  /  AlkPhos  150[H]  04-02      LIVER FUNCTIONS - ( 02 Apr 2025 06:15 )  Alb: 3.3 g/dL / Pro: 7.1 g/dL / ALK PHOS: 150 U/L / ALT: 20 U/L DA / AST: 17 U/L / GGT: x             CULTURES: Blood Blood-Peripheral  04-01 @ 14:42   No growth at 24 hours  --  --      Blood Blood-Peripheral  04-01 @ 14:37   No growth at 24 hours  --  --            RADIOLOGY:      ROS:  [  ] UNABLE TO ELICIT

## 2025-04-03 NOTE — PROGRESS NOTE ADULT - PROBLEM SELECTOR PLAN 5
has h/o PE on Eliquis 2.5 BID  CTA chest: negative for PE  c/w Eliquis
- has h/o PE on Eliquis 2.5 BID  - CTA chest: negative for PE  - c/w Eliquis
has h/o PE on Eliquis 2.5 BID  CTA chest: negative for PE  c/w Eliquis
- has h/o PE on Eliquis 2.5 BID  - CTA chest: negative for PE  - c/w Eliquis

## 2025-04-03 NOTE — PROGRESS NOTE ADULT - PROBLEM SELECTOR PLAN 1
- p/w fever, dry cough and malaise for 4 days  - HR >90, WBC 11k, lactate 1.6, hypoxia to 90% on RA -- met sepsis criteria  - CT chest: b/l LL pneumonia with endobronchial spread  - UA negative, Bcx negative  - Cont Levaquin for now  - f/u atypical pneumonia  - procalcitonin WNL  - weaned off O2 today  - continue IS
2/2 B/L LL PNA  p/w fever, dry cough and malaise for 4 days  HR >90, WBC 11k, lactate 1.6, hypoxia to 90% on RA  meets sepsis criteria  received 2L bolus  CT chest: b/l LL pneumonia  with endobronchial spread  sepsis 2/2 pneumonia  UA negative  Cont Levaquin for now  f/u blood/urine cultures  f/u atypical pneumonia, procalcitonin  wean off O2 as tolerated  incentive spirometry
2/2 B/L LL PNA  p/w fever, dry cough and malaise for 4 days  HR >90, WBC 11k, lactate 1.6, hypoxia to 90% on RA  meets sepsis criteria  received 2L bolus  CT chest: b/l LL pneumonia  with endobronchial spread  sepsis 2/2 pneumonia  UA negative  Cont Levaquin for now  f/u blood/urine cultures  f/u atypical pneumonia, procalcitonin  wean off O2 as tolerated  incentive spirometry
- p/w fever, dry cough and malaise for 4 days  - HR >90, WBC 11k, lactate 1.6, hypoxia to 90% on RA -- met sepsis criteria  - CT chest: b/l LL pneumonia with endobronchial spread  - UA negative, Bcx negative  - Cont Levaquin for now  - f/u atypical pneumonia  - procalcitonin WNL  - weaned off O2 today  - continue IS

## 2025-04-03 NOTE — PROGRESS NOTE ADULT - PROBLEM SELECTOR PLAN 7
from home  likely to DC tomorrow if respiratory symptoms continues to improve
from home  likely to DC tomorrow if respiratory symptoms continues to improve

## 2025-04-03 NOTE — PROGRESS NOTE ADULT - PROBLEM SELECTOR PLAN 2
- CT chest: b/l LL pneumonia with endobronchial spread  - Cont Levaquin for now  - f/u atypical pneumonia  - Off O2
plan as above
- CT chest: b/l LL pneumonia with endobronchial spread  - Cont Levaquin for now  - f/u atypical pneumonia  - Off O2
plan as above

## 2025-04-03 NOTE — PROGRESS NOTE ADULT - PROBLEM SELECTOR PLAN 4
- on MTX every week, simponi infusion every 8 week (last infusion 03/21) and folic acid  - also states she is on Prednisone 20 as needed (does not take it on a regular basis)  - c/w folic acid
- on MTX every week, simponi infusion every 8 week (last infusion 03/21) and folic acid  - also states she is on Prednisone 20 as needed (does not take it on a regular basis)  - c/w folic acid
on MTX every week, simponi infusion every 8 week (last infusion 03/21) and folic acid  also states she is on Prednisone 20 as needed (does not take it )  c/w folic acid
on MTX every week, simponi infusion every 8 week (last infusion 03/21) and folic acid  also states she is on Prednisone 20 as needed (does not take it )  c/w folic acid

## 2025-04-03 NOTE — PROGRESS NOTE ADULT - SUBJECTIVE AND OBJECTIVE BOX
NP Note discussed with  Primary Attending    Patient is a 39y old  Female who presents with a chief complaint of Sepsis 2/2 pneumonia (02 Apr 2025 18:21)      INTERVAL HPI/OVERNIGHT EVENTS:  40 y/o F, PMHx of  Ehler-Danlos syndrome, Fibromyalgia, Rheumatoid arthritis, Sjogren syndrome, ILD (on methotrexate and Simponi infusion, last infusion 10 days ago), pulmonary embolism on Eliquis presents with fever and cough x 4 days. Met sepsis criteria and hypoxic to 90% on RA.  CT chest: Bilateral lower lung predominant numerous nodular opacities, likely pneumonia with endobronchial spread. Admitted for sepsis with AHRF 2/2 pneumonia.  Pt. was started on Levaquin (PCN All),  bronchodilation and pulmonary toileting. ID Dr. Jacob following    Pt is now off oxygen, saturating well. Encouraged the pt to ambulate today, ambulatory sats >95% on RA    MEDICATIONS  (STANDING):  albuterol/ipratropium for Nebulization 3 milliLiter(s) Nebulizer every 6 hours  apixaban 2.5 milliGRAM(s) Oral every 12 hours  benzonatate 100 milliGRAM(s) Oral three times a day  fluticasone propionate/ salmeterol 250-50 MICROgram(s) Diskus 1 Dose(s) Inhalation two times a day  folic acid 1 milliGRAM(s) Oral daily  influenza   Vaccine 0.5 milliLiter(s) IntraMuscular once  levoFLOXacin IVPB 750 milliGRAM(s) IV Intermittent every 24 hours  potassium chloride   Powder 40 milliEquivalent(s) Oral once    MEDICATIONS  (PRN):  acetaminophen     Tablet .. 650 milliGRAM(s) Oral every 6 hours PRN Temp greater or equal to 38C (100.4F), Mild Pain (1 - 3)  aluminum hydroxide/magnesium hydroxide/simethicone Suspension 30 milliLiter(s) Oral every 4 hours PRN Dyspepsia  melatonin 3 milliGRAM(s) Oral at bedtime PRN Insomnia  ondansetron Injectable 4 milliGRAM(s) IV Push every 8 hours PRN Nausea and/or Vomiting      __________________________________________________  REVIEW OF SYSTEMS:    CONSTITUTIONAL: No fever,   EYES: no acute visual disturbances  NECK: No pain or stiffness  RESPIRATORY: No cough; No shortness of breath  CARDIOVASCULAR: No chest pain, no palpitations  GASTROINTESTINAL: No pain. No nausea or vomiting; No diarrhea   NEUROLOGICAL: No headache or numbness, no tremors  MUSCULOSKELETAL: No joint pain, no muscle pain  GENITOURINARY: no dysuria, no frequency, no hesitancy  ALL OTHER  ROS negative        Vital Signs Last 24 Hrs  T(C): 36.6 (03 Apr 2025 05:04), Max: 36.6 (03 Apr 2025 05:04)  T(F): 97.8 (03 Apr 2025 05:04), Max: 97.8 (03 Apr 2025 05:04)  HR: 70 (03 Apr 2025 05:04) (70 - 85)  BP: 113/74 (03 Apr 2025 05:04) (113/74 - 129/80)  BP(mean): --  RR: 22 (03 Apr 2025 11:19) (17 - 22)  SpO2: 96% (03 Apr 2025 11:19) (96% - 100%)    Parameters below as of 03 Apr 2025 11:19  Patient On (Oxygen Delivery Method): room air        ________________________________________________  PHYSICAL EXAM:  GENERAL: NAD  HEENT: Normocephalic; moist mucous membranes;   NECK : supple  CHEST/LUNG: B/L LL rales   HEART: S1 S2  regular;  ABDOMEN: Soft, Nontender, Nondistended; Bowel sounds present  EXTREMITIES: no cyanosis; no edema; no calf tenderness  SKIN: warm and dry; no rash  NERVOUS SYSTEM:  Awake and alert; Oriented to place, person and time    _________________________________________________  LABS:                        12.0   7.65  )-----------( 222      ( 03 Apr 2025 05:09 )             37.0     04-03    141  |  109[H]  |  14  ----------------------------<  109[H]  3.2[L]   |  25  |  0.65    Ca    8.4      03 Apr 2025 05:09  Phos  2.6     04-02  Mg     2.0     04-02    TPro  7.1  /  Alb  3.3[L]  /  TBili  0.2  /  DBili  x   /  AST  17  /  ALT  20  /  AlkPhos  150[H]  04-02    PT/INR - ( 01 Apr 2025 14:37 )   PT: 14.4 sec;   INR: 1.24 ratio         PTT - ( 01 Apr 2025 14:37 )  PTT:31.7 sec  Urinalysis Basic - ( 03 Apr 2025 05:09 )    Color: x / Appearance: x / SG: x / pH: x  Gluc: 109 mg/dL / Ketone: x  / Bili: x / Urobili: x   Blood: x / Protein: x / Nitrite: x   Leuk Esterase: x / RBC: x / WBC x   Sq Epi: x / Non Sq Epi: x / Bacteria: x      CAPILLARY BLOOD GLUCOSE            RADIOLOGY & ADDITIONAL TESTS:  < from: CT Angio Chest PE Protocol w/ IV Cont (04.01.25 @ 17:03) >    ACC: [id number] EXAM:  CT ANGIO CHEST PULM ART WAWIC   ORDERED BY:   EMBER FERNANDEZ     PROCEDURE DATE:  04/01/2025          INTERPRETATION:  INDICATION: Chest pain, shortness of breath.    CT angiogram of the chest was performed following intravenous   administration of 50 cc of Omnipaque-350. 50 cc of contrast was   discarded. MIP images are submitted.    COMPARISON: Chest CT of January 1, 2025.    No pulmonary arterial emboli.    LYMPH NODES/MEDIASTINUM: No lymphadenopathy.    HEART: Normal heart size. No pericardial effusion.    UPPER ABDOMEN: Unremarkable.    LUNGS/PLEURA: No pleural effusions.    Respiratory motion artifact.    Bilateral mid to lower lung numerous ill-defined nodular opacities, most   numerous within the lower lobes, many of which have a groundglass or   clustered appearance predominantly new since January 1, 2025.   Consolidation within the medial segment of the right middle lobe mildly   progressed since January 1, 2025 suggestive of atelectasis possibly with   superimposed infection.    CHEST WALL: No significant bony abnormality.    IMPRESSION:    No pulmonary arterial emboli.    Bilateral lower lung predominant numerous nodular opacities predominantly   new since January 1, 2025 likely pneumonia with endobronchial spread. A 3   month follow-up noncontrast chest CT is recommended to ensure resolution.    --- End of Report ---            LOKI MCKEON MD; Attending Radiologist  This document has been electronically signed. Apr 1 2025  5:44PM    < end of copied text >    Imaging Personally Reviewed:  YES/NO    Consultant(s) Notes Reviewed:   YES/ No    Care Discussed with Consultants :     Plan of care was discussed with patient and /or primary care giver; all questions and concerns were addressed and care was aligned with patient's wishes.

## 2025-04-03 NOTE — PROGRESS NOTE ADULT - SUBJECTIVE AND OBJECTIVE BOX
Patient is a 39y old  Female who presents with a chief complaint of Sepsis 2/2 pneumonia     pt seen and examined    MEDICATIONS  (STANDING):  albuterol/ipratropium for Nebulization 3 milliLiter(s) Nebulizer every 6 hours  apixaban 2.5 milliGRAM(s) Oral every 12 hours  benzonatate 100 milliGRAM(s) Oral three times a day  fluticasone propionate/ salmeterol 250-50 MICROgram(s) Diskus 1 Dose(s) Inhalation two times a day  folic acid 1 milliGRAM(s) Oral daily  influenza   Vaccine 0.5 milliLiter(s) IntraMuscular once  levoFLOXacin IVPB 750 milliGRAM(s) IV Intermittent every 24 hours  potassium chloride   Powder 40 milliEquivalent(s) Oral once    MEDICATIONS  (PRN):  acetaminophen     Tablet .. 650 milliGRAM(s) Oral every 6 hours PRN Temp greater or equal to 38C (100.4F), Mild Pain (1 - 3)  aluminum hydroxide/magnesium hydroxide/simethicone Suspension 30 milliLiter(s) Oral every 4 hours PRN Dyspepsia  melatonin 3 milliGRAM(s) Oral at bedtime PRN Insomnia  ondansetron Injectable 4 milliGRAM(s) IV Push every 8 hours PRN Nausea and/or Vomiting      __________________________________________________  REVIEW OF SYSTEMS:    CONSTITUTIONAL: No fever,   EYES: no acute visual disturbances  NECK: No pain or stiffness  RESPIRATORY: No cough; No shortness of breath  CARDIOVASCULAR: No chest pain, no palpitations  GASTROINTESTINAL: No pain. No nausea or vomiting; No diarrhea   NEUROLOGICAL: No headache or numbness, no tremors  MUSCULOSKELETAL: No joint pain, no muscle pain  GENITOURINARY: no dysuria, no frequency, no hesitancy  ALL OTHER  ROS negative        Vital Signs Last 24 Hrs  T(C): 36.6 (03 Apr 2025 05:04), Max: 36.6 (03 Apr 2025 05:04)  T(F): 97.8 (03 Apr 2025 05:04), Max: 97.8 (03 Apr 2025 05:04)  HR: 70 (03 Apr 2025 05:04) (70 - 85)  BP: 113/74 (03 Apr 2025 05:04) (113/74 - 129/80)  BP(mean): --  RR: 22 (03 Apr 2025 11:19) (17 - 22)  SpO2: 96% (03 Apr 2025 11:19) (96% - 100%)    Parameters below as of 03 Apr 2025 11:19  Patient On (Oxygen Delivery Method): room air        ________________________________________________  PHYSICAL EXAM:  GENERAL: NAD  HEENT: Normocephalic; moist mucous membranes;   NECK : supple  CHEST/LUNG:dec breath sounds at bases  HEART: S1 S2 +  ABDOMEN: Soft, Nontender, Nondistended; Bowel sounds present  EXTREMITIES: edema+  SKIN: warm and dry; no rash  NERVOUS SYSTEM:  Awake and alert;     _________________________________________________  LABS:                        12.0   7.65  )-----------( 222      ( 03 Apr 2025 05:09 )             37.0     04-03    141  |  109[H]  |  14  ----------------------------<  109[H]  3.2[L]   |  25  |  0.65    Ca    8.4      03 Apr 2025 05:09  Phos  2.6     04-02  Mg     2.0     04-02    TPro  7.1  /  Alb  3.3[L]  /  TBili  0.2  /  DBili  x   /  AST  17  /  ALT  20  /  AlkPhos  150[H]  04-02    PT/INR - ( 01 Apr 2025 14:37 )   PT: 14.4 sec;   INR: 1.24 ratio         PTT - ( 01 Apr 2025 14:37 )  PTT:31.7 sec  Urinalysis Basic - ( 03 Apr 2025 05:09 )    Color: x / Appearance: x / SG: x / pH: x  Gluc: 109 mg/dL / Ketone: x  / Bili: x / Urobili: x   Blood: x / Protein: x / Nitrite: x   Leuk Esterase: x / RBC: x / WBC x   Sq Epi: x / Non Sq Epi: x / Bacteria: x      CAPILLARY BLOOD GLUCOSE            RADIOLOGY & ADDITIONAL TESTS:  < from: CT Angio Chest PE Protocol w/ IV Cont (04.01.25 @ 17:03) >    ACC: [id number] EXAM:  CT ANGIO CHEST PULM ART WAWIC   ORDERED BY:   EMBER FERNANDEZ     PROCEDURE DATE:  04/01/2025          INTERPRETATION:  INDICATION: Chest pain, shortness of breath.    CT angiogram of the chest was performed following intravenous   administration of 50 cc of Omnipaque-350. 50 cc of contrast was   discarded. MIP images are submitted.    COMPARISON: Chest CT of January 1, 2025.    No pulmonary arterial emboli.    LYMPH NODES/MEDIASTINUM: No lymphadenopathy.    HEART: Normal heart size. No pericardial effusion.    UPPER ABDOMEN: Unremarkable.    LUNGS/PLEURA: No pleural effusions.    Respiratory motion artifact.    Bilateral mid to lower lung numerous ill-defined nodular opacities, most   numerous within the lower lobes, many of which have a groundglass or   clustered appearance predominantly new since January 1, 2025.   Consolidation within the medial segment of the right middle lobe mildly   progressed since January 1, 2025 suggestive of atelectasis possibly with   superimposed infection.    CHEST WALL: No significant bony abnormality.    IMPRESSION:    No pulmonary arterial emboli.    Bilateral lower lung predominant numerous nodular opacities predominantly   new since January 1, 2025 likely pneumonia with endobronchial spread. A 3   month follow-up noncontrast chest CT is recommended to ensure resolution.    --- End of Report ---            LOKI MCKEON MD; Attending Radiologist  This document has been electronically signed. Apr 1 2025  5:44PM    < end of copied text >    Imaging Personally Reviewed:  YES/NO    Consultant(s) Notes Reviewed:   YES/ No    Care Discussed with Consultants :     Plan of care was discussed with patient and /or primary care giver; all questions and concerns were addressed and care was aligned with patient's wishes.

## 2025-04-04 ENCOUNTER — TRANSCRIPTION ENCOUNTER (OUTPATIENT)
Age: 40
End: 2025-04-04

## 2025-04-04 VITALS
SYSTOLIC BLOOD PRESSURE: 126 MMHG | RESPIRATION RATE: 18 BRPM | OXYGEN SATURATION: 96 % | TEMPERATURE: 98 F | HEART RATE: 82 BPM | DIASTOLIC BLOOD PRESSURE: 79 MMHG

## 2025-04-04 LAB
ALBUMIN SERPL ELPH-MCNC: 3.1 G/DL — LOW (ref 3.5–5)
ALP SERPL-CCNC: 140 U/L — HIGH (ref 40–120)
ALT FLD-CCNC: 18 U/L DA — SIGNIFICANT CHANGE UP (ref 10–60)
ANION GAP SERPL CALC-SCNC: 7 MMOL/L — SIGNIFICANT CHANGE UP (ref 5–17)
AST SERPL-CCNC: 9 U/L — LOW (ref 10–40)
BASOPHILS # BLD AUTO: 0.02 K/UL — SIGNIFICANT CHANGE UP (ref 0–0.2)
BASOPHILS NFR BLD AUTO: 0.3 % — SIGNIFICANT CHANGE UP (ref 0–2)
BILIRUB SERPL-MCNC: 0.2 MG/DL — SIGNIFICANT CHANGE UP (ref 0.2–1.2)
BUN SERPL-MCNC: 12 MG/DL — SIGNIFICANT CHANGE UP (ref 7–18)
CALCIUM SERPL-MCNC: 8.8 MG/DL — SIGNIFICANT CHANGE UP (ref 8.4–10.5)
CHLORIDE SERPL-SCNC: 107 MMOL/L — SIGNIFICANT CHANGE UP (ref 96–108)
CO2 SERPL-SCNC: 24 MMOL/L — SIGNIFICANT CHANGE UP (ref 22–31)
CREAT SERPL-MCNC: 0.65 MG/DL — SIGNIFICANT CHANGE UP (ref 0.5–1.3)
EGFR: 114 ML/MIN/1.73M2 — SIGNIFICANT CHANGE UP
EGFR: 114 ML/MIN/1.73M2 — SIGNIFICANT CHANGE UP
EOSINOPHIL # BLD AUTO: 0.03 K/UL — SIGNIFICANT CHANGE UP (ref 0–0.5)
EOSINOPHIL NFR BLD AUTO: 0.4 % — SIGNIFICANT CHANGE UP (ref 0–6)
GLUCOSE SERPL-MCNC: 104 MG/DL — HIGH (ref 70–99)
HCT VFR BLD CALC: 37.3 % — SIGNIFICANT CHANGE UP (ref 34.5–45)
HGB BLD-MCNC: 12.2 G/DL — SIGNIFICANT CHANGE UP (ref 11.5–15.5)
IMM GRANULOCYTES NFR BLD AUTO: 0.5 % — SIGNIFICANT CHANGE UP (ref 0–0.9)
LEGIONELLA AG UR QL: NEGATIVE — SIGNIFICANT CHANGE UP
LYMPHOCYTES # BLD AUTO: 3.12 K/UL — SIGNIFICANT CHANGE UP (ref 1–3.3)
LYMPHOCYTES # BLD AUTO: 42.4 % — SIGNIFICANT CHANGE UP (ref 13–44)
MAGNESIUM SERPL-MCNC: 2.1 MG/DL — SIGNIFICANT CHANGE UP (ref 1.6–2.6)
MCHC RBC-ENTMCNC: 27.2 PG — SIGNIFICANT CHANGE UP (ref 27–34)
MCHC RBC-ENTMCNC: 32.7 G/DL — SIGNIFICANT CHANGE UP (ref 32–36)
MCV RBC AUTO: 83.1 FL — SIGNIFICANT CHANGE UP (ref 80–100)
MONOCYTES # BLD AUTO: 0.47 K/UL — SIGNIFICANT CHANGE UP (ref 0–0.9)
MONOCYTES NFR BLD AUTO: 6.4 % — SIGNIFICANT CHANGE UP (ref 2–14)
NEUTROPHILS # BLD AUTO: 3.68 K/UL — SIGNIFICANT CHANGE UP (ref 1.8–7.4)
NEUTROPHILS NFR BLD AUTO: 50 % — SIGNIFICANT CHANGE UP (ref 43–77)
NRBC BLD AUTO-RTO: 0 /100 WBCS — SIGNIFICANT CHANGE UP (ref 0–0)
PHOSPHATE SERPL-MCNC: 3.4 MG/DL — SIGNIFICANT CHANGE UP (ref 2.5–4.5)
PLATELET # BLD AUTO: 261 K/UL — SIGNIFICANT CHANGE UP (ref 150–400)
POTASSIUM SERPL-MCNC: 3.5 MMOL/L — SIGNIFICANT CHANGE UP (ref 3.5–5.3)
POTASSIUM SERPL-SCNC: 3.5 MMOL/L — SIGNIFICANT CHANGE UP (ref 3.5–5.3)
PROT SERPL-MCNC: 6.5 G/DL — SIGNIFICANT CHANGE UP (ref 6–8.3)
RBC # BLD: 4.49 M/UL — SIGNIFICANT CHANGE UP (ref 3.8–5.2)
RBC # FLD: 14.9 % — HIGH (ref 10.3–14.5)
S PNEUM AG UR QL: NEGATIVE — SIGNIFICANT CHANGE UP
SODIUM SERPL-SCNC: 138 MMOL/L — SIGNIFICANT CHANGE UP (ref 135–145)
WBC # BLD: 7.36 K/UL — SIGNIFICANT CHANGE UP (ref 3.8–10.5)
WBC # FLD AUTO: 7.36 K/UL — SIGNIFICANT CHANGE UP (ref 3.8–10.5)

## 2025-04-04 PROCEDURE — 96374 THER/PROPH/DIAG INJ IV PUSH: CPT

## 2025-04-04 PROCEDURE — 87640 STAPH A DNA AMP PROBE: CPT

## 2025-04-04 PROCEDURE — 84100 ASSAY OF PHOSPHORUS: CPT

## 2025-04-04 PROCEDURE — 80053 COMPREHEN METABOLIC PANEL: CPT

## 2025-04-04 PROCEDURE — 71275 CT ANGIOGRAPHY CHEST: CPT | Mod: MC

## 2025-04-04 PROCEDURE — 82803 BLOOD GASES ANY COMBINATION: CPT

## 2025-04-04 PROCEDURE — 94640 AIRWAY INHALATION TREATMENT: CPT

## 2025-04-04 PROCEDURE — 96375 TX/PRO/DX INJ NEW DRUG ADDON: CPT

## 2025-04-04 PROCEDURE — 83605 ASSAY OF LACTIC ACID: CPT

## 2025-04-04 PROCEDURE — 85025 COMPLETE CBC W/AUTO DIFF WBC: CPT

## 2025-04-04 PROCEDURE — 85730 THROMBOPLASTIN TIME PARTIAL: CPT

## 2025-04-04 PROCEDURE — 81003 URINALYSIS AUTO W/O SCOPE: CPT

## 2025-04-04 PROCEDURE — 36415 COLL VENOUS BLD VENIPUNCTURE: CPT

## 2025-04-04 PROCEDURE — 84484 ASSAY OF TROPONIN QUANT: CPT

## 2025-04-04 PROCEDURE — 87641 MR-STAPH DNA AMP PROBE: CPT

## 2025-04-04 PROCEDURE — 85610 PROTHROMBIN TIME: CPT

## 2025-04-04 PROCEDURE — 87040 BLOOD CULTURE FOR BACTERIA: CPT

## 2025-04-04 PROCEDURE — 85027 COMPLETE CBC AUTOMATED: CPT

## 2025-04-04 PROCEDURE — 84145 PROCALCITONIN (PCT): CPT

## 2025-04-04 PROCEDURE — 93005 ELECTROCARDIOGRAM TRACING: CPT

## 2025-04-04 PROCEDURE — 83880 ASSAY OF NATRIURETIC PEPTIDE: CPT

## 2025-04-04 PROCEDURE — 80048 BASIC METABOLIC PNL TOTAL CA: CPT

## 2025-04-04 PROCEDURE — 83735 ASSAY OF MAGNESIUM: CPT

## 2025-04-04 PROCEDURE — 84702 CHORIONIC GONADOTROPIN TEST: CPT

## 2025-04-04 PROCEDURE — 87637 SARSCOV2&INF A&B&RSV AMP PRB: CPT

## 2025-04-04 PROCEDURE — 99285 EMERGENCY DEPT VISIT HI MDM: CPT

## 2025-04-04 PROCEDURE — 87899 AGENT NOS ASSAY W/OPTIC: CPT

## 2025-04-04 PROCEDURE — 86738 MYCOPLASMA ANTIBODY: CPT

## 2025-04-04 RX ORDER — LEVOFLOXACIN 25 MG/ML
1 SOLUTION ORAL
Qty: 4 | Refills: 0
Start: 2025-04-04 | End: 2025-04-07

## 2025-04-04 RX ORDER — BENZONATATE 100 MG
1 CAPSULE ORAL
Qty: 21 | Refills: 0
Start: 2025-04-04 | End: 2025-04-10

## 2025-04-04 RX ORDER — IPRATROPIUM BROMIDE AND ALBUTEROL SULFATE .5; 2.5 MG/3ML; MG/3ML
3 SOLUTION RESPIRATORY (INHALATION)
Qty: 168 | Refills: 0
Start: 2025-04-04 | End: 2025-04-17

## 2025-04-04 RX ADMIN — Medication 1 DOSE(S): at 11:21

## 2025-04-04 RX ADMIN — APIXABAN 2.5 MILLIGRAM(S): 2.5 TABLET, FILM COATED ORAL at 05:08

## 2025-04-04 RX ADMIN — Medication 100 MILLIGRAM(S): at 05:09

## 2025-04-04 RX ADMIN — IPRATROPIUM BROMIDE AND ALBUTEROL SULFATE 3 MILLILITER(S): .5; 2.5 SOLUTION RESPIRATORY (INHALATION) at 08:00

## 2025-04-04 RX ADMIN — FOLIC ACID 1 MILLIGRAM(S): 1 TABLET ORAL at 11:21

## 2025-04-04 NOTE — PROGRESS NOTE ADULT - ASSESSMENT
Pneumonia  Fevers - resolved  Leukocytosis ( mild ) - normalized    Plan -   Cont Levaquin 750mgs iv q24hrs.  ·	If doing well will plan to DC home today or tomorrow on PO Levaquin 750mg qd till 4/8/25.  ·	DC planning  Pneumonia  Fevers - resolved  Leukocytosis ( mild ) - normalized    Plan -   Cont Levaquin 750mgs iv q24hrs.  ·	doing well will plan to DC home today on PO Levaquin 750mg qd till 4/8/25.  ·	DC planning

## 2025-04-04 NOTE — DISCHARGE NOTE NURSING/CASE MANAGEMENT/SOCIAL WORK - PATIENT PORTAL LINK FT
You can access the FollowMyHealth Patient Portal offered by [facility] by registering at the following website: [email or web address]. By joining [facility]’s FollowMyHealth portal, you will also be able to view your health information using other applications (apps) compatible with our system.
Yes

## 2025-04-04 NOTE — PROGRESS NOTE ADULT - NS_MD_PANP_GEN_ALL_CORE
Patient 89yo F presenting with . Attending and PA/NP shared services statement (NON-critical care): Patient 89yo F w/ PMH hypothyroidism, reflux, dementia presenting with RLE swelling and tachycardia. Pt assessed by home PT today who noticed RLE swelling. Evaluated by PCP (Francie) who noted patient tachy and sent pt to [facility] ED. Patient asymptomatic; denies CP, dyspnea, ab pain, f/c/n/v/d/c, dysuria. Of note, as per HHA, pt fairly immobile. Ambulates w/ walker.

## 2025-04-04 NOTE — DISCHARGE NOTE NURSING/CASE MANAGEMENT/SOCIAL WORK - FINANCIAL ASSISTANCE
[facility] provides services at a reduced cost to those who are determined to be eligible through [facility]’s financial assistance program. Information regarding [facility]’s financial assistance program can be found by going to [email or web address] or by calling [phone].

## 2025-04-04 NOTE — DISCHARGE NOTE NURSING/CASE MANAGEMENT/SOCIAL WORK - NSDCPEFALRISK_GEN_ALL_CORE
For information on Fall & Injury Prevention, visit: [email or web address] OR  [email or web address] OR  https://www.cdc.gov/steadi/patient.html

## 2025-04-04 NOTE — DISCHARGE NOTE PROVIDER - NSDCMRMEDTOKEN_GEN_ALL_CORE_FT
benzonatate 100 mg oral capsule: 1 cap(s) orally 3 times a day  Breo Ellipta 200 mcg-25 mcg/inh inhalation powder: 1 puff(s) inhaled 2 times a day  Eliquis 2.5 mg oral tablet: 1 tab(s) orally 2 times a day hold 2 days before procedure  folic acid 1 mg oral tablet: 1 tab(s) orally once a day  golimumab: 1 drop(s) intravenous every 8 weeks  ipratropium-albuterol 0.5 mg-2.5 mg/3 mL inhalation solution: 3 milliliter(s) inhaled every 6 hours  levoFLOXacin 750 mg oral tablet: 1 tab(s) orally once a day Take until 4/8  methotrexate: 12.5 milligram(s) orally once a week  every Friday

## 2025-04-04 NOTE — DISCHARGE NOTE PROVIDER - NSDCFUSCHEDAPPT_GEN_ALL_CORE_FT
Theresa Van  [facility] Physician [facility]  PULMMED [address]  Scheduled Appointment: 04/08/2025    Danielle Canales  [facility] Physician [facility]  ENDOCRIN [address]  Scheduled Appointment: 04/09/2025    [facility]  DENTAL  [address]  Scheduled Appointment: 05/07/2025    Arik Colon  [facility]  INTMED [address]  Scheduled Appointment: 05/14/2025    Juan Sainz  [facility]  HEPATOLOGY [address]  Scheduled Appointment: 05/14/2025    Oren Wilkins  [facility] Physician [facility]  OTOLARYNG [address]  Scheduled Appointment: 05/15/2025    Ashok Davison  [facility] Physician [facility]  OTOLARYNG [address]  Scheduled Appointment: 06/20/2025

## 2025-04-04 NOTE — DISCHARGE NOTE PROVIDER - NSDCCPCAREPLAN_GEN_ALL_CORE_FT
Refills sent to pharmacy per MD protocol due to COVID-19 situation.  
PRINCIPAL DISCHARGE DIAGNOSIS  Diagnosis: Sepsis with acute hypoxic respiratory failure  Assessment and Plan of Treatment: You presented with fever, dry cough and malaise. Your oxygen level was low on admission. Your CT chest was consistent with pneumonia and you were started on IV antibiotics. Your urine and blood cultures were negative for growth  You were evaluated by infectious disease. You are to complete oral antibiotics Levaquin 750mg oral daily until 4/8/25      SECONDARY DISCHARGE DIAGNOSES  Diagnosis: Pneumonia  Assessment and Plan of Treatment: Your oxygen level was low on admission. You required oxygen supplementation and now off oxygen.   You are saturating well on room air  You were evaluated by infectious disease. You are to complete oral antibiotics Levaquin 750mg oral daily until 4/8/25    Diagnosis: Rheumatoid arthritis  Assessment and Plan of Treatment: Continue home medications    Diagnosis: Pulmonary embolism  Assessment and Plan of Treatment: Continue home medications Eliquis    Diagnosis: Interstitial lung disease  Assessment and Plan of Treatment: Continue home medication Breo-Ellipta and Duonebs  Follow up with pulmonologist as scehduled

## 2025-04-04 NOTE — PROGRESS NOTE ADULT - REASON FOR ADMISSION
Sepsis 2/2 pneumonia

## 2025-04-04 NOTE — DISCHARGE NOTE PROVIDER - HOSPITAL COURSE
38 y/o F, PMHx of  Ehler-Danlos syndrome, Fibromyalgia, Rheumatoid arthritis, Sjogren syndrome, ILD (on methotrexate and Simponi infusion, last infusion 10 days ago), pulmonary embolism on Eliquis presents with fever and cough x 4 days. Met sepsis criteria and hypoxic to 90% on RA.  CT chest: Bilateral lower lung predominant numerous nodular opacities, likely pneumonia with endobronchial spread. Admitted for sepsis with AHRF 2/2 pneumonia.  Pt. was started on Levaquin (PCN All),  bronchodilation and pulmonary toileting. ID Dr. Jacob following. To complete PO Levaquin 750mg qd till 4/8/25.     Pt is now off oxygen, saturating well. Encouraged the pt to ambulate today, ambulatory sats >95% on RA    Pt is medically optimized for discharge, discussed with the attending Dr Bobby

## 2025-04-04 NOTE — PROGRESS NOTE ADULT - SUBJECTIVE AND OBJECTIVE BOX
39y Female sitting up in the chair and in no apparent distress, denies soc or chest pain, she still has a dry cough but admits it is improving. No nausea, vomiting or diarrhea. Has not had any fevers and wbc count is normal.     MEDS:  levoFLOXacin IVPB 750 milliGRAM(s) IV Intermittent every 24 hours    ALLERGIES: Allergies    penicillin (Anaphylaxis)  amoxicillin (Hives)    Intolerances    REVIEW OF SYSTEMS:  [  ] Not able to elicit  General: no fevers no malaise  Chest: +dry cough, no sob  GI: no nvd  : no urinary sxs   Skin: no rashes  Musculoskeletal: no trauma no LBP  Neuro: no ha's no dizziness     VITALS:  Vital Signs Last 24 Hrs  T(C): 36.6 (04 Apr 2025 05:07), Max: 36.8 (03 Apr 2025 13:49)  T(F): 97.9 (04 Apr 2025 05:07), Max: 98.3 (03 Apr 2025 13:49)  HR: 82 (04 Apr 2025 05:07) (82 - 83)  BP: 126/79 (04 Apr 2025 05:07) (126/79 - 128/72)  BP(mean): --  RR: 18 (04 Apr 2025 05:07) (18 - 20)  SpO2: 96% (04 Apr 2025 05:07) (96% - 97%)    Parameters below as of 04 Apr 2025 05:07  Patient On (Oxygen Delivery Method): room air    PHYSICAL EXAM:  HEENT: normocephalic, conjunctivae and sclerae clear; moist mucous membranes  Neck: supple no LN's   Respiratory: lungs clear no rales  Cardiovascular: S1 S2 reg no murmurs  Gastrointestinal: +BS with soft, nondistended abdomen; nontender  Extremities: no edema  Skin: no rashes  Ortho: no erythema or joint swelling  Neuro: AAO x 3    LABS/DIAGNOSTIC TESTS:                        12.2   7.36  )-----------( 261      ( 04 Apr 2025 06:00 )             37.3     WBC Count: 7.36 K/uL (04-04 @ 06:00)  WBC Count: 7.65 K/uL (04-03 @ 05:09)  WBC Count: 6.61 K/uL (04-02 @ 06:15)  WBC Count: 11.09 K/uL (04-01 @ 14:37)    04-04    138  |  107  |  12  ----------------------------<  104[H]  3.5   |  24  |  0.65    Ca    8.8      04 Apr 2025 06:00  Phos  3.4     04-04  Mg     2.1     04-04    TPro  6.5  /  Alb  3.1[L]  /  TBili  0.2  /  DBili  x   /  AST  9[L]  /  ALT  18  /  AlkPhos  140[H]  04-04    CULTURES:   Blood Blood-Peripheral  04-01 @ 14:42   No growth at 48 Hours  --  --    Blood Blood-Peripheral  04-01 @ 14:37   No growth at 48 Hours  --  --    RADIOLOGY:  no new studies

## 2025-04-06 LAB
CULTURE RESULTS: SIGNIFICANT CHANGE UP
CULTURE RESULTS: SIGNIFICANT CHANGE UP
SPECIMEN SOURCE: SIGNIFICANT CHANGE UP
SPECIMEN SOURCE: SIGNIFICANT CHANGE UP

## 2025-04-07 ENCOUNTER — TRANSCRIPTION ENCOUNTER (OUTPATIENT)
Age: 40
End: 2025-04-07

## 2025-04-07 DIAGNOSIS — M35.00 SICCA SYNDROME, UNSPECIFIED: ICD-10-CM

## 2025-04-07 PROBLEM — Q79.60 EHLERS-DANLOS SYNDROME, UNSPECIFIED: Chronic | Status: ACTIVE | Noted: 2025-04-02

## 2025-04-08 ENCOUNTER — APPOINTMENT (OUTPATIENT)
Dept: PULMONOLOGY | Facility: CLINIC | Age: 40
End: 2025-04-08
Payer: MEDICAID

## 2025-04-08 VITALS
HEIGHT: 59 IN | HEART RATE: 78 BPM | SYSTOLIC BLOOD PRESSURE: 129 MMHG | RESPIRATION RATE: 16 BRPM | WEIGHT: 195 LBS | OXYGEN SATURATION: 96 % | BODY MASS INDEX: 39.31 KG/M2 | TEMPERATURE: 98.2 F | DIASTOLIC BLOOD PRESSURE: 82 MMHG

## 2025-04-08 DIAGNOSIS — J18.9 PNEUMONIA, UNSPECIFIED ORGANISM: ICD-10-CM

## 2025-04-08 PROCEDURE — 99214 OFFICE O/P EST MOD 30 MIN: CPT

## 2025-04-09 ENCOUNTER — APPOINTMENT (OUTPATIENT)
Dept: ENDOCRINOLOGY | Facility: CLINIC | Age: 40
End: 2025-04-09
Payer: MEDICAID

## 2025-04-09 ENCOUNTER — APPOINTMENT (OUTPATIENT)
Dept: INTERNAL MEDICINE | Facility: CLINIC | Age: 40
End: 2025-04-09
Payer: MEDICAID

## 2025-04-09 VITALS
HEIGHT: 59 IN | OXYGEN SATURATION: 97 % | TEMPERATURE: 98.2 F | HEART RATE: 64 BPM | SYSTOLIC BLOOD PRESSURE: 114 MMHG | BODY MASS INDEX: 38.91 KG/M2 | DIASTOLIC BLOOD PRESSURE: 76 MMHG | WEIGHT: 193 LBS

## 2025-04-09 VITALS
DIASTOLIC BLOOD PRESSURE: 81 MMHG | BODY MASS INDEX: 38.91 KG/M2 | SYSTOLIC BLOOD PRESSURE: 121 MMHG | HEIGHT: 59 IN | TEMPERATURE: 97.4 F | RESPIRATION RATE: 16 BRPM | HEART RATE: 86 BPM | OXYGEN SATURATION: 97 % | WEIGHT: 193 LBS

## 2025-04-09 DIAGNOSIS — Z79.899 OTHER LONG TERM (CURRENT) DRUG THERAPY: ICD-10-CM

## 2025-04-09 DIAGNOSIS — Z09 ENCOUNTER FOR FOLLOW-UP EXAMINATION AFTER COMPLETED TREATMENT FOR CONDITIONS OTHER THAN MALIGNANT NEOPLASM: ICD-10-CM

## 2025-04-09 DIAGNOSIS — J96.01 SEPSIS, UNSPECIFIED ORGANISM: ICD-10-CM

## 2025-04-09 DIAGNOSIS — R74.8 ABNORMAL LEVELS OF OTHER SERUM ENZYMES: ICD-10-CM

## 2025-04-09 DIAGNOSIS — N39.0 URINARY TRACT INFECTION, SITE NOT SPECIFIED: ICD-10-CM

## 2025-04-09 DIAGNOSIS — E66.01 MORBID (SEVERE) OBESITY DUE TO EXCESS CALORIES: ICD-10-CM

## 2025-04-09 DIAGNOSIS — J45.909 UNSPECIFIED ASTHMA, UNCOMPLICATED: ICD-10-CM

## 2025-04-09 DIAGNOSIS — M35.9 SYSTEMIC INVOLVEMENT OF CONNECTIVE TISSUE, UNSPECIFIED: ICD-10-CM

## 2025-04-09 DIAGNOSIS — K80.20 CALCULUS OF GALLBLADDER W/OUT CHOLECYSTITIS W/OUT OBSTRUCTION: ICD-10-CM

## 2025-04-09 DIAGNOSIS — Q79.60 EHLERS-DANLOS SYNDROME, UNSP: ICD-10-CM

## 2025-04-09 DIAGNOSIS — R65.20 SEPSIS, UNSPECIFIED ORGANISM: ICD-10-CM

## 2025-04-09 DIAGNOSIS — R79.89 OTHER SPECIFIED ABNORMAL FINDINGS OF BLOOD CHEMISTRY: ICD-10-CM

## 2025-04-09 DIAGNOSIS — A41.9 SEPSIS, UNSPECIFIED ORGANISM: ICD-10-CM

## 2025-04-09 DIAGNOSIS — M06.9 RHEUMATOID ARTHRITIS, UNSPECIFIED: ICD-10-CM

## 2025-04-09 PROBLEM — J15.9 PNEUMONIA, BACTERIAL: Status: ACTIVE | Noted: 2025-04-09

## 2025-04-09 PROCEDURE — 99215 OFFICE O/P EST HI 40 MIN: CPT

## 2025-04-09 PROCEDURE — 99496 TRANSJ CARE MGMT HIGH F2F 7D: CPT | Mod: 25

## 2025-04-09 PROCEDURE — 99204 OFFICE O/P NEW MOD 45 MIN: CPT

## 2025-04-09 RX ORDER — DEXAMETHASONE 1 MG/1
1 TABLET ORAL
Qty: 1 | Refills: 0 | Status: ACTIVE | COMMUNITY
Start: 2025-04-09 | End: 1900-01-01

## 2025-04-09 RX ORDER — BENZONATATE 100 MG/1
100 CAPSULE ORAL 3 TIMES DAILY
Qty: 30 | Refills: 3 | Status: COMPLETED | COMMUNITY
Start: 2025-04-07

## 2025-04-09 RX ORDER — LEVOFLOXACIN 750 MG/1
750 TABLET, FILM COATED ORAL
Refills: 0 | Status: COMPLETED | COMMUNITY
Start: 2025-04-07

## 2025-04-10 ENCOUNTER — TRANSCRIPTION ENCOUNTER (OUTPATIENT)
Age: 40
End: 2025-04-10

## 2025-04-10 LAB
CANCER AG19-9 SERPL-ACNC: 15 U/ML
CEA SERPL-MCNC: 1.5 NG/ML
CORTIS SERPL-MCNC: 14.2 UG/DL
FSH SERPL-MCNC: 65.6 IU/L
HCG SERPL-MCNC: 6 MIU/ML
LH SERPL-ACNC: 50.7 IU/L
PROLACTIN SERPL-MCNC: 18.1 NG/ML
T4 FREE SERPL-MCNC: 1.3 NG/DL
TESTOST SERPL-MCNC: 18.8 NG/DL
THYROPEROXIDASE AB SERPL IA-ACNC: 10.4 IU/ML
TSH SERPL-ACNC: 1.58 UIU/ML

## 2025-04-11 ENCOUNTER — APPOINTMENT (OUTPATIENT)
Dept: RADIOLOGY | Facility: CLINIC | Age: 40
End: 2025-04-11

## 2025-04-11 LAB
ACTH SER-ACNC: 18.2 PG/ML
IGF-1 INTERP: NORMAL
IGF-I BLD-MCNC: 206 NG/ML

## 2025-04-11 PROCEDURE — 77080 DXA BONE DENSITY AXIAL: CPT

## 2025-04-14 ENCOUNTER — APPOINTMENT (OUTPATIENT)
Dept: RHEUMATOLOGY | Facility: CLINIC | Age: 40
End: 2025-04-14
Payer: MEDICAID

## 2025-04-14 DIAGNOSIS — J84.9 INTERSTITIAL PULMONARY DISEASE, UNSPECIFIED: ICD-10-CM

## 2025-04-14 DIAGNOSIS — Z87.01 PERSONAL HISTORY OF PNEUMONIA (RECURRENT): ICD-10-CM

## 2025-04-14 DIAGNOSIS — Z15.89 GENETIC SUSCEPTIBILITY TO OTHER DISEASE: ICD-10-CM

## 2025-04-14 DIAGNOSIS — J44.9 CHRONIC OBSTRUCTIVE PULMONARY DISEASE, UNSPECIFIED: ICD-10-CM

## 2025-04-14 DIAGNOSIS — K76.0 FATTY (CHANGE OF) LIVER, NOT ELSEWHERE CLASSIFIED: ICD-10-CM

## 2025-04-14 DIAGNOSIS — Z79.620 ENCOUNTER FOR THERAPEUTIC DRUG LVL MONITORING: ICD-10-CM

## 2025-04-14 DIAGNOSIS — Z79.620 LONG TERM (CURRENT) USE OF IMMUNOSUPPRESSIVE BIOLOGIC: ICD-10-CM

## 2025-04-14 DIAGNOSIS — U09.9 POST COVID-19 CONDITION, UNSPECIFIED: ICD-10-CM

## 2025-04-14 DIAGNOSIS — R93.89 ABNORMAL FINDINGS ON DIAGNOSTIC IMAGING OF OTHER SPECIFIED BODY STRUCTURES: ICD-10-CM

## 2025-04-14 DIAGNOSIS — M06.9 RHEUMATOID ARTHRITIS, UNSPECIFIED: ICD-10-CM

## 2025-04-14 DIAGNOSIS — I26.99 OTHER PULMONARY EMBOLISM W/OUT ACUTE COR PULMONALE: ICD-10-CM

## 2025-04-14 DIAGNOSIS — Z51.81 ENCOUNTER FOR THERAPEUTIC DRUG LVL MONITORING: ICD-10-CM

## 2025-04-14 DIAGNOSIS — G90.A POSTURAL ORTHOSTATIC TACHYCARDIA SYNDROME [POTS]: ICD-10-CM

## 2025-04-14 PROCEDURE — 99215 OFFICE O/P EST HI 40 MIN: CPT

## 2025-04-14 PROCEDURE — G2211 COMPLEX E/M VISIT ADD ON: CPT | Mod: NC

## 2025-04-16 ENCOUNTER — TRANSCRIPTION ENCOUNTER (OUTPATIENT)
Age: 40
End: 2025-04-16

## 2025-04-17 ENCOUNTER — TRANSCRIPTION ENCOUNTER (OUTPATIENT)
Age: 40
End: 2025-04-17

## 2025-04-18 ENCOUNTER — TRANSCRIPTION ENCOUNTER (OUTPATIENT)
Age: 40
End: 2025-04-18

## 2025-04-21 ENCOUNTER — APPOINTMENT (OUTPATIENT)
Dept: NEUROLOGY | Facility: CLINIC | Age: 40
End: 2025-04-21
Payer: MEDICAID

## 2025-04-21 ENCOUNTER — NON-APPOINTMENT (OUTPATIENT)
Age: 40
End: 2025-04-21

## 2025-04-21 VITALS
SYSTOLIC BLOOD PRESSURE: 132 MMHG | OXYGEN SATURATION: 99 % | WEIGHT: 193 LBS | TEMPERATURE: 96.6 F | HEART RATE: 78 BPM | DIASTOLIC BLOOD PRESSURE: 90 MMHG | HEIGHT: 59 IN | BODY MASS INDEX: 38.91 KG/M2

## 2025-04-21 DIAGNOSIS — G63 OTHER SPECIFIED DISORDERS INVOLVING THE IMMUNE MECHANISM, NOT ELSEWHERE CLASSIFIED: ICD-10-CM

## 2025-04-21 DIAGNOSIS — D89.89 OTHER SPECIFIED DISORDERS INVOLVING THE IMMUNE MECHANISM, NOT ELSEWHERE CLASSIFIED: ICD-10-CM

## 2025-04-21 DIAGNOSIS — G90.9 DISORDER OF THE AUTONOMIC NERVOUS SYSTEM, UNSPECIFIED: ICD-10-CM

## 2025-04-21 DIAGNOSIS — I72.9 ANEURYSM OF UNSPECIFIED SITE: ICD-10-CM

## 2025-04-21 PROCEDURE — 99214 OFFICE O/P EST MOD 30 MIN: CPT

## 2025-04-21 RX ORDER — RIBOFLAVIN (VITAMIN B2) 400 MG
400 TABLET ORAL
Qty: 30 | Refills: 3 | Status: ACTIVE | COMMUNITY
Start: 2025-04-21 | End: 1900-01-01

## 2025-04-21 RX ORDER — OMEGA-3/DHA/EPA/FISH OIL 300-1000MG
400 CAPSULE ORAL
Qty: 30 | Refills: 3 | Status: ACTIVE | COMMUNITY
Start: 2025-04-21 | End: 1900-01-01

## 2025-04-21 RX ORDER — MAGNESIUM OXIDE 241.3 MG/1000MG
400 TABLET ORAL DAILY
Qty: 30 | Refills: 0 | Status: ACTIVE | COMMUNITY
Start: 2025-04-21 | End: 1900-01-01

## 2025-04-23 ENCOUNTER — TRANSCRIPTION ENCOUNTER (OUTPATIENT)
Age: 40
End: 2025-04-23

## 2025-04-23 LAB
CRP SERPL-MCNC: 8 MG/L
ERYTHROCYTE [SEDIMENTATION RATE] IN BLOOD BY WESTERGREN METHOD: 55 MM/HR
FOLATE SERPL-MCNC: >20 NG/ML
VIT B12 SERPL-MCNC: 443 PG/ML

## 2025-04-24 ENCOUNTER — TRANSCRIPTION ENCOUNTER (OUTPATIENT)
Age: 40
End: 2025-04-24

## 2025-04-24 ENCOUNTER — NON-APPOINTMENT (OUTPATIENT)
Age: 40
End: 2025-04-24

## 2025-04-24 DIAGNOSIS — R94.7 ABNORMAL RESULTS OF OTHER ENDOCRINE FUNCTION STUDIES: ICD-10-CM

## 2025-04-24 RX ORDER — DEXAMETHASONE 1 MG/1
1 TABLET ORAL
Qty: 1 | Refills: 0 | Status: ACTIVE | COMMUNITY
Start: 2025-04-24 | End: 1900-01-01

## 2025-04-25 ENCOUNTER — TRANSCRIPTION ENCOUNTER (OUTPATIENT)
Age: 40
End: 2025-04-25

## 2025-04-28 ENCOUNTER — TRANSCRIPTION ENCOUNTER (OUTPATIENT)
Age: 40
End: 2025-04-28

## 2025-04-28 LAB — CORTIS SERPL-MCNC: 0.5 UG/DL

## 2025-04-29 ENCOUNTER — TRANSCRIPTION ENCOUNTER (OUTPATIENT)
Age: 40
End: 2025-04-29

## 2025-04-29 DIAGNOSIS — F01.50 VASCULAR DEMENTIA W/OUT BEHAVIORAL DISTURBANCE: ICD-10-CM

## 2025-04-29 LAB
ASIALO-GM1 ANTIBODIES, IGG/IGM: 17 IV
GD1A ANTIBODIES, IGG/IGM: 7 IV
GD1B ANTIBODIES, IGG/IGM: 5 IV
GM1 ANTIBODIES, IGG/IGM: 6 IV
GM2 ANTIBODIES, IGG/IGM: 5 IV
GQ1B ANTIBODIES, IGG/IGM: 6 IV
HOMOCYSTEINE LEVEL: 10.6 UMOL/L
M PROTEIN SPEC IFE-MCNC: NORMAL
METHYLMALONATE SERPL-SCNC: 246 NMOL/L
VIT B1 SERPL-MCNC: 107.9 NMOL/L

## 2025-04-30 ENCOUNTER — APPOINTMENT (OUTPATIENT)
Dept: ENDOCRINOLOGY | Facility: CLINIC | Age: 40
End: 2025-04-30

## 2025-05-03 LAB
ARSENIC BLD-MCNC: 2 UG/L
CADMIUM SERPL-MCNC: <0.5 UG/L
GQ1B AB IGG: NORMAL TITER
LEAD BLD-MCNC: <1 UG/DL
MERCURY BLD-MCNC: 1.4 UG/L

## 2025-05-07 ENCOUNTER — APPOINTMENT (OUTPATIENT)
Age: 40
End: 2025-05-07

## 2025-05-07 PROCEDURE — D0150: CPT

## 2025-05-07 PROCEDURE — D0210: CPT

## 2025-05-07 PROCEDURE — D1110 PROPHYLAXIS - ADULT: CPT

## 2025-05-08 ENCOUNTER — APPOINTMENT (OUTPATIENT)
Dept: SLEEP CENTER | Facility: CLINIC | Age: 40
End: 2025-05-08
Payer: MEDICAID

## 2025-05-08 ENCOUNTER — OUTPATIENT (OUTPATIENT)
Dept: OUTPATIENT SERVICES | Facility: HOSPITAL | Age: 40
LOS: 1 days | End: 2025-05-08
Payer: MEDICAID

## 2025-05-08 DIAGNOSIS — Z98.891 HISTORY OF UTERINE SCAR FROM PREVIOUS SURGERY: Chronic | ICD-10-CM

## 2025-05-08 PROCEDURE — 95800 SLP STDY UNATTENDED: CPT

## 2025-05-08 PROCEDURE — 95800 SLP STDY UNATTENDED: CPT | Mod: 26

## 2025-05-09 ENCOUNTER — TRANSCRIPTION ENCOUNTER (OUTPATIENT)
Age: 40
End: 2025-05-09

## 2025-05-14 ENCOUNTER — APPOINTMENT (OUTPATIENT)
Dept: HEPATOLOGY | Facility: CLINIC | Age: 40
End: 2025-05-14
Payer: MEDICAID

## 2025-05-14 ENCOUNTER — APPOINTMENT (OUTPATIENT)
Dept: INTERNAL MEDICINE | Facility: CLINIC | Age: 40
End: 2025-05-14
Payer: MEDICAID

## 2025-05-14 ENCOUNTER — APPOINTMENT (OUTPATIENT)
Dept: INTERNAL MEDICINE | Facility: CLINIC | Age: 40
End: 2025-05-14

## 2025-05-14 VITALS
DIASTOLIC BLOOD PRESSURE: 84 MMHG | WEIGHT: 196 LBS | OXYGEN SATURATION: 98 % | SYSTOLIC BLOOD PRESSURE: 127 MMHG | BODY MASS INDEX: 39.51 KG/M2 | HEIGHT: 59 IN | HEART RATE: 71 BPM | RESPIRATION RATE: 16 BRPM | TEMPERATURE: 97.6 F

## 2025-05-14 VITALS — HEART RATE: 98 BPM

## 2025-05-14 VITALS
BODY MASS INDEX: 39.72 KG/M2 | DIASTOLIC BLOOD PRESSURE: 70 MMHG | TEMPERATURE: 97.2 F | OXYGEN SATURATION: 98 % | HEIGHT: 59 IN | WEIGHT: 197 LBS | SYSTOLIC BLOOD PRESSURE: 119 MMHG

## 2025-05-14 DIAGNOSIS — G47.33 OBSTRUCTIVE SLEEP APNEA (ADULT) (PEDIATRIC): ICD-10-CM

## 2025-05-14 DIAGNOSIS — J84.9 INTERSTITIAL PULMONARY DISEASE, UNSPECIFIED: ICD-10-CM

## 2025-05-14 DIAGNOSIS — R76.8 OTHER SPECIFIED ABNORMAL IMMUNOLOGICAL FINDINGS IN SERUM: ICD-10-CM

## 2025-05-14 DIAGNOSIS — G63 OTHER SPECIFIED DISORDERS INVOLVING THE IMMUNE MECHANISM, NOT ELSEWHERE CLASSIFIED: ICD-10-CM

## 2025-05-14 DIAGNOSIS — E66.01 MORBID (SEVERE) OBESITY DUE TO EXCESS CALORIES: ICD-10-CM

## 2025-05-14 DIAGNOSIS — J44.9 CHRONIC OBSTRUCTIVE PULMONARY DISEASE, UNSPECIFIED: ICD-10-CM

## 2025-05-14 DIAGNOSIS — U07.1 COVID-19: ICD-10-CM

## 2025-05-14 DIAGNOSIS — F01.50 VASCULAR DEMENTIA W/OUT BEHAVIORAL DISTURBANCE: ICD-10-CM

## 2025-05-14 DIAGNOSIS — Z87.01 PERSONAL HISTORY OF PNEUMONIA (RECURRENT): ICD-10-CM

## 2025-05-14 DIAGNOSIS — J96.01 SEPSIS, UNSPECIFIED ORGANISM: ICD-10-CM

## 2025-05-14 DIAGNOSIS — K74.00 HEPATIC FIBROSIS, UNSPECIFIED: ICD-10-CM

## 2025-05-14 DIAGNOSIS — D89.89 OTHER SPECIFIED DISORDERS INVOLVING THE IMMUNE MECHANISM, NOT ELSEWHERE CLASSIFIED: ICD-10-CM

## 2025-05-14 DIAGNOSIS — R53.82 CHRONIC FATIGUE, UNSPECIFIED: ICD-10-CM

## 2025-05-14 DIAGNOSIS — M06.9 RHEUMATOID ARTHRITIS, UNSPECIFIED: ICD-10-CM

## 2025-05-14 DIAGNOSIS — K80.20 CALCULUS OF GALLBLADDER W/OUT CHOLECYSTITIS W/OUT OBSTRUCTION: ICD-10-CM

## 2025-05-14 DIAGNOSIS — M35.9 SYSTEMIC INVOLVEMENT OF CONNECTIVE TISSUE, UNSPECIFIED: ICD-10-CM

## 2025-05-14 DIAGNOSIS — Q79.60 EHLERS-DANLOS SYNDROME, UNSP: ICD-10-CM

## 2025-05-14 DIAGNOSIS — G93.2 BENIGN INTRACRANIAL HYPERTENSION: ICD-10-CM

## 2025-05-14 DIAGNOSIS — R21 RASH AND OTHER NONSPECIFIC SKIN ERUPTION: ICD-10-CM

## 2025-05-14 DIAGNOSIS — M75.100 UNSPECIFIED ROTATOR CUFF TEAR OR RUPTURE OF UNSPECIFIED SHOULDER, NOT SPECIFIED AS TRAUMATIC: ICD-10-CM

## 2025-05-14 DIAGNOSIS — Z86.19 PERSONAL HISTORY OF OTHER INFECTIOUS AND PARASITIC DISEASES: ICD-10-CM

## 2025-05-14 DIAGNOSIS — S93.401A SPRAIN OF UNSPECIFIED LIGAMENT OF RIGHT ANKLE, INITIAL ENCOUNTER: ICD-10-CM

## 2025-05-14 DIAGNOSIS — R65.20 SEPSIS, UNSPECIFIED ORGANISM: ICD-10-CM

## 2025-05-14 DIAGNOSIS — I26.99 OTHER PULMONARY EMBOLISM W/OUT ACUTE COR PULMONALE: ICD-10-CM

## 2025-05-14 DIAGNOSIS — M35.00 SICCA SYNDROME, UNSPECIFIED: ICD-10-CM

## 2025-05-14 DIAGNOSIS — G43.719 CHRONIC MIGRAINE W/OUT AURA, INTRACTABLE, W/OUT STATUS MIGRAINOSUS: ICD-10-CM

## 2025-05-14 DIAGNOSIS — G47.34 IDIOPATHIC SLEEP RELATED NONOBSTRUCTIVE ALVEOLAR HYPOVENTILATION: ICD-10-CM

## 2025-05-14 DIAGNOSIS — Z01.818 ENCOUNTER FOR OTHER PREPROCEDURAL EXAMINATION: ICD-10-CM

## 2025-05-14 DIAGNOSIS — R05.8 OTHER SPECIFIED COUGH: ICD-10-CM

## 2025-05-14 DIAGNOSIS — J45.909 UNSPECIFIED ASTHMA, UNCOMPLICATED: ICD-10-CM

## 2025-05-14 DIAGNOSIS — R74.8 ABNORMAL LEVELS OF OTHER SERUM ENZYMES: ICD-10-CM

## 2025-05-14 DIAGNOSIS — A41.9 SEPSIS, UNSPECIFIED ORGANISM: ICD-10-CM

## 2025-05-14 DIAGNOSIS — Z15.89 GENETIC SUSCEPTIBILITY TO OTHER DISEASE: ICD-10-CM

## 2025-05-14 DIAGNOSIS — G90.A POSTURAL ORTHOSTATIC TACHYCARDIA SYNDROME [POTS]: ICD-10-CM

## 2025-05-14 DIAGNOSIS — Z79.631 LONG TERM (CURRENT) USE OF ANTIMETABOLITE AGENT: ICD-10-CM

## 2025-05-14 DIAGNOSIS — Z79.01 LONG TERM (CURRENT) USE OF ANTICOAGULANTS: ICD-10-CM

## 2025-05-14 PROCEDURE — 99214 OFFICE O/P EST MOD 30 MIN: CPT

## 2025-05-14 NOTE — H&P PST ADULT - NSANTHTIREDRD_ENT_A_CORE
You were seen today for pain on your chest which is consistent with shingles. You need to  the medication that was sent to your pharmacy.     RETURN IF YOU DEVELOP PAIN THAT CAUSES DIFFICULTY BREATHING, NAUSEA, NECK PAIN OR CHEST PRESSURE.   No

## 2025-05-14 NOTE — H&P ADULT - NSICDXPASTSURGICALHX_GEN_ALL_CORE_FT
PROCEDURES:  Parathyroidectomy 14-May-2025 09:43:42  Isaac Mcdermott  
PAST SURGICAL HISTORY:  H/O  section     Plantar fasciitis, left -s/p fasciotomy    S/P D&C (status post dilation and curettage)     S/P tubal ligation

## 2025-05-15 ENCOUNTER — APPOINTMENT (OUTPATIENT)
Dept: OTOLARYNGOLOGY | Facility: CLINIC | Age: 40
End: 2025-05-15

## 2025-05-15 LAB
ALBUMIN SERPL ELPH-MCNC: 4.5 G/DL
ALP BLD-CCNC: 195 U/L
ALT SERPL-CCNC: 20 U/L
ANION GAP SERPL CALC-SCNC: 14 MMOL/L
AST SERPL-CCNC: 19 U/L
BASOPHILS # BLD AUTO: 0.04 K/UL
BASOPHILS NFR BLD AUTO: 0.5 %
BILIRUB SERPL-MCNC: 0.4 MG/DL
BUN SERPL-MCNC: 11 MG/DL
CALCIUM SERPL-MCNC: 9.8 MG/DL
CHLORIDE SERPL-SCNC: 102 MMOL/L
CHOLEST SERPL-MCNC: 194 MG/DL
CO2 SERPL-SCNC: 24 MMOL/L
CREAT SERPL-MCNC: 0.63 MG/DL
EGFRCR SERPLBLD CKD-EPI 2021: 115 ML/MIN/1.73M2
EOSINOPHIL # BLD AUTO: 0.09 K/UL
EOSINOPHIL NFR BLD AUTO: 1.2 %
ESTIMATED AVERAGE GLUCOSE: 120 MG/DL
FRUCTOSAMINE SERPL-MCNC: 222 UMOL/L
GGT SERPL-CCNC: 24 U/L
GLUCOSE SERPL-MCNC: 74 MG/DL
HBA1C MFR BLD HPLC: 5.8 %
HCT VFR BLD CALC: 40.8 %
HDLC SERPL-MCNC: 63 MG/DL
HGB BLD-MCNC: 12.9 G/DL
IMM GRANULOCYTES NFR BLD AUTO: 0.1 %
LDLC SERPL-MCNC: 110 MG/DL
LYMPHOCYTES # BLD AUTO: 2.41 K/UL
LYMPHOCYTES NFR BLD AUTO: 32 %
MAN DIFF?: NORMAL
MCHC RBC-ENTMCNC: 27.3 PG
MCHC RBC-ENTMCNC: 31.6 G/DL
MCV RBC AUTO: 86.3 FL
MONOCYTES # BLD AUTO: 0.39 K/UL
MONOCYTES NFR BLD AUTO: 5.2 %
NEUTROPHILS # BLD AUTO: 4.59 K/UL
NEUTROPHILS NFR BLD AUTO: 61 %
NONHDLC SERPL-MCNC: 130 MG/DL
PLATELET # BLD AUTO: 265 K/UL
POTASSIUM SERPL-SCNC: 4.3 MMOL/L
PROT SERPL-MCNC: 7.1 G/DL
RBC # BLD: 4.73 M/UL
RBC # FLD: 15.1 %
SODIUM SERPL-SCNC: 140 MMOL/L
TRIGL SERPL-MCNC: 118 MG/DL
WBC # FLD AUTO: 7.53 K/UL

## 2025-05-15 NOTE — REVIEW OF SYSTEMS
REFERRAL APPROVAL NOTICE         Sent on May 15, 2025                   Sherry Hatfield  [address]  [place] [address]                   Dear Ms. Hatfield,    After a careful review of the medical information and benefit coverage, [facility] has processed your referral. See below for additional details.    If applicable, you must be actively enrolled with your insurance for coverage of the authorized service. If you have any questions regarding your coverage, please contact your insurance directly.    REFERRAL INFORMATION   Referral #:  [id number]  Referred-To Department    Referred-By Provider:  Dermatology    JASPER Schwartz   [facility]      [address]  [address]  [place] [id number] [phone] [address]  [place] [id number] [phone]    Referral Start Date:  05/09/2025  Referral End Date:   05/09/2026             SCHEDULING  If you do not already have an appointment, please call [phone] to make an appointment.     MORE INFORMATION  If you do not already have a [facility] account, sign up at: [email or web address]  You can access your medical information, make appointments, see lab results, billing information, and more.  If you have questions regarding this referral, please contact  the [facility] Referrals department at:             [phone]. Monday - Friday 8:00AM - 5:00PM.     Sincerely,    [facility]     [Fever] : no fever [Chills] : no chills [Feeling Poorly] : feeling poorly [Feeling Tired] : feeling tired [Confused or Disoriented] : confusion [Memory Lapses or Loss] : memory loss [Difficulty with Language] : no ~M difficulty with language [Facial Weakness] : no facial weakness [Arm Weakness] : no arm weakness [Hand Weakness] : no hand weakness [Leg Weakness] : no leg weakness [Poor Coordination] : good coordination [Difficulty Writing] : no difficulty writing [Numbness] : no numbness [Tingling] : no tingling [Suicidal] : not suicidal [Sleep Disturbances] : no sleep disturbances [Anxiety] : no anxiety [Depression] : no depression [Eye Pain] : no eye pain [Discharge From Eyes] : no purulent discharge from the eyes [Dry Eyes] : no dryness of the eyes

## 2025-05-16 ENCOUNTER — TRANSCRIPTION ENCOUNTER (OUTPATIENT)
Age: 40
End: 2025-05-16

## 2025-05-16 ENCOUNTER — NON-APPOINTMENT (OUTPATIENT)
Age: 40
End: 2025-05-16

## 2025-05-17 LAB
APPEARANCE: CLEAR
BILIRUBIN URINE: NEGATIVE
BLOOD URINE: NEGATIVE
COLOR: YELLOW
GLUCOSE QUALITATIVE U: NEGATIVE MG/DL
KETONES URINE: NEGATIVE MG/DL
LEUKOCYTE ESTERASE URINE: NEGATIVE
NITRITE URINE: NEGATIVE
PH URINE: 5.5
PROTEIN URINE: NEGATIVE MG/DL
SPECIFIC GRAVITY URINE: 1.02
UROBILINOGEN URINE: 0.2 MG/DL

## 2025-05-22 ENCOUNTER — TRANSCRIPTION ENCOUNTER (OUTPATIENT)
Age: 40
End: 2025-05-22

## 2025-05-30 ENCOUNTER — APPOINTMENT (OUTPATIENT)
Dept: MRI IMAGING | Facility: CLINIC | Age: 40
End: 2025-05-30
Payer: MEDICAID

## 2025-05-30 PROCEDURE — 70551 MRI BRAIN STEM W/O DYE: CPT

## 2025-05-31 ENCOUNTER — TRANSCRIPTION ENCOUNTER (OUTPATIENT)
Age: 40
End: 2025-05-31

## 2025-06-04 ENCOUNTER — TRANSCRIPTION ENCOUNTER (OUTPATIENT)
Age: 40
End: 2025-06-04

## 2025-06-06 ENCOUNTER — TRANSCRIPTION ENCOUNTER (OUTPATIENT)
Age: 40
End: 2025-06-06

## 2025-06-11 ENCOUNTER — APPOINTMENT (OUTPATIENT)
Dept: MRI IMAGING | Facility: CLINIC | Age: 40
End: 2025-06-11
Payer: MEDICAID

## 2025-06-11 ENCOUNTER — APPOINTMENT (OUTPATIENT)
Dept: MRI IMAGING | Facility: CLINIC | Age: 40
End: 2025-06-11

## 2025-06-11 ENCOUNTER — APPOINTMENT (OUTPATIENT)
Age: 40
End: 2025-06-11

## 2025-06-11 PROCEDURE — 70549 MR ANGIOGRAPH NECK W/O&W/DYE: CPT

## 2025-06-11 PROCEDURE — A9585: CPT

## 2025-06-11 PROCEDURE — 70546 MR ANGIOGRAPH HEAD W/O&W/DYE: CPT | Mod: 76

## 2025-06-13 ENCOUNTER — TRANSCRIPTION ENCOUNTER (OUTPATIENT)
Age: 40
End: 2025-06-13

## 2025-06-20 ENCOUNTER — APPOINTMENT (OUTPATIENT)
Dept: OTOLARYNGOLOGY | Facility: CLINIC | Age: 40
End: 2025-06-20

## 2025-06-23 ENCOUNTER — TRANSCRIPTION ENCOUNTER (OUTPATIENT)
Age: 40
End: 2025-06-23

## 2025-06-24 ENCOUNTER — APPOINTMENT (OUTPATIENT)
Dept: DERMATOLOGY | Facility: CLINIC | Age: 40
End: 2025-06-24
Payer: MEDICAID

## 2025-06-24 VITALS — BODY MASS INDEX: 40.32 KG/M2 | WEIGHT: 200 LBS | HEIGHT: 59 IN

## 2025-06-24 PROBLEM — D22.9 MULTIPLE BENIGN NEVI: Status: ACTIVE | Noted: 2025-06-24

## 2025-06-24 PROBLEM — L71.9 ROSACEA: Status: ACTIVE | Noted: 2025-06-24

## 2025-06-24 PROBLEM — L28.0 FRICTIONAL LICHENOID DERMATOSIS: Status: ACTIVE | Noted: 2025-06-24

## 2025-06-24 PROCEDURE — 99204 OFFICE O/P NEW MOD 45 MIN: CPT

## 2025-06-24 RX ORDER — METRONIDAZOLE 7.5 MG/G
0.75 CREAM TOPICAL
Qty: 1 | Refills: 1 | Status: ACTIVE | COMMUNITY
Start: 2025-06-24 | End: 1900-01-01

## 2025-06-24 RX ORDER — MOMETASONE FUROATE 1 MG/G
0.1 OINTMENT TOPICAL
Qty: 1 | Refills: 1 | Status: ACTIVE | COMMUNITY
Start: 2025-06-24 | End: 1900-01-01

## 2025-06-25 ENCOUNTER — APPOINTMENT (OUTPATIENT)
Dept: INTERNAL MEDICINE | Facility: CLINIC | Age: 40
End: 2025-06-25
Payer: MEDICAID

## 2025-06-25 VITALS
HEART RATE: 86 BPM | BODY MASS INDEX: 39.72 KG/M2 | HEIGHT: 59 IN | WEIGHT: 197 LBS | OXYGEN SATURATION: 98 % | DIASTOLIC BLOOD PRESSURE: 74 MMHG | SYSTOLIC BLOOD PRESSURE: 107 MMHG | TEMPERATURE: 98 F

## 2025-06-25 PROCEDURE — 99214 OFFICE O/P EST MOD 30 MIN: CPT

## 2025-06-26 ENCOUNTER — NON-APPOINTMENT (OUTPATIENT)
Age: 40
End: 2025-06-26

## 2025-06-26 ENCOUNTER — APPOINTMENT (OUTPATIENT)
Dept: OPHTHALMOLOGY | Facility: CLINIC | Age: 40
End: 2025-06-26
Payer: MEDICAID

## 2025-06-26 PROCEDURE — 92004 COMPRE OPH EXAM NEW PT 1/>: CPT | Mod: 25

## 2025-06-26 PROCEDURE — 92015 DETERMINE REFRACTIVE STATE: CPT | Mod: NC

## 2025-07-01 ENCOUNTER — TRANSCRIPTION ENCOUNTER (OUTPATIENT)
Age: 40
End: 2025-07-01

## 2025-07-02 ENCOUNTER — TRANSCRIPTION ENCOUNTER (OUTPATIENT)
Age: 40
End: 2025-07-02

## 2025-07-07 ENCOUNTER — TRANSCRIPTION ENCOUNTER (OUTPATIENT)
Age: 40
End: 2025-07-07

## 2025-07-09 ENCOUNTER — TRANSCRIPTION ENCOUNTER (OUTPATIENT)
Age: 40
End: 2025-07-09

## 2025-07-10 ENCOUNTER — APPOINTMENT (OUTPATIENT)
Age: 40
End: 2025-07-10
Payer: MEDICAID

## 2025-07-10 ENCOUNTER — TRANSCRIPTION ENCOUNTER (OUTPATIENT)
Age: 40
End: 2025-07-10

## 2025-07-10 VITALS
HEIGHT: 59 IN | BODY MASS INDEX: 39.31 KG/M2 | DIASTOLIC BLOOD PRESSURE: 76 MMHG | RESPIRATION RATE: 16 BRPM | WEIGHT: 195 LBS | SYSTOLIC BLOOD PRESSURE: 113 MMHG | HEART RATE: 86 BPM | OXYGEN SATURATION: 96 %

## 2025-07-10 PROCEDURE — 99213 OFFICE O/P EST LOW 20 MIN: CPT

## 2025-07-11 ENCOUNTER — APPOINTMENT (OUTPATIENT)
Dept: RHEUMATOLOGY | Facility: CLINIC | Age: 40
End: 2025-07-11
Payer: MEDICAID

## 2025-07-11 ENCOUNTER — TRANSCRIPTION ENCOUNTER (OUTPATIENT)
Age: 40
End: 2025-07-11

## 2025-07-11 VITALS
SYSTOLIC BLOOD PRESSURE: 128 MMHG | OXYGEN SATURATION: 98 % | WEIGHT: 196 LBS | RESPIRATION RATE: 16 BRPM | DIASTOLIC BLOOD PRESSURE: 73 MMHG | TEMPERATURE: 98.1 F | HEART RATE: 69 BPM | BODY MASS INDEX: 39.59 KG/M2

## 2025-07-11 PROCEDURE — G2211 COMPLEX E/M VISIT ADD ON: CPT | Mod: NC

## 2025-07-11 PROCEDURE — 99214 OFFICE O/P EST MOD 30 MIN: CPT

## 2025-07-14 ENCOUNTER — TRANSCRIPTION ENCOUNTER (OUTPATIENT)
Age: 40
End: 2025-07-14

## 2025-07-18 ENCOUNTER — TRANSCRIPTION ENCOUNTER (OUTPATIENT)
Age: 40
End: 2025-07-18

## 2025-07-21 ENCOUNTER — APPOINTMENT (OUTPATIENT)
Dept: CARDIOLOGY | Facility: CLINIC | Age: 40
End: 2025-07-21

## 2025-07-21 DIAGNOSIS — I77.6 ARTERITIS, UNSPECIFIED: ICD-10-CM

## 2025-07-21 DIAGNOSIS — I72.9 ANEURYSM OF UNSPECIFIED SITE: ICD-10-CM

## 2025-07-21 DIAGNOSIS — Q79.62 HYPERMOBILE EHLERS-DANLOS SYNDROME: ICD-10-CM

## 2025-07-21 DIAGNOSIS — Q79.60 EHLERS-DANLOS SYNDROME, UNSP: ICD-10-CM

## 2025-07-21 DIAGNOSIS — G90.9 DISORDER OF THE AUTONOMIC NERVOUS SYSTEM, UNSPECIFIED: ICD-10-CM

## 2025-07-21 DIAGNOSIS — Z13.6 ENCOUNTER FOR SCREENING FOR CARDIOVASCULAR DISORDERS: ICD-10-CM

## 2025-07-22 ENCOUNTER — APPOINTMENT (OUTPATIENT)
Dept: SLEEP CENTER | Facility: CLINIC | Age: 40
End: 2025-07-22

## 2025-07-22 ENCOUNTER — APPOINTMENT (OUTPATIENT)
Dept: OTOLARYNGOLOGY | Facility: CLINIC | Age: 40
End: 2025-07-22
Payer: MEDICAID

## 2025-07-22 VITALS
HEIGHT: 59 IN | WEIGHT: 200 LBS | HEART RATE: 67 BPM | BODY MASS INDEX: 40.32 KG/M2 | SYSTOLIC BLOOD PRESSURE: 96 MMHG | DIASTOLIC BLOOD PRESSURE: 65 MMHG

## 2025-07-22 DIAGNOSIS — B37.0 CANDIDAL STOMATITIS: ICD-10-CM

## 2025-07-22 PROCEDURE — 31579 LARYNGOSCOPY TELESCOPIC: CPT

## 2025-07-22 PROCEDURE — 99214 OFFICE O/P EST MOD 30 MIN: CPT | Mod: 25

## 2025-07-22 RX ORDER — NYSTATIN 100000 [USP'U]/ML
100000 SUSPENSION ORAL 3 TIMES DAILY
Qty: 1 | Refills: 1 | Status: ACTIVE | COMMUNITY
Start: 2025-07-22 | End: 1900-01-01

## 2025-07-23 ENCOUNTER — TRANSCRIPTION ENCOUNTER (OUTPATIENT)
Age: 40
End: 2025-07-23

## 2025-07-29 ENCOUNTER — APPOINTMENT (OUTPATIENT)
Dept: HEPATOLOGY | Facility: CLINIC | Age: 40
End: 2025-07-29

## 2025-07-30 ENCOUNTER — APPOINTMENT (OUTPATIENT)
Dept: NEUROLOGY | Facility: CLINIC | Age: 40
End: 2025-07-30

## 2025-07-30 ENCOUNTER — TRANSCRIPTION ENCOUNTER (OUTPATIENT)
Age: 40
End: 2025-07-30

## 2025-08-06 ENCOUNTER — APPOINTMENT (OUTPATIENT)
Dept: NEUROLOGY | Facility: CLINIC | Age: 40
End: 2025-08-06

## 2025-08-16 ENCOUNTER — APPOINTMENT (OUTPATIENT)
Dept: SLEEP CENTER | Facility: CLINIC | Age: 40
End: 2025-08-16

## 2025-08-16 PROCEDURE — 95810 POLYSOM 6/> YRS 4/> PARAM: CPT | Mod: 26

## 2025-08-19 ENCOUNTER — APPOINTMENT (OUTPATIENT)
Dept: DERMATOLOGY | Facility: CLINIC | Age: 40
End: 2025-08-19

## 2025-08-20 ENCOUNTER — APPOINTMENT (OUTPATIENT)
Dept: RHEUMATOLOGY | Facility: CLINIC | Age: 40
End: 2025-08-20
Payer: MEDICAID

## 2025-08-20 VITALS
WEIGHT: 200 LBS | DIASTOLIC BLOOD PRESSURE: 60 MMHG | HEIGHT: 59 IN | SYSTOLIC BLOOD PRESSURE: 122 MMHG | BODY MASS INDEX: 40.32 KG/M2

## 2025-08-20 DIAGNOSIS — D89.89 OTHER SPECIFIED DISORDERS INVOLVING THE IMMUNE MECHANISM, NOT ELSEWHERE CLASSIFIED: ICD-10-CM

## 2025-08-20 DIAGNOSIS — J84.9 INTERSTITIAL PULMONARY DISEASE, UNSPECIFIED: ICD-10-CM

## 2025-08-20 DIAGNOSIS — M54.12 RADICULOPATHY, CERVICAL REGION: ICD-10-CM

## 2025-08-20 DIAGNOSIS — M06.9 RHEUMATOID ARTHRITIS, UNSPECIFIED: ICD-10-CM

## 2025-08-20 DIAGNOSIS — G63 OTHER SPECIFIED DISORDERS INVOLVING THE IMMUNE MECHANISM, NOT ELSEWHERE CLASSIFIED: ICD-10-CM

## 2025-08-20 DIAGNOSIS — M79.7 FIBROMYALGIA: ICD-10-CM

## 2025-08-20 DIAGNOSIS — M25.511 PAIN IN RIGHT SHOULDER: ICD-10-CM

## 2025-08-20 DIAGNOSIS — G89.29 PAIN IN RIGHT SHOULDER: ICD-10-CM

## 2025-08-20 PROCEDURE — 99215 OFFICE O/P EST HI 40 MIN: CPT

## 2025-08-20 PROCEDURE — G2211 COMPLEX E/M VISIT ADD ON: CPT | Mod: NC

## 2025-08-22 ENCOUNTER — APPOINTMENT (OUTPATIENT)
Dept: NEUROLOGY | Facility: CLINIC | Age: 40
End: 2025-08-22

## 2025-08-22 ENCOUNTER — TRANSCRIPTION ENCOUNTER (OUTPATIENT)
Age: 40
End: 2025-08-22

## 2025-08-25 ENCOUNTER — NON-APPOINTMENT (OUTPATIENT)
Age: 40
End: 2025-08-25

## 2025-08-25 ENCOUNTER — TRANSCRIPTION ENCOUNTER (OUTPATIENT)
Age: 40
End: 2025-08-25

## 2025-08-25 ENCOUNTER — APPOINTMENT (OUTPATIENT)
Dept: CARDIOLOGY | Facility: CLINIC | Age: 40
End: 2025-08-25
Payer: MEDICAID

## 2025-08-25 VITALS
BODY MASS INDEX: 39.39 KG/M2 | OXYGEN SATURATION: 99 % | HEART RATE: 84 BPM | TEMPERATURE: 98.3 F | SYSTOLIC BLOOD PRESSURE: 130 MMHG | DIASTOLIC BLOOD PRESSURE: 90 MMHG | WEIGHT: 195 LBS

## 2025-08-25 DIAGNOSIS — Q79.60 EHLERS-DANLOS SYNDROME, UNSP: ICD-10-CM

## 2025-08-25 DIAGNOSIS — G90.A POSTURAL ORTHOSTATIC TACHYCARDIA SYNDROME [POTS]: ICD-10-CM

## 2025-08-25 DIAGNOSIS — Z01.810 ENCOUNTER FOR PREPROCEDURAL CARDIOVASCULAR EXAMINATION: ICD-10-CM

## 2025-08-25 DIAGNOSIS — Q79.62 HYPERMOBILE EHLERS-DANLOS SYNDROME: ICD-10-CM

## 2025-08-25 DIAGNOSIS — Z13.6 ENCOUNTER FOR SCREENING FOR CARDIOVASCULAR DISORDERS: ICD-10-CM

## 2025-08-25 PROCEDURE — 99215 OFFICE O/P EST HI 40 MIN: CPT | Mod: 25

## 2025-08-25 PROCEDURE — 93000 ELECTROCARDIOGRAM COMPLETE: CPT

## 2025-08-28 ENCOUNTER — TRANSCRIPTION ENCOUNTER (OUTPATIENT)
Age: 40
End: 2025-08-28

## 2025-08-29 ENCOUNTER — TRANSCRIPTION ENCOUNTER (OUTPATIENT)
Age: 40
End: 2025-08-29

## 2025-09-02 ENCOUNTER — TRANSCRIPTION ENCOUNTER (OUTPATIENT)
Age: 40
End: 2025-09-02

## 2025-09-03 ENCOUNTER — TRANSCRIPTION ENCOUNTER (OUTPATIENT)
Age: 40
End: 2025-09-03

## 2025-09-04 ENCOUNTER — TRANSCRIPTION ENCOUNTER (OUTPATIENT)
Age: 40
End: 2025-09-04

## 2025-09-04 LAB
ALBUMIN SERPL ELPH-MCNC: 4.4 G/DL
ALP BLD-CCNC: 188 U/L
ALT SERPL-CCNC: 22 U/L
ANION GAP SERPL CALC-SCNC: 18 MMOL/L
AST SERPL-CCNC: 18 U/L
BASOPHILS # BLD AUTO: 0.06 K/UL
BASOPHILS NFR BLD AUTO: 0.7 %
BILIRUB SERPL-MCNC: 0.4 MG/DL
BUN SERPL-MCNC: 12 MG/DL
CALCIUM SERPL-MCNC: 9.7 MG/DL
CHLORIDE SERPL-SCNC: 103 MMOL/L
CO2 SERPL-SCNC: 20 MMOL/L
CREAT SERPL-MCNC: 0.69 MG/DL
EGFRCR SERPLBLD CKD-EPI 2021: 112 ML/MIN/1.73M2
EOSINOPHIL # BLD AUTO: 0.1 K/UL
EOSINOPHIL NFR BLD AUTO: 1.1 %
GLUCOSE SERPL-MCNC: 88 MG/DL
HCT VFR BLD CALC: 39.9 %
HGB BLD-MCNC: 12.9 G/DL
IMM GRANULOCYTES NFR BLD AUTO: 0.3 %
LYMPHOCYTES # BLD AUTO: 3.37 K/UL
LYMPHOCYTES NFR BLD AUTO: 37.9 %
MAN DIFF?: NORMAL
MCHC RBC-ENTMCNC: 28 PG
MCHC RBC-ENTMCNC: 32.3 G/DL
MCV RBC AUTO: 86.7 FL
MONOCYTES # BLD AUTO: 0.41 K/UL
MONOCYTES NFR BLD AUTO: 4.6 %
NEUTROPHILS # BLD AUTO: 4.93 K/UL
NEUTROPHILS NFR BLD AUTO: 55.4 %
PLATELET # BLD AUTO: 246 K/UL
POTASSIUM SERPL-SCNC: 4.3 MMOL/L
PROT SERPL-MCNC: 7 G/DL
RBC # BLD: 4.6 M/UL
RBC # FLD: 13.5 %
SODIUM SERPL-SCNC: 141 MMOL/L
WBC # FLD AUTO: 8.9 K/UL

## 2025-09-05 ENCOUNTER — TRANSCRIPTION ENCOUNTER (OUTPATIENT)
Age: 40
End: 2025-09-05

## 2025-09-05 LAB
CK SERPL-CCNC: 135 U/L
CRP SERPL-MCNC: 9 MG/L
ERYTHROCYTE [SEDIMENTATION RATE] IN BLOOD BY WESTERGREN METHOD: 45 MM/HR
FOLATE SERPL-MCNC: 15.5 NG/ML
MYOGLOBIN SERPL-MCNC: 27 NG/ML
VIT B12 SERPL-MCNC: 449 PG/ML

## 2025-09-08 ENCOUNTER — TRANSCRIPTION ENCOUNTER (OUTPATIENT)
Age: 40
End: 2025-09-08

## 2025-09-08 DIAGNOSIS — D80.2 SELECTIVE DEFICIENCY OF IMMUNOGLOBULIN A [IGA]: ICD-10-CM

## 2025-09-08 LAB
DEPRECATED KAPPA LC FREE/LAMBDA SER: 0.75 RATIO
IGA SERPL-MCNC: 76 MG/DL
IGG SERPL-MCNC: 918 MG/DL
IGM SERPL-MCNC: 324 MG/DL
KAPPA LC CSF-MCNC: 2.66 MG/DL
KAPPA LC SERPL-MCNC: 2 MG/DL

## 2025-09-09 ENCOUNTER — APPOINTMENT (OUTPATIENT)
Dept: PULMONOLOGY | Facility: CLINIC | Age: 40
End: 2025-09-09
Payer: MEDICAID

## 2025-09-09 VITALS
BODY MASS INDEX: 40.32 KG/M2 | WEIGHT: 200 LBS | DIASTOLIC BLOOD PRESSURE: 83 MMHG | HEIGHT: 59 IN | RESPIRATION RATE: 16 BRPM | SYSTOLIC BLOOD PRESSURE: 125 MMHG | HEART RATE: 77 BPM | OXYGEN SATURATION: 96 %

## 2025-09-09 DIAGNOSIS — R93.89 ABNORMAL FINDINGS ON DIAGNOSTIC IMAGING OF OTHER SPECIFIED BODY STRUCTURES: ICD-10-CM

## 2025-09-09 DIAGNOSIS — J45.909 UNSPECIFIED ASTHMA, UNCOMPLICATED: ICD-10-CM

## 2025-09-09 DIAGNOSIS — M06.9 RHEUMATOID ARTHRITIS, UNSPECIFIED: ICD-10-CM

## 2025-09-09 LAB
ANA TITR SER: ABNORMAL
ANA TITR SER: ABNORMAL
CCP AB SER IA-ACNC: <8 U/ML
CCP AB SER QL: NEGATIVE
CENTROMERE B AB SER-ACNC: <0.2 AL
CHROMATIN AB SERPL-ACNC: 0.2 AL
DSDNA AB SER-ACNC: 1 IU/ML
ENA JO1 AB SER-ACNC: <0.2 AL
ENA RNP AB SER-ACNC: 0.2 AL
ENA SCL70 AB SER-ACNC: <0.2 AL
ENA SM AB SER-ACNC: <0.2 AL
ENA SS-A AB SER-ACNC: <0.2 AL
ENA SS-B AB SER-ACNC: <0.2 AL
RHEUMATOID FACT SERPL-ACNC: <10 IU/ML
RIBOSOMAL P AB SER-ACNC: <0.2 AL

## 2025-09-09 PROCEDURE — 99215 OFFICE O/P EST HI 40 MIN: CPT

## 2025-09-09 RX ORDER — ALBUTEROL SULFATE 90 UG/1
108 (90 BASE) INHALANT RESPIRATORY (INHALATION)
Qty: 1 | Refills: 5 | Status: ACTIVE | COMMUNITY
Start: 2025-09-09 | End: 1900-01-01

## 2025-09-11 LAB — HMGCR ANTIBODY, IGG: 4 UNITS

## 2025-09-12 ENCOUNTER — APPOINTMENT (OUTPATIENT)
Dept: HEPATOLOGY | Facility: CLINIC | Age: 40
End: 2025-09-12

## 2025-09-13 ENCOUNTER — NON-APPOINTMENT (OUTPATIENT)
Age: 40
End: 2025-09-13

## 2025-09-15 ENCOUNTER — TRANSCRIPTION ENCOUNTER (OUTPATIENT)
Age: 40
End: 2025-09-15

## 2025-09-17 ENCOUNTER — TRANSCRIPTION ENCOUNTER (OUTPATIENT)
Age: 40
End: 2025-09-17

## 2025-09-18 ENCOUNTER — TRANSCRIPTION ENCOUNTER (OUTPATIENT)
Age: 40
End: 2025-09-18

## 2025-09-20 ENCOUNTER — TRANSCRIPTION ENCOUNTER (OUTPATIENT)
Age: 40
End: 2025-09-20

## 2025-09-22 LAB — ENA SCL70 AB SER-ACNC: <0.2 AL

## 2025-09-25 PROBLEM — Z86.19 HISTORY OF CANDIDIASIS OF MOUTH: Status: RESOLVED | Noted: 2020-08-21 | Resolved: 2025-09-25

## 2025-09-26 LAB — HMGCR ANTIBODY, IGG: 5 UNITS

## (undated) DEVICE — VALVE BIOPSY BRONCHOVIDEOSCOPE

## (undated) DEVICE — FORCEP BIOPSY OVAL CUP DISP

## (undated) DEVICE — ADAPTER FIBEROPTIC BRONCHOSCOPE DUAL AXIS SWIVEL

## (undated) DEVICE — SOL ANTI FOG

## (undated) DEVICE — TUBING STRYKER HYSTEROSCOPY INFLOW OUTFLOW

## (undated) DEVICE — FORCEP BIOPSY 1.8MM JAW X 100CM DISP

## (undated) DEVICE — DRSG TAPE TRANSPORE 1"

## (undated) DEVICE — GLV 7 PROTEXIS (WHITE)

## (undated) DEVICE — DRAPE 1/2 SHEET 40X57"

## (undated) DEVICE — SOL IRR SORBITOL 3% 3000ML

## (undated) DEVICE — VENODYNE/SCD SLEEVE CALF MEDIUM

## (undated) DEVICE — TUBING SET TUR BLADDER IRRIGATION Y-TYPE 81"

## (undated) DEVICE — SOL IRR NS 0.9% 250ML

## (undated) DEVICE — TUBING CANNULA SALTER LABS NASAL ADULT 7FT

## (undated) DEVICE — GLV 8.5 PROTEXIS (WHITE)

## (undated) DEVICE — TUBING TUR 2 PRONG

## (undated) DEVICE — DRAPE TOWEL BLUE 17" X 24"

## (undated) DEVICE — SOL IRR POUR H2O 500ML

## (undated) DEVICE — GLV 7.5 PROTEXIS (WHITE)

## (undated) DEVICE — PACK BRONCHOSCOPY

## (undated) DEVICE — URETERAL CATH RED RUBBER 16FR (ORANGE)

## (undated) DEVICE — SOL IRR POUR NS 0.9% 1500ML

## (undated) DEVICE — GOWN LG

## (undated) DEVICE — SOL INJ NS 0.9% 500ML 1-PORT

## (undated) DEVICE — SUTURE REMOVAL KIT

## (undated) DEVICE — VALVE SUCTION EVIS 160/200/240

## (undated) DEVICE — MASK SURGICAL WITH EYESHIELD ANTIFOG (ORANGE)

## (undated) DEVICE — LABELS BLANK W PEN

## (undated) DEVICE — LAP PAD W RING 18 X 18"

## (undated) DEVICE — PACK PERI GYN

## (undated) DEVICE — ELCTR CUTTING LOOP MONOPOLAR ANGLED 24F

## (undated) DEVICE — SYR LUER SLIP TIP 30CC

## (undated) DEVICE — DRAPE LEGGINGS 6" CUFF 28X43"

## (undated) DEVICE — FORCEP BIOPSY BRONCHOSCOPE DISP

## (undated) DEVICE — DRAPE LIGHT HANDLE COVER (BLUE)

## (undated) DEVICE — WARMING BLANKET UPPER ADULT

## (undated) DEVICE — DRAPE 3/4 SHEET 52X76"

## (undated) DEVICE — TRAP SPECIMEN SPUTUM 40CC

## (undated) DEVICE — PREP BETADINE SPONGE STICKS